# Patient Record
Sex: MALE | Race: WHITE | NOT HISPANIC OR LATINO | Employment: UNEMPLOYED | URBAN - METROPOLITAN AREA
[De-identification: names, ages, dates, MRNs, and addresses within clinical notes are randomized per-mention and may not be internally consistent; named-entity substitution may affect disease eponyms.]

---

## 2017-06-06 ENCOUNTER — HOSPITAL ENCOUNTER (EMERGENCY)
Facility: HOSPITAL | Age: 47
Discharge: HOME | End: 2017-06-06
Attending: EMERGENCY MEDICINE | Admitting: EMERGENCY MEDICINE
Payer: COMMERCIAL

## 2017-06-06 ENCOUNTER — APPOINTMENT (EMERGENCY)
Dept: RADIOLOGY | Facility: HOSPITAL | Age: 47
End: 2017-06-06
Payer: COMMERCIAL

## 2017-06-06 VITALS
DIASTOLIC BLOOD PRESSURE: 86 MMHG | WEIGHT: 140 LBS | HEART RATE: 85 BPM | SYSTOLIC BLOOD PRESSURE: 139 MMHG | OXYGEN SATURATION: 99 % | RESPIRATION RATE: 18 BRPM | TEMPERATURE: 98.9 F | HEIGHT: 67 IN | BODY MASS INDEX: 21.97 KG/M2

## 2017-06-06 DIAGNOSIS — K57.92 DIVERTICULITIS: Primary | ICD-10-CM

## 2017-06-06 LAB
ALBUMIN SERPL BCP-MCNC: 3.2 G/DL (ref 3.5–5)
ALP SERPL-CCNC: 50 U/L (ref 46–116)
ALT SERPL W P-5'-P-CCNC: 12 U/L (ref 12–78)
ANION GAP SERPL CALCULATED.3IONS-SCNC: 8 MMOL/L (ref 4–13)
AST SERPL W P-5'-P-CCNC: 9 U/L (ref 5–45)
BACTERIA UR QL AUTO: ABNORMAL /HPF
BASOPHILS # BLD AUTO: 0 THOUSANDS/ΜL (ref 0–0.1)
BASOPHILS NFR BLD AUTO: 0 % (ref 0–1)
BILIRUB SERPL-MCNC: 0.4 MG/DL (ref 0.2–1)
BILIRUB UR QL STRIP: NEGATIVE
BUN SERPL-MCNC: 8 MG/DL (ref 5–25)
CALCIUM SERPL-MCNC: 8.6 MG/DL (ref 8.3–10.1)
CHLORIDE SERPL-SCNC: 103 MMOL/L (ref 100–108)
CLARITY UR: CLEAR
CO2 SERPL-SCNC: 29 MMOL/L (ref 21–32)
COLOR UR: ABNORMAL
CREAT SERPL-MCNC: 0.84 MG/DL (ref 0.6–1.3)
EOSINOPHIL # BLD AUTO: 0 THOUSAND/ΜL (ref 0–0.61)
EOSINOPHIL NFR BLD AUTO: 1 % (ref 0–6)
ERYTHROCYTE [DISTWIDTH] IN BLOOD BY AUTOMATED COUNT: 13.3 % (ref 11.6–15.1)
GFR SERPL CREATININE-BSD FRML MDRD: >60 ML/MIN/1.73SQ M
GLUCOSE SERPL-MCNC: 103 MG/DL (ref 65–140)
GLUCOSE UR STRIP-MCNC: NEGATIVE MG/DL
HCT VFR BLD AUTO: 44 % (ref 42–52)
HGB BLD-MCNC: 14.9 G/DL (ref 14–18)
HGB UR QL STRIP.AUTO: ABNORMAL
KETONES UR STRIP-MCNC: NEGATIVE MG/DL
LEUKOCYTE ESTERASE UR QL STRIP: NEGATIVE
LIPASE SERPL-CCNC: 175 U/L (ref 73–393)
LYMPHOCYTES # BLD AUTO: 1.3 THOUSANDS/ΜL (ref 0.6–4.47)
LYMPHOCYTES NFR BLD AUTO: 19 % (ref 14–44)
MCH RBC QN AUTO: 31 PG (ref 27–31)
MCHC RBC AUTO-ENTMCNC: 34 G/DL (ref 31.4–37.4)
MCV RBC AUTO: 91 FL (ref 82–98)
MONOCYTES # BLD AUTO: 0.7 THOUSAND/ΜL (ref 0.17–1.22)
MONOCYTES NFR BLD AUTO: 11 % (ref 4–12)
NEUTROPHILS # BLD AUTO: 4.5 THOUSANDS/ΜL (ref 1.85–7.62)
NEUTS SEG NFR BLD AUTO: 69 % (ref 43–75)
NITRITE UR QL STRIP: NEGATIVE
NON-SQ EPI CELLS URNS QL MICRO: ABNORMAL /HPF
NRBC BLD AUTO-RTO: 0 /100 WBCS
PH UR STRIP.AUTO: 6 [PH] (ref 5–9)
PLATELET # BLD AUTO: 198 THOUSANDS/UL (ref 130–400)
PMV BLD AUTO: 6.8 FL (ref 8.9–12.7)
POTASSIUM SERPL-SCNC: 4.3 MMOL/L (ref 3.5–5.3)
PROT SERPL-MCNC: 6.7 G/DL (ref 6.4–8.2)
PROT UR STRIP-MCNC: NEGATIVE MG/DL
RBC # BLD AUTO: 4.82 MILLION/UL (ref 4.7–6.1)
RBC #/AREA URNS AUTO: ABNORMAL /HPF
SODIUM SERPL-SCNC: 140 MMOL/L (ref 136–145)
SP GR UR STRIP.AUTO: <=1.005 (ref 1–1.03)
UROBILINOGEN UR QL STRIP.AUTO: 0.2 E.U./DL
WBC # BLD AUTO: 6.6 THOUSAND/UL (ref 4.8–10.8)
WBC #/AREA URNS AUTO: ABNORMAL /HPF

## 2017-06-06 PROCEDURE — 96360 HYDRATION IV INFUSION INIT: CPT

## 2017-06-06 PROCEDURE — 81001 URINALYSIS AUTO W/SCOPE: CPT | Performed by: EMERGENCY MEDICINE

## 2017-06-06 PROCEDURE — 74177 CT ABD & PELVIS W/CONTRAST: CPT

## 2017-06-06 PROCEDURE — 36415 COLL VENOUS BLD VENIPUNCTURE: CPT | Performed by: EMERGENCY MEDICINE

## 2017-06-06 PROCEDURE — 80053 COMPREHEN METABOLIC PANEL: CPT | Performed by: EMERGENCY MEDICINE

## 2017-06-06 PROCEDURE — 99284 EMERGENCY DEPT VISIT MOD MDM: CPT

## 2017-06-06 PROCEDURE — 85025 COMPLETE CBC W/AUTO DIFF WBC: CPT | Performed by: EMERGENCY MEDICINE

## 2017-06-06 PROCEDURE — 96361 HYDRATE IV INFUSION ADD-ON: CPT

## 2017-06-06 PROCEDURE — 83690 ASSAY OF LIPASE: CPT | Performed by: EMERGENCY MEDICINE

## 2017-06-06 RX ORDER — METRONIDAZOLE 500 MG/1
500 TABLET ORAL ONCE
Status: COMPLETED | OUTPATIENT
Start: 2017-06-06 | End: 2017-06-06

## 2017-06-06 RX ORDER — METRONIDAZOLE 500 MG/1
500 TABLET ORAL 3 TIMES DAILY
Qty: 30 TABLET | Refills: 0 | Status: SHIPPED | OUTPATIENT
Start: 2017-06-06 | End: 2017-06-16

## 2017-06-06 RX ORDER — MORPHINE SULFATE 2 MG/ML
2 INJECTION, SOLUTION INTRAMUSCULAR; INTRAVENOUS ONCE
Status: DISCONTINUED | OUTPATIENT
Start: 2017-06-06 | End: 2017-06-06 | Stop reason: HOSPADM

## 2017-06-06 RX ORDER — LEVOFLOXACIN 500 MG/1
500 TABLET, FILM COATED ORAL ONCE
Status: COMPLETED | OUTPATIENT
Start: 2017-06-06 | End: 2017-06-06

## 2017-06-06 RX ORDER — HYDROCODONE BITARTRATE AND ACETAMINOPHEN 5; 325 MG/1; MG/1
1 TABLET ORAL EVERY 8 HOURS PRN
Qty: 10 TABLET | Refills: 0 | Status: SHIPPED | OUTPATIENT
Start: 2017-06-06 | End: 2017-06-09

## 2017-06-06 RX ORDER — LEVOFLOXACIN 500 MG/1
500 TABLET, FILM COATED ORAL DAILY
Qty: 10 TABLET | Refills: 0 | Status: SHIPPED | OUTPATIENT
Start: 2017-06-06 | End: 2017-06-16

## 2017-06-06 RX ADMIN — LEVOFLOXACIN 500 MG: 500 TABLET, FILM COATED ORAL at 19:34

## 2017-06-06 RX ADMIN — METRONIDAZOLE 500 MG: 500 TABLET ORAL at 19:34

## 2017-06-06 RX ADMIN — IOHEXOL 100 ML: 350 INJECTION, SOLUTION INTRAVENOUS at 18:51

## 2017-06-06 RX ADMIN — SODIUM CHLORIDE 1000 ML: 0.9 INJECTION, SOLUTION INTRAVENOUS at 17:56

## 2017-06-07 ENCOUNTER — ALLSCRIPTS OFFICE VISIT (OUTPATIENT)
Dept: OTHER | Facility: OTHER | Age: 47
End: 2017-06-07

## 2017-07-06 ENCOUNTER — ALLSCRIPTS OFFICE VISIT (OUTPATIENT)
Dept: OTHER | Facility: OTHER | Age: 47
End: 2017-07-06

## 2017-08-14 ENCOUNTER — GENERIC CONVERSION - ENCOUNTER (OUTPATIENT)
Dept: OTHER | Facility: OTHER | Age: 47
End: 2017-08-14

## 2017-08-14 ENCOUNTER — ANESTHESIA EVENT (OUTPATIENT)
Dept: GASTROENTEROLOGY | Facility: AMBULARY SURGERY CENTER | Age: 47
End: 2017-08-14
Payer: COMMERCIAL

## 2017-08-14 ENCOUNTER — HOSPITAL ENCOUNTER (OUTPATIENT)
Facility: AMBULARY SURGERY CENTER | Age: 47
Setting detail: OUTPATIENT SURGERY
Discharge: HOME | End: 2017-08-14
Attending: INTERNAL MEDICINE | Admitting: INTERNAL MEDICINE
Payer: COMMERCIAL

## 2017-08-14 VITALS
OXYGEN SATURATION: 97 % | WEIGHT: 135 LBS | SYSTOLIC BLOOD PRESSURE: 121 MMHG | TEMPERATURE: 97.1 F | RESPIRATION RATE: 16 BRPM | HEART RATE: 59 BPM | DIASTOLIC BLOOD PRESSURE: 74 MMHG | BODY MASS INDEX: 21.19 KG/M2 | HEIGHT: 67 IN

## 2017-08-14 DIAGNOSIS — K57.32 DIVERTICULITIS OF LARGE INTESTINE WITHOUT PERFORATION OR ABSCESS WITHOUT BLEEDING: ICD-10-CM

## 2017-08-14 PROCEDURE — 88305 TISSUE EXAM BY PATHOLOGIST: CPT | Performed by: INTERNAL MEDICINE

## 2017-08-14 RX ORDER — SODIUM CHLORIDE, SODIUM LACTATE, POTASSIUM CHLORIDE, CALCIUM CHLORIDE 600; 310; 30; 20 MG/100ML; MG/100ML; MG/100ML; MG/100ML
125 INJECTION, SOLUTION INTRAVENOUS CONTINUOUS
Status: DISCONTINUED | OUTPATIENT
Start: 2017-08-14 | End: 2017-08-14 | Stop reason: HOSPADM

## 2017-08-14 RX ORDER — PROPOFOL 10 MG/ML
INJECTION, EMULSION INTRAVENOUS CONTINUOUS PRN
Status: DISCONTINUED | OUTPATIENT
Start: 2017-08-14 | End: 2017-08-14 | Stop reason: SURG

## 2017-08-14 RX ORDER — PROPOFOL 10 MG/ML
INJECTION, EMULSION INTRAVENOUS AS NEEDED
Status: DISCONTINUED | OUTPATIENT
Start: 2017-08-14 | End: 2017-08-14 | Stop reason: SURG

## 2017-08-14 RX ADMIN — PROPOFOL 100 MG: 10 INJECTION, EMULSION INTRAVENOUS at 12:40

## 2017-08-14 RX ADMIN — PROPOFOL 90 MCG/KG/MIN: 10 INJECTION, EMULSION INTRAVENOUS at 12:40

## 2017-08-14 RX ADMIN — SODIUM CHLORIDE, SODIUM LACTATE, POTASSIUM CHLORIDE, AND CALCIUM CHLORIDE: .6; .31; .03; .02 INJECTION, SOLUTION INTRAVENOUS at 12:31

## 2017-08-21 ENCOUNTER — GENERIC CONVERSION - ENCOUNTER (OUTPATIENT)
Dept: OTHER | Facility: OTHER | Age: 47
End: 2017-08-21

## 2017-09-20 ENCOUNTER — ALLSCRIPTS OFFICE VISIT (OUTPATIENT)
Dept: OTHER | Facility: OTHER | Age: 47
End: 2017-09-20

## 2017-09-22 ENCOUNTER — APPOINTMENT (EMERGENCY)
Dept: RADIOLOGY | Facility: HOSPITAL | Age: 47
DRG: 282 | End: 2017-09-22
Payer: COMMERCIAL

## 2017-09-22 ENCOUNTER — HOSPITAL ENCOUNTER (INPATIENT)
Facility: HOSPITAL | Age: 47
LOS: 2 days | DRG: 282 | End: 2017-09-25
Attending: EMERGENCY MEDICINE | Admitting: INTERNAL MEDICINE
Payer: COMMERCIAL

## 2017-09-22 DIAGNOSIS — R07.9 CHEST PAIN: Primary | ICD-10-CM

## 2017-09-22 DIAGNOSIS — K63.9 DISORDER OF JEJUNUM: ICD-10-CM

## 2017-09-22 PROBLEM — J20.9 ACUTE BRONCHITIS: Status: ACTIVE | Noted: 2017-09-22

## 2017-09-22 LAB
ANION GAP SERPL CALCULATED.3IONS-SCNC: 7 MMOL/L (ref 4–13)
BASOPHILS # BLD AUTO: 0 THOUSANDS/ΜL (ref 0–0.1)
BASOPHILS NFR BLD AUTO: 0 % (ref 0–1)
BUN SERPL-MCNC: 17 MG/DL (ref 5–25)
CALCIUM SERPL-MCNC: 9.2 MG/DL (ref 8.3–10.1)
CHLORIDE SERPL-SCNC: 104 MMOL/L (ref 100–108)
CHOLEST SERPL-MCNC: 174 MG/DL (ref 50–200)
CO2 SERPL-SCNC: 28 MMOL/L (ref 21–32)
CREAT SERPL-MCNC: 0.9 MG/DL (ref 0.6–1.3)
EOSINOPHIL # BLD AUTO: 0.1 THOUSAND/ΜL (ref 0–0.61)
EOSINOPHIL NFR BLD AUTO: 1 % (ref 0–6)
ERYTHROCYTE [DISTWIDTH] IN BLOOD BY AUTOMATED COUNT: 14.2 % (ref 11.6–15.1)
GFR SERPL CREATININE-BSD FRML MDRD: 101 ML/MIN/1.73SQ M
GLUCOSE SERPL-MCNC: 117 MG/DL (ref 65–140)
HCT VFR BLD AUTO: 47.4 % (ref 42–52)
HDLC SERPL-MCNC: 50 MG/DL (ref 40–60)
HGB BLD-MCNC: 15.9 G/DL (ref 14–18)
LDLC SERPL CALC-MCNC: 110 MG/DL (ref 0–100)
LIPASE SERPL-CCNC: 313 U/L (ref 73–393)
LYMPHOCYTES # BLD AUTO: 1.8 THOUSANDS/ΜL (ref 0.6–4.47)
LYMPHOCYTES NFR BLD AUTO: 17 % (ref 14–44)
MCH RBC QN AUTO: 30.7 PG (ref 27–31)
MCHC RBC AUTO-ENTMCNC: 33.6 G/DL (ref 31.4–37.4)
MCV RBC AUTO: 91 FL (ref 82–98)
MONOCYTES # BLD AUTO: 0.6 THOUSAND/ΜL (ref 0.17–1.22)
MONOCYTES NFR BLD AUTO: 6 % (ref 4–12)
NEUTROPHILS # BLD AUTO: 8.2 THOUSANDS/ΜL (ref 1.85–7.62)
NEUTS SEG NFR BLD AUTO: 77 % (ref 43–75)
NRBC BLD AUTO-RTO: 0 /100 WBCS
NT-PROBNP SERPL-MCNC: 86 PG/ML
PLATELET # BLD AUTO: 226 THOUSANDS/UL (ref 130–400)
PMV BLD AUTO: 7 FL (ref 8.9–12.7)
POTASSIUM SERPL-SCNC: 4.2 MMOL/L (ref 3.5–5.3)
RBC # BLD AUTO: 5.19 MILLION/UL (ref 4.7–6.1)
SODIUM SERPL-SCNC: 139 MMOL/L (ref 136–145)
TRIGL SERPL-MCNC: 68 MG/DL
TROPONIN I SERPL-MCNC: 0.02 NG/ML
TROPONIN I SERPL-MCNC: 0.74 NG/ML
TROPONIN I SERPL-MCNC: 3.42 NG/ML
TROPONIN I SERPL-MCNC: 4.03 NG/ML
WBC # BLD AUTO: 10.7 THOUSAND/UL (ref 4.8–10.8)

## 2017-09-22 PROCEDURE — 71010 HB CHEST X-RAY 1 VIEW FRONTAL (PORTABLE): CPT

## 2017-09-22 PROCEDURE — 84484 ASSAY OF TROPONIN QUANT: CPT | Performed by: INTERNAL MEDICINE

## 2017-09-22 PROCEDURE — 99285 EMERGENCY DEPT VISIT HI MDM: CPT

## 2017-09-22 PROCEDURE — 96374 THER/PROPH/DIAG INJ IV PUSH: CPT

## 2017-09-22 PROCEDURE — 93005 ELECTROCARDIOGRAM TRACING: CPT | Performed by: EMERGENCY MEDICINE

## 2017-09-22 PROCEDURE — 83690 ASSAY OF LIPASE: CPT | Performed by: EMERGENCY MEDICINE

## 2017-09-22 PROCEDURE — 96375 TX/PRO/DX INJ NEW DRUG ADDON: CPT

## 2017-09-22 PROCEDURE — 94760 N-INVAS EAR/PLS OXIMETRY 1: CPT

## 2017-09-22 PROCEDURE — 80048 BASIC METABOLIC PNL TOTAL CA: CPT | Performed by: EMERGENCY MEDICINE

## 2017-09-22 PROCEDURE — 80061 LIPID PANEL: CPT | Performed by: INTERNAL MEDICINE

## 2017-09-22 PROCEDURE — 83880 ASSAY OF NATRIURETIC PEPTIDE: CPT | Performed by: EMERGENCY MEDICINE

## 2017-09-22 PROCEDURE — 74175 CTA ABDOMEN W/CONTRAST: CPT

## 2017-09-22 PROCEDURE — 96361 HYDRATE IV INFUSION ADD-ON: CPT

## 2017-09-22 PROCEDURE — 94640 AIRWAY INHALATION TREATMENT: CPT

## 2017-09-22 PROCEDURE — 71275 CT ANGIOGRAPHY CHEST: CPT

## 2017-09-22 PROCEDURE — 85025 COMPLETE CBC W/AUTO DIFF WBC: CPT | Performed by: EMERGENCY MEDICINE

## 2017-09-22 PROCEDURE — 84484 ASSAY OF TROPONIN QUANT: CPT | Performed by: EMERGENCY MEDICINE

## 2017-09-22 PROCEDURE — 87081 CULTURE SCREEN ONLY: CPT | Performed by: INTERNAL MEDICINE

## 2017-09-22 PROCEDURE — 36415 COLL VENOUS BLD VENIPUNCTURE: CPT | Performed by: EMERGENCY MEDICINE

## 2017-09-22 RX ORDER — ONDANSETRON 2 MG/ML
4 INJECTION INTRAMUSCULAR; INTRAVENOUS ONCE
Status: COMPLETED | OUTPATIENT
Start: 2017-09-22 | End: 2017-09-22

## 2017-09-22 RX ORDER — AZITHROMYCIN 250 MG/1
250 TABLET, FILM COATED ORAL EVERY 24 HOURS
Status: DISCONTINUED | OUTPATIENT
Start: 2017-09-22 | End: 2017-09-25 | Stop reason: HOSPADM

## 2017-09-22 RX ORDER — POLYETHYLENE GLYCOL 3350 17 G/17G
17 POWDER, FOR SOLUTION ORAL DAILY
Status: DISCONTINUED | OUTPATIENT
Start: 2017-09-22 | End: 2017-09-25 | Stop reason: HOSPADM

## 2017-09-22 RX ORDER — NITROGLYCERIN 0.4 MG/1
0.4 TABLET SUBLINGUAL ONCE
Status: COMPLETED | OUTPATIENT
Start: 2017-09-22 | End: 2017-09-22

## 2017-09-22 RX ORDER — ALBUTEROL SULFATE 90 UG/1
2 AEROSOL, METERED RESPIRATORY (INHALATION) EVERY 6 HOURS PRN
COMMUNITY
End: 2018-07-05 | Stop reason: SDUPTHER

## 2017-09-22 RX ORDER — ACETAMINOPHEN 325 MG/1
650 TABLET ORAL EVERY 6 HOURS PRN
Status: DISCONTINUED | OUTPATIENT
Start: 2017-09-22 | End: 2017-09-25 | Stop reason: HOSPADM

## 2017-09-22 RX ORDER — AMOXICILLIN AND CLAVULANATE POTASSIUM 500; 125 MG/1; MG/1
1 TABLET, FILM COATED ORAL EVERY 8 HOURS SCHEDULED
COMMUNITY
End: 2017-09-28 | Stop reason: HOSPADM

## 2017-09-22 RX ORDER — MORPHINE SULFATE 4 MG/ML
4 INJECTION, SOLUTION INTRAMUSCULAR; INTRAVENOUS ONCE
Status: COMPLETED | OUTPATIENT
Start: 2017-09-22 | End: 2017-09-22

## 2017-09-22 RX ORDER — ONDANSETRON 2 MG/ML
4 INJECTION INTRAMUSCULAR; INTRAVENOUS EVERY 4 HOURS PRN
Status: DISCONTINUED | OUTPATIENT
Start: 2017-09-22 | End: 2017-09-25 | Stop reason: HOSPADM

## 2017-09-22 RX ORDER — ASPIRIN 81 MG/1
324 TABLET, CHEWABLE ORAL ONCE
Status: COMPLETED | OUTPATIENT
Start: 2017-09-22 | End: 2017-09-22

## 2017-09-22 RX ORDER — NICOTINE 21 MG/24HR
1 PATCH, TRANSDERMAL 24 HOURS TRANSDERMAL DAILY
Status: DISCONTINUED | OUTPATIENT
Start: 2017-09-22 | End: 2017-09-25 | Stop reason: HOSPADM

## 2017-09-22 RX ORDER — IPRATROPIUM BROMIDE AND ALBUTEROL SULFATE 2.5; .5 MG/3ML; MG/3ML
3 SOLUTION RESPIRATORY (INHALATION)
Status: DISCONTINUED | OUTPATIENT
Start: 2017-09-22 | End: 2017-09-25 | Stop reason: HOSPADM

## 2017-09-22 RX ORDER — PANTOPRAZOLE SODIUM 40 MG/1
40 TABLET, DELAYED RELEASE ORAL
Status: DISCONTINUED | OUTPATIENT
Start: 2017-09-23 | End: 2017-09-25 | Stop reason: HOSPADM

## 2017-09-22 RX ORDER — ATORVASTATIN CALCIUM 80 MG/1
80 TABLET, FILM COATED ORAL
Status: DISCONTINUED | OUTPATIENT
Start: 2017-09-22 | End: 2017-09-25 | Stop reason: HOSPADM

## 2017-09-22 RX ORDER — PREDNISONE 1 MG/1
TABLET ORAL DAILY
COMMUNITY
End: 2017-09-28 | Stop reason: HOSPADM

## 2017-09-22 RX ORDER — MAGNESIUM HYDROXIDE/ALUMINUM HYDROXICE/SIMETHICONE 120; 1200; 1200 MG/30ML; MG/30ML; MG/30ML
30 SUSPENSION ORAL EVERY 6 HOURS PRN
Status: DISCONTINUED | OUTPATIENT
Start: 2017-09-22 | End: 2017-09-25 | Stop reason: HOSPADM

## 2017-09-22 RX ORDER — ASPIRIN 81 MG/1
81 TABLET ORAL DAILY
Status: DISCONTINUED | OUTPATIENT
Start: 2017-09-23 | End: 2017-09-25 | Stop reason: HOSPADM

## 2017-09-22 RX ADMIN — ATORVASTATIN CALCIUM 80 MG: 80 TABLET, FILM COATED ORAL at 18:49

## 2017-09-22 RX ADMIN — NITROGLYCERIN 0.4 MG: 0.4 TABLET SUBLINGUAL at 12:57

## 2017-09-22 RX ADMIN — IPRATROPIUM BROMIDE AND ALBUTEROL SULFATE 3 ML: .5; 3 SOLUTION RESPIRATORY (INHALATION) at 21:01

## 2017-09-22 RX ADMIN — ENOXAPARIN SODIUM 20 MG: 30 INJECTION SUBCUTANEOUS at 18:49

## 2017-09-22 RX ADMIN — SODIUM CHLORIDE 1000 ML: 0.9 INJECTION, SOLUTION INTRAVENOUS at 13:01

## 2017-09-22 RX ADMIN — MORPHINE SULFATE 4 MG: 4 INJECTION, SOLUTION INTRAMUSCULAR; INTRAVENOUS at 13:01

## 2017-09-22 RX ADMIN — ENOXAPARIN SODIUM 40 MG: 40 INJECTION SUBCUTANEOUS at 17:21

## 2017-09-22 RX ADMIN — POLYETHYLENE GLYCOL 3350 17 G: 17 POWDER, FOR SOLUTION ORAL at 17:17

## 2017-09-22 RX ADMIN — ONDANSETRON 4 MG: 2 INJECTION INTRAMUSCULAR; INTRAVENOUS at 12:57

## 2017-09-22 RX ADMIN — IOHEXOL 100 ML: 350 INJECTION, SOLUTION INTRAVENOUS at 13:07

## 2017-09-22 RX ADMIN — AZITHROMYCIN 250 MG: 250 TABLET, FILM COATED ORAL at 17:20

## 2017-09-22 RX ADMIN — ASPIRIN 81 MG 324 MG: 81 TABLET ORAL at 12:59

## 2017-09-22 RX ADMIN — NICOTINE 1 PATCH: 21 PATCH, EXTENDED RELEASE TRANSDERMAL at 17:19

## 2017-09-22 NOTE — ED PROVIDER NOTES
History  Chief Complaint   Patient presents with    Chest Pain     chest pain for the past half hour, pt diagnosed yesterday with bronchitis, pt has hx of reflux but sts this is different  pt coughing up yellow mucous      Chest Pain   Pain location:  Substernal area and epigastric  Pain quality: aching    Pain radiates to:  Does not radiate  Pain severity:  Moderate  Onset quality:  Gradual  Timing:  Constant  Progression:  Worsening  Chronicity:  New  Relieved by:  Nothing  Worsened by:  Nothing tried  Ineffective treatments:  None tried  Associated symptoms: no abdominal pain, no cough, no dizziness, no dysphagia, no fever, no headache, no nausea, no numbness, no shortness of breath and no weakness    Associated symptoms comment:  Chest pain with cough patient recently diagnosed with bronchitis started on outpatient antibiotics with no improvement started with worsening pain earlier today with radiation to the mid back  No neurological deficits noted no nausea no vomiting  Patient had no diarrhea as well  Prior to Admission Medications   Prescriptions Last Dose Informant Patient Reported? Taking? albuterol (PROVENTIL HFA,VENTOLIN HFA) 90 mcg/act inhaler   Yes Yes   Sig: Inhale 2 puffs every 6 (six) hours as needed for wheezing      Facility-Administered Medications: None       Past Medical History:   Diagnosis Date    Diverticulitis     GERD (gastroesophageal reflux disease)     off medicine for past few years    Wears glasses        Past Surgical History:   Procedure Laterality Date    APPENDECTOMY  1974    COLONOSCOPY N/A 8/14/2017    Procedure: COLONOSCOPY;  Surgeon: Larissa Wallace MD;  Location: [facility]; Service: Gastroenterology    THUMB AMPUTATION Left 1980       Family History   Problem Relation Age of Onset    Heart disease Father      CABG    Heart disease Maternal Grandfather      I have reviewed and agree with the history as documented      Social History   Substance Use Topics  Smoking status: Current Every Day Smoker     Packs/day: 2 00     Years: 28 00     Types: Cigarettes    Smokeless tobacco: Never Used    Alcohol use Yes      Comment: rarely        Review of Systems   Constitutional: Negative for chills and fever  HENT: Negative for rhinorrhea, sore throat and trouble swallowing  Eyes: Negative for pain  Respiratory: Negative for cough, shortness of breath, wheezing and stridor  Cardiovascular: Positive for chest pain  Negative for leg swelling  Gastrointestinal: Negative for abdominal pain, diarrhea and nausea  Endocrine: Negative for polyuria  Genitourinary: Negative for dysuria, flank pain and urgency  Musculoskeletal: Negative for joint swelling, myalgias and neck stiffness  Skin: Negative for rash  Allergic/Immunologic: Negative for immunocompromised state  Neurological: Negative for dizziness, syncope, weakness, numbness and headaches  Psychiatric/Behavioral: Negative for confusion and suicidal ideas  All other systems reviewed and are negative  Physical Exam  ED Triage Vitals [09/22/17 1242]   Temperature Pulse Respirations Blood Pressure SpO2   97 8 °F (36 6 °C) 83 16 139/76 93 %      Temp Source Heart Rate Source Patient Position - Orthostatic VS BP Location FiO2 (%)   Oral Monitor Sitting Left arm --      Pain Score       7           Physical Exam   Constitutional: He is oriented to person, place, and time  He appears well-developed and well-nourished  HENT:   Head: Normocephalic and atraumatic  Eyes: EOM are normal  Pupils are equal, round, and reactive to light  Neck: Normal range of motion  Neck supple  Cardiovascular: Normal rate and regular rhythm  Exam reveals no friction rub  No murmur heard  Pulmonary/Chest: No respiratory distress  He has wheezes  He has no rales  Abdominal: Soft  Bowel sounds are normal  He exhibits no distension  There is no tenderness  Musculoskeletal: Normal range of motion   He exhibits no edema or tenderness  Neurological: He is alert and oriented to person, place, and time  Skin: Skin is warm  No rash noted  Psychiatric: He has a normal mood and affect  Nursing note and vitals reviewed        ED Medications  Medications   aspirin chewable tablet 324 mg (324 mg Oral Given 9/22/17 1259)   nitroglycerin (NITROSTAT) SL tablet 0 4 mg (0 4 mg Sublingual Given 9/22/17 1257)   morphine (PF) 4 mg/mL injection 4 mg (4 mg Intravenous Given 9/22/17 1301)   sodium chloride 0 9 % bolus 1,000 mL (1,000 mL Intravenous New Bag 9/22/17 1301)   ondansetron (ZOFRAN) injection 4 mg (4 mg Intravenous Given 9/22/17 1257)   iohexol (OMNIPAQUE) 350 MG/ML injection (MULTI-DOSE) 100 mL (100 mL Intravenous Given 9/22/17 1307)       Diagnostic Studies  Labs Reviewed   CBC AND DIFFERENTIAL - Abnormal        Result Value Ref Range Status    MPV 7 0 (*) 8 9 - 12 7 fL Final    Neutrophils Relative 77 (*) 43 - 75 % Final    Neutrophils Absolute 8 20 (*) 1 85 - 7 62 Thousands/µL Final    WBC 10 70  4 80 - 10 80 Thousand/uL Final    RBC 5 19  4 70 - 6 10 Million/uL Final    Hemoglobin 15 9  14 0 - 18 0 g/dL Final    Hematocrit 47 4  42 0 - 52 0 % Final    MCV 91  82 - 98 fL Final    MCH 30 7  27 0 - 31 0 pg Final    MCHC 33 6  31 4 - 37 4 g/dL Final    RDW 14 2  11 6 - 15 1 % Final    Platelets 497  890 - 400 Thousands/uL Final    nRBC 0  /100 WBCs Final    Lymphocytes Relative 17  14 - 44 % Final    Monocytes Relative 6  4 - 12 % Final    Eosinophils Relative 1  0 - 6 % Final    Basophils Relative 0  0 - 1 % Final    Lymphocytes Absolute 1 80  0 60 - 4 47 Thousands/µL Final    Monocytes Absolute 0 60  0 17 - 1 22 Thousand/µL Final    Eosinophils Absolute 0 10  0 00 - 0 61 Thousand/µL Final    Basophils Absolute 0 00  0 00 - 0 10 Thousands/µL Final   NT-BNP PRO (BRAIN NATRIURETIC PEPTIDE) - Normal    NT-proBNP 86  <125 pg/mL Final   LIPASE - Normal    Lipase 313  73 - 393 u/L Final   TROPONIN I - Normal    Troponin I 0 02 <=0 04 ng/mL Final    Comment: 3Autovalidation override    Narrative:     [employer] Chemistry analyzer 99% cutoff is > 0 04 ng/mL in network labs    o cTnI 99% cutoff is useful only when applied to patients in the clinical setting of myocardial ischemia  o cTnI 99% cutoff should be interpreted in the context of clinical history, ECG findings and possibly cardiac imaging to establish correct diagnosis  o cTnI 99% cutoff may be suggestive but clearly not indicative of a coronary event without the clinical setting of myocardial ischemia  BASIC METABOLIC PANEL    Sodium 744  136 - 145 mmol/L Final    Potassium 4 2  3 5 - 5 3 mmol/L Final    Chloride 104  100 - 108 mmol/L Final    CO2 28  21 - 32 mmol/L Final    Anion Gap 7  4 - 13 mmol/L Final    BUN 17  5 - 25 mg/dL Final    Creatinine 0 90  0 60 - 1 30 mg/dL Final    Comment: Standardized to IDMS reference method    Glucose 117  65 - 140 mg/dL Final    Comment:   If the patient is fasting, the ADA then defines impaired fasting glucose as > 100 mg/dL and diabetes as > or equal to 123 mg/dL  Specimen collection should occur prior to Sulfasalazine administration due to the potential for falsely depressed results  Specimen collection should occur prior to Sulfapyridine administration due to the potential for falsely elevated results  Calcium 9 2  8 3 - 10 1 mg/dL Final    eGFR 101  ml/min/1 73sq m Final    Narrative:     National Kidney Disease Education Program recommendations are as follows:  GFR calculation is accurate only with a steady state creatinine  Chronic Kidney disease less than 60 ml/min/1 73 sq  meters  Kidney failure less than 15 ml/min/1 73 sq  meters  LIGHT BLUE TOP   RED TOP       X-ray chest 1 view portable   Final Result      COPD  No active pulmonary disease  Workstation performed: [id number]         CTA dissection protocol chest and abdomen   Final Result      No evidence of aortic aneurysm or dissection  Mild to moderate COPD  Mildly thick-walled jejunal loop with slight hyperemia of the bowel, perhaps related to underdistention, cannot entirely exclude enteritis  No inflammatory changes in the adjacent mesentery  Workstation performed: [id number]             Procedures  ECG 12 Lead Documentation  Date/Time: 9/22/2017 12:38 PM  Performed by: Kalpana Clarke by: Zay Macedo     ECG reviewed by me, the ED Provider: yes    Patient location:  ED  Previous ECG:     Previous ECG:  Compared to current    Similarity:  No change  Interpretation:     Interpretation: normal    Rate:     ECG rate assessment: normal    Rhythm:     Rhythm: sinus rhythm    Ectopy:     Ectopy: none    QRS:     QRS axis:  Normal    QRS intervals:  Normal  Conduction:     Conduction: normal    ST segments:     ST segments:  Normal  T waves:     T waves: normal            Phone Contacts  ED Phone Contact    ED Course  ED Course          HEART Risk Score    Flowsheet Row Most Recent Value   History  1 Filed at: 09/22/2017 1403   ECG  0 Filed at: 09/22/2017 1403   Age  1 Filed at: 09/22/2017 1403   Risk Factors  2 Filed at: 09/22/2017 1403   Troponin  0 Filed at: 09/22/2017 1403   Heart Score Risk Calculator   History  1 Filed at: 09/22/2017 1403   ECG  0 Filed at: 09/22/2017 1403   Age  1 Filed at: 09/22/2017 1403   Risk Factors  2 Filed at: 09/22/2017 1403   Troponin  0 Filed at: 09/22/2017 1403   HEART Score  4 Filed at: 09/22/2017 1403   HEART Score  4 Filed at: 09/22/2017 1403                            MDM  Number of Diagnoses or Management Options  Chest pain: new and requires workup  Diagnosis management comments: 57-year-old male presents with chest pain noted risk factors for cardiac disease significant risk factors with a heart score of 3  Pain is controlled CT scan for dissection protocol as negative  Plan on inpatient management and evaluation spoke with the hospitalist accepted the patient    Aspirin morphine and nitro given with improvement in the patient's symptoms  Amount and/or Complexity of Data Reviewed  Clinical lab tests: reviewed and ordered  Tests in the radiology section of CPT®: ordered and reviewed  Review and summarize past medical records: yes      CritCare Time    Disposition  Final diagnoses:   Chest pain     ED Disposition     ED Disposition Condition Comment    Admit        Follow-up Information    None       Patient's Medications   Discharge Prescriptions    No medications on file     No discharge procedures on file      ED Provider  Electronically Signed by       Abdoulaye Lam DO  09/22/17 3824

## 2017-09-22 NOTE — CONSULTS
CARDIOLOGY CONSULTATION  Jordon Mark 48 y o  male MRN: [id number]  Unit/Bed#: [address] [place] [address] Encounter: [id number]      History of Present Illness   Physician Requesting Consult: Jj Haddad MD  Reason for Consult / Principal Problem: chest pain    Assessment/Plan   Non STEMI- possible type 1 versus 2  She has significant risk factors including prolonged smoking, family history, and age  He currently has no active chest pain  His EKG 12 lead shows no acute ST T wave changes-  Continue troponin x3  Plan for nuclear stress test in a m  echo 2D   aspirin plus atorvastatin 80 mg   Given his significant risk factors and recent chest pain this morning will start Lovenox therapy  Recent bronchitis- wheezing on examination  Continue Zithromax plus pulmonary toilet    Nicotine dependence- smoking cessation      HPI: Jordon Mark is a 48y o  year old male who presents with 10/10 chest pain this morning which lasted 15 minutes and subsided  However he also reports having recent cough and productive yellow phlegm  He was treated for bronchitis and started on prednisone and amoxicillin  He denies having any prior history of a myocardial infarction or stent placement  He states not being very active and having shortness of breath walking up 2 flights of stairs  He denies having lower extremity edema  He denies having nausea vomiting diarrhea  He denies having any bleeding history  He denies having any history of strokes  Historical Information   Past Medical History:   Diagnosis Date    Diverticulitis     GERD (gastroesophageal reflux disease)     off medicine for past few years    Wears glasses      Past Surgical History:   Procedure Laterality Date    APPENDECTOMY  1974    COLONOSCOPY N/A 8/14/2017    Procedure: COLONOSCOPY;  Surgeon: Ronald Francis MD;  Location: [place] [address] GI LAB;   Service: Gastroenterology    THUMB AMPUTATION Left 1980     History   Alcohol Use    Yes     Comment: rarely History   Drug Use No     History   Smoking Status    Current Every Day Smoker    Packs/day: 2 00    Years: 28 00    Types: Cigarettes   Smokeless Tobacco    Never Used     Family History   Problem Relation Age of Onset    Heart disease Father      CABG    Heart disease Maternal Grandfather        Meds/Allergies   Prior to Admission medications    Medication Sig Start Date End Date Taking?  Authorizing Provider   albuterol (PROVENTIL HFA,VENTOLIN HFA) 90 mcg/act inhaler Inhale 2 puffs every 6 (six) hours as needed for wheezing   Yes Historical Provider, MD   amoxicillin-clavulanate (AUGMENTIN) 500-125 mg per tablet Take 1 tablet by mouth every 8 (eight) hours   Yes Historical Provider, MD   predniSONE 1 mg tablet Take by mouth daily   Yes Historical Provider, MD     Current Facility-Administered Medications   Medication Dose Route Frequency Provider Last Rate Last Dose    acetaminophen (TYLENOL) tablet 650 mg  650 mg Oral Q6H PRN Luis M Varghese MD        aluminum-magnesium hydroxide-simethicone (MYLANTA) 200-200-20 mg/5 mL oral suspension 30 mL  30 mL Oral Q6H PRN Luis M Varghese MD        [START ON 9/23/2017] aspirin (ECOTRIN LOW STRENGTH) EC tablet 81 mg  81 mg Oral Daily Luis M Varghese MD        atorvastatin (LIPITOR) tablet 80 mg  80 mg Oral Daily With Yola Lombardi MD        azithromycin (ZITHROMAX) tablet 250 mg  250 mg Oral Q24H Luis M Varghese MD   250 mg at 09/22/17 1720    enoxaparin (LOVENOX) subcutaneous injection 20 mg  20 mg Subcutaneous Once Luis M Varghese MD        enoxaparin (LOVENOX) subcutaneous injection 60 mg  1 mg/kg Subcutaneous Q12H [facility] Luis M Varghese MD        ipratropium-albuterol (DUO-NEB) 0 5-2 5 mg/mL inhalation solution 3 mL  3 mL Nebulization Q6H Luis M Varghese MD        nicotine (NICODERM CQ) 21 mg/24 hr TD 24 hr patch 1 patch  1 patch Transdermal Daily Luis M Varghese MD   1 patch at 09/22/17 1719    ondansetron (ZOFRAN) injection 4 mg  4 mg Intravenous Q4H PRN Luis M Varghese MD        [START ON 9/23/2017] pantoprazole (PROTONIX) EC tablet 40 mg  40 mg Oral Early Morning Luis M Varghese MD        polyethylene glycol (MIRALAX) packet 17 g  17 g Oral Daily Luis M Varghese MD   17 g at 09/22/17 1717     No Known Allergies    Review of systems  CONSTITUTIONAL:  No weight loss, fever, chills, weakness or fatigue  HEENT:  Eyes:  No visual loss, blurred vision, double vision or yellow sclerae  Ears, Nose, Throat:  No hearing loss, sneezing, congestion, runny nose or sore throat  SKIN:  No rash or itching  CARDIOVASCULAR:  As per history and physical  RESPIRATORY:  No shortness of breath, cough or sputum  GASTROINTESTINAL:  No anorexia, nausea, vomiting or diarrhea  No abdominal pain or blood  GENITOURINARY:  Burning on urination  No flank pain  NEUROLOGICAL:  No headache, dizziness, syncope, paralysis, ataxia, numbness or tingling in the extremities  No change in bowel or bladder control  MUSCULOSKELETAL:  No muscle, back pain, joint pain or stiffness  HEMATOLOGIC:  No anemia, bleeding or bruising  LYMPHATICS:  No enlarged nodes  No history of splenectomy  PSYCHIATRIC:  No active suicidal or homicidal ideation  ENDOCRINOLOGIC:  No reports of sweating, cold or heat intolerance  No polyuria or polydipsia  ALLERGIES:  No history of asthma, hives, eczema or rhinitis  More than 10 systems reviewed and otherwise noncontributory  Objective   Vitals: Blood pressure 144/63, pulse 75, temperature 97 9 °F (36 6 °C), temperature source Oral, resp  rate 17, height 5' 6" (1 676 m), weight 64 kg (141 lb 1 5 oz), SpO2 95 %  Physical Exam   Constitutional: He is oriented to person, place, and time  He appears well-developed and well-nourished  No distress  HENT:   Head: Normocephalic and atraumatic  Right Ear: External ear normal    Left Ear: External ear normal    Nose: Nose normal    Mouth/Throat: No oropharyngeal exudate     Eyes: Conjunctivae are normal  Pupils are equal, round, and reactive to light  Right eye exhibits no discharge  Left eye exhibits no discharge  No scleral icterus  Neck: Normal range of motion  No JVD present  No tracheal deviation present  No thyromegaly present  Cardiovascular: Normal rate, regular rhythm and intact distal pulses  Exam reveals no gallop and no friction rub  No murmur heard  Pulmonary/Chest: Effort normal  No stridor  No respiratory distress  He has wheezes  He has no rales  He exhibits no tenderness  Abdominal: Soft  Bowel sounds are normal  He exhibits no distension and no mass  There is no tenderness  There is no rebound and no guarding  Genitourinary:   Genitourinary Comments: No CVA tenderness   Musculoskeletal: He exhibits no edema or tenderness  Neurological: He is alert and oriented to person, place, and time  He displays normal reflexes  He exhibits normal muscle tone  Skin: Skin is warm and dry  No rash noted  He is not diaphoretic  No erythema  Psychiatric: He has a normal mood and affect  His behavior is normal  Judgment and thought content normal    Nursing note and vitals reviewed      Recent Results (from the past 24 hour(s))   CBC and differential    Collection Time: 09/22/17 12:48 PM   Result Value Ref Range    WBC 10 70 4 80 - 10 80 Thousand/uL    RBC 5 19 4 70 - 6 10 Million/uL    Hemoglobin 15 9 14 0 - 18 0 g/dL    Hematocrit 47 4 42 0 - 52 0 %    MCV 91 82 - 98 fL    MCH 30 7 27 0 - 31 0 pg    MCHC 33 6 31 4 - 37 4 g/dL    RDW 14 2 11 6 - 15 1 %    MPV 7 0 (L) 8 9 - 12 7 fL    Platelets 836 179 - 830 Thousands/uL    nRBC 0 /100 WBCs    Neutrophils Relative 77 (H) 43 - 75 %    Lymphocytes Relative 17 14 - 44 %    Monocytes Relative 6 4 - 12 %    Eosinophils Relative 1 0 - 6 %    Basophils Relative 0 0 - 1 %    Neutrophils Absolute 8 20 (H) 1 85 - 7 62 Thousands/µL    Lymphocytes Absolute 1 80 0 60 - 4 47 Thousands/µL    Monocytes Absolute 0 60 0 17 - 1 22 Thousand/µL    Eosinophils Absolute 0 10 0 00 - 0 61 Thousand/µL    Basophils Absolute 0 00 0 00 - 0 10 Thousands/µL   Basic metabolic panel    Collection Time: 09/22/17 12:48 PM   Result Value Ref Range    Sodium 139 136 - 145 mmol/L    Potassium 4 2 3 5 - 5 3 mmol/L    Chloride 104 100 - 108 mmol/L    CO2 28 21 - 32 mmol/L    Anion Gap 7 4 - 13 mmol/L    BUN 17 5 - 25 mg/dL    Creatinine 0 90 0 60 - 1 30 mg/dL    Glucose 117 65 - 140 mg/dL    Calcium 9 2 8 3 - 10 1 mg/dL    eGFR 101 ml/min/1 73sq m   B-type natriuretic peptide    Collection Time: 09/22/17 12:48 PM   Result Value Ref Range    NT-proBNP 86 <125 pg/mL   Lipase    Collection Time: 09/22/17 12:48 PM   Result Value Ref Range    Lipase 313 73 - 393 u/L   Troponin I    Collection Time: 09/22/17 12:48 PM   Result Value Ref Range    Troponin I 0 02 <=0 04 ng/mL   Troponin I    Collection Time: 09/22/17  4:17 PM   Result Value Ref Range    Troponin I 0 74 (H) <=0 04 ng/mL   Lipid panel    Collection Time: 09/22/17  4:17 PM   Result Value Ref Range    Cholesterol 174 50 - 200 mg/dL    Triglycerides 68 <=150 mg/dL    HDL, Direct 50 40 - 60 mg/dL    LDL Calculated 110 (H) 0 - 100 mg/dL     Imaging: I have personally reviewed pertinent films in PACS  EKG:  Normal sinus rhythm no acute ST T wave changes    Counseling / Coordination of Care  Total floor / unit time spent today 70 minutes   Greater than fifty percent of time spent at bedside for coordination of care, patient counseling, history taking:  NSTEMI management with hospitalist

## 2017-09-22 NOTE — PLAN OF CARE
Problem: RESPIRATORY - ADULT  Goal: Achieves optimal ventilation and oxygenation  INTERVENTIONS:  - Assess for changes in respiratory status  - Assess for changes in mentation and behavior  - Position to facilitate oxygenation and minimize respiratory effort  - Oxygen administration by appropriate delivery method based on oxygen saturation (per order) or ABGs  - Initiate smoking cessation education as indicated  - Encourage broncho-pulmonary hygiene including cough, deep breathe, Incentive Spirometry  - Assess the need for suctioning and aspirate as needed  - Assess and instruct to report SOB or any respiratory difficulty  - Respiratory Therapy support as indicated  Outcome: Progressing  resp care plan initiated will update on 9/25

## 2017-09-22 NOTE — H&P
History and Physical - [facility] Internal Medicine    Patient Information: Shira Santana 48 y o  male MRN: [id number]  Unit/Bed#: [address] [address] Encounter: [id number]  Admitting Physician: Vipul Swanson MD  PCP: Alicia Vale MD  Date of Admission:  09/22/17        Chief Complaint:   Chest pain    History of Present Illness:    Shira Santana is a 48 y o  male who presents with chest pain  Patient was diagnosed with bronchitis and was started on prednisone and amoxicillin  He is still coughing with yellow phlegm  Patient describes the pain as heaviness, retrosternal   He had 2 episodes of 1st 1 lasted for 15 minutes and resolved spontaneously  The other 1 happened while he was visiting his sister and this 1 was more severe associated with shortness of breath, but no nausea or sweating  No known exacerbating or relieving factors  Afebrile  No abd pain, nausea, vomiting, or diarrhea  No dysuria or polyuria  No headaches or dizziness  Review of Systems:  All 10 point review of systems was discussed and otherwise negative    Past Medical and Surgical History:     Past Medical History:   Diagnosis Date    Diverticulitis     GERD (gastroesophageal reflux disease)     off medicine for past few years    Wears glasses        Past Surgical History:   Procedure Laterality Date    APPENDECTOMY  1974    COLONOSCOPY N/A 8/14/2017    Procedure: COLONOSCOPY;  Surgeon: Pedro Vazquez MD;  Location: [facility] GI LAB; Service: Gastroenterology    THUMB AMPUTATION Left 1980       Meds/Allergies:    Prior to Admission medications    Medication Sig Start Date End Date Taking? Authorizing Provider   albuterol (PROVENTIL HFA,VENTOLIN HFA) 90 mcg/act inhaler Inhale 2 puffs every 6 (six) hours as needed for wheezing   Yes Historical Provider, MD     I have reviewed home medications with patient personally      Allergies: No Known Allergies    Social History:     Marital Status:      History   Alcohol Use    Yes Comment: rarely     History   Smoking Status    Current Every Day Smoker    Packs/day: 2 00    Years: 28 00    Types: Cigarettes   Smokeless Tobacco    Never Used     History   Drug Use No       Family History:  Asked and noncontributory    Physical Exam:     Vitals:   Blood Pressure: 144/63 (09/22/17 1527)  Pulse: 74 (09/22/17 1527)  Temperature: 97 9 °F (36 6 °C) (09/22/17 1527)  Temp Source: Oral (09/22/17 1527)  Respirations: 20 (09/22/17 1527)  Height: 5' 6" (167 6 cm) (09/22/17 1528)  Weight - Scale: 64 kg (141 lb 1 5 oz) (09/22/17 1528)  SpO2: 95 % (09/22/17 1527)    General: Alert , Ox3  Head: Normocephalic atraumatic  Eyes: FERNANDO  Anicteric sclera  H&N: Supple neck  No palpable lymphadenopathy  Chest CTA BL  No wheezing or crackles  Heart:  Distant heart sounds  Abd: soft, epigastric tenderness, non distended  Extremities: No oedema  No clubbing or cyanosis  Skin: Intact, no rash  Neuro: Moving all 4 extremities  Additional Data:     Lab Results: I have personally reviewed pertinent reports  Results from last 7 days  Lab Units 09/22/17  1248   WBC Thousand/uL 10 70   HEMOGLOBIN g/dL 15 9   HEMATOCRIT % 47 4   PLATELETS Thousands/uL 226   NEUTROS PCT % 77*   LYMPHS PCT % 17   MONOS PCT % 6   EOS PCT % 1       Results from last 7 days  Lab Units 09/22/17  1248   SODIUM mmol/L 139   POTASSIUM mmol/L 4 2   CHLORIDE mmol/L 104   CO2 mmol/L 28   BUN mg/dL 17   CREATININE mg/dL 0 90   CALCIUM mg/dL 9 2   GLUCOSE RANDOM mg/dL 117           Imaging: I have personally reviewed pertinent reports  and I have personally reviewed pertinent films in PACS    X-ray Chest 1 View Portable    Result Date: 9/22/2017  Narrative: CHEST  INDICATION: Chest pain  COMPARISON: 3/5/2014 VIEWS:  AP frontal IMAGES:  1 FINDINGS: Cardiomediastinal silhouette appears unremarkable  Monitoring wires and leads project over the chest  The lungs are clear  Hyperinflation consistent with COPD   No pneumothorax or pleural effusion  Visualized osseous structures appear within normal limits for the patient's age  Impression: COPD  No active pulmonary disease  Workstation performed: [id number]     Cta Dissection Protocol Chest And Abdomen    Result Date: 9/22/2017  Narrative: CTA - CHEST AND ABDOMEN  - WITHOUT AND WITH IV CONTRAST INDICATION:  Chest pain  Coughing up yellow mucus  COMPARISON:  Chest film earlier this day, 3/5/2014, CT abdomen and pelvis 6/6/2017 TECHNIQUE: CT examination of the chest and abdomen was performed both prior to and after the administration of intravenous contrast   Thin section angiographic arterial phase post contrast technique was used in order to evaluate for aortic dissection  3D reformatted images and volume rendering were performed on an independent workstation  Additionally, reformatted images were created in axial, sagittal, and coronal planes  Radiation dose length product (DLP) for this visit:  594 59 mGy-cm   This examination, like all CT scans performed in the [facility], was performed utilizing techniques to minimize radiation dose exposure, including the use of iterative  reconstruction and automated exposure control  IV Contrast:  100 mL of iohexol (OMNIPAQUE)     Enteric Contrast: Enteric contrast was not administered  FINDINGS: AORTA: There is no aortic dissection  There is no aortic aneurysm  Precontrast images demonstrate no evidence of aortic mural hematoma  Mild atherosclerotic changes are noted  CHEST LUNGS:  Mild to moderate centrilobular and paraseptal emphysema  PLEURA:  Unremarkable  HEART/PULMONARY ARTERIAL TREE:  Heart is unremarkable for the patient's age  Within the limitations of this examination there is no evidence of pulmonary embolus  MEDIASTINUM AND HARSHA:  Unremarkable  CHEST WALL AND LOWER NECK:       Normal  ABDOMEN LIVER/BILIARY TREE:  Small left hepatic lobe cyst, similar  GALLBLADDER:  No calcified gallstones   No pericholecystic inflammatory change  SPLEEN:  Unremarkable  PANCREAS:  Unremarkable  ADRENAL GLANDS:  Unremarkable  KIDNEYS/URETERS:  Tiny right renal cyst  No hydronephrosis  VISUALIZED STOMACH AND BOWEL:  The small bowel is normal caliber  Mildly thick-walled jejunal loop is seen in the mid abdomen with slight hyperemia of the bowel, perhaps related to underdistention  Cannot entirely exclude enteritis  No inflammatory changes in the adjacent mesentery  VISUALIZED ABDOMINOPELVIC CAVITY:  No ascites or free intraperitoneal air  No lymphadenopathy  ABDOMINAL WALL:  Unremarkable  OSSEOUS STRUCTURES:  No acute fracture or destructive osseous lesion  Impression: No evidence of aortic aneurysm or dissection  Mild to moderate COPD  Mildly thick-walled jejunal loop with slight hyperemia of the bowel, perhaps related to underdistention, cannot entirely exclude enteritis  No inflammatory changes in the adjacent mesentery  Workstation performed: [id number]       EKG, Pathology, and Other Studies Reviewed on Admission:   EKG:  Normal sinus rhythm with no ST/T-wave changes    Assessment/Plan:    Hospital Problem List:     Active Problems:    * No active hospital problems  *      Plan for the Primary Problem(s):  Chest pain  Likely GI source  CT scan of the chest showed no evidence of aneurysms or pneumonias  No pleural reactions  Will trend troponins check 2D echo  Keep patient only monitors  Consult Cardiology for possible stress test   Will start patient empirically on Protonix  Will start aspirin  Will check lipid profile    Acute bronchitis  Patient is not wheezing  Will keep patient on Zithromax  Left treatments p r n     Incidental Jejunal Thickness in CT abd  Will consult GI    VTE Prophylaxis: Enoxaparin (Lovenox)  / sequential compression device   Code Status: Fc  POLST: POLST form is not discussed and not completed at this time      Anticipated Length of Stay:  Patient will be admitted on an Observation basis with an anticipated length of stay of  < 2 midnights  Justification for Hospital Stay: work up for chest pain    Total Time for Visit, including Counseling / Coordination of Care: 30 minutes  Greater than 50% of this total time spent on direct patient counseling and coordination of care  ** Please Note: Dragon 360 Dictation voice to text software may have been used in the creation of this document   **

## 2017-09-23 ENCOUNTER — APPOINTMENT (OUTPATIENT)
Dept: NON INVASIVE DIAGNOSTICS | Facility: HOSPITAL | Age: 47
DRG: 282 | End: 2017-09-23
Payer: COMMERCIAL

## 2017-09-23 LAB
ANION GAP SERPL CALCULATED.3IONS-SCNC: 8 MMOL/L (ref 4–13)
BUN SERPL-MCNC: 21 MG/DL (ref 5–25)
CALCIUM SERPL-MCNC: 8.4 MG/DL (ref 8.3–10.1)
CHLORIDE SERPL-SCNC: 108 MMOL/L (ref 100–108)
CO2 SERPL-SCNC: 29 MMOL/L (ref 21–32)
CREAT SERPL-MCNC: 0.83 MG/DL (ref 0.6–1.3)
ERYTHROCYTE [DISTWIDTH] IN BLOOD BY AUTOMATED COUNT: 14.6 % (ref 11.6–15.1)
GFR SERPL CREATININE-BSD FRML MDRD: 104 ML/MIN/1.73SQ M
GLUCOSE P FAST SERPL-MCNC: 95 MG/DL (ref 65–99)
GLUCOSE SERPL-MCNC: 95 MG/DL (ref 65–140)
HCT VFR BLD AUTO: 41.5 % (ref 42–52)
HGB BLD-MCNC: 13.8 G/DL (ref 14–18)
MCH RBC QN AUTO: 30.4 PG (ref 27–31)
MCHC RBC AUTO-ENTMCNC: 33.1 G/DL (ref 31.4–37.4)
MCV RBC AUTO: 92 FL (ref 82–98)
PLATELET # BLD AUTO: 186 THOUSANDS/UL (ref 130–400)
PMV BLD AUTO: 6.9 FL (ref 8.9–12.7)
POTASSIUM SERPL-SCNC: 4.3 MMOL/L (ref 3.5–5.3)
RBC # BLD AUTO: 4.52 MILLION/UL (ref 4.7–6.1)
SODIUM SERPL-SCNC: 145 MMOL/L (ref 136–145)
TROPONIN I SERPL-MCNC: 1.72 NG/ML
TROPONIN I SERPL-MCNC: 2.94 NG/ML
WBC # BLD AUTO: 8 THOUSAND/UL (ref 4.8–10.8)

## 2017-09-23 PROCEDURE — 84484 ASSAY OF TROPONIN QUANT: CPT | Performed by: STUDENT IN AN ORGANIZED HEALTH CARE EDUCATION/TRAINING PROGRAM

## 2017-09-23 PROCEDURE — 85027 COMPLETE CBC AUTOMATED: CPT | Performed by: INTERNAL MEDICINE

## 2017-09-23 PROCEDURE — 94760 N-INVAS EAR/PLS OXIMETRY 1: CPT

## 2017-09-23 PROCEDURE — 93306 TTE W/DOPPLER COMPLETE: CPT

## 2017-09-23 PROCEDURE — 93005 ELECTROCARDIOGRAM TRACING: CPT | Performed by: STUDENT IN AN ORGANIZED HEALTH CARE EDUCATION/TRAINING PROGRAM

## 2017-09-23 PROCEDURE — 94640 AIRWAY INHALATION TREATMENT: CPT

## 2017-09-23 PROCEDURE — 80048 BASIC METABOLIC PNL TOTAL CA: CPT | Performed by: INTERNAL MEDICINE

## 2017-09-23 RX ADMIN — IPRATROPIUM BROMIDE AND ALBUTEROL SULFATE 3 ML: .5; 3 SOLUTION RESPIRATORY (INHALATION) at 14:13

## 2017-09-23 RX ADMIN — IPRATROPIUM BROMIDE AND ALBUTEROL SULFATE 3 ML: .5; 3 SOLUTION RESPIRATORY (INHALATION) at 07:36

## 2017-09-23 RX ADMIN — ENOXAPARIN SODIUM 60 MG: 60 INJECTION SUBCUTANEOUS at 21:26

## 2017-09-23 RX ADMIN — ENOXAPARIN SODIUM 60 MG: 60 INJECTION SUBCUTANEOUS at 08:37

## 2017-09-23 RX ADMIN — ASPIRIN 81 MG: 81 TABLET ORAL at 08:37

## 2017-09-23 RX ADMIN — PANTOPRAZOLE SODIUM 40 MG: 40 TABLET, DELAYED RELEASE ORAL at 05:14

## 2017-09-23 RX ADMIN — AZITHROMYCIN 250 MG: 250 TABLET, FILM COATED ORAL at 16:23

## 2017-09-23 RX ADMIN — IPRATROPIUM BROMIDE AND ALBUTEROL SULFATE 3 ML: .5; 3 SOLUTION RESPIRATORY (INHALATION) at 02:19

## 2017-09-23 RX ADMIN — NICOTINE 1 PATCH: 21 PATCH, EXTENDED RELEASE TRANSDERMAL at 08:38

## 2017-09-23 RX ADMIN — POLYETHYLENE GLYCOL 3350 17 G: 17 POWDER, FOR SOLUTION ORAL at 11:43

## 2017-09-23 RX ADMIN — IPRATROPIUM BROMIDE AND ALBUTEROL SULFATE 3 ML: .5; 3 SOLUTION RESPIRATORY (INHALATION) at 20:50

## 2017-09-23 RX ADMIN — ATORVASTATIN CALCIUM 80 MG: 80 TABLET, FILM COATED ORAL at 16:23

## 2017-09-23 NOTE — CONSULTS
Consultation - [facility]) [facility]  Jordon Mark 48 y o  male MRN: [id number]  Unit/Bed#: [address] [address] Encounter: [id number]        Inpatient consult to gastroenterology  Consult performed by: Daniel Patterson ordered by: Nhan Garcia          Reason for Consult / Principal Problem:  Jejunal wall thickening on CT    HPI: Jordon Mark is a 48y o  year old male with history of GERD who presented to the emergency room yesterday afternoon complaining of chest pain; he had recently been started on prednisone and amoxicillin after being diagnosed with bronchitis, continuing to cough up yellow phlegm  He was seen by Cardiology and is plan for nuclear stress test this morning as well as echocardiogram   His troponin is noted to be elevated since yesterday evening, it went from 0 74 to 3 42   GI consultation was requested when CT scan showed what appeared to be an area of mild jejunal wall thickening with some mild hyperemia of the bowel wall  The patient says that for several months he has been experiencing some postprandial fecal urgency, and sometimes having to go to the bathroom quickly after he has already defecated  He denies any persistent diarrhea, or any rectal bleeding/melena  He says that for a long time he has also dealt with acid reflux issues and heartburn, he denies any problems with difficulty swallowing  He does not take a PPI and he denies any NSAID use  He has never had an EGD, he says he did have colonoscopy 1 month ago, several weeks after an episode of diverticulitis which he says was his 1st   He says that he had some polyps removed during this colonoscopy  Otherwise he denies any recent unexpected weight loss, any nausea or vomiting  He says he was a little tender in the epigastric region in the emergency room yesterday, but he does not have this problem at this time  REVIEW OF SYSTEMS:    CONSTITUTIONAL: Denies any fever, chills, or rigors   Good appetite, and no recent weight loss  HEENT: No earache or tinnitus  Denies hearing loss or visual disturbances  CARDIOVASCULAR: No chest pain or palpitations  RESPIRATORY: Denies any cough, hemoptysis, shortness of breath or dyspnea on exertion  GASTROINTESTINAL: As noted in the History of Present Illness  GENITOURINARY: No problems with urination  Denies any hematuria or dysuria  NEUROLOGIC: No dizziness or vertigo, denies headaches  MUSCULOSKELETAL: Denies any muscle or joint pain  SKIN: Denies skin rashes or itching  ENDOCRINE: Denies excessive thirst  Denies intolerance to heat or cold  PSYCHOSOCIAL: Denies depression or anxiety  Denies any recent memory loss  Historical Information   Past Medical History:   Diagnosis Date    Diverticulitis     GERD (gastroesophageal reflux disease)     off medicine for past few years    Wears glasses      Past Surgical History:   Procedure Laterality Date    APPENDECTOMY  1974    COLONOSCOPY N/A 8/14/2017    Procedure: COLONOSCOPY;  Surgeon: Manoj Cronin MD;  Location: [place] [address] GI LAB;   Service: Gastroenterology    THUMB AMPUTATION Left 1980     Social History   History   Alcohol Use    Yes     Comment: rarely     History   Drug Use No     History   Smoking Status    Current Every Day Smoker    Packs/day: 2 00    Years: 28 00    Types: Cigarettes   Smokeless Tobacco    Never Used     Family History   Problem Relation Age of Onset    Heart disease Father      CABG    Heart disease Maternal Grandfather        Meds/Allergies     Prescriptions Prior to Admission   Medication    albuterol (PROVENTIL HFA,VENTOLIN HFA) 90 mcg/act inhaler    amoxicillin-clavulanate (AUGMENTIN) 500-125 mg per tablet    predniSONE 1 mg tablet     Current Facility-Administered Medications   Medication Dose Route Frequency    acetaminophen (TYLENOL) tablet 650 mg  650 mg Oral Q6H PRN    aluminum-magnesium hydroxide-simethicone (MYLANTA) 200-200-20 mg/5 mL oral suspension 30 mL 30 mL Oral Q6H PRN    aspirin (ECOTRIN LOW STRENGTH) EC tablet 81 mg  81 mg Oral Daily    atorvastatin (LIPITOR) tablet 80 mg  80 mg Oral Daily With Dinner    azithromycin (ZITHROMAX) tablet 250 mg  250 mg Oral Q24H    enoxaparin (LOVENOX) subcutaneous injection 60 mg  1 mg/kg Subcutaneous Q12H [address]    ipratropium-albuterol (DUO-NEB) 0 5-2 5 mg/mL inhalation solution 3 mL  3 mL Nebulization Q6H    nicotine (NICODERM CQ) 21 mg/24 hr TD 24 hr patch 1 patch  1 patch Transdermal Daily    ondansetron (ZOFRAN) injection 4 mg  4 mg Intravenous Q4H PRN    pantoprazole (PROTONIX) EC tablet 40 mg  40 mg Oral Early Morning    polyethylene glycol (MIRALAX) packet 17 g  17 g Oral Daily       No Known Allergies        Objective     Blood pressure 135/73, pulse 84, temperature 98 °F (36 7 °C), temperature source Oral, resp  rate 18, height 5' 6" (1 676 m), weight 64 kg (141 lb 1 5 oz), SpO2 96 %  Intake/Output Summary (Last 24 hours) at 09/23/17 0932  Last data filed at 09/22/17 1926   Gross per 24 hour   Intake             1360 ml   Output                0 ml   Net             1360 ml         PHYSICAL EXAM     General Appearance:   Alert, cooperative, no distress, appears stated age    HEENT:   Normocephalic, atraumatic, anicteric      Neck:  Supple, symmetrical, trachea midline, no adenopathy;    thyroid: no enlargement/tenderness/nodules; no carotid  bruit or JVD    Lungs:   Clear to auscultation bilaterally; no rales, rhonchi or wheezing; respirations unlabored    Heart[de-identified]   S1 and S2 normal; regular rate and rhythm; no murmur, rub, or gallop     Abdomen:   Soft, non-tender, non-distended; normal bowel sounds; no masses, no organomegaly    Genitalia:   Deferred    Rectal:   Deferred    Extremities:  No cyanosis, clubbing or edema    Pulses:  2+ and symmetric all extremities    Skin:  Skin color, texture, turgor normal, no rashes or lesions    Lymph nodes:  No palpable cervical, axillary or inguinal lymphadenopathy        Lab Results:   Admission on 09/22/2017   Component Date Value    WBC 09/22/2017 10 70     RBC 09/22/2017 5 19     Hemoglobin 09/22/2017 15 9     Hematocrit 09/22/2017 47 4     MCV 09/22/2017 91     MCH 09/22/2017 30 7     MCHC 09/22/2017 33 6     RDW 09/22/2017 14 2     MPV 09/22/2017 7 0*    Platelets 60/09/2027 226     nRBC 09/22/2017 0     Neutrophils Relative 09/22/2017 77*    Lymphocytes Relative 09/22/2017 17     Monocytes Relative 09/22/2017 6     Eosinophils Relative 09/22/2017 1     Basophils Relative 09/22/2017 0     Neutrophils Absolute 09/22/2017 8 20*    Lymphocytes Absolute 09/22/2017 1 80     Monocytes Absolute 09/22/2017 0 60     Eosinophils Absolute 09/22/2017 0 10     Basophils Absolute 09/22/2017 0 00     Sodium 09/22/2017 139     Potassium 09/22/2017 4 2     Chloride 09/22/2017 104     CO2 09/22/2017 28     Anion Gap 09/22/2017 7     BUN 09/22/2017 17     Creatinine 09/22/2017 0 90     Glucose 09/22/2017 117     Calcium 09/22/2017 9 2     eGFR 09/22/2017 101     NT-proBNP 09/22/2017 86     Lipase 09/22/2017 313     Troponin I 09/22/2017 0 02     Troponin I 09/22/2017 0 74*    Cholesterol 09/22/2017 174     Triglycerides 09/22/2017 68     HDL, Direct 09/22/2017 50     LDL Calculated 09/22/2017 110*    Troponin I 09/22/2017 3 42*    Troponin I 09/22/2017 4 03*    Troponin I 09/23/2017 2 94*    Sodium 09/23/2017 145     Potassium 09/23/2017 4 3     Chloride 09/23/2017 108     CO2 09/23/2017 29     Anion Gap 09/23/2017 8     BUN 09/23/2017 21     Creatinine 09/23/2017 0 83     Glucose 09/23/2017 95     Glucose, Fasting 09/23/2017 95     Calcium 09/23/2017 8 4     eGFR 09/23/2017 104     WBC 09/23/2017 8 00     RBC 09/23/2017 4 52*    Hemoglobin 09/23/2017 13 8*    Hematocrit 09/23/2017 41 5*    MCV 09/23/2017 92     MCH 09/23/2017 30 4     MCHC 09/23/2017 33 1     RDW 09/23/2017 14 6     Platelets 52/25/8408 186     MPV 09/23/2017 6 9*     Results for Rod Barros (MRN [id number]) as of 9/23/2017 09:58   Ref  Range 9/22/2017 16:17 9/22/2017 17:17 9/22/2017 19:39 9/22/2017 23:04 9/23/2017 03:58   eGFR Latest Units: ml/min/1 73sq m     104   Sodium Latest Ref Range: 136 - 145 mmol/L     145   Potassium Latest Ref Range: 3 5 - 5 3 mmol/L     4 3   Chloride Latest Ref Range: 100 - 108 mmol/L     108   CO2 Latest Ref Range: 21 - 32 mmol/L     29   Anion Gap Latest Ref Range: 4 - 13 mmol/L     8   BUN Latest Ref Range: 5 - 25 mg/dL     21   Creatinine Latest Ref Range: 0 60 - 1 30 mg/dL     0 83   Glucose Latest Ref Range: 65 - 140 mg/dL     95   GLUCOSE FASTING Latest Ref Range: 65 - 99 mg/dL     95   Calcium Latest Ref Range: 8 3 - 10 1 mg/dL     8 4   Troponin I Latest Ref Range: <=0 04 ng/mL 0 74 (H)  3 42 (H) 4 03 (H) 2 94 (H)   Cholesterol Latest Ref Range: 50 - 200 mg/dL 174       Triglycerides Latest Ref Range: <=150 mg/dL 68       HDL Latest Ref Range: 40 - 60 mg/dL 50       LDL Calculated Latest Ref Range: 0 - 100 mg/dL 110 (H)       WBC Latest Ref Range: 4 80 - 10 80 Thousand/uL     8 00   RBC Latest Ref Range: 4 70 - 6 10 Million/uL     4 52 (L)   Hemoglobin Latest Ref Range: 14 0 - 18 0 g/dL     13 8 (L)   Hematocrit Latest Ref Range: 42 0 - 52 0 %     41 5 (L)   MCV Latest Ref Range: 82 - 98 fL     92   MCH Latest Ref Range: 27 0 - 31 0 pg     30 4   MCHC Latest Ref Range: 31 4 - 37 4 g/dL     33 1   RDW Latest Ref Range: 11 6 - 15 1 %     14 6   Platelets Latest Ref Range: 130 - 400 Thousands/uL     186   MPV Latest Ref Range: 8 9 - 12 7 fL     6 9 (L)   MRSA CULTURE Unknown  Rpt ((NONE))          Imaging Studies: I have personally reviewed pertinent reports          Colonoscopy, Dr Florina Clement, 8/14/17  FINDINGS:  6 mm sessile ascending colon polyp removed by cold snare completely retrieved  Significant left-sided diverticulosis with associated inflammatory changes, erythematous mucosa, multiple biopsies taken, most consistent with colitis associated with diverticulosis  Mild internal hemorrhoids on retroflexion  Otherwise normal colonoscopy  IMPRESSIONS:    Segmental inflammatory changes of the descending sigmoid colon, biopsies taken  Significant left-sided diverticulosis  Ascending colon polyp removed by cold snare  Internal hemorrhoids  RECOMMENDATIONS:  Discharge home  Resume regular diet and resume home medications  Follow up biopsy results  Consider mesalamine agents  Call with any abdominal pain, bleeding, fevers  Repeat colonoscopy in 3-5 years, or based on pathology         CTA - CHEST AND ABDOMEN  - WITHOUT AND WITH IV CONTRAST  INDICATION:  Chest pain  Coughing up yellow mucus  COMPARISON:  Chest film earlier this day, 3/5/2014, CT abdomen and pelvis 6/6/2017  TECHNIQUE: CT examination of the chest and abdomen was performed both prior to and after the administration of intravenous contrast   Thin section angiographic arterial phase post contrast technique was used in order to evaluate for aortic dissection  3D reformatted images and volume rendering were performed on an independent workstation  Additionally, reformatted images were created in axial, sagittal, and coronal planes  Radiation dose length product (DLP) for this visit:  594 59 mGy-cm   This examination, like all CT scans performed in the [facility], was performed utilizing techniques to minimize radiation dose exposure, including the use of iterative   reconstruction and automated exposure control  IV Contrast:  100 mL of iohexol (OMNIPAQUE)       Enteric Contrast: Enteric contrast was not administered  FINDINGS:   AORTA: There is no aortic dissection  There is no aortic aneurysm  Precontrast images demonstrate no evidence of aortic mural hematoma  Mild atherosclerotic changes are noted  CHEST  LUNGS:  Mild to moderate centrilobular and paraseptal emphysema  PLEURA:  Unremarkable    HEART/PULMONARY ARTERIAL TREE:  Heart is unremarkable for the patient's age  Within the limitations of this examination there is no evidence of pulmonary embolus  MEDIATINUM AND HARSHA:  Unremarkable  CHEST WALL AND LOWER NECK:       Normal   ABDOMEN  LIVER/BILIARY TREE:  Small left hepatic lobe cyst, similar  GALLBLADDER:  No calcified gallstones  No pericholecystic inflammatory change  SPLEEN:  Unremarkable  PANCREAS:  Unremarkable  ADRENAL GLANDS:  Unremarkable  KIDNEYS/URETERS:  Tiny right renal cyst  No hydronephrosis  VISUALIZED STOMACH AND BOWEL:    The small bowel is normal caliber  Mildly thick-walled jejunal loop is seen in the mid abdomen with slight hyperemia of the bowel, perhaps related to underdistention  Cannot entirely exclude enteritis  No inflammatory changes in the adjacent mesentery  VISUALIZED ABDOMINOPELVIC CAVITY:  No ascites or free intraperitoneal air  No lymphadenopathy  ABDOMINAL WALL:  Unremarkable  OSSEOUS STRUCTURES:  No acute fracture or destructive osseous lesion  IMPRESSION:  No evidence of aortic aneurysm or dissection  Mild to moderate COPD  Mildly thick-walled jejunal loop with slight hyperemia of the bowel, perhaps related to underdistention, cannot entirely exclude enteritis  No inflammatory changes in the adjacent mesentery          ASSESSMENT/PLAN:     1  Incidentally noted jejunal wall thickening, a mildly thick-walled jejunal loop with slight hyperemia of the bowel was noted on patient's CT scan during workup for chest pain  He does not have any obvious signs or symptoms of enteritis  This finding is most likely related to underdistention in the absence of symptomatology    - diet as tolerated  - consider outpatient small bowel imaging such as small bowel series or CT enterography  - if patient develops any diarrhea, check stool cultures/studies      2    Postprandial fecal urgency for several months, patient says that he has improved his symptoms somewhat with modification of his diet including cutting out fried/fatty foods; his symptomatology is most likely related to some form of dietary intolerance, doubt it would be related to #1  He also had a colonoscopy recently    - I advised patient to continue avoiding fatty/greasy foods, I also recommended he try a trial of avoiding dairy products for a couple of weeks and see if this improves his symptoms  - can also perform duodenal evaluation and possible biopsies for celiac disease if he has EGD as outpatient      3  Longstanding GERD, patient says he is not on a PPI at home  Rule out underlying Smith's or malignancy    - outpatient EGD   - start Protonix daily here      4  History of adenomatous colon polyp, he had tubular adenoma removed during colonoscopy last month    - repeat colonoscopy in 3-5 years        The patient was seen and examined by Dr Chloé Addison, all howell medical decisions were made with Dr Chloé Addison  Thank you for allowing us to participate in the care of this pleasant patient  We will follow up with you closely

## 2017-09-23 NOTE — CASE MANAGEMENT
[address] [facility] in the [facility] by Neftaly Larose for 2017  Network Utilization Review Department  Phone: [phone]; Fax [phone]  ATTENTION: The Network Utilization Review Department is now centralized for our 7 Facilities  Make a note that we have a new phone and fax numbers for our Department  Please call with any questions or concerns to [phone] and carefully follow the prompts so that you are directed to the right person  All voicemails are confidential  Fax any determinations, approvals, denials, and requests for initial or continue stay review clinical to [phone]  Due to HIGH CALL volume, it would be easier if you could please send faxed requests to expedite your requests and in part, help us provide discharge notifications faster  Initial Clinical Review    Admission: Date/Time/Statement: OBS 9/22/17 @1408    Orders Placed This Encounter   Procedures    Place in Observation (expected length of stay for this patient is less than two midnights)     Standing Status:   Standing     Number of Occurrences:   1     Order Specific Question:   Admitting Physician     Answer:   Jeramie Rueda     Order Specific Question:   Level of Care     Answer:   Med Surg [16]     ED: Date/Time/Mode of Arrival:   ED Arrival Information     Expected Arrival Acuity Means of Arrival Escorted By Service Admission Type    - 9/22/2017 12:34 Urgent Walk-In Friend General Medicine Urgent    Arrival Complaint    CHEST PAIN          Chief Complaint:   Chief Complaint   Patient presents with    Chest Pain     chest pain for the past half hour, pt diagnosed yesterday with bronchitis, pt has hx of reflux but sts this is different  pt coughing up yellow mucous        History of Illness: Jordon Mark is a 48 y o  male who presents with chest pain  Patient was diagnosed with bronchitis and was started on prednisone and amoxicillin    He is still coughing with yellow phlegm  Patient describes the pain as heaviness, retrosternal   He had 2 episodes of 1st 1 lasted for 15 minutes and resolved spontaneously  The other 1 happened while he was visiting his sister and this 1 was more severe associated with shortness of breath, but no nausea or sweating  No known exacerbating or relieving factors      Afebrile  No abd pain, nausea, vomiting, or diarrhea  No dysuria or polyuria  No headaches or dizziness  ED Vital Signs:   ED Triage Vitals [09/22/17 1242]   Temperature Pulse Respirations Blood Pressure SpO2   97 8 °F (36 6 °C) 83 16 139/76 93 %      Temp Source Heart Rate Source Patient Position - Orthostatic VS BP Location FiO2 (%)   Oral Monitor Sitting Left arm --      Pain Score       7        Wt Readings from Last 1 Encounters:   09/22/17 64 kg (141 lb 1 5 oz)       Vital Signs (abnormal): HR 54 x 1    Abnormal Labs/Diagnostic Test Results:   9/22: trop  02,  74, 3 42, 4 03  9/22: EKG: nsr  9/22: CTA dissection protocol chest & abd: No evidence of aortic aneurysm or dissection  Mild to moderate COPD  Mildly thick-walled jejunal loop with slight hyperemia of the bowel, perhaps related to underdistention, cannot entirely exclude enteritis  No inflammatory changes in the adjacent mesentery    9/22: PCXR: copd, no active dz    ED Treatment:   Medication Administration from 09/22/2017 1234 to 09/22/2017 1512       Date/Time Order Dose Route Action Action by Comments     09/22/2017 1259 aspirin chewable tablet 324 mg 324 mg Oral Given Gila Garces RN      09/22/2017 1257 nitroglycerin (NITROSTAT) SL tablet 0 4 mg 0 4 mg Sublingual Given Gila Garces RN      09/22/2017 1301 morphine (PF) 4 mg/mL injection 4 mg 4 mg Intravenous Given Gila Garces RN      09/22/2017 1301 sodium chloride 0 9 % bolus 1,000 mL 1,000 mL Intravenous [address] Gila Garces RN      09/22/2017 1257 ondansetron (ZOFRAN) injection 4 mg 4 mg Intravenous Given Gila Garces RN           Past chest pain  His EKG 12 lead shows no acute ST T wave changes-  Continue troponin x3  Plan for nuclear stress test in a m  echo 2D   aspirin plus atorvastatin 80 mg   Given his significant risk factors and recent chest pain this morning will start Lovenox therapy      Recent bronchitis- wheezing on examination    Continue Zithromax plus pulmonary toilet     Nicotine dependence- smoking cessation

## 2017-09-23 NOTE — PLAN OF CARE
Problem: PAIN - ADULT  Goal: Verbalizes/displays adequate comfort level or baseline comfort level  Interventions:  - Encourage patient to monitor pain and request assistance  - Assess pain using appropriate pain scale  - Administer analgesics based on type and severity of pain and evaluate response  - Implement non-pharmacological measures as appropriate and evaluate response  - Consider cultural and social influences on pain and pain management  - Notify physician/advanced practitioner if interventions unsuccessful or patient reports new pain   Outcome: Progressing      Problem: INFECTION - ADULT  Goal: Absence or prevention of progression during hospitalization  INTERVENTIONS:  - Assess and monitor for signs and symptoms of infection  - Monitor lab/diagnostic results  - Monitor all insertion sites, i e  IV site  - [place] appropriate cooling/warming therapies per order  - Administer medications as ordered  - Instruct and encourage patient and family to use good hand hygiene technique  - Identify and instruct in appropriate isolation precautions for identified infection/condition   Outcome: Progressing      Problem: SAFETY ADULT  Goal: Patient will remain free of falls  INTERVENTIONS:  - Assess patient frequently for physical needs  -  Identify cognitive and physical deficits and behaviors that affect risk of falls    -  [place] fall precautions as indicated by assessment   - Educate patient/family on patient safety including physical limitations  - Instruct patient to call for assistance with activity based on assessment  - Modify environment to reduce risk of injury  - Consider OT/PT consult to assist with strengthening/mobility   Outcome: Progressing    Goal: Maintain or return to baseline ADL function  INTERVENTIONS:  -  Assess patient's ability to carry out ADLs; assess patient's baseline for ADL function and identify physical deficits which impact ability to perform ADLs (bathing, care of mouth/teeth, toileting, grooming, dressing, etc )  - Assess/evaluate cause of self-care deficits   - Assess range of motion  - Assess patient's mobility; develop plan if impaired  - Assess patient's need for assistive devices and provide as appropriate  - Encourage maximum independence but intervene and supervise when necessary  ¯ Involve family in performance of ADLs  ¯ Assess for home care needs following discharge   ¯ Request OT consult to assist with ADL evaluation and planning for discharge  ¯ Provide patient education as appropriate   Outcome: Progressing    Goal: Maintain or return mobility status to optimal level  INTERVENTIONS:  - Assess patient's baseline mobility status (ambulation, transfers, stairs, etc )    - Identify cognitive and physical deficits and behaviors that affect mobility  - Identify mobility aids required to assist with transfers and/or ambulation (gait belt, sit-to-stand, lift, walker, cane, etc )  - [place] fall precautions as indicated by assessment  - Record patient progress and toleration of activity level on Mobility SBAR; progress patient to next Phase/Stage  - Instruct patient to call for assistance with activity based on assessment  - Request Rehabilitation consult to assist with strengthening/weightbearing, etc    Outcome: Progressing      Problem: DISCHARGE PLANNING  Goal: Discharge to home or other facility with appropriate resources  INTERVENTIONS:  - Identify barriers to discharge w/patient and caregiver  - Arrange for needed discharge resources and transportation as appropriate  - Identify discharge learning needs (meds, wound care, etc )  - Arrange for interpretive services to assist at discharge as needed  - Refer to Case Management Department for coordinating discharge planning if the patient needs post-hospital services based on physician/advanced practitioner order or complex needs related to functional status, cognitive ability, or social support system   Outcome: Progressing      Problem: Knowledge Deficit  Goal: Patient/family/caregiver demonstrates understanding of disease process, treatment plan, medications, and discharge instructions  Complete learning assessment and assess knowledge base    Interventions:  - Provide teaching at level of understanding  - Provide teaching via preferred learning methods   Outcome: Progressing      Problem: RESPIRATORY - ADULT  Goal: Achieves optimal ventilation and oxygenation  INTERVENTIONS:  - Assess for changes in respiratory status  - Assess for changes in mentation and behavior  - Position to facilitate oxygenation and minimize respiratory effort  - Oxygen administration by appropriate delivery method based on oxygen saturation (per order) or ABGs  - Initiate smoking cessation education as indicated  - Encourage broncho-pulmonary hygiene including cough, deep breathe, Incentive Spirometry  - Assess the need for suctioning and aspirate as needed  - Assess and instruct to report SOB or any respiratory difficulty  - Respiratory Therapy support as indicated   Outcome: Progressing

## 2017-09-23 NOTE — PLAN OF CARE
DISCHARGE PLANNING     Discharge to home or other facility with appropriate resources Progressing        INFECTION - ADULT     Absence or prevention of progression during hospitalization Progressing        Knowledge Deficit     Patient/family/caregiver demonstrates understanding of disease process, treatment plan, medications, and discharge instructions Progressing        PAIN - ADULT     Verbalizes/displays adequate comfort level or baseline comfort level Progressing        RESPIRATORY - ADULT     Achieves optimal ventilation and oxygenation Progressing        SAFETY ADULT     Patient will remain free of falls Progressing     Maintain or return to baseline ADL function Progressing     Maintain or return mobility status to optimal level Progressing

## 2017-09-23 NOTE — PROGRESS NOTES
Notified by nursing staff that the pt's troponin is elevated  Current value is 4 03 (up from 3 42)  Pt is currently sleeping with no signs of distress  Vitals are stable  Notes reviewed and Cardiology is following  He is receiving therapeutic Lovenox, Asprin and Lipitor 80 mg with a plan to undergo nuclear stress test in the morning  At this time, will continue management as above, obtain EKG and continue to trend troponin  Advised nurse not to have pt undergo stress testing until Cardiologist is notified of recent troponin elevation

## 2017-09-23 NOTE — CASE MANAGEMENT
[address] [facility] in the [facility] by Neftaly Larose for 2017  Network Utilization Review Department  Phone: [phone]; Fax [phone]  ATTENTION: The Network Utilization Review Department is now centralized for our 7 Facilities  Make a note that we have a new phone and fax numbers for our Department  Please call with any questions or concerns to [phone] and carefully follow the prompts so that you are directed to the right person  All voicemails are confidential  Fax any determinations, approvals, denials, and requests for initial or continue stay review clinical to [phone]  Due to HIGH CALL volume, it would be easier if you could please send faxed requests to expedite your requests and in part, help us provide discharge notifications faster  Continued Stay Review    Date: 9/23/17        Vital Signs: /73   Pulse 84   Temp 98 °F (36 7 °C) (Oral)   Resp 18   Ht 5' 6" (1 676 m)   Wt 64 kg (141 lb 1 5 oz)   SpO2 96%   BMI 22 77 kg/m²     Medications:   Scheduled Meds:   aspirin 81 mg Oral Daily   atorvastatin 80 mg Oral Daily With Dinner   azithromycin 250 mg Oral Q24H   enoxaparin 1 mg/kg Subcutaneous Q12H [address]   ipratropium-albuterol 3 mL Nebulization Q6H   nicotine 1 patch Transdermal Daily   pantoprazole 40 mg Oral Early Morning   polyethylene glycol 17 g Oral Daily   PRN Meds:   acetaminophen    aluminum-magnesium hydroxide-simethicone    ondansetron    Abnormal Labs/Diagnostic Results:   9/23 TROP 2 94   hgb 13 8   hct 41 5    Age/Sex: 48 y o  male     Assessment/Plan:   PER MED 9/23 0324:  Notified by nursing staff that the pt's troponin is elevated  Current value is 4 03 (up from 3 42)  Pt is currently sleeping with no signs of distress  Vitals are stable  Notes reviewed and Cardiology is following  He is receiving therapeutic Lovenox, Asprin and Lipitor 80 mg with a plan to undergo nuclear stress test in the morning    At this time, will continue management as above, obtain EKG and continue to trend troponin  Advised nurse not to have pt undergo stress testing until Cardiologist is notified of recent troponin elevation    PER GI 9/23:  1  Incidentally noted jejunal wall thickening, a mildly thick-walled jejunal loop with slight hyperemia of the bowel was noted on patient's CT scan during workup for chest pain  He does not have any obvious signs or symptoms of enteritis  This finding is most likely related to underdistention in the absence of symptomatology   - diet as tolerated  - consider outpatient small bowel imaging such as small bowel series or CT enterography  - if patient develops any diarrhea, check stool cultures/studies    2   Postprandial fecal urgency for several months, patient says that he has improved his symptoms somewhat with modification of his diet including cutting out fried/fatty foods; his symptomatology is most likely related to some form of dietary intolerance, doubt it would be related to #1  He also had a colonoscopy recently   - I advised patient to continue avoiding fatty/greasy foods, I also recommended he try a trial of avoiding dairy products for a couple of weeks and see if this improves his symptoms  - can also perform duodenal evaluation and possible biopsies for celiac disease if he has EGD as outpatient    3  Longstanding GERD, patient says he is not on a PPI at home    Rule out underlying Smith's or malignancy   - outpatient EGD   - start Protonix daily here    4   History of adenomatous colon polyp, he had tubular adenoma removed during colonoscopy last month - repeat colonoscopy in 3-5 years          Discharge Plan: to be determined

## 2017-09-23 NOTE — PROGRESS NOTES
Progress Note - Cardiology   Elba Chandler 48 y o  male MRN: [id number]  Unit/Bed#: 88313 [facility] 419-01 Encounter: [id number]    Assessment and Plan:   1  Non ST-elevation MI most likely type 1  Patient has multiple risk factors for coronary artery disease including heavy smoking, strong family history male sex and age  2   Tracheobronchitis no recovering  3  History of nicotine dependence advised to quit smoking  Plan  Continue aspirin  Continue Lovenox  Continue statins  Since patient's heart rate is in 62s and he is not having any chest pain now will hold off on beta-blockers  Echo Doppler  Discussed with patient and significant other since patient has multiple cardiac risk factors and troponin went up to 4 he will be scheduled for cardiac catheterization on Monday  All the risks benefit of native complication discussed with the patient and wishes to proceed  Subjective / Objective:   Patient seen and evaluated  Patient troponin went up up to 4  Troponin now trending down  Patient was admitted with heaviness in the retrosternal which lasted about 15 minutes  He has strong family history of coronary artery disease  His echo is pending  Vitals:    09/23/17 0738   BP:    Pulse:    Resp:    Temp:    SpO2: 96%     Vitals:    09/22/17 1242 09/22/17 1528   Weight: 64 4 kg (142 lb) 64 kg (141 lb 1 5 oz)     Orthostatic Blood Pressures    Flowsheet Row Most Recent Value   Blood Pressure  135/73 filed at 09/23/2017 0700   Patient Position - Orthostatic VS  Sitting filed at 09/23/2017 0700        I/O       09/21 0701 - 09/22 0700 09/22 0701 - 09/23 0700 09/23 0701 - 09/24 0700    P  O   360     IV Piggyback  1000     Total Intake(mL/kg)  1360 (21 3)     Net   +1360                 Invasive Devices     Peripheral Intravenous Line            Peripheral IV 09/22/17 Right Antecubital less than 1 day              Body mass index is 22 77 kg/m²        Physical Exam:   Physical Exam    Neurologic:  Alert & oriented x 3, no focal deficits noted   Constitutional:  Well developed, well nourished,  With no acute distress  Eyes:  PERRL, conjunctiva normal   HENT:  Atraumatic, external ears normal, nose normal,   NECK: Normal range of motion, no tenderness, neck is supple   Respiratory:  Bilateral air entry with coarse breath sounds  Cardiovascular: S1-S2 regular with a 2/6 ejection systolic murmur  S4 is heard  GI:  Soft, nondistended, normal bowel sounds, nontender, no hepatosplenomegaly appreciated  Musculoskeletal:  , no tenderness, no deformities      Extremities:  Mild edema and distal pulses are present  Psychiatric:  Speech and behavior appropriate           Medications, Allergies:     aspirin 81 mg Oral Daily   atorvastatin 80 mg Oral Daily With Dinner   azithromycin 250 mg Oral Q24H   enoxaparin 1 mg/kg Subcutaneous Q12H [address]   ipratropium-albuterol 3 mL Nebulization Q6H   nicotine 1 patch Transdermal Daily   pantoprazole 40 mg Oral Early Morning   polyethylene glycol 17 g Oral Daily       acetaminophen 650 mg Q6H PRN   aluminum-magnesium hydroxide-simethicone 30 mL Q6H PRN   ondansetron 4 mg Q4H PRN     No Known Allergies    VTE Pharmacologic Prophylaxis:   Enoxaparin (Lovenox)    Labs:   Troponins:  Results from last 7 days  Lab Units 09/23/17  0957 09/23/17  0358 09/22/17  2304   TROPONIN I ng/mL 1 72* 2 94* 4 03*       CBC with diff:  Results from last 7 days  Lab Units 09/23/17  0358 09/22/17  1248   WBC Thousand/uL 8 00 10 70   HEMOGLOBIN g/dL 13 8* 15 9   HEMATOCRIT % 41 5* 47 4   MCV fL 92 91   PLATELETS Thousands/uL 186 226   MCH pg 30 4 30 7   MCHC g/dL 33 1 33 6   RDW % 14 6 14 2   MPV fL 6 9* 7 0*   NRBC AUTO /100 WBCs  --  0       CMP:  Results from last 7 days  Lab Units 09/23/17  0358 09/22/17  1248   SODIUM mmol/L 145 139   POTASSIUM mmol/L 4 3 4 2   CHLORIDE mmol/L 108 104   CO2 mmol/L 29 28   ANION GAP mmol/L 8 7   BUN mg/dL 21 17   CREATININE mg/dL 0 83 0 90   GLUCOSE RANDOM mg/dL 95 117   CALCIUM mg/dL 8 4 9  2   EGFR ml/min/1 73sq m 104 101       Lipid Profile:  Results from last 7 days  Lab Units 09/22/17  1617   CHOLESTEROL mg/dL 174   TRIGLYCERIDES mg/dL 68   HDL mg/dL 50   LDL CALC mg/dL 110*     Hgb A1c:    NT-proBNP:   Recent Labs      09/22/17   1248   NTBNP  86        Imaging & Testing   I have personally reviewed pertinent reports  X-ray Chest 1 View Portable    Result Date: 9/22/2017  Narrative: CHEST  INDICATION: Chest pain  COMPARISON: 3/5/2014 VIEWS:  AP frontal IMAGES:  1 FINDINGS: Cardiomediastinal silhouette appears unremarkable  Monitoring wires and leads project over the chest  The lungs are clear  Hyperinflation consistent with COPD  No pneumothorax or pleural effusion  Visualized osseous structures appear within normal limits for the patient's age  Impression: COPD  No active pulmonary disease  Workstation performed: [id number]     Cta Dissection Protocol Chest And Abdomen    Result Date: 9/22/2017  Narrative: CTA - CHEST AND ABDOMEN  - WITHOUT AND WITH IV CONTRAST INDICATION:  Chest pain  Coughing up yellow mucus  COMPARISON:  Chest film earlier this day, 3/5/2014, CT abdomen and pelvis 6/6/2017 TECHNIQUE: CT examination of the chest and abdomen was performed both prior to and after the administration of intravenous contrast   Thin section angiographic arterial phase post contrast technique was used in order to evaluate for aortic dissection  3D reformatted images and volume rendering were performed on an independent workstation  Additionally, reformatted images were created in axial, sagittal, and coronal planes  Radiation dose length product (DLP) for this visit:  594 59 mGy-cm   This examination, like all CT scans performed in the [facility], was performed utilizing techniques to minimize radiation dose exposure, including the use of iterative  reconstruction and automated exposure control   IV Contrast:  100 mL of iohexol (OMNIPAQUE)     Enteric Contrast: Enteric contrast was not administered  FINDINGS: AORTA: There is no aortic dissection  There is no aortic aneurysm  Precontrast images demonstrate no evidence of aortic mural hematoma  Mild atherosclerotic changes are noted  CHEST LUNGS:  Mild to moderate centrilobular and paraseptal emphysema  PLEURA:  Unremarkable  HEART/PULMONARY ARTERIAL TREE:  Heart is unremarkable for the patient's age  Within the limitations of this examination there is no evidence of pulmonary embolus  MEDIASTINUM AND HARSHA:  Unremarkable  CHEST WALL AND LOWER NECK:       Normal  ABDOMEN LIVER/BILIARY TREE:  Small left hepatic lobe cyst, similar  GALLBLADDER:  No calcified gallstones  No pericholecystic inflammatory change  SPLEEN:  Unremarkable  PANCREAS:  Unremarkable  ADRENAL GLANDS:  Unremarkable  KIDNEYS/URETERS:  Tiny right renal cyst  No hydronephrosis  VISUALIZED STOMACH AND BOWEL:  The small bowel is normal caliber  Mildly thick-walled jejunal loop is seen in the mid abdomen with slight hyperemia of the bowel, perhaps related to underdistention  Cannot entirely exclude enteritis  No inflammatory changes in the adjacent mesentery  VISUALIZED ABDOMINOPELVIC CAVITY:  No ascites or free intraperitoneal air  No lymphadenopathy  ABDOMINAL WALL:  Unremarkable  OSSEOUS STRUCTURES:  No acute fracture or destructive osseous lesion  Impression: No evidence of aortic aneurysm or dissection  Mild to moderate COPD  Mildly thick-walled jejunal loop with slight hyperemia of the bowel, perhaps related to underdistention, cannot entirely exclude enteritis  No inflammatory changes in the adjacent mesentery  Workstation performed: [id number]     EKG / Monitor: Personally reviewed  Normal sinus rhythm heart rate 65 beats per minute  No significant ST changes  Previous EKG also shows no significant ST changes  Monitor shows no significant arrhythmias    Cardiac testing:   No results found for this or any previous visit        Dr Chau Velazquez MD [facility]      "This note has been constructed using a voice recognition system  Therefore there may be syntax, spelling, and/or grammatical errors   Please call if you have any questions  "

## 2017-09-23 NOTE — SOCIAL WORK
DASH discussion completed  Discussed goals of making sure pt's needs are met upon discharge, pt's preferences are taken into account, pt understands her health condition, medications and symptoms to watch for after returning home and pt is aware of any follow up appointments recommended by hospital physician  SPOKE WITH PT AT THE BEDSIDE  PT LIVES WITH HIS FAMILY AND HAS NO DME OR HHC  PT IS INDEPENDENT WITH HIS OWN CARE AND DOES DRIVE HIMSELF    PT USES "[facility]" [facility]Y IN [place], [place]   NO DCP NEEDS NOTED

## 2017-09-23 NOTE — PLAN OF CARE
Problem: DISCHARGE PLANNING - CARE MANAGEMENT  Goal: Discharge to post-acute care or home with appropriate resources  INTERVENTIONS:  - Conduct assessment to determine patient/family and health care team treatment goals, and need for post-acute services based on payer coverage, community resources, and patient preferences, and barriers to discharge  - Address psychosocial, clinical, and financial barriers to discharge as identified in assessment in conjunction with the patient/family and health care team  - Arrange appropriate level of post-acute services according to patient's   needs and preference and payer coverage in collaboration with the physician and health care team  - Communicate with and update the patient/family, physician, and health care team regarding progress on the discharge plan  - Arrange appropriate transportation to post-acute venues  RETURN TO HOME INDEPENDENTLY  Outcome: Progressing

## 2017-09-24 LAB
ANION GAP SERPL CALCULATED.3IONS-SCNC: 7 MMOL/L (ref 4–13)
BUN SERPL-MCNC: 15 MG/DL (ref 5–25)
CALCIUM SERPL-MCNC: 8.4 MG/DL (ref 8.3–10.1)
CHLORIDE SERPL-SCNC: 107 MMOL/L (ref 100–108)
CO2 SERPL-SCNC: 30 MMOL/L (ref 21–32)
CREAT SERPL-MCNC: 0.84 MG/DL (ref 0.6–1.3)
ERYTHROCYTE [DISTWIDTH] IN BLOOD BY AUTOMATED COUNT: 14.6 % (ref 11.6–15.1)
GFR SERPL CREATININE-BSD FRML MDRD: 104 ML/MIN/1.73SQ M
GLUCOSE SERPL-MCNC: 92 MG/DL (ref 65–140)
HCT VFR BLD AUTO: 40.6 % (ref 42–52)
HGB BLD-MCNC: 13.5 G/DL (ref 14–18)
MCH RBC QN AUTO: 30.2 PG (ref 27–31)
MCHC RBC AUTO-ENTMCNC: 33.1 G/DL (ref 31.4–37.4)
MCV RBC AUTO: 91 FL (ref 82–98)
MRSA NOSE QL CULT: NORMAL
PLATELET # BLD AUTO: 184 THOUSANDS/UL (ref 130–400)
PMV BLD AUTO: 6.7 FL (ref 8.9–12.7)
POTASSIUM SERPL-SCNC: 3.9 MMOL/L (ref 3.5–5.3)
RBC # BLD AUTO: 4.46 MILLION/UL (ref 4.7–6.1)
SODIUM SERPL-SCNC: 144 MMOL/L (ref 136–145)
TROPONIN I SERPL-MCNC: 0.69 NG/ML
TROPONIN I SERPL-MCNC: 0.7 NG/ML
TROPONIN I SERPL-MCNC: 0.82 NG/ML
WBC # BLD AUTO: 6.3 THOUSAND/UL (ref 4.8–10.8)

## 2017-09-24 PROCEDURE — 94760 N-INVAS EAR/PLS OXIMETRY 1: CPT

## 2017-09-24 PROCEDURE — 93005 ELECTROCARDIOGRAM TRACING: CPT

## 2017-09-24 PROCEDURE — 80048 BASIC METABOLIC PNL TOTAL CA: CPT | Performed by: INTERNAL MEDICINE

## 2017-09-24 PROCEDURE — 94640 AIRWAY INHALATION TREATMENT: CPT

## 2017-09-24 PROCEDURE — 84484 ASSAY OF TROPONIN QUANT: CPT | Performed by: STUDENT IN AN ORGANIZED HEALTH CARE EDUCATION/TRAINING PROGRAM

## 2017-09-24 PROCEDURE — 85027 COMPLETE CBC AUTOMATED: CPT | Performed by: INTERNAL MEDICINE

## 2017-09-24 RX ORDER — SODIUM CHLORIDE 9 MG/ML
100 INJECTION, SOLUTION INTRAVENOUS CONTINUOUS
Status: DISCONTINUED | OUTPATIENT
Start: 2017-09-25 | End: 2017-09-25

## 2017-09-24 RX ADMIN — ENOXAPARIN SODIUM 60 MG: 60 INJECTION SUBCUTANEOUS at 21:38

## 2017-09-24 RX ADMIN — AZITHROMYCIN 250 MG: 250 TABLET, FILM COATED ORAL at 18:05

## 2017-09-24 RX ADMIN — IPRATROPIUM BROMIDE AND ALBUTEROL SULFATE 3 ML: .5; 3 SOLUTION RESPIRATORY (INHALATION) at 02:42

## 2017-09-24 RX ADMIN — POLYETHYLENE GLYCOL 3350 17 G: 17 POWDER, FOR SOLUTION ORAL at 08:16

## 2017-09-24 RX ADMIN — METOPROLOL TARTRATE 12.5 MG: 25 TABLET ORAL at 13:13

## 2017-09-24 RX ADMIN — IPRATROPIUM BROMIDE AND ALBUTEROL SULFATE 3 ML: .5; 3 SOLUTION RESPIRATORY (INHALATION) at 07:56

## 2017-09-24 RX ADMIN — PANTOPRAZOLE SODIUM 40 MG: 40 TABLET, DELAYED RELEASE ORAL at 06:02

## 2017-09-24 RX ADMIN — IPRATROPIUM BROMIDE AND ALBUTEROL SULFATE 3 ML: .5; 3 SOLUTION RESPIRATORY (INHALATION) at 20:49

## 2017-09-24 RX ADMIN — NICOTINE 1 PATCH: 21 PATCH, EXTENDED RELEASE TRANSDERMAL at 08:16

## 2017-09-24 RX ADMIN — METOPROLOL TARTRATE 12.5 MG: 25 TABLET ORAL at 21:39

## 2017-09-24 RX ADMIN — IPRATROPIUM BROMIDE AND ALBUTEROL SULFATE 3 ML: .5; 3 SOLUTION RESPIRATORY (INHALATION) at 13:37

## 2017-09-24 RX ADMIN — ASPIRIN 81 MG: 81 TABLET ORAL at 08:16

## 2017-09-24 RX ADMIN — ENOXAPARIN SODIUM 60 MG: 60 INJECTION SUBCUTANEOUS at 08:16

## 2017-09-24 RX ADMIN — ATORVASTATIN CALCIUM 80 MG: 80 TABLET, FILM COATED ORAL at 18:05

## 2017-09-24 NOTE — PROGRESS NOTES
[facility] Progress Note  Patient: Griffin Parker 48 y o  male   MRN: [id number]  PCP: Paul Johnson MD  Unit/Bed#: [address] [place] [address] Encounter: [id number]  Date Of Visit: 17    Assessment:    Principal Problem:    Chest pain  Active Problems:    Acute bronchitis      Plan:  1  Non ST-elevation MI type 1   -  Patient has multiple risk factors for coronary artery disease including heavy smoking, strong family history male sex and age  - Cardiac cath on Monday per cardiology  - if cardiac cath came back positive, will need further work up, discussed with patient    2  Tracheobronchitis  -  Better  - Cont Azithromycin    3  History of nicotine dependence   - advised to quit smoking Plan  4  Abormal CT scan  - s/p colonoscopy, GI commended that Patient does not have any bowel symptoms at this time and the jejunal thickening appears to most likely from under distention rather than enteritis  If patient has any bowel symptoms may consider CT enterography as an outpatient  VTE Pharmacologic Prophylaxis:   Pharmacologic: Enoxaparin (Lovenox)  Mechanical VTE Prophylaxis in Place: Yes    Patient Centered Rounds: I have performed bedside rounds with nursing staff today  Discussions with Specialists or Other Care Team Provider: Yes    Education and Discussions with Family / Patient: Yes    Time Spent for Care: 20 minutes  More than 50% of total time spent on counseling and coordination of care as described above  Current Length of Stay: 1 day(s)    Current Patient Status: Inpatient     Discharge Plan / Estimated Discharge Date: home when stable    Code Status: Level 1 - Full Code      Subjective:   Feel OK no chest pain, cough better as well    Objective:     Vitals:   Temp (24hrs), Av 9 °F (36 6 °C), Min:97 6 °F (36 4 °C), Max:98 2 °F (36 8 °C)    HR:  [61-82] 82  Resp:  [17-18] 18  BP: (119-152)/(61-86) 152/86  SpO2:  [95 %-96 %] 96 %  Body mass index is 22 77 kg/m²       Input and Output Summary (last 24 hours): Intake/Output Summary (Last 24 hours) at 09/24/17 1814  Last data filed at 09/23/17 1920   Gross per 24 hour   Intake              480 ml   Output                0 ml   Net              480 ml       Physical Exam:     Physical Exam  Constitutional: He is oriented to person, place, and time  He appears well-developed and well-nourished  No distress  HENT:   Head: Normocephalic and atraumatic  Right Ear: External ear normal    Left Ear: External ear normal    Nose: Nose normal    Mouth/Throat: No oropharyngeal exudate  Eyes: Conjunctivae are normal  Pupils are equal, round, and reactive to light  Right eye exhibits no discharge  Left eye exhibits no discharge  No scleral icterus  Neck: Normal range of motion  No JVD present  No tracheal deviation present  No thyromegaly present  Cardiovascular: Normal rate, regular rhythm and intact distal pulses  Exam reveals no gallop and no friction rub  No murmur heard  Pulmonary/Chest: Effort normal  No stridor  No respiratory distress  He has no rales  He exhibits no tenderness  Abdominal: Soft  Bowel sounds are normal  He exhibits no distension and no mass  There is no tenderness  There is no rebound and no guarding  Musculoskeletal: He exhibits no edema or tenderness  Neurological: He is alert and oriented to person, place, and time  He displays normal reflexes  He exhibits normal muscle tone  Skin: Skin is warm and dry  No rash noted  He is not diaphoretic  No erythema  Psychiatric: He has a normal mood and affect   His behavior is normal  Judgment       Additional Data:     Labs:      Results from last 7 days  Lab Units 09/24/17  0541  09/22/17  1248   WBC Thousand/uL 6 30  < > 10 70   HEMOGLOBIN g/dL 13 5*  < > 15 9   HEMATOCRIT % 40 6*  < > 47 4   PLATELETS Thousands/uL 184  < > 226   NEUTROS PCT %  --   --  77*   LYMPHS PCT %  --   --  17   MONOS PCT %  --   --  6   EOS PCT %  --   --  1   < > = values in this interval not displayed  Results from last 7 days  Lab Units 09/24/17  0541   SODIUM mmol/L 144   POTASSIUM mmol/L 3 9   CHLORIDE mmol/L 107   CO2 mmol/L 30   BUN mg/dL 15   CREATININE mg/dL 0 84   CALCIUM mg/dL 8 4   GLUCOSE RANDOM mg/dL 92           * I Have Reviewed All Lab Data Listed Above  * Additional Pertinent Lab Tests Reviewed: All [place] Admission Reviewed        Recent Cultures (last 7 days):           Last 24 Hours Medication List:     aspirin 81 mg Oral Daily   atorvastatin 80 mg Oral Daily With Dinner   azithromycin 250 mg Oral Q24H   enoxaparin 1 mg/kg Subcutaneous Q12H [address]   ipratropium-albuterol 3 mL Nebulization Q6H   metoprolol tartrate 12 5 mg Oral Q12H [address]   nicotine 1 patch Transdermal Daily   pantoprazole 40 mg Oral Early Morning   polyethylene glycol 17 g Oral Daily        Today, Patient Was Seen By: Mike Merritt MD    ** Please Note: This note has been constructed using a voice recognition system   **

## 2017-09-24 NOTE — PROGRESS NOTES
[facility] Internal Medicine Progress Note  Patient: Marilyn Faustin 48 y o  male   MRN: [id number]  PCP: Yamini Booth MD  Unit/Bed#: [address] [place] [address] Encounter: [id number]  Date Of Visit: 17    Assessment:    Principal Problem:    Chest pain  Active Problems:    Acute bronchitis      Plan:  1  Non ST-elevation MI type 1   -  Patient has multiple risk factors for coronary artery disease including heavy smoking, strong family history male sex and age  - Cardiac cath on Monday per cardiology    2  Tracheobronchitis  -  Better  - Cont Azithromycin    3  History of nicotine dependence   - advised to quit smoking Plan  4  Abormal CT scan  - s/p colonoscopy, GI commended that Patient does not have any bowel symptoms at this time and the jejunal thickening appears to most likely from under distention rather than enteritis  If patient has any bowel symptoms may consider CT enterography as an outpatient  VTE Pharmacologic Prophylaxis:   Pharmacologic: Enoxaparin (Lovenox)  Mechanical VTE Prophylaxis in Place: Yes    Patient Centered Rounds: I have performed bedside rounds with nursing staff today  Discussions with Specialists or Other Care Team Provider: Yes    Education and Discussions with Family / Patient: Yes    Time Spent for Care: 20 minutes  More than 50% of total time spent on counseling and coordination of care as described above  Current Length of Stay: 0 day(s)    Current Patient Status: Inpatient     Discharge Plan / Estimated Discharge Date: home when stable    Code Status: Level 1 - Full Code      Subjective:   Feel OK no chest pain    Objective:     Vitals:   Temp (24hrs), Av 4 °F (36 9 °C), Min:98 °F (36 7 °C), Max:98 8 °F (37 1 °C)    HR:  [68-91] 68  Resp:  [18] 18  BP: (116-142)/(61-76) 132/61  SpO2:  [94 %-98 %] 96 %  Body mass index is 22 77 kg/m²  Input and Output Summary (last 24 hours):        Intake/Output Summary (Last 24 hours) at 17  Last data filed at 09/23/17 1920   Gross per 24 hour   Intake              480 ml   Output                0 ml   Net              480 ml       Physical Exam:     Physical Exam  Constitutional: He is oriented to person, place, and time  He appears well-developed and well-nourished  No distress  HENT:   Head: Normocephalic and atraumatic  Right Ear: External ear normal    Left Ear: External ear normal    Nose: Nose normal    Mouth/Throat: No oropharyngeal exudate  Eyes: Conjunctivae are normal  Pupils are equal, round, and reactive to light  Right eye exhibits no discharge  Left eye exhibits no discharge  No scleral icterus  Neck: Normal range of motion  No JVD present  No tracheal deviation present  No thyromegaly present  Cardiovascular: Normal rate, regular rhythm and intact distal pulses  Exam reveals no gallop and no friction rub  No murmur heard  Pulmonary/Chest: Effort normal  No stridor  No respiratory distress  He has no rales  He exhibits no tenderness  Abdominal: Soft  Bowel sounds are normal  He exhibits no distension and no mass  There is no tenderness  There is no rebound and no guarding  Musculoskeletal: He exhibits no edema or tenderness  Neurological: He is alert and oriented to person, place, and time  He displays normal reflexes  He exhibits normal muscle tone  Skin: Skin is warm and dry  No rash noted  He is not diaphoretic  No erythema  Psychiatric: He has a normal mood and affect   His behavior is normal  Judgment       Additional Data:     Labs:      Results from last 7 days  Lab Units 09/23/17  0358 09/22/17  1248   WBC Thousand/uL 8 00 10 70   HEMOGLOBIN g/dL 13 8* 15 9   HEMATOCRIT % 41 5* 47 4   PLATELETS Thousands/uL 186 226   NEUTROS PCT %  --  77*   LYMPHS PCT %  --  17   MONOS PCT %  --  6   EOS PCT %  --  1       Results from last 7 days  Lab Units 09/23/17  0358   SODIUM mmol/L 145   POTASSIUM mmol/L 4 3   CHLORIDE mmol/L 108   CO2 mmol/L 29   BUN mg/dL 21   CREATININE mg/dL 0  83   CALCIUM mg/dL 8 4   GLUCOSE RANDOM mg/dL 95           * I Have Reviewed All Lab Data Listed Above  * Additional Pertinent Lab Tests Reviewed: [address] Admission Reviewed        Recent Cultures (last 7 days):           Last 24 Hours Medication List:     aspirin 81 mg Oral Daily   atorvastatin 80 mg Oral Daily With Dinner   azithromycin 250 mg Oral Q24H   enoxaparin 1 mg/kg Subcutaneous Q12H [address]   ipratropium-albuterol 3 mL Nebulization Q6H   nicotine 1 patch Transdermal Daily   pantoprazole 40 mg Oral Early Morning   polyethylene glycol 17 g Oral Daily        Today, Patient Was Seen By: Jimmy Jin MD    ** Please Note: This note has been constructed using a voice recognition system   **

## 2017-09-24 NOTE — PLAN OF CARE
DISCHARGE PLANNING     Discharge to home or other facility with appropriate resources Progressing        DISCHARGE PLANNING - CARE MANAGEMENT     Discharge to post-acute care or home with appropriate resources Progressing        INFECTION - ADULT     Absence or prevention of progression during hospitalization Progressing        Knowledge Deficit     Patient/family/caregiver demonstrates understanding of disease process, treatment plan, medications, and discharge instructions Progressing        PAIN - ADULT     Verbalizes/displays adequate comfort level or baseline comfort level Progressing        RESPIRATORY - ADULT     Achieves optimal ventilation and oxygenation Progressing        SAFETY ADULT     Patient will remain free of falls Progressing     Maintain or return to baseline ADL function Progressing     Maintain or return mobility status to optimal level Progressing

## 2017-09-24 NOTE — PROGRESS NOTES
Progress Note - Cardiology   Greer Kim 48 y o  male MRN: [id number]  Unit/Bed#: [address] [facility] 419-01 Encounter: [id number]    Assessment and Plan:   1  Non ST-elevation MI most likely type 1  With repeat EKG showing new T-wave inversions but troponin trending down  2   Tracheobronchitis, now much better  3  History of nicotine dependence advised to quit smoking  4  Dyslipidemia  Plan  Continue aspirin  Continue Lovenox, hold after evening dose  Continue statins  Since patient's heart rate is in 60s and he is not having any chest pain now will hold off on beta-blockers  Echo Doppler reviewed and showed evidence of wall motion abnormality along with new 12 lead EKG changes  Discussed with patient and significant other since patient has multiple cardiac risk factors and troponin went up to 4 he will be scheduled for cardiac catheterization on Monday  All the risks benefit alternatives complication, heart attack,  stroke or even death but not limited to it, discussed with the patient and wishes to proceed  Subjective / Objective:   Patient seen and evaluated today  He has no more episodes of chest pain  His troponins are trending down  Echo shows wall motion abnormalities and EKG has new changes  Patient scheduled for cardiac catheterization tomorrow  Vitals:    09/24/17 1056   BP: 120/69   Pulse: 65   Resp: 18   Temp: 97 6 °F (36 4 °C)   SpO2: 96%     Vitals:    09/22/17 1242 09/22/17 1528   Weight: 64 4 kg (142 lb) 64 kg (141 lb 1 5 oz)     Orthostatic Blood Pressures    Flowsheet Row Most Recent Value   Blood Pressure  120/69 filed at 09/24/2017 1056   Patient Position - Orthostatic VS  Lying filed at 09/24/2017 1056        I/O       09/21 0701 - 09/22 0700 09/22 0701 - 09/23 0700 09/23 0701 - 09/24 0700    P  O   360     IV Piggyback  1000     Total Intake(mL/kg)  1360 (21 3)     Net   +1360                 Invasive Devices     Peripheral Intravenous Line            Peripheral IV 09/22/17 Right Antecubital 1 day              Body mass index is 22 77 kg/m²  Physical Exam:   Physical Exam   Constitutional: He is oriented to person, place, and time  He appears well-developed and well-nourished  No distress  HENT:   Head: Normocephalic and atraumatic  Eyes: Pupils are equal, round, and reactive to light  Neck: Neck supple  No JVD present  No tracheal deviation present  No thyromegaly present  Cardiovascular: Normal rate, regular rhythm, S1 normal, S2 normal and intact distal pulses  Exam reveals no gallop, no S3, no S4, no distant heart sounds and no friction rub  Murmur heard  Systolic (ejection) murmur is present with a grade of 2/6   Pulmonary/Chest: Effort normal and breath sounds normal  No respiratory distress  He has no wheezes  He has no rales  He exhibits no tenderness  Abdominal: Soft  Bowel sounds are normal  He exhibits no distension  There is no tenderness  Musculoskeletal: He exhibits no edema or deformity  Neurological: He is alert and oriented to person, place, and time  Skin: Skin is warm and dry  No rash noted  He is not diaphoretic  No pallor  Psychiatric: He has a normal mood and affect   His behavior is normal  Judgment normal                Medications, Allergies:       aspirin 81 mg Oral Daily   atorvastatin 80 mg Oral Daily With Dinner   azithromycin 250 mg Oral Q24H   enoxaparin 1 mg/kg Subcutaneous Q12H [address]   ipratropium-albuterol 3 mL Nebulization Q6H   nicotine 1 patch Transdermal Daily   pantoprazole 40 mg Oral Early Morning   polyethylene glycol 17 g Oral Daily       acetaminophen 650 mg Q6H PRN   aluminum-magnesium hydroxide-simethicone 30 mL Q6H PRN   ondansetron 4 mg Q4H PRN     No Known Allergies    VTE Pharmacologic Prophylaxis:   Enoxaparin (Lovenox)    Labs:   Troponins:    Results from last 7 days  Lab Units 09/24/17  1001 09/24/17  0647 09/23/17  0957   TROPONIN I ng/mL 0 69* 0 82* 1 72*       CBC with diff:    Results from last 7 days  Lab Units 09/24/17  0541 09/23/17  0358 09/22/17  1248   WBC Thousand/uL 6 30 8 00 10 70   HEMOGLOBIN g/dL 13 5* 13 8* 15 9   HEMATOCRIT % 40 6* 41 5* 47 4   MCV fL 91 92 91   PLATELETS Thousands/uL 184 186 226   MCH pg 30 2 30 4 30 7   MCHC g/dL 33 1 33 1 33 6   RDW % 14 6 14 6 14 2   MPV fL 6 7* 6 9* 7 0*   NRBC AUTO /100 WBCs  --   --  0       CMP:    Results from last 7 days  Lab Units 09/24/17  0541 09/23/17  0358 09/22/17  1248   SODIUM mmol/L 144 145 139   POTASSIUM mmol/L 3 9 4 3 4 2   CHLORIDE mmol/L 107 108 104   CO2 mmol/L 30 29 28   ANION GAP mmol/L 7 8 7   BUN mg/dL 15 21 17   CREATININE mg/dL 0 84 0 83 0 90   GLUCOSE RANDOM mg/dL 92 95 117   CALCIUM mg/dL 8 4 8 4 9 2   EGFR ml/min/1 73sq m 104 104 101       Lipid Profile:    Results from last 7 days  Lab Units 09/22/17  1617   CHOLESTEROL mg/dL 174   TRIGLYCERIDES mg/dL 68   HDL mg/dL 50   LDL CALC mg/dL 110*     Hgb A1c:    NT-proBNP:   Recent Labs      09/22/17   1248   NTBNP  86        Imaging & Testing   I have personally reviewed pertinent reports  X-ray Chest 1 View Portable    Result Date: 9/22/2017  Narrative: CHEST  INDICATION: Chest pain  COMPARISON: 3/5/2014 VIEWS:  AP frontal IMAGES:  1 FINDINGS: Cardiomediastinal silhouette appears unremarkable  Monitoring wires and leads project over the chest  The lungs are clear  Hyperinflation consistent with COPD  No pneumothorax or pleural effusion  Visualized osseous structures appear within normal limits for the patient's age  Impression: COPD  No active pulmonary disease  Workstation performed: [id number]     Cta Dissection Protocol Chest And Abdomen    Result Date: 9/22/2017  Narrative: CTA - CHEST AND ABDOMEN  - WITHOUT AND WITH IV CONTRAST INDICATION:  Chest pain  Coughing up yellow mucus   COMPARISON:  Chest film earlier this day, 3/5/2014, CT abdomen and pelvis 6/6/2017 TECHNIQUE: CT examination of the chest and abdomen was performed both prior to and after the administration of intravenous contrast   Thin section angiographic arterial phase post contrast technique was used in order to evaluate for aortic dissection  3D reformatted images and volume rendering were performed on an independent workstation  Additionally, reformatted images were created in axial, sagittal, and coronal planes  Radiation dose length product (DLP) for this visit:  594 59 mGy-cm   This examination, like all CT scans performed in the [facility], was performed utilizing techniques to minimize radiation dose exposure, including the use of iterative  reconstruction and automated exposure control  IV Contrast:  100 mL of iohexol (OMNIPAQUE)     Enteric Contrast: Enteric contrast was not administered  FINDINGS: AORTA: There is no aortic dissection  There is no aortic aneurysm  Precontrast images demonstrate no evidence of aortic mural hematoma  Mild atherosclerotic changes are noted  CHEST LUNGS:  Mild to moderate centrilobular and paraseptal emphysema  PLEURA:  Unremarkable  HEART/PULMONARY ARTERIAL TREE:  Heart is unremarkable for the patient's age  Within the limitations of this examination there is no evidence of pulmonary embolus  MEDIASTINUM AND HARSHA:  Unremarkable  CHEST WALL AND LOWER NECK:       Normal  ABDOMEN LIVER/BILIARY TREE:  Small left hepatic lobe cyst, similar  GALLBLADDER:  No calcified gallstones  No pericholecystic inflammatory change  SPLEEN:  Unremarkable  PANCREAS:  Unremarkable  ADRENAL GLANDS:  Unremarkable  KIDNEYS/URETERS:  Tiny right renal cyst  No hydronephrosis  VISUALIZED STOMACH AND BOWEL:  The small bowel is normal caliber  Mildly thick-walled jejunal loop is seen in the mid abdomen with slight hyperemia of the bowel, perhaps related to underdistention  Cannot entirely exclude enteritis  No inflammatory changes in the adjacent mesentery  VISUALIZED ABDOMINOPELVIC CAVITY:  No ascites or free intraperitoneal air  No lymphadenopathy  ABDOMINAL WALL:  Unremarkable  OSSEOUS STRUCTURES:  No acute fracture or destructive osseous lesion  Impression: No evidence of aortic aneurysm or dissection  Mild to moderate COPD  Mildly thick-walled jejunal loop with slight hyperemia of the bowel, perhaps related to underdistention, cannot entirely exclude enteritis  No inflammatory changes in the adjacent mesentery  Workstation performed: [id number]     EKG / Monitor: Personally reviewed  Normal sinus rhythm heart rate 65 beats per minute  No evidence of significant tachy-chandu arrhythmia  EKG shows new changes with T-wave inversions in precordial leads consistent with ischemia    Cardiac testing:   Echo Doppler yesterday shows EF around 50-55% with anterior apical and apical septal wall motion abnormality  Dr Gerber Sosa, MD [facility]      "This note has been constructed using a voice recognition system  Therefore there may be syntax, spelling, and/or grammatical errors   Please call if you have any questions  "

## 2017-09-24 NOTE — PROGRESS NOTES
Notified by nursing staff that the pt was displaying some T wave inversions on telemetry  Stat EKG does reveal some T wave inversions in lateral leads not evident on prior study  Went to bedside and the pt was resting comfortably with no complaints  Reported having an uneventful night  He is currently on therapeutic Lovenox, Asprin and high dose Statin therapy (no beta blocker due to HR in 60s)  Cardiology has been following the pt and is planning for a cath tomorrow  At this time, Ill trend his troponin  Contacted Cardiologist on call and discussed case with him as well  Agrees with above plan for now  All this information was also relayed to the nurse

## 2017-09-25 ENCOUNTER — ANESTHESIA (INPATIENT)
Dept: PERIOP | Facility: HOSPITAL | Age: 47
DRG: 235 | End: 2017-09-25
Payer: COMMERCIAL

## 2017-09-25 ENCOUNTER — APPOINTMENT (INPATIENT)
Dept: RADIOLOGY | Facility: HOSPITAL | Age: 47
DRG: 235 | End: 2017-09-25
Payer: COMMERCIAL

## 2017-09-25 ENCOUNTER — HOSPITAL ENCOUNTER (INPATIENT)
Facility: HOSPITAL | Age: 47
LOS: 3 days | Discharge: HOME HEALTH | DRG: 235 | End: 2017-09-28
Attending: THORACIC SURGERY (CARDIOTHORACIC VASCULAR SURGERY) | Admitting: THORACIC SURGERY (CARDIOTHORACIC VASCULAR SURGERY)
Payer: COMMERCIAL

## 2017-09-25 ENCOUNTER — ANESTHESIA EVENT (INPATIENT)
Dept: PERIOP | Facility: HOSPITAL | Age: 47
DRG: 235 | End: 2017-09-25
Payer: COMMERCIAL

## 2017-09-25 ENCOUNTER — GENERIC CONVERSION - ENCOUNTER (OUTPATIENT)
Dept: OTHER | Facility: OTHER | Age: 47
End: 2017-09-25

## 2017-09-25 ENCOUNTER — APPOINTMENT (INPATIENT)
Dept: NON INVASIVE DIAGNOSTICS | Facility: HOSPITAL | Age: 47
DRG: 282 | End: 2017-09-25
Attending: INTERNAL MEDICINE
Payer: COMMERCIAL

## 2017-09-25 ENCOUNTER — APPOINTMENT (INPATIENT)
Dept: NON INVASIVE DIAGNOSTICS | Facility: HOSPITAL | Age: 47
DRG: 235 | End: 2017-09-25
Attending: THORACIC SURGERY (CARDIOTHORACIC VASCULAR SURGERY)
Payer: COMMERCIAL

## 2017-09-25 VITALS
HEIGHT: 66 IN | TEMPERATURE: 97.5 F | OXYGEN SATURATION: 99 % | SYSTOLIC BLOOD PRESSURE: 128 MMHG | DIASTOLIC BLOOD PRESSURE: 82 MMHG | RESPIRATION RATE: 18 BRPM | HEART RATE: 75 BPM | BODY MASS INDEX: 22.66 KG/M2 | WEIGHT: 141 LBS

## 2017-09-25 DIAGNOSIS — Z95.1 S/P CABG X 2: ICD-10-CM

## 2017-09-25 DIAGNOSIS — I25.42 LEFT MAIN CORONARY ARTERY DISSECTION: ICD-10-CM

## 2017-09-25 DIAGNOSIS — I21.4 NSTEMI (NON-ST ELEVATED MYOCARDIAL INFARCTION) (HCC): ICD-10-CM

## 2017-09-25 DIAGNOSIS — I25.10 CORONARY ARTERY DISEASE INVOLVING NATIVE CORONARY ARTERY OF NATIVE HEART WITHOUT ANGINA PECTORIS: Primary | ICD-10-CM

## 2017-09-25 PROBLEM — J95.89 ACUTE RESPIRATORY INSUFFICIENCY, POSTOPERATIVE: Status: ACTIVE | Noted: 2017-09-25

## 2017-09-25 PROBLEM — R07.9 CHEST PAIN: Status: RESOLVED | Noted: 2017-09-22 | Resolved: 2017-09-25

## 2017-09-25 PROBLEM — Z72.0 TOBACCO ABUSE: Status: ACTIVE | Noted: 2017-09-25

## 2017-09-25 LAB
ABO GROUP BLD: NORMAL
ANCILLARY VALUES: ABNORMAL
ANION GAP SERPL CALCULATED.3IONS-SCNC: 6 MMOL/L (ref 4–13)
ANION GAP SERPL CALCULATED.3IONS-SCNC: 8 MMOL/L (ref 4–13)
APTT PPP: 28 SECONDS (ref 24–33)
ATRIAL RATE: 58 BPM
ATRIAL RATE: 63 BPM
ATRIAL RATE: 65 BPM
ATRIAL RATE: 77 BPM
BASE EXCESS BLDA CALC-SCNC: 1 MMOL/L (ref -2–3)
BASE EXCESS BLDA CALC-SCNC: 1 MMOL/L (ref -2–3)
BASE EXCESS BLDA CALC-SCNC: 3 MMOL/L (ref -2–3)
BASE EXCESS BLDA CALC-SCNC: 6 MMOL/L (ref -2–3)
BASOPHILS # BLD AUTO: 0 THOUSANDS/ΜL (ref 0–0.1)
BASOPHILS NFR BLD AUTO: 1 % (ref 0–1)
BILIRUB UR QL STRIP: NEGATIVE
BLD GP AB SCN SERPL QL: NEGATIVE
BUN SERPL-MCNC: 13 MG/DL (ref 5–25)
BUN SERPL-MCNC: 8 MG/DL (ref 5–25)
CA-I BLD-SCNC: 0.9 MMOL/L (ref 1.12–1.32)
CA-I BLD-SCNC: 0.94 MMOL/L (ref 1.12–1.32)
CA-I BLD-SCNC: 1.02 MMOL/L (ref 1.12–1.32)
CA-I BLD-SCNC: 1.03 MMOL/L (ref 1.12–1.32)
CA-I BLD-SCNC: 1.12 MMOL/L (ref 1.12–1.32)
CA-I BLD-SCNC: 1.15 MMOL/L (ref 1.12–1.32)
CALCIUM SERPL-MCNC: 7.2 MG/DL (ref 8.3–10.1)
CALCIUM SERPL-MCNC: 8.5 MG/DL (ref 8.3–10.1)
CHLORIDE SERPL-SCNC: 106 MMOL/L (ref 100–108)
CHLORIDE SERPL-SCNC: 108 MMOL/L (ref 100–108)
CLARITY UR: CLEAR
CO2 SERPL-SCNC: 27 MMOL/L (ref 21–32)
CO2 SERPL-SCNC: 30 MMOL/L (ref 21–32)
COLOR UR: YELLOW
CREAT SERPL-MCNC: 0.64 MG/DL (ref 0.6–1.3)
CREAT SERPL-MCNC: 0.87 MG/DL (ref 0.6–1.3)
DS:DELIVERY SYSTEM: AC
EOSINOPHIL # BLD AUTO: 0.3 THOUSAND/ΜL (ref 0–0.61)
EOSINOPHIL NFR BLD AUTO: 5 % (ref 0–6)
ERYTHROCYTE [DISTWIDTH] IN BLOOD BY AUTOMATED COUNT: 14.5 % (ref 11.6–15.1)
EST. AVERAGE GLUCOSE BLD GHB EST-MCNC: 117 MG/DL
FIO2 GAS DIL.REBREATH: 60 L
GFR SERPL CREATININE-BSD FRML MDRD: 102 ML/MIN/1.73SQ M
GFR SERPL CREATININE-BSD FRML MDRD: 116 ML/MIN/1.73SQ M
GLUCOSE SERPL-MCNC: 107 MG/DL (ref 65–140)
GLUCOSE SERPL-MCNC: 113 MG/DL (ref 65–140)
GLUCOSE SERPL-MCNC: 126 MG/DL (ref 65–140)
GLUCOSE SERPL-MCNC: 137 MG/DL (ref 65–140)
GLUCOSE SERPL-MCNC: 142 MG/DL (ref 65–140)
GLUCOSE SERPL-MCNC: 147 MG/DL (ref 65–140)
GLUCOSE SERPL-MCNC: 159 MG/DL (ref 65–140)
GLUCOSE SERPL-MCNC: 159 MG/DL (ref 65–140)
GLUCOSE SERPL-MCNC: 176 MG/DL (ref 65–140)
GLUCOSE SERPL-MCNC: 79 MG/DL (ref 65–140)
GLUCOSE SERPL-MCNC: 80 MG/DL (ref 65–140)
GLUCOSE SERPL-MCNC: 85 MG/DL (ref 65–140)
GLUCOSE UR STRIP-MCNC: NEGATIVE MG/DL
HBA1C MFR BLD: 5.7 % (ref 4.2–6.3)
HCO3 BLDA-SCNC: 28.3 MMOL/L (ref 22–28)
HCO3 BLDA-SCNC: 29.6 MMOL/L (ref 22–28)
HCO3 BLDA-SCNC: 30.1 MMOL/L (ref 22–28)
HCO3 BLDA-SCNC: 30.8 MMOL/L (ref 24–30)
HCO3 BLDA-SCNC: 31.6 MMOL/L (ref 22–28)
HCO3 BLDA-SCNC: 31.8 MMOL/L (ref 22–28)
HCT VFR BLD AUTO: 42.3 % (ref 42–52)
HCT VFR BLD AUTO: 45.8 % (ref 36.5–49.3)
HCT VFR BLD CALC: 26 % (ref 36.5–49.3)
HCT VFR BLD CALC: 27 % (ref 36.5–49.3)
HCT VFR BLD CALC: 29 % (ref 36.5–49.3)
HCT VFR BLD CALC: 36 % (ref 36.5–49.3)
HCT VFR BLD CALC: 39 % (ref 36.5–49.3)
HCT VFR BLD CALC: 43 % (ref 36.5–49.3)
HGB BLD-MCNC: 14 G/DL (ref 14–18)
HGB BLD-MCNC: 15.3 G/DL (ref 12–17)
HGB BLDA-MCNC: 12.2 G/DL (ref 12–17)
HGB BLDA-MCNC: 13.3 G/DL (ref 12–17)
HGB BLDA-MCNC: 14.6 G/DL (ref 12–17)
HGB BLDA-MCNC: 8.8 G/DL (ref 12–17)
HGB BLDA-MCNC: 9.2 G/DL (ref 12–17)
HGB BLDA-MCNC: 9.9 G/DL (ref 12–17)
HGB UR QL STRIP.AUTO: NEGATIVE
INR PPP: 0.98 (ref 0.86–1.16)
KCT BLD-ACNC: 121 SEC (ref 89–137)
KCT BLD-ACNC: 126 SEC (ref 89–137)
KCT BLD-ACNC: 577 SEC (ref 89–137)
KCT BLD-ACNC: 605 SEC (ref 89–137)
KCT BLD-ACNC: 726 SEC (ref 89–137)
KETONES UR STRIP-MCNC: NEGATIVE MG/DL
LEUKOCYTE ESTERASE UR QL STRIP: NEGATIVE
LYMPHOCYTES # BLD AUTO: 2.3 THOUSANDS/ΜL (ref 0.6–4.47)
LYMPHOCYTES NFR BLD AUTO: 37 % (ref 14–44)
MAGNESIUM SERPL-MCNC: 2 MG/DL (ref 1.6–2.6)
MCH RBC QN AUTO: 30.1 PG (ref 27–31)
MCHC RBC AUTO-ENTMCNC: 33 G/DL (ref 31.4–37.4)
MCV RBC AUTO: 91 FL (ref 82–98)
MONOCYTES # BLD AUTO: 0.6 THOUSAND/ΜL (ref 0.17–1.22)
MONOCYTES NFR BLD AUTO: 9 % (ref 4–12)
NEUTROPHILS # BLD AUTO: 3 THOUSANDS/ΜL (ref 1.85–7.62)
NEUTS SEG NFR BLD AUTO: 48 % (ref 43–75)
NITRITE UR QL STRIP: NEGATIVE
NRBC BLD AUTO-RTO: 0 /100 WBCS
P AXIS: 81 DEGREES
P AXIS: 81 DEGREES
P AXIS: 84 DEGREES
P AXIS: 85 DEGREES
PCO2 BLD: 30 MMOL/L (ref 21–32)
PCO2 BLD: 31 MMOL/L (ref 21–32)
PCO2 BLD: 32 MMOL/L (ref 21–32)
PCO2 BLD: 33 MMOL/L (ref 21–32)
PCO2 BLD: 33 MMOL/L (ref 21–32)
PCO2 BLD: 34 MMOL/L (ref 21–32)
PCO2 BLD: 53.4 MM HG (ref 36–44)
PCO2 BLD: 53.9 MM HG (ref 36–44)
PCO2 BLD: 58.1 MM HG (ref 36–44)
PCO2 BLD: 61.7 MM HG (ref 36–44)
PCO2 BLD: 66.8 MM HG (ref 42–50)
PCO2 BLD: 84.2 MM HG (ref 36–44)
PH BLD: 7.18 [PH] (ref 7.35–7.45)
PH BLD: 7.27 [PH] (ref 7.35–7.45)
PH BLD: 7.27 [PH] (ref 7.3–7.4)
PH BLD: 7.32 [PH] (ref 7.35–7.45)
PH BLD: 7.35 [PH] (ref 7.35–7.45)
PH BLD: 7.38 [PH] (ref 7.35–7.45)
PH UR STRIP.AUTO: 7 [PH] (ref 4.5–8)
PLATELET # BLD AUTO: 136 THOUSANDS/UL (ref 149–390)
PLATELET # BLD AUTO: 149 THOUSANDS/UL (ref 149–390)
PLATELET # BLD AUTO: 192 THOUSANDS/UL (ref 130–400)
PMV BLD AUTO: 6.7 FL (ref 8.9–12.7)
PMV BLD AUTO: 9.2 FL (ref 8.9–12.7)
PMV BLD AUTO: 9.3 FL (ref 8.9–12.7)
PO2 BLD: 140 MM HG (ref 75–129)
PO2 BLD: 43 MM HG (ref 35–45)
PO2 BLD: 449 MM HG (ref 75–129)
PO2 BLD: 467 MM HG (ref 75–129)
PO2 BLD: 500 MM HG (ref 75–129)
PO2 BLD: 590 MM HG (ref 75–129)
POTASSIUM BLD-SCNC: 3.9 MMOL/L (ref 3.5–5.3)
POTASSIUM BLD-SCNC: 4 MMOL/L (ref 3.5–5.3)
POTASSIUM BLD-SCNC: 4.9 MMOL/L (ref 3.5–5.3)
POTASSIUM BLD-SCNC: 5 MMOL/L (ref 3.5–5.3)
POTASSIUM BLD-SCNC: 5.1 MMOL/L (ref 3.5–5.3)
POTASSIUM BLD-SCNC: 5.8 MMOL/L (ref 3.5–5.3)
POTASSIUM SERPL-SCNC: 4.1 MMOL/L (ref 3.5–5.3)
POTASSIUM SERPL-SCNC: 4.6 MMOL/L (ref 3.5–5.3)
POTASSIUM SERPL-SCNC: 4.8 MMOL/L (ref 3.5–5.3)
PR INTERVAL: 136 MS
PR INTERVAL: 142 MS
PRESSURE SETTING: 5
PROT UR STRIP-MCNC: NEGATIVE MG/DL
PROTHROMBIN TIME: 10.3 SECONDS (ref 9.4–11.7)
QRS AXIS: 67 DEGREES
QRS AXIS: 74 DEGREES
QRS AXIS: 75 DEGREES
QRS AXIS: 85 DEGREES
QRSD INTERVAL: 74 MS
QRSD INTERVAL: 79 MS
QRSD INTERVAL: 82 MS
QRSD INTERVAL: 88 MS
QT INTERVAL: 336 MS
QT INTERVAL: 350 MS
QT INTERVAL: 375 MS
QT INTERVAL: 446 MS
QTC INTERVAL: 364 MS
QTC INTERVAL: 380 MS
QTC INTERVAL: 384 MS
QTC INTERVAL: 437 MS
RBC # BLD AUTO: 4.63 MILLION/UL (ref 4.7–6.1)
RESPIRATORY RATE: 16
RH BLD: POSITIVE
SAO2 % BLD FROM PO2: 100 % (ref 95–98)
SAO2 % BLD FROM PO2: 70 % (ref 95–98)
SAO2 % BLD FROM PO2: 99 % (ref 95–98)
SODIUM BLD-SCNC: 133 MMOL/L (ref 136–145)
SODIUM BLD-SCNC: 135 MMOL/L (ref 136–145)
SODIUM BLD-SCNC: 137 MMOL/L (ref 136–145)
SODIUM BLD-SCNC: 140 MMOL/L (ref 136–145)
SODIUM BLD-SCNC: 142 MMOL/L (ref 136–145)
SODIUM BLD-SCNC: 143 MMOL/L (ref 136–145)
SODIUM SERPL-SCNC: 141 MMOL/L (ref 136–145)
SODIUM SERPL-SCNC: 144 MMOL/L (ref 136–145)
SP GR UR STRIP.AUTO: >1.045 (ref 1–1.03)
SPECIMEN EXPIRATION DATE: NORMAL
SPECIMEN SOURCE: ABNORMAL
SPECIMEN SOURCE: NORMAL
SPECIMEN SOURCE: NORMAL
T WAVE AXIS: 66 DEGREES
T WAVE AXIS: 86 DEGREES
T WAVE AXIS: 95 DEGREES
T WAVE AXIS: 97 DEGREES
UROBILINOGEN UR QL STRIP.AUTO: 0.2 E.U./DL
VENT TYPE: ABNORMAL
VENTILATION VALUE: 450
VENTRICULAR RATE: 58 BPM
VENTRICULAR RATE: 63 BPM
VENTRICULAR RATE: 65 BPM
VENTRICULAR RATE: 77 BPM
WBC # BLD AUTO: 6.3 THOUSAND/UL (ref 4.8–10.8)

## 2017-09-25 PROCEDURE — 86900 BLOOD TYPING SEROLOGIC ABO: CPT | Performed by: PHYSICIAN ASSISTANT

## 2017-09-25 PROCEDURE — 85049 AUTOMATED PLATELET COUNT: CPT | Performed by: THORACIC SURGERY (CARDIOTHORACIC VASCULAR SURGERY)

## 2017-09-25 PROCEDURE — 81003 URINALYSIS AUTO W/O SCOPE: CPT | Performed by: PHYSICIAN ASSISTANT

## 2017-09-25 PROCEDURE — 94640 AIRWAY INHALATION TREATMENT: CPT

## 2017-09-25 PROCEDURE — 85730 THROMBOPLASTIN TIME PARTIAL: CPT | Performed by: INTERNAL MEDICINE

## 2017-09-25 PROCEDURE — C1769 GUIDE WIRE: HCPCS

## 2017-09-25 PROCEDURE — 80048 BASIC METABOLIC PNL TOTAL CA: CPT | Performed by: PHYSICIAN ASSISTANT

## 2017-09-25 PROCEDURE — 80048 BASIC METABOLIC PNL TOTAL CA: CPT | Performed by: INTERNAL MEDICINE

## 2017-09-25 PROCEDURE — 94760 N-INVAS EAR/PLS OXIMETRY 1: CPT

## 2017-09-25 PROCEDURE — 84295 ASSAY OF SERUM SODIUM: CPT

## 2017-09-25 PROCEDURE — 93312 ECHO TRANSESOPHAGEAL: CPT

## 2017-09-25 PROCEDURE — 93458 L HRT ARTERY/VENTRICLE ANGIO: CPT

## 2017-09-25 PROCEDURE — 85014 HEMATOCRIT: CPT | Performed by: PHYSICIAN ASSISTANT

## 2017-09-25 PROCEDURE — 85025 COMPLETE CBC W/AUTO DIFF WBC: CPT | Performed by: INTERNAL MEDICINE

## 2017-09-25 PROCEDURE — 85610 PROTHROMBIN TIME: CPT | Performed by: INTERNAL MEDICINE

## 2017-09-25 PROCEDURE — 86901 BLOOD TYPING SEROLOGIC RH(D): CPT | Performed by: PHYSICIAN ASSISTANT

## 2017-09-25 PROCEDURE — 86850 RBC ANTIBODY SCREEN: CPT | Performed by: PHYSICIAN ASSISTANT

## 2017-09-25 PROCEDURE — B211YZZ FLUOROSCOPY OF MULTIPLE CORONARY ARTERIES USING OTHER CONTRAST: ICD-10-PCS | Performed by: INTERNAL MEDICINE

## 2017-09-25 PROCEDURE — 02100Z9 BYPASS CORONARY ARTERY, ONE ARTERY FROM LEFT INTERNAL MAMMARY, OPEN APPROACH: ICD-10-PCS | Performed by: THORACIC SURGERY (CARDIOTHORACIC VASCULAR SURGERY)

## 2017-09-25 PROCEDURE — 30233N0 TRANSFUSION OF AUTOLOGOUS RED BLOOD CELLS INTO PERIPHERAL VEIN, PERCUTANEOUS APPROACH: ICD-10-PCS | Performed by: ANESTHESIOLOGY

## 2017-09-25 PROCEDURE — 85347 COAGULATION TIME ACTIVATED: CPT

## 2017-09-25 PROCEDURE — 93005 ELECTROCARDIOGRAM TRACING: CPT

## 2017-09-25 PROCEDURE — 85014 HEMATOCRIT: CPT

## 2017-09-25 PROCEDURE — 71010 HB CHEST X-RAY 1 VIEW FRONTAL (PORTABLE): CPT

## 2017-09-25 PROCEDURE — 021009W BYPASS CORONARY ARTERY, ONE ARTERY FROM AORTA WITH AUTOLOGOUS VENOUS TISSUE, OPEN APPROACH: ICD-10-PCS | Performed by: THORACIC SURGERY (CARDIOTHORACIC VASCULAR SURGERY)

## 2017-09-25 PROCEDURE — 02HV33Z INSERTION OF INFUSION DEVICE INTO SUPERIOR VENA CAVA, PERCUTANEOUS APPROACH: ICD-10-PCS | Performed by: ANESTHESIOLOGY

## 2017-09-25 PROCEDURE — 84132 ASSAY OF SERUM POTASSIUM: CPT | Performed by: PHYSICIAN ASSISTANT

## 2017-09-25 PROCEDURE — C1894 INTRO/SHEATH, NON-LASER: HCPCS

## 2017-09-25 PROCEDURE — 94002 VENT MGMT INPAT INIT DAY: CPT

## 2017-09-25 PROCEDURE — 93005 ELECTROCARDIOGRAM TRACING: CPT | Performed by: PHYSICIAN ASSISTANT

## 2017-09-25 PROCEDURE — 06BQ4ZZ EXCISION OF LEFT SAPHENOUS VEIN, PERCUTANEOUS ENDOSCOPIC APPROACH: ICD-10-PCS | Performed by: THORACIC SURGERY (CARDIOTHORACIC VASCULAR SURGERY)

## 2017-09-25 PROCEDURE — 85049 AUTOMATED PLATELET COUNT: CPT | Performed by: PHYSICIAN ASSISTANT

## 2017-09-25 PROCEDURE — 5A1221Z PERFORMANCE OF CARDIAC OUTPUT, CONTINUOUS: ICD-10-PCS | Performed by: THORACIC SURGERY (CARDIOTHORACIC VASCULAR SURGERY)

## 2017-09-25 PROCEDURE — 85018 HEMOGLOBIN: CPT | Performed by: PHYSICIAN ASSISTANT

## 2017-09-25 PROCEDURE — 82330 ASSAY OF CALCIUM: CPT

## 2017-09-25 PROCEDURE — 86920 COMPATIBILITY TEST SPIN: CPT

## 2017-09-25 PROCEDURE — 82947 ASSAY GLUCOSE BLOOD QUANT: CPT

## 2017-09-25 PROCEDURE — 82948 REAGENT STRIP/BLOOD GLUCOSE: CPT

## 2017-09-25 PROCEDURE — 84132 ASSAY OF SERUM POTASSIUM: CPT

## 2017-09-25 PROCEDURE — 83036 HEMOGLOBIN GLYCOSYLATED A1C: CPT | Performed by: PHYSICIAN ASSISTANT

## 2017-09-25 PROCEDURE — B215YZZ FLUOROSCOPY OF LEFT HEART USING OTHER CONTRAST: ICD-10-PCS | Performed by: INTERNAL MEDICINE

## 2017-09-25 PROCEDURE — 4A023N7 MEASUREMENT OF CARDIAC SAMPLING AND PRESSURE, LEFT HEART, PERCUTANEOUS APPROACH: ICD-10-PCS | Performed by: INTERNAL MEDICINE

## 2017-09-25 PROCEDURE — 82803 BLOOD GASES ANY COMBINATION: CPT

## 2017-09-25 PROCEDURE — 83735 ASSAY OF MAGNESIUM: CPT | Performed by: INTERNAL MEDICINE

## 2017-09-25 PROCEDURE — 5A1223Z PERFORMANCE OF CARDIAC PACING, CONTINUOUS: ICD-10-PCS | Performed by: THORACIC SURGERY (CARDIOTHORACIC VASCULAR SURGERY)

## 2017-09-25 DEVICE — MARKER CORONARY BYPASS VOSS GRAFT: Type: IMPLANTABLE DEVICE | Site: HEART | Status: FUNCTIONAL

## 2017-09-25 RX ORDER — FENTANYL CITRATE 50 UG/ML
50 INJECTION, SOLUTION INTRAMUSCULAR; INTRAVENOUS ONCE
Status: COMPLETED | OUTPATIENT
Start: 2017-09-25 | End: 2017-09-25

## 2017-09-25 RX ORDER — MAGNESIUM SULFATE HEPTAHYDRATE 40 MG/ML
2 INJECTION, SOLUTION INTRAVENOUS ONCE
Status: COMPLETED | OUTPATIENT
Start: 2017-09-25 | End: 2017-09-25

## 2017-09-25 RX ORDER — CALCIUM CHLORIDE 100 MG/ML
1 INJECTION INTRAVENOUS; INTRAVENTRICULAR ONCE
Status: DISCONTINUED | OUTPATIENT
Start: 2017-09-25 | End: 2017-09-25 | Stop reason: ALTCHOICE

## 2017-09-25 RX ORDER — METOPROLOL TARTRATE 5 MG/5ML
INJECTION INTRAVENOUS AS NEEDED
Status: DISCONTINUED | OUTPATIENT
Start: 2017-09-25 | End: 2017-09-25 | Stop reason: SURG

## 2017-09-25 RX ORDER — FENTANYL CITRATE 50 UG/ML
50 INJECTION, SOLUTION INTRAMUSCULAR; INTRAVENOUS
Status: DISCONTINUED | OUTPATIENT
Start: 2017-09-25 | End: 2017-09-26

## 2017-09-25 RX ORDER — SODIUM CHLORIDE 9 MG/ML
125 INJECTION, SOLUTION INTRAVENOUS CONTINUOUS
Status: DISCONTINUED | OUTPATIENT
Start: 2017-09-25 | End: 2017-09-25 | Stop reason: HOSPADM

## 2017-09-25 RX ORDER — ALBUTEROL SULFATE 90 UG/1
AEROSOL, METERED RESPIRATORY (INHALATION) AS NEEDED
Status: DISCONTINUED | OUTPATIENT
Start: 2017-09-25 | End: 2017-09-25 | Stop reason: SURG

## 2017-09-25 RX ORDER — POTASSIUM CHLORIDE 14.9 MG/ML
20 INJECTION INTRAVENOUS ONCE AS NEEDED
Status: DISCONTINUED | OUTPATIENT
Start: 2017-09-25 | End: 2017-09-26

## 2017-09-25 RX ORDER — OXYCODONE HYDROCHLORIDE AND ACETAMINOPHEN 5; 325 MG/1; MG/1
2 TABLET ORAL EVERY 6 HOURS PRN
Status: DISCONTINUED | OUTPATIENT
Start: 2017-09-25 | End: 2017-09-28 | Stop reason: HOSPADM

## 2017-09-25 RX ORDER — POLYETHYLENE GLYCOL 3350 17 G/17G
17 POWDER, FOR SOLUTION ORAL DAILY
Status: DISCONTINUED | OUTPATIENT
Start: 2017-09-26 | End: 2017-09-28 | Stop reason: HOSPADM

## 2017-09-25 RX ORDER — GLYCOPYRROLATE 0.2 MG/ML
INJECTION INTRAMUSCULAR; INTRAVENOUS AS NEEDED
Status: DISCONTINUED | OUTPATIENT
Start: 2017-09-25 | End: 2017-09-25 | Stop reason: SURG

## 2017-09-25 RX ORDER — LIDOCAINE HYDROCHLORIDE 10 MG/ML
INJECTION, SOLUTION INFILTRATION; PERINEURAL CODE/TRAUMA/SEDATION MEDICATION
Status: COMPLETED | OUTPATIENT
Start: 2017-09-25 | End: 2017-09-25

## 2017-09-25 RX ORDER — ACETAMINOPHEN 325 MG/1
650 TABLET ORAL EVERY 4 HOURS PRN
Status: DISCONTINUED | OUTPATIENT
Start: 2017-09-25 | End: 2017-09-28 | Stop reason: HOSPADM

## 2017-09-25 RX ORDER — SODIUM CHLORIDE 450 MG/100ML
20 INJECTION, SOLUTION INTRAVENOUS CONTINUOUS
Status: DISCONTINUED | OUTPATIENT
Start: 2017-09-25 | End: 2017-09-26

## 2017-09-25 RX ORDER — ACETAMINOPHEN 325 MG/1
650 TABLET ORAL EVERY 4 HOURS PRN
Status: DISCONTINUED | OUTPATIENT
Start: 2017-09-25 | End: 2017-09-25

## 2017-09-25 RX ORDER — MIDAZOLAM HYDROCHLORIDE 1 MG/ML
INJECTION INTRAMUSCULAR; INTRAVENOUS AS NEEDED
Status: DISCONTINUED | OUTPATIENT
Start: 2017-09-25 | End: 2017-09-25 | Stop reason: SURG

## 2017-09-25 RX ORDER — SODIUM CHLORIDE, SODIUM LACTATE, POTASSIUM CHLORIDE, CALCIUM CHLORIDE 600; 310; 30; 20 MG/100ML; MG/100ML; MG/100ML; MG/100ML
INJECTION, SOLUTION INTRAVENOUS CONTINUOUS PRN
Status: DISCONTINUED | OUTPATIENT
Start: 2017-09-25 | End: 2017-09-25 | Stop reason: SURG

## 2017-09-25 RX ORDER — HYDRALAZINE HYDROCHLORIDE 20 MG/ML
5 INJECTION INTRAMUSCULAR; INTRAVENOUS EVERY 6 HOURS PRN
Status: DISCONTINUED | OUTPATIENT
Start: 2017-09-25 | End: 2017-09-25

## 2017-09-25 RX ORDER — OXYCODONE HYDROCHLORIDE AND ACETAMINOPHEN 5; 325 MG/1; MG/1
1 TABLET ORAL EVERY 4 HOURS PRN
Status: DISCONTINUED | OUTPATIENT
Start: 2017-09-25 | End: 2017-09-28 | Stop reason: HOSPADM

## 2017-09-25 RX ORDER — CHLORHEXIDINE GLUCONATE 0.12 MG/ML
15 RINSE ORAL EVERY 12 HOURS SCHEDULED
Status: DISCONTINUED | OUTPATIENT
Start: 2017-09-25 | End: 2017-09-25

## 2017-09-25 RX ORDER — HEPARIN SODIUM 1000 [USP'U]/ML
INJECTION, SOLUTION INTRAVENOUS; SUBCUTANEOUS CODE/TRAUMA/SEDATION MEDICATION
Status: COMPLETED | OUTPATIENT
Start: 2017-09-25 | End: 2017-09-25

## 2017-09-25 RX ORDER — AMOXICILLIN AND CLAVULANATE POTASSIUM 500; 125 MG/1; MG/1
1 TABLET, FILM COATED ORAL EVERY 8 HOURS SCHEDULED
Status: DISCONTINUED | OUTPATIENT
Start: 2017-09-25 | End: 2017-09-25

## 2017-09-25 RX ORDER — BISACODYL 10 MG
10 SUPPOSITORY, RECTAL RECTAL DAILY PRN
Status: DISCONTINUED | OUTPATIENT
Start: 2017-09-25 | End: 2017-09-28 | Stop reason: HOSPADM

## 2017-09-25 RX ORDER — ATORVASTATIN CALCIUM 80 MG/1
80 TABLET, FILM COATED ORAL
Status: DISCONTINUED | OUTPATIENT
Start: 2017-09-25 | End: 2017-09-25

## 2017-09-25 RX ORDER — FONDAPARINUX SODIUM 2.5 MG/.5ML
2.5 INJECTION SUBCUTANEOUS DAILY
Status: DISCONTINUED | OUTPATIENT
Start: 2017-09-26 | End: 2017-09-28 | Stop reason: HOSPADM

## 2017-09-25 RX ORDER — ASPIRIN 325 MG
325 TABLET ORAL DAILY
Status: DISCONTINUED | OUTPATIENT
Start: 2017-09-26 | End: 2017-09-28 | Stop reason: HOSPADM

## 2017-09-25 RX ORDER — ROCURONIUM BROMIDE 10 MG/ML
INJECTION, SOLUTION INTRAVENOUS AS NEEDED
Status: DISCONTINUED | OUTPATIENT
Start: 2017-09-25 | End: 2017-09-25 | Stop reason: SURG

## 2017-09-25 RX ORDER — ASPIRIN 325 MG
325 TABLET, DELAYED RELEASE (ENTERIC COATED) ORAL DAILY
Status: DISCONTINUED | OUTPATIENT
Start: 2017-09-25 | End: 2017-09-25

## 2017-09-25 RX ORDER — VERAPAMIL HYDROCHLORIDE 2.5 MG/ML
INJECTION, SOLUTION INTRAVENOUS CODE/TRAUMA/SEDATION MEDICATION
Status: COMPLETED | OUTPATIENT
Start: 2017-09-25 | End: 2017-09-25

## 2017-09-25 RX ORDER — MIDAZOLAM HYDROCHLORIDE 1 MG/ML
INJECTION INTRAMUSCULAR; INTRAVENOUS CODE/TRAUMA/SEDATION MEDICATION
Status: COMPLETED | OUTPATIENT
Start: 2017-09-25 | End: 2017-09-25

## 2017-09-25 RX ORDER — PREDNISONE 1 MG/1
1 TABLET ORAL DAILY
Status: DISCONTINUED | OUTPATIENT
Start: 2017-09-25 | End: 2017-09-25

## 2017-09-25 RX ORDER — NITROGLYCERIN 20 MG/100ML
INJECTION INTRAVENOUS CODE/TRAUMA/SEDATION MEDICATION
Status: COMPLETED | OUTPATIENT
Start: 2017-09-25 | End: 2017-09-25

## 2017-09-25 RX ORDER — POTASSIUM CHLORIDE 14.9 MG/ML
20 INJECTION INTRAVENOUS
Status: DISCONTINUED | OUTPATIENT
Start: 2017-09-25 | End: 2017-09-26

## 2017-09-25 RX ORDER — FENTANYL CITRATE 50 UG/ML
INJECTION, SOLUTION INTRAMUSCULAR; INTRAVENOUS CODE/TRAUMA/SEDATION MEDICATION
Status: COMPLETED | OUTPATIENT
Start: 2017-09-25 | End: 2017-09-25

## 2017-09-25 RX ORDER — FUROSEMIDE 10 MG/ML
40 INJECTION INTRAMUSCULAR; INTRAVENOUS EVERY 6 HOURS PRN
Status: DISCONTINUED | OUTPATIENT
Start: 2017-09-25 | End: 2017-09-26

## 2017-09-25 RX ORDER — PROTAMINE SULFATE 10 MG/ML
INJECTION, SOLUTION INTRAVENOUS AS NEEDED
Status: DISCONTINUED | OUTPATIENT
Start: 2017-09-25 | End: 2017-09-25 | Stop reason: SURG

## 2017-09-25 RX ORDER — CHLORHEXIDINE GLUCONATE 0.12 MG/ML
15 RINSE ORAL 2 TIMES DAILY
Status: DISCONTINUED | OUTPATIENT
Start: 2017-09-25 | End: 2017-09-26

## 2017-09-25 RX ORDER — ONDANSETRON 2 MG/ML
4 INJECTION INTRAMUSCULAR; INTRAVENOUS EVERY 6 HOURS PRN
Status: DISCONTINUED | OUTPATIENT
Start: 2017-09-25 | End: 2017-09-28 | Stop reason: HOSPADM

## 2017-09-25 RX ORDER — HEPARIN SODIUM 1000 [USP'U]/ML
INJECTION, SOLUTION INTRAVENOUS; SUBCUTANEOUS AS NEEDED
Status: DISCONTINUED | OUTPATIENT
Start: 2017-09-25 | End: 2017-09-25 | Stop reason: SURG

## 2017-09-25 RX ORDER — PANTOPRAZOLE SODIUM 40 MG/1
40 TABLET, DELAYED RELEASE ORAL DAILY
Status: DISCONTINUED | OUTPATIENT
Start: 2017-09-26 | End: 2017-09-28 | Stop reason: HOSPADM

## 2017-09-25 RX ORDER — MAGNESIUM HYDROXIDE 1200 MG/15ML
LIQUID ORAL AS NEEDED
Status: DISCONTINUED | OUTPATIENT
Start: 2017-09-25 | End: 2017-09-25 | Stop reason: HOSPADM

## 2017-09-25 RX ORDER — SODIUM CHLORIDE 9 MG/ML
INJECTION, SOLUTION INTRAVENOUS CONTINUOUS PRN
Status: DISCONTINUED | OUTPATIENT
Start: 2017-09-25 | End: 2017-09-25 | Stop reason: SURG

## 2017-09-25 RX ORDER — ATORVASTATIN CALCIUM 80 MG/1
80 TABLET, FILM COATED ORAL
Status: DISCONTINUED | OUTPATIENT
Start: 2017-09-25 | End: 2017-09-28 | Stop reason: HOSPADM

## 2017-09-25 RX ORDER — AMIODARONE HYDROCHLORIDE 200 MG/1
200 TABLET ORAL EVERY 8 HOURS SCHEDULED
Status: DISCONTINUED | OUTPATIENT
Start: 2017-09-25 | End: 2017-09-28 | Stop reason: HOSPADM

## 2017-09-25 RX ORDER — FONDAPARINUX SODIUM 2.5 MG/.5ML
2.5 INJECTION SUBCUTANEOUS DAILY
Status: DISCONTINUED | OUTPATIENT
Start: 2017-09-25 | End: 2017-09-25

## 2017-09-25 RX ORDER — PROPOFOL 10 MG/ML
INJECTION, EMULSION INTRAVENOUS AS NEEDED
Status: DISCONTINUED | OUTPATIENT
Start: 2017-09-25 | End: 2017-09-25 | Stop reason: SURG

## 2017-09-25 RX ORDER — FENTANYL CITRATE 50 UG/ML
INJECTION, SOLUTION INTRAMUSCULAR; INTRAVENOUS AS NEEDED
Status: DISCONTINUED | OUTPATIENT
Start: 2017-09-25 | End: 2017-09-25 | Stop reason: SURG

## 2017-09-25 RX ADMIN — AMINOCAPROIC ACID 5 G: 250 INJECTION, SOLUTION INTRAVENOUS at 14:03

## 2017-09-25 RX ADMIN — NITROGLYCERIN 200 MCG: 20 INJECTION INTRAVENOUS at 07:50

## 2017-09-25 RX ADMIN — AMIODARONE HYDROCHLORIDE 200 MG: 200 TABLET ORAL at 21:15

## 2017-09-25 RX ADMIN — HYDROMORPHONE HYDROCHLORIDE 0.5 MG: 1 INJECTION, SOLUTION INTRAMUSCULAR; INTRAVENOUS; SUBCUTANEOUS at 20:51

## 2017-09-25 RX ADMIN — ROCURONIUM BROMIDE 100 MG: 10 INJECTION, SOLUTION INTRAVENOUS at 13:46

## 2017-09-25 RX ADMIN — VERAPAMIL HYDROCHLORIDE 2.5 MG: 2.5 INJECTION, SOLUTION INTRAVENOUS at 07:50

## 2017-09-25 RX ADMIN — SODIUM CHLORIDE 10 MG/HR: 0.9 INJECTION, SOLUTION INTRAVENOUS at 14:40

## 2017-09-25 RX ADMIN — ROCURONIUM BROMIDE 50 MG: 10 INJECTION, SOLUTION INTRAVENOUS at 15:35

## 2017-09-25 RX ADMIN — METOPROLOL TARTRATE 1 MG: 1 INJECTION, SOLUTION INTRAVENOUS at 14:41

## 2017-09-25 RX ADMIN — CEFAZOLIN SODIUM 2000 MG: 2 SOLUTION INTRAVENOUS at 21:15

## 2017-09-25 RX ADMIN — MIDAZOLAM HYDROCHLORIDE 2 MG: 1 INJECTION, SOLUTION INTRAMUSCULAR; INTRAVENOUS at 13:28

## 2017-09-25 RX ADMIN — HEPARIN SODIUM 17000 UNITS: 1000 INJECTION INTRAVENOUS; SUBCUTANEOUS at 15:12

## 2017-09-25 RX ADMIN — ATORVASTATIN CALCIUM 80 MG: 80 TABLET, FILM COATED ORAL at 19:50

## 2017-09-25 RX ADMIN — NEOSTIGMINE METHYLSULFATE 5 MG: 1 INJECTION, SOLUTION INTRAMUSCULAR; INTRAVENOUS; SUBCUTANEOUS at 16:58

## 2017-09-25 RX ADMIN — FENTANYL CITRATE 250 MCG: 50 INJECTION, SOLUTION INTRAMUSCULAR; INTRAVENOUS at 15:35

## 2017-09-25 RX ADMIN — OXYCODONE HYDROCHLORIDE AND ACETAMINOPHEN 1 TABLET: 5; 325 TABLET ORAL at 19:50

## 2017-09-25 RX ADMIN — PROPOFOL 100 MG: 10 INJECTION, EMULSION INTRAVENOUS at 13:47

## 2017-09-25 RX ADMIN — CALCIUM CHLORIDE 1 G: 100 INJECTION, SOLUTION INTRAVENOUS at 18:30

## 2017-09-25 RX ADMIN — CHLORHEXIDINE GLUCONATE 15 ML: 1.2 RINSE ORAL at 12:43

## 2017-09-25 RX ADMIN — HYDROMORPHONE HYDROCHLORIDE 0.5 MG: 1 INJECTION, SOLUTION INTRAMUSCULAR; INTRAVENOUS; SUBCUTANEOUS at 23:46

## 2017-09-25 RX ADMIN — FENTANYL CITRATE 25 MCG: 50 INJECTION, SOLUTION INTRAMUSCULAR; INTRAVENOUS at 07:47

## 2017-09-25 RX ADMIN — HEPARIN SODIUM 5000 UNITS: 1000 INJECTION INTRAVENOUS; SUBCUTANEOUS at 15:08

## 2017-09-25 RX ADMIN — HEPARIN SODIUM 3500 UNITS: 1000 INJECTION INTRAVENOUS; SUBCUTANEOUS at 07:50

## 2017-09-25 RX ADMIN — LIDOCAINE HYDROCHLORIDE 2 ML: 10 INJECTION, SOLUTION INFILTRATION; PERINEURAL at 07:48

## 2017-09-25 RX ADMIN — FENTANYL CITRATE 50 MCG: 50 INJECTION INTRAMUSCULAR; INTRAVENOUS at 17:40

## 2017-09-25 RX ADMIN — AMINOCAPROIC ACID 1000 MG/HR: 250 INJECTION, SOLUTION INTRAVENOUS at 14:03

## 2017-09-25 RX ADMIN — IPRATROPIUM BROMIDE AND ALBUTEROL SULFATE 3 ML: .5; 3 SOLUTION RESPIRATORY (INHALATION) at 01:19

## 2017-09-25 RX ADMIN — SODIUM CHLORIDE 100 ML/HR: 0.9 INJECTION, SOLUTION INTRAVENOUS at 05:58

## 2017-09-25 RX ADMIN — SODIUM CHLORIDE: 0.9 INJECTION, SOLUTION INTRAVENOUS at 13:27

## 2017-09-25 RX ADMIN — FENTANYL CITRATE 25 MCG: 50 INJECTION, SOLUTION INTRAMUSCULAR; INTRAVENOUS at 07:44

## 2017-09-25 RX ADMIN — PROTAMINE SULFATE 220 MG: 10 INJECTION, SOLUTION INTRAVENOUS at 16:15

## 2017-09-25 RX ADMIN — MIDAZOLAM HYDROCHLORIDE 1 MG: 1 INJECTION, SOLUTION INTRAMUSCULAR; INTRAVENOUS at 07:47

## 2017-09-25 RX ADMIN — ASPIRIN 325 MG: 325 TABLET, DELAYED RELEASE ORAL at 12:43

## 2017-09-25 RX ADMIN — SODIUM CHLORIDE, SODIUM LACTATE, POTASSIUM CHLORIDE, AND CALCIUM CHLORIDE: .6; .31; .03; .02 INJECTION, SOLUTION INTRAVENOUS at 14:02

## 2017-09-25 RX ADMIN — METOPROLOL TARTRATE 1 MG: 1 INJECTION, SOLUTION INTRAVENOUS at 14:27

## 2017-09-25 RX ADMIN — FENTANYL CITRATE 250 MCG: 50 INJECTION, SOLUTION INTRAMUSCULAR; INTRAVENOUS at 13:46

## 2017-09-25 RX ADMIN — CHLORHEXIDINE GLUCONATE 15 ML: 1.2 RINSE ORAL at 21:16

## 2017-09-25 RX ADMIN — IOHEXOL 85 ML: 350 INJECTION, SOLUTION INTRAVENOUS at 14:16

## 2017-09-25 RX ADMIN — MUPIROCIN 1 APPLICATION: 20 OINTMENT TOPICAL at 12:43

## 2017-09-25 RX ADMIN — MIDAZOLAM HYDROCHLORIDE 2 MG: 1 INJECTION, SOLUTION INTRAMUSCULAR; INTRAVENOUS at 13:27

## 2017-09-25 RX ADMIN — METOPROLOL TARTRATE 1 MG: 1 INJECTION, SOLUTION INTRAVENOUS at 14:30

## 2017-09-25 RX ADMIN — HYDROMORPHONE HYDROCHLORIDE 0.5 MG: 1 INJECTION, SOLUTION INTRAMUSCULAR; INTRAVENOUS; SUBCUTANEOUS at 21:16

## 2017-09-25 RX ADMIN — SODIUM CHLORIDE 20 ML/HR: 0.45 INJECTION, SOLUTION INTRAVENOUS at 18:15

## 2017-09-25 RX ADMIN — GLYCOPYRROLATE 0.8 MG: 0.2 INJECTION, SOLUTION INTRAMUSCULAR; INTRAVENOUS at 16:58

## 2017-09-25 RX ADMIN — FENTANYL CITRATE 500 MCG: 50 INJECTION, SOLUTION INTRAMUSCULAR; INTRAVENOUS at 13:45

## 2017-09-25 RX ADMIN — SODIUM CHLORIDE, SODIUM LACTATE, POTASSIUM CHLORIDE, AND CALCIUM CHLORIDE 500 ML: .6; .31; .03; .02 INJECTION, SOLUTION INTRAVENOUS at 20:20

## 2017-09-25 RX ADMIN — SODIUM CHLORIDE, SODIUM LACTATE, POTASSIUM CHLORIDE, AND CALCIUM CHLORIDE 500 ML: .6; .31; .03; .02 INJECTION, SOLUTION INTRAVENOUS at 23:30

## 2017-09-25 RX ADMIN — MUPIROCIN 1 APPLICATION: 20 OINTMENT TOPICAL at 21:15

## 2017-09-25 RX ADMIN — MAGNESIUM SULFATE HEPTAHYDRATE 2 G: 40 INJECTION, SOLUTION INTRAVENOUS at 18:30

## 2017-09-25 RX ADMIN — METOPROLOL TARTRATE 2 MG: 1 INJECTION, SOLUTION INTRAVENOUS at 14:40

## 2017-09-25 RX ADMIN — CEFAZOLIN SODIUM 2000 MG: 2 SOLUTION INTRAVENOUS at 14:25

## 2017-09-25 RX ADMIN — SODIUM CHLORIDE 3 UNITS/HR: 9 INJECTION, SOLUTION INTRAVENOUS at 18:17

## 2017-09-25 RX ADMIN — MIDAZOLAM HYDROCHLORIDE 6 MG: 1 INJECTION, SOLUTION INTRAMUSCULAR; INTRAVENOUS at 13:45

## 2017-09-25 RX ADMIN — MIDAZOLAM HYDROCHLORIDE 1 MG: 1 INJECTION, SOLUTION INTRAMUSCULAR; INTRAVENOUS at 07:44

## 2017-09-25 RX ADMIN — ALBUTEROL SULFATE 2 PUFF: 90 AEROSOL, METERED RESPIRATORY (INHALATION) at 13:24

## 2017-09-25 RX ADMIN — FENTANYL CITRATE 50 MCG: 50 INJECTION INTRAMUSCULAR; INTRAVENOUS at 17:35

## 2017-09-25 RX ADMIN — ONDANSETRON 4 MG: 2 INJECTION INTRAMUSCULAR; INTRAVENOUS at 21:44

## 2017-09-25 NOTE — PROCEDURES
Cardiac Catheterization Operative Report    Shira Santana  [id number]  9/25/2017  Alicia Vale MD    Procedure performed:    Right coronary angiography  Left coronary angiography  LV gram   Fluoroscopy    Indication:     Interventionist Cardiologist : Dr Violet Hidalgo MD [facility] [place]    Brief history:  Patient is 53-year-old male with a past medical history significant for tobacco abuse, strong family history of coronary artery disease was admitted for the chest pain on Friday ruled in for non ST elevation MI  Admission EKG shows no significant ST changes a repeat EKG on Saturday was also normal and an EKG done on Sunday shows deep T-wave inversions in precordial leads  In view of that patient was scheduled for cardiac catheterization  Procedure Details: The risks, benefits, complications, including risk of stroke, heart attack, bleeding and even death but not limited to it discussed with the patient  Other treatment options, alternatives and expected outcomes were discussed with the patient and family  The patient and/or family concurred with the proposed plan, giving informed consent  Patient was brought to the cath lab after IV hydration was begun and oral premedication was given  Patient was further sedated with midazolam and fentanyl  Patient was prepped and draped in the usual manner  Using the modified Seldinger access technique, a 5 Amharic sheath was placed in the right radial artery without any complications  Patient was given intra-arterial nitroglycerin and IV verapamil  IV Heparin was administered  A left heart catheterization was performed  Left and right coronary angiograms were  done  End of the procedure patient was transferred to holding area without any complications  Patient tolerated the procedure well  After the procedure was completed, sedation was stopped and the sheaths and catheters were all removed  Hemostasis was achieved with vasc band as per protocol      Equipment used:     1  JR4 for right coronary angiography  2  JL 3 5 for left coronary angiography  3  LV gram  with JR4    Findings:  1  Dominance: Right dominant coronary system    2  Left main Coronary artery: Left main is a normal-size vessel  It bifurcates into large LAD and a nondominant circumflex system  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 which is not flow limiting  We never fully engaged the left main as it was noted on the 1st shot  3  Left anterior descending artery: LAD is a large-size vessel and it has a long smooth 75% to 80% proximal to mid stenosis  Distal LAD has mild luminal arteries  4  Circumflex Coronary artery: Circumflex is a nondominant artery with some mild luminal irregularities causing 25 30% stenosis    5  Right coronary artery: RCA is a medium-sized nondominant artery with mild luminal regularities no focal stenosis seen    6  Left ventriculogram: LV gram done in QUESADA view shows low normal LV systolic function with EF 50-55%  Normal LVEDP  No gradient across aortic valve  Echo shows patient has anterior apical as well as apical septal hypokinesis    Estimated Blood Loss:  Minimal         Complications:  None; patient tolerated the procedure well  Impression: Cardiac catheterization shows 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  We did not engage the left main  There was also 80 percent prox mid LAD stenosis  Recommendation: Continue medical therapy  Continue risk factor modification and since patient had spontaneous dissection plaque noted  It is non flow limiting however it is at a critical point and patient has significant single-vessel stenosis  Patient will probably best benefit from  bypass surgery  Disposition: Patient transferred to holding area/monitoring in hemodynamically stable condition  Patient to be transferred to [place]  Spoke to CT surgery and Cardiology and patient's family             Condition: Stable        Chau Velazquez MD [facility] [place]        "This note has been constructed using a voice recognition system  Therefore there may be syntax, spelling, and/or grammatical errors   Please call if you have any questions  "

## 2017-09-25 NOTE — SEDATION DOCUMENTATION
Vacband applied to right wrist  Hemostasis obtained at 6cc of air in band  Additional 2cc added to band for a total of 8cc of air  No bleeding, no hematoma noted  Capillary refill brisk, patient denies numbness or tingling

## 2017-09-25 NOTE — PROGRESS NOTES
Patient transported to [facility] with [facility] transport  Report given to Rubi Helms RN transporter  Sent patient with vascband on right wrist still intact with 6cc air total in band  Gave syringe to transporter with Dr Brenden Dorman orders for removal protocol  Right wrist site stable, no bleeding no hematoma noted  Capillary refill brisk  Patient denies any numbness or tingling  Patient denies chest pain  Telephone report given to Cathy Moe, receiving RN at [facility] AND [facility]  Transfer forms completed, signed, and sent with transporters

## 2017-09-25 NOTE — EMTALA/ACUTE CARE TRANSFER
[address]  Dept: [phone]      ACUTE CARE TRANSFER CONSENT    NAME Mariah Thompson                                         1970                              MRN [id number]    I have been informed of my rights regarding examination, treatment, and transfer   by Dr Soha Delacruz MD    Benefits:      Risks:        Transfer Request:  I acknowledge that my medical condition has been evaluated and explained to me by the treating physician or other qualified medical person and/or my attending physician who has recommended and offered to me further medical examination and treatment  I understand the Hospital's obligation with respect to the treatment and stabilization of my medical condition  I nevertheless request to be transferred  I release the Hospital, the doctor, and any other persons caring for me from all responsibility or liability for any injury or ill effects that may result from my transfer and agree to accept all responsibility for the consequences of my choice to transfer, rather than receive stabilizing treatment at the Hospital  I understand that because the transfer is my request, my insurance may not provide reimbursement for the services  The Hospital will assist and direct me and my family in how to make arrangements for transfer, but the hospital is not liable for any fees charged by the transport service  In spite of this understanding, I refuse to consent to further medical examination and treatment which has been offered to me, and request transfer to    I authorize the performance of emergency medical procedures and treatments upon me in both transit and upon arrival at the receiving facility  Additionally, I authorize the release of any and all medical records to the receiving facility and request they be transported with me, if possible      I authorize the performance of emergency medical procedures and treatments upon me in both transit and upon arrival at the receiving facility  Additionally, I authorize the release of any and all medical records to the receiving facility and request they be transported with me, if possible  I understand that the safest mode of transportation during a medical emergency is an ambulance and that the Hospital advocates the use of this mode of transport  Risks of traveling to the receiving facility by car, including absence of medical control, life sustaining equipment, such as oxygen, and medical personnel has been explained to me and I fully understand them  (DIYA CORRECT BOX BELOW)  [  ]  I consent to the stated transfer and to be transported by ambulance/helicopter  [  ]  I consent to the stated transfer, but refuse transportation by ambulance and accept full responsibility for my transportation by car  I understand the risks of non-ambulance transfers and I exonerate the Hospital and its staff from any deterioration in my condition that results from this refusal     X___________________________________________    DATE  17  TIME________  Signature of patient or legally responsible individual signing on patient behalf           RELATIONSHIP TO PATIENT_________________________          Provider Certification    NAME Mira Vargas                                        [facility] 1970                              MRN [id number]    A medical screening exam was performed on the above named patient    Based on the examination:    Condition Necessitating Transfer Coronary  Artery aneurysm    Patient Condition:      Reason for Transfer:      Transfer Requirements: Facility     · Space available and qualified personnel available for treatment as acknowledged by    · Agreed to accept transfer and to provide appropriate medical treatment as acknowledged by          · Appropriate medical records of the examination and treatment of the patient are provided at the time of transfer   STAFF INITIAL WHEN COMPLETED _______  · Transfer will be performed by qualified personnel from    and appropriate transfer equipment as required, including the use of necessary and appropriate life support measures  Provider Certification: I have examined the patient and explained the following risks and benefits of being transferred/refusing transfer to the patient/family:         Based on these reasonable risks and benefits to the patient and/or the unborn child(anu), and based upon the information available at the time of the patients examination, I certify that the medical benefits reasonably to be expected from the provision of appropriate medical treatments at another medical facility outweigh the increasing risks, if any, to the individuals medical condition, and in the case of labor to the unborn child, from effecting the transfer      X_______________Luis M Varghese_____________________________ DATE 09/25/17        TIME___8:45 AM____      ORIGINAL - SEND TO MEDICAL RECORDS   COPY - SEND WITH PATIENT DURING TRANSFER

## 2017-09-26 ENCOUNTER — APPOINTMENT (INPATIENT)
Dept: RADIOLOGY | Facility: HOSPITAL | Age: 47
DRG: 235 | End: 2017-09-26
Payer: COMMERCIAL

## 2017-09-26 LAB
ANION GAP SERPL CALCULATED.3IONS-SCNC: 5 MMOL/L (ref 4–13)
ATRIAL RATE: 84 BPM
ATRIAL RATE: 85 BPM
BUN SERPL-MCNC: 13 MG/DL (ref 5–25)
CALCIUM SERPL-MCNC: 7.6 MG/DL (ref 8.3–10.1)
CHLORIDE SERPL-SCNC: 107 MMOL/L (ref 100–108)
CO2 SERPL-SCNC: 29 MMOL/L (ref 21–32)
CREAT SERPL-MCNC: 0.6 MG/DL (ref 0.6–1.3)
ERYTHROCYTE [DISTWIDTH] IN BLOOD BY AUTOMATED COUNT: 14.2 % (ref 11.6–15.1)
GFR SERPL CREATININE-BSD FRML MDRD: 119 ML/MIN/1.73SQ M
GLUCOSE SERPL-MCNC: 109 MG/DL (ref 65–140)
GLUCOSE SERPL-MCNC: 123 MG/DL (ref 65–140)
GLUCOSE SERPL-MCNC: 123 MG/DL (ref 65–140)
GLUCOSE SERPL-MCNC: 127 MG/DL (ref 65–140)
GLUCOSE SERPL-MCNC: 132 MG/DL (ref 65–140)
GLUCOSE SERPL-MCNC: 136 MG/DL (ref 65–140)
GLUCOSE SERPL-MCNC: 138 MG/DL (ref 65–140)
GLUCOSE SERPL-MCNC: 139 MG/DL (ref 65–140)
GLUCOSE SERPL-MCNC: 153 MG/DL (ref 65–140)
HCT VFR BLD AUTO: 39.7 % (ref 36.5–49.3)
HCT VFR BLD AUTO: 43.5 % (ref 36.5–49.3)
HGB BLD-MCNC: 13 G/DL (ref 12–17)
HGB BLD-MCNC: 14.4 G/DL (ref 12–17)
MAGNESIUM SERPL-MCNC: 2.6 MG/DL (ref 1.6–2.6)
MCH RBC QN AUTO: 30.4 PG (ref 26.8–34.3)
MCHC RBC AUTO-ENTMCNC: 32.7 G/DL (ref 31.4–37.4)
MCV RBC AUTO: 93 FL (ref 82–98)
P AXIS: 82 DEGREES
P AXIS: 83 DEGREES
PLATELET # BLD AUTO: 104 THOUSANDS/UL (ref 149–390)
PMV BLD AUTO: 9.2 FL (ref 8.9–12.7)
POTASSIUM SERPL-SCNC: 4.3 MMOL/L (ref 3.5–5.3)
POTASSIUM SERPL-SCNC: 5 MMOL/L (ref 3.5–5.3)
POTASSIUM SERPL-SCNC: 5.2 MMOL/L (ref 3.5–5.3)
PR INTERVAL: 142 MS
PR INTERVAL: 142 MS
QRS AXIS: 71 DEGREES
QRS AXIS: 75 DEGREES
QRSD INTERVAL: 71 MS
QRSD INTERVAL: 71 MS
QT INTERVAL: 346 MS
QT INTERVAL: 367 MS
QTC INTERVAL: 411 MS
QTC INTERVAL: 434 MS
RBC # BLD AUTO: 4.27 MILLION/UL (ref 3.88–5.62)
SODIUM SERPL-SCNC: 141 MMOL/L (ref 136–145)
T WAVE AXIS: 86 DEGREES
T WAVE AXIS: 90 DEGREES
VENTRICULAR RATE: 84 BPM
VENTRICULAR RATE: 85 BPM
WBC # BLD AUTO: 12.27 THOUSAND/UL (ref 4.31–10.16)

## 2017-09-26 PROCEDURE — 80048 BASIC METABOLIC PNL TOTAL CA: CPT | Performed by: PHYSICIAN ASSISTANT

## 2017-09-26 PROCEDURE — 93005 ELECTROCARDIOGRAM TRACING: CPT | Performed by: PHYSICIAN ASSISTANT

## 2017-09-26 PROCEDURE — 94760 N-INVAS EAR/PLS OXIMETRY 1: CPT

## 2017-09-26 PROCEDURE — 97166 OT EVAL MOD COMPLEX 45 MIN: CPT

## 2017-09-26 PROCEDURE — G8989 SELF CARE D/C STATUS: HCPCS

## 2017-09-26 PROCEDURE — 71010 HB CHEST X-RAY 1 VIEW FRONTAL (PORTABLE): CPT

## 2017-09-26 PROCEDURE — 97535 SELF CARE MNGMENT TRAINING: CPT

## 2017-09-26 PROCEDURE — 84132 ASSAY OF SERUM POTASSIUM: CPT | Performed by: PHYSICIAN ASSISTANT

## 2017-09-26 PROCEDURE — G8987 SELF CARE CURRENT STATUS: HCPCS

## 2017-09-26 PROCEDURE — 82948 REAGENT STRIP/BLOOD GLUCOSE: CPT

## 2017-09-26 PROCEDURE — 85027 COMPLETE CBC AUTOMATED: CPT | Performed by: PHYSICIAN ASSISTANT

## 2017-09-26 PROCEDURE — 85018 HEMOGLOBIN: CPT | Performed by: PHYSICIAN ASSISTANT

## 2017-09-26 PROCEDURE — 83735 ASSAY OF MAGNESIUM: CPT | Performed by: PHYSICIAN ASSISTANT

## 2017-09-26 PROCEDURE — G8988 SELF CARE GOAL STATUS: HCPCS

## 2017-09-26 PROCEDURE — 85014 HEMATOCRIT: CPT | Performed by: PHYSICIAN ASSISTANT

## 2017-09-26 RX ORDER — DOCUSATE SODIUM 100 MG/1
100 CAPSULE, LIQUID FILLED ORAL 2 TIMES DAILY
Status: DISCONTINUED | OUTPATIENT
Start: 2017-09-26 | End: 2017-09-28 | Stop reason: HOSPADM

## 2017-09-26 RX ORDER — TEMAZEPAM 15 MG/1
15 CAPSULE ORAL
Status: DISCONTINUED | OUTPATIENT
Start: 2017-09-26 | End: 2017-09-28 | Stop reason: HOSPADM

## 2017-09-26 RX ORDER — ALBUMIN, HUMAN INJ 5% 5 %
25 SOLUTION INTRAVENOUS ONCE
Status: COMPLETED | OUTPATIENT
Start: 2017-09-26 | End: 2017-09-26

## 2017-09-26 RX ORDER — FUROSEMIDE 10 MG/ML
40 INJECTION INTRAMUSCULAR; INTRAVENOUS DAILY
Status: DISCONTINUED | OUTPATIENT
Start: 2017-09-26 | End: 2017-09-28 | Stop reason: HOSPADM

## 2017-09-26 RX ORDER — POTASSIUM CHLORIDE 20 MEQ/1
20 TABLET, EXTENDED RELEASE ORAL DAILY
Status: DISCONTINUED | OUTPATIENT
Start: 2017-09-27 | End: 2017-09-28 | Stop reason: HOSPADM

## 2017-09-26 RX ADMIN — ALBUMIN HUMAN 25 G: 0.05 INJECTION, SOLUTION INTRAVENOUS at 01:30

## 2017-09-26 RX ADMIN — ONDANSETRON 4 MG: 2 INJECTION INTRAMUSCULAR; INTRAVENOUS at 08:57

## 2017-09-26 RX ADMIN — POLYETHYLENE GLYCOL 3350 17 G: 17 POWDER, FOR SOLUTION ORAL at 08:39

## 2017-09-26 RX ADMIN — PANTOPRAZOLE SODIUM 40 MG: 40 TABLET, DELAYED RELEASE ORAL at 08:38

## 2017-09-26 RX ADMIN — MUPIROCIN 1 APPLICATION: 20 OINTMENT TOPICAL at 21:01

## 2017-09-26 RX ADMIN — ATORVASTATIN CALCIUM 80 MG: 80 TABLET, FILM COATED ORAL at 16:38

## 2017-09-26 RX ADMIN — DOCUSATE SODIUM 100 MG: 100 CAPSULE, LIQUID FILLED ORAL at 08:38

## 2017-09-26 RX ADMIN — ACETAMINOPHEN 650 MG: 325 TABLET, FILM COATED ORAL at 08:39

## 2017-09-26 RX ADMIN — DOCUSATE SODIUM 100 MG: 100 CAPSULE, LIQUID FILLED ORAL at 17:54

## 2017-09-26 RX ADMIN — FUROSEMIDE 40 MG: 10 INJECTION, SOLUTION INTRAMUSCULAR; INTRAVENOUS at 08:39

## 2017-09-26 RX ADMIN — AMIODARONE HYDROCHLORIDE 200 MG: 200 TABLET ORAL at 21:20

## 2017-09-26 RX ADMIN — MUPIROCIN 1 APPLICATION: 20 OINTMENT TOPICAL at 08:39

## 2017-09-26 RX ADMIN — AMIODARONE HYDROCHLORIDE 200 MG: 200 TABLET ORAL at 13:16

## 2017-09-26 RX ADMIN — CEFAZOLIN SODIUM 2000 MG: 2 SOLUTION INTRAVENOUS at 13:17

## 2017-09-26 RX ADMIN — METOPROLOL TARTRATE 25 MG: 25 TABLET ORAL at 08:38

## 2017-09-26 RX ADMIN — AMIODARONE HYDROCHLORIDE 200 MG: 200 TABLET ORAL at 06:26

## 2017-09-26 RX ADMIN — ASPIRIN 325 MG: 325 TABLET ORAL at 08:38

## 2017-09-26 RX ADMIN — OXYCODONE HYDROCHLORIDE AND ACETAMINOPHEN 2 TABLET: 5; 325 TABLET ORAL at 00:12

## 2017-09-26 RX ADMIN — METOPROLOL TARTRATE 25 MG: 25 TABLET ORAL at 21:01

## 2017-09-26 RX ADMIN — OXYCODONE HYDROCHLORIDE AND ACETAMINOPHEN 2 TABLET: 5; 325 TABLET ORAL at 13:16

## 2017-09-26 RX ADMIN — FONDAPARINUX SODIUM 2.5 MG: 2.5 INJECTION, SOLUTION SUBCUTANEOUS at 08:39

## 2017-09-26 RX ADMIN — CEFAZOLIN SODIUM 2000 MG: 2 SOLUTION INTRAVENOUS at 06:26

## 2017-09-26 RX ADMIN — OXYCODONE HYDROCHLORIDE AND ACETAMINOPHEN 1 TABLET: 5; 325 TABLET ORAL at 19:25

## 2017-09-26 NOTE — CASE MANAGEMENT
Notification of Discharge  This is a Notification of Discharge from our facility [address]  Please be advised that this patient has been discharge from our facility  Below you will find the admission and discharge date and time including the patients disposition  PRESENTATION DATE: 9/22/2017 12:40 PM  IP ADMISSION DATE: 9/23/17 1700  DISCHARGE DATE: 9/25/2017  9:43 AM  DISPOSITION: [facility] in the [facility] by Neftaly Larose for 2017  Network Utilization Review Department  Phone: [phone]; Fax [phone]  ATTENTION: The Network Utilization Review Department is now centralized for our 7 Facilities  Make a note that we have a new phone and fax numbers for our Department  Please call with any questions or concerns to [phone] and carefully follow the prompts so that you are directed to the right person  All voicemails are confidential  Fax any determinations, approvals, denials, and requests for initial or continue stay review clinical to [phone]  Due to HIGH CALL volume, it would be easier if you could please send faxed requests to expedite your requests and in part, help us provide discharge notifications faster

## 2017-09-26 NOTE — CASE MANAGEMENT
Continued Stay Review  Date: 17  Vitals:   Temp (24hrs), Av 9 °F (36 6 °C), Min:97 6 °F (36 4 °C), Max:98 2 °F (36 8 °C)  HR:  [61-82] 82  Resp:  [17-18] 18  BP: (119-152)/(61-86) 152/86  SpO2:  [95 %-96 %] 96 %  Body mass index is 22 77 kg/m²  Medications:   Scheduled Meds:   aspirin 81 mg Oral Daily   atorvastatin 80 mg Oral Daily With Dinner   azithromycin 250 mg Oral Q24H   enoxaparin 1 mg/kg Subcutaneous Q12H [address]   ipratropium-albuterol 3 mL Nebulization Q6H   nicotine 1 patch Transdermal Daily   pantoprazole 40 mg Oral Early Morning   polyethylene glycol 17 g Oral Daily   PRN Meds:   acetaminophen    aluminum-magnesium hydroxide-simethicone    ondansetron  Abnormal Labs/Diagnostic Results:   TROP 0 82, 0 69, 0 70  Cardiac testing:   Echo Doppler yesterday shows EF around 50-55% with anterior apical and apical septal wall motion abnormality  Age/Sex: 48 y o  male   Assessment/Plan:   Principal Problem:    Chest pain  Active Problems:    Acute bronchitis   Plan:  1   Non ST-elevation MI type 1   -  Patient has multiple risk factors for coronary artery disease including heavy smoking, strong family history male sex and age  2   Tracheobronchitis  -  Better  - Cont Azithromycin  3   History of nicotine dependence   - advised to quit smoking Plan  4  Abormal CT scan  - s/p colonoscopy, GI commended that Patient does not have any bowel symptoms at this time and the jejunal thickening appears to most likely from under distention rather than enteritis   If patient has any bowel symptoms may consider CT enterography as an outpatient    PER CARDIO  Assessment and Plan:   1  Non ST-elevation MI most likely type 1  With repeat EKG showing new T-wave inversions but troponin trending down  2   Tracheobronchitis, now much better  3  History of nicotine dependence advised to quit smoking  4  Dyslipidemia  Plan    Continue aspirin  Continue Lovenox, hold after evening dose  Continue statins  Since patient's heart rate is in 60s and he is not having any chest pain now will hold off on beta-blockers  Echo Doppler reviewed and showed evidence of wall motion abnormality along with new 12 lead EKG changes  Discussed with patient and significant other since patient has multiple cardiac risk factors and troponin went up to 4 he will be scheduled for cardiac catheterization on Monday  All the risks benefit alternatives complication, heart attack,  stroke or even death but not limited to it, discussed with the patient and wishes to proceed       Subjective / Objective:   Patient seen and evaluated today  He has no more episodes of chest pain  His troponins are trending down  Echo shows wall motion abnormalities and EKG has new changes  Discharge Plan: TBD  [address] [facility] in the [facility] by Neftaly Larose for 2017  Network Utilization Review Department  Phone: [phone]; Fax [phone]  ATTENTION: The Network Utilization Review Department is now centralized for our 7 Facilities  Make a note that we have a new phone and fax numbers for our Department  Please call with any questions or concerns to [phone] and carefully follow the prompts so that you are directed to the right person  All voicemails are confidential  Fax any determinations, approvals, denials, and requests for initial or continue stay review clinical to [phone]  Due to HIGH CALL volume, it would be easier if you could please send faxed requests to expedite your requests and in part, help us provide discharge notifications faster

## 2017-09-26 NOTE — CASE MANAGEMENT
Initial Clinical Review     Admission: Date/Time/Statement:    OBSERVATION  9/22/17 @1408   INPATIENT ADMISSION 9/23/17 @ 1700        Orders Placed This Encounter   Procedures    Place in Observation (expected length of stay for this patient is less than two midnights)       Standing Status:   Standing       Number of Occurrences:   1       Order Specific Question:   Admitting Physician       Answer:   Dasia Palomino [[id number]]       Order Specific Question:   Level of Care       Answer:   Med Surg [16]      09/23/17 1700 Release Kobe Louise MD (auto-released) From Order: [id number]   09/23/17 1700 Complete Kobe Louise MD    Order Details     Frequency Duration Priority Order Class   Once 1  occurrence Routine Hospital Performed   Order Questions     Question Answer Comment   Admitting Physician REMINGTON ECKERT    Level of Care Med Surg    Bed Type Clinitro    Estimated length of stay More than 2 Midnights    Certification I certify that inpatient services are medically necessary for this patient for a duration of greater than two midnights  See H&P and MD Progress Notes for additional information about the patient's course of treatment  ED: Date/Time/Mode of Arrival:             ED Arrival Information      Expected Arrival Acuity Means of Arrival Escorted By Service Admission Type     - 9/22/2017 12:34 Urgent [facility] Urgent     Arrival Complaint     CHEST PAIN          Chief Complaint:        Chief Complaint   Patient presents with    Chest Pain       chest pain for the past half hour, pt diagnosed yesterday with bronchitis, pt has hx of reflux but sts this is different  pt coughing up yellow mucous       History of Illness: Ofpete Epifanio a 48 y  o  male who presents with chest pain   Patient was diagnosed with bronchitis and was started on prednisone and amoxicillin   He is still coughing with yellow phlegm   Patient describes the pain as heaviness, retrosternal   He had 2 episodes of 1st 1 lasted for 15 minutes and resolved spontaneously   The other 1 happened while he was visiting his sister and this 1 was more severe associated with shortness of breath, but no nausea or sweating   No known exacerbating or relieving factors  Afebrile  No abd pain, nausea, vomiting, or diarrhea  No dysuria or polyuria  No headaches or dizziness  ED Vital Signs:            ED Triage Vitals [09/22/17 1242]   Temperature Pulse Respirations Blood Pressure SpO2   97 8 °F (36 6 °C) 83 16 139/76 93 %       Temp Source Heart Rate Source Patient Position - Orthostatic VS BP Location FiO2 (%)   Oral Monitor Sitting Left arm --       Pain Score           7                Wt Readings from Last 1 Encounters:   09/22/17 64 kg (141 lb 1 5 oz)    Vital Signs (abnormal): HR 54 x 1  Abnormal Labs/Diagnostic Test Results:   9/22: trop  02,  74, 3 42, 4 03  9/22: EKG: nsr  9/22: CTA dissection protocol chest & abd: No evidence of aortic aneurysm or dissection  Mild to moderate COPD  Mildly thick-walled jejunal loop with slight hyperemia of the bowel, perhaps related to underdistention, cannot entirely exclude enteritis  No inflammatory changes in the adjacent mesentery  9/22: PCXR: copd, no active dz  ED Treatment:              Medication Administration from 09/22/2017 1234 to 09/22/2017 1512        Date/Time Order Dose Route Action Action by Comments       09/22/2017 1259 aspirin chewable tablet 324 mg 324 mg Oral Given Hollis Saldana RN         09/22/2017 1257 nitroglycerin (NITROSTAT) SL tablet 0 4 mg 0 4 mg Sublingual Given Hollis Saldana RN         09/22/2017 1301 morphine (PF) 4 mg/mL injection 4 mg 4 mg Intravenous Given Hollis Saldana RN         09/22/2017 1301 sodium chloride 0 9 % bolus 1,000 mL 1,000 mL Intravenous New Bag Hollis Saldana RN         09/22/2017 1257 ondansetron (ZOFRAN) injection 4 mg 4 mg Intravenous Given Hollis Saldana RN          Past Medical/Surgical History:         Active Ambulatory Problems     Diagnosis Date Noted    No Active Ambulatory Problems           Resolved Ambulatory Problems     Diagnosis Date Noted    No Resolved Ambulatory Problems           Past Medical History:   Diagnosis Date    Diverticulitis      GERD (gastroesophageal reflux disease)      Wears glasses      Admitting Diagnosis: Chest pain [R07 9]  Age/Sex: 48 y o  male  Assessment/Plan: Chest pain  Likely GI source   CT scan of the chest showed no evidence of aneurysms or pneumonias   No pleural reactions   Will trend troponins check 2D echo  General [place] patient only monitors   Consult Cardiology for possible stress test   Will start patient empirically on Protonix   Will start aspirin  Beverley Nay check lipid profile  Acute bronchitis  Patient is not wheezing   Will keep patient on Zithromax   Left treatments p r n   Incidental Jejunal Thickness in CT abd  Will consult GI  Admission Orders:  Scheduled Meds:   aspirin 81 mg Oral Daily   atorvastatin 80 mg Oral Daily With Dinner   azithromycin 250 mg Oral Q24H   enoxaparin 1 mg/kg Subcutaneous Q12H [facility]   ipratropium-albuterol 3 mL Nebulization Q6H   nicotine 1 patch Transdermal Daily   pantoprazole 40 mg Oral Early Morning   polyethylene glycol 17 g Oral Daily   PRN Meds:   acetaminophen    aluminum-magnesium hydroxide-simethicone    ondansetron      NPO  Bmp, cbc in am  Trop q3h  Cons GI  bilat venodynes  tele     PER CARDIO 9/22:  Non STEMI- possible type 1 versus 2   She has significant risk factors including prolonged smoking, family history, and age  Paty Funes currently has no active chest pain   His EKG 12 lead shows no acute ST T wave changes-  Continue troponin x3   Plan for nuclear stress test in a m  echo 2D   aspirin plus atorvastatin 80 mg  Mike Randall his significant risk factors and recent chest pain this morning will start Lovenox therapy      Recent bronchitis- wheezing on examination   Continue Zithromax plus pulmonary toilet     Nicotine dependence- smoking cessation    Date: 9/23/17 INPATIENT ADMISSION         Vital Signs: /73   Pulse 84   Temp 98 °F (36 7 °C) (Oral)   Resp 18   Ht 5' 6" (1 676 m)   Wt 64 kg (141 lb 1 5 oz)   SpO2 96%   BMI 22 77 kg/m²      Medications:   Scheduled Meds:   aspirin 81 mg Oral Daily   atorvastatin 80 mg Oral Daily With Dinner   azithromycin 250 mg Oral Q24H   enoxaparin 1 mg/kg Subcutaneous Q12H [facility]   ipratropium-albuterol 3 mL Nebulization Q6H   nicotine 1 patch Transdermal Daily   pantoprazole 40 mg Oral Early Morning   polyethylene glycol 17 g Oral Daily   PRN Meds:   acetaminophen    aluminum-magnesium hydroxide-simethicone    ondansetron  Abnormal Labs/Diagnostic Results:   9/23 TROP 2 94   hgb 13 8   hct 41 5  Age/Sex: 48 y o  male   Assessment/Plan:   PER MED 9/23 0324:  Notified by nursing staff that the pt's troponin is elevated    Current value is 4 03 (up from 3 42)    Pt is currently sleeping with no signs of distress   Vitals are stable   Notes reviewed and Cardiology is following  [facility] is receiving therapeutic Lovenox, Asprin and Lipitor 80 mg with a plan to undergo nuclear stress test in the morning   At this time, will continue management as above, obtain EKG and continue to trend troponin   Advised nurse not to have pt undergo stress testing until Cardiologist is notified of recent troponin elevation  PER GI 9/23:  1   Incidentally noted jejunal wall thickening, a mildly thick-walled jejunal loop with slight hyperemia of the bowel was noted on patient's CT scan during workup for chest pain   He does not have any obvious signs or symptoms of enteritis   This finding is most likely related to underdistention in the absence of symptomatology   - diet as tolerated  - consider outpatient small bowel imaging such as small bowel series or CT enterography  - if patient develops any diarrhea, check stool cultures/studies    2   Postprandial fecal urgency for several months, patient says that he has improved his symptoms somewhat with modification of his diet including cutting out fried/fatty foods; his symptomatology is most likely related to some form of dietary intolerance, doubt it would be related to #1  Anna Patton also had a colonoscopy recently   - I advised patient to continue avoiding fatty/greasy foods, I also recommended he try a trial of avoiding dairy products for a couple of weeks and see if this improves his symptoms  - can also perform duodenal evaluation and possible biopsies for celiac disease if he has EGD as outpatient    3   Longstanding GERD, patient says he is not on a PPI at home  [place]  out underlying Smith's or malignancy   - outpatient EGD   - start Protonix daily here    4   History of adenomatous colon polyp, he had tubular adenoma removed during colonoscopy last month - repeat colonoscopy in 3-5 years   Discharge Plan: to be determined

## 2017-09-27 LAB
ANION GAP SERPL CALCULATED.3IONS-SCNC: 4 MMOL/L (ref 4–13)
BUN SERPL-MCNC: 15 MG/DL (ref 5–25)
CALCIUM SERPL-MCNC: 8.3 MG/DL (ref 8.3–10.1)
CHLORIDE SERPL-SCNC: 102 MMOL/L (ref 100–108)
CO2 SERPL-SCNC: 34 MMOL/L (ref 21–32)
CREAT SERPL-MCNC: 0.7 MG/DL (ref 0.6–1.3)
ERYTHROCYTE [DISTWIDTH] IN BLOOD BY AUTOMATED COUNT: 14.3 % (ref 11.6–15.1)
GFR SERPL CREATININE-BSD FRML MDRD: 112 ML/MIN/1.73SQ M
GLUCOSE SERPL-MCNC: 105 MG/DL (ref 65–140)
GLUCOSE SERPL-MCNC: 109 MG/DL (ref 65–140)
GLUCOSE SERPL-MCNC: 109 MG/DL (ref 65–140)
GLUCOSE SERPL-MCNC: 111 MG/DL (ref 65–140)
GLUCOSE SERPL-MCNC: 118 MG/DL (ref 65–140)
HCT VFR BLD AUTO: 37.7 % (ref 36.5–49.3)
HGB BLD-MCNC: 12.3 G/DL (ref 12–17)
MAGNESIUM SERPL-MCNC: 2.4 MG/DL (ref 1.6–2.6)
MCH RBC QN AUTO: 30.4 PG (ref 26.8–34.3)
MCHC RBC AUTO-ENTMCNC: 32.6 G/DL (ref 31.4–37.4)
MCV RBC AUTO: 93 FL (ref 82–98)
PLATELET # BLD AUTO: 141 THOUSANDS/UL (ref 149–390)
PMV BLD AUTO: 10.1 FL (ref 8.9–12.7)
POTASSIUM SERPL-SCNC: 4.4 MMOL/L (ref 3.5–5.3)
RBC # BLD AUTO: 4.04 MILLION/UL (ref 3.88–5.62)
SODIUM SERPL-SCNC: 140 MMOL/L (ref 136–145)
WBC # BLD AUTO: 10.46 THOUSAND/UL (ref 4.31–10.16)

## 2017-09-27 PROCEDURE — 83735 ASSAY OF MAGNESIUM: CPT | Performed by: PHYSICIAN ASSISTANT

## 2017-09-27 PROCEDURE — G8978 MOBILITY CURRENT STATUS: HCPCS

## 2017-09-27 PROCEDURE — 82948 REAGENT STRIP/BLOOD GLUCOSE: CPT

## 2017-09-27 PROCEDURE — 97163 PT EVAL HIGH COMPLEX 45 MIN: CPT

## 2017-09-27 PROCEDURE — 97110 THERAPEUTIC EXERCISES: CPT

## 2017-09-27 PROCEDURE — 80048 BASIC METABOLIC PNL TOTAL CA: CPT | Performed by: PHYSICIAN ASSISTANT

## 2017-09-27 PROCEDURE — 94760 N-INVAS EAR/PLS OXIMETRY 1: CPT

## 2017-09-27 PROCEDURE — G8979 MOBILITY GOAL STATUS: HCPCS

## 2017-09-27 PROCEDURE — 85027 COMPLETE CBC AUTOMATED: CPT | Performed by: PHYSICIAN ASSISTANT

## 2017-09-27 RX ORDER — ALBUTEROL SULFATE 90 UG/1
2 AEROSOL, METERED RESPIRATORY (INHALATION) EVERY 4 HOURS PRN
Status: DISCONTINUED | OUTPATIENT
Start: 2017-09-27 | End: 2017-09-28 | Stop reason: HOSPADM

## 2017-09-27 RX ADMIN — ASPIRIN 325 MG: 325 TABLET ORAL at 08:24

## 2017-09-27 RX ADMIN — OXYCODONE HYDROCHLORIDE AND ACETAMINOPHEN 1 TABLET: 5; 325 TABLET ORAL at 16:52

## 2017-09-27 RX ADMIN — FONDAPARINUX SODIUM 2.5 MG: 2.5 INJECTION, SOLUTION SUBCUTANEOUS at 08:26

## 2017-09-27 RX ADMIN — METOPROLOL TARTRATE 25 MG: 25 TABLET ORAL at 08:24

## 2017-09-27 RX ADMIN — OXYCODONE HYDROCHLORIDE AND ACETAMINOPHEN 2 TABLET: 5; 325 TABLET ORAL at 08:23

## 2017-09-27 RX ADMIN — DOCUSATE SODIUM 100 MG: 100 CAPSULE, LIQUID FILLED ORAL at 19:35

## 2017-09-27 RX ADMIN — AMIODARONE HYDROCHLORIDE 200 MG: 200 TABLET ORAL at 13:45

## 2017-09-27 RX ADMIN — DOCUSATE SODIUM 100 MG: 100 CAPSULE, LIQUID FILLED ORAL at 08:24

## 2017-09-27 RX ADMIN — POLYETHYLENE GLYCOL 3350 17 G: 17 POWDER, FOR SOLUTION ORAL at 08:26

## 2017-09-27 RX ADMIN — OXYCODONE HYDROCHLORIDE AND ACETAMINOPHEN 1 TABLET: 5; 325 TABLET ORAL at 21:17

## 2017-09-27 RX ADMIN — ATORVASTATIN CALCIUM 80 MG: 80 TABLET, FILM COATED ORAL at 16:52

## 2017-09-27 RX ADMIN — METOPROLOL TARTRATE 25 MG: 25 TABLET ORAL at 21:17

## 2017-09-27 RX ADMIN — AMIODARONE HYDROCHLORIDE 200 MG: 200 TABLET ORAL at 05:43

## 2017-09-27 RX ADMIN — PANTOPRAZOLE SODIUM 40 MG: 40 TABLET, DELAYED RELEASE ORAL at 08:24

## 2017-09-27 RX ADMIN — MUPIROCIN 1 APPLICATION: 20 OINTMENT TOPICAL at 08:25

## 2017-09-27 RX ADMIN — AMIODARONE HYDROCHLORIDE 200 MG: 200 TABLET ORAL at 21:17

## 2017-09-27 RX ADMIN — POTASSIUM CHLORIDE 20 MEQ: 1500 TABLET, EXTENDED RELEASE ORAL at 08:24

## 2017-09-27 RX ADMIN — OXYCODONE HYDROCHLORIDE AND ACETAMINOPHEN 1 TABLET: 5; 325 TABLET ORAL at 00:04

## 2017-09-27 RX ADMIN — FUROSEMIDE 40 MG: 10 INJECTION, SOLUTION INTRAMUSCULAR; INTRAVENOUS at 08:25

## 2017-09-28 VITALS
TEMPERATURE: 98.2 F | SYSTOLIC BLOOD PRESSURE: 107 MMHG | OXYGEN SATURATION: 97 % | WEIGHT: 133.6 LBS | BODY MASS INDEX: 21.47 KG/M2 | RESPIRATION RATE: 19 BRPM | HEIGHT: 66 IN | HEART RATE: 66 BPM | DIASTOLIC BLOOD PRESSURE: 62 MMHG

## 2017-09-28 LAB
ABO GROUP BLD BPU: NORMAL
ANION GAP SERPL CALCULATED.3IONS-SCNC: 5 MMOL/L (ref 4–13)
BPU ID: NORMAL
BUN SERPL-MCNC: 13 MG/DL (ref 5–25)
CALCIUM SERPL-MCNC: 8.7 MG/DL (ref 8.3–10.1)
CHLORIDE SERPL-SCNC: 103 MMOL/L (ref 100–108)
CO2 SERPL-SCNC: 31 MMOL/L (ref 21–32)
CREAT SERPL-MCNC: 0.62 MG/DL (ref 0.6–1.3)
CROSSMATCH: NORMAL
ERYTHROCYTE [DISTWIDTH] IN BLOOD BY AUTOMATED COUNT: 13.8 % (ref 11.6–15.1)
GFR SERPL CREATININE-BSD FRML MDRD: 117 ML/MIN/1.73SQ M
GLUCOSE SERPL-MCNC: 86 MG/DL (ref 65–140)
GLUCOSE SERPL-MCNC: 93 MG/DL (ref 65–140)
GLUCOSE SERPL-MCNC: 94 MG/DL (ref 65–140)
HCT VFR BLD AUTO: 36.7 % (ref 36.5–49.3)
HGB BLD-MCNC: 12.2 G/DL (ref 12–17)
MCH RBC QN AUTO: 30.4 PG (ref 26.8–34.3)
MCHC RBC AUTO-ENTMCNC: 33.2 G/DL (ref 31.4–37.4)
MCV RBC AUTO: 92 FL (ref 82–98)
PLATELET # BLD AUTO: 134 THOUSANDS/UL (ref 149–390)
PMV BLD AUTO: 9.3 FL (ref 8.9–12.7)
POTASSIUM SERPL-SCNC: 4.3 MMOL/L (ref 3.5–5.3)
RBC # BLD AUTO: 4.01 MILLION/UL (ref 3.88–5.62)
SODIUM SERPL-SCNC: 139 MMOL/L (ref 136–145)
UNIT DISPENSE STATUS: NORMAL
UNIT PRODUCT CODE: NORMAL
UNIT RH: NORMAL
WBC # BLD AUTO: 8.95 THOUSAND/UL (ref 4.31–10.16)

## 2017-09-28 PROCEDURE — 80048 BASIC METABOLIC PNL TOTAL CA: CPT | Performed by: PHYSICIAN ASSISTANT

## 2017-09-28 PROCEDURE — 85027 COMPLETE CBC AUTOMATED: CPT | Performed by: PHYSICIAN ASSISTANT

## 2017-09-28 PROCEDURE — 82948 REAGENT STRIP/BLOOD GLUCOSE: CPT

## 2017-09-28 RX ORDER — DOCUSATE SODIUM 100 MG/1
100 CAPSULE, LIQUID FILLED ORAL 2 TIMES DAILY
Qty: 10 CAPSULE | Refills: 0 | Status: SHIPPED | OUTPATIENT
Start: 2017-09-28 | End: 2018-01-26

## 2017-09-28 RX ORDER — OXYCODONE HYDROCHLORIDE AND ACETAMINOPHEN 5; 325 MG/1; MG/1
1 TABLET ORAL EVERY 4 HOURS PRN
Qty: 30 TABLET | Refills: 0
Start: 2017-09-28 | End: 2017-10-08

## 2017-09-28 RX ORDER — OXYCODONE HYDROCHLORIDE AND ACETAMINOPHEN 5; 325 MG/1; MG/1
2 TABLET ORAL EVERY 6 HOURS PRN
Qty: 30 TABLET | Refills: 0
Start: 2017-09-28 | End: 2017-10-08

## 2017-09-28 RX ORDER — OMEPRAZOLE 20 MG/1
20 CAPSULE, DELAYED RELEASE ORAL DAILY
Qty: 30 CAPSULE | Refills: 0 | Status: SHIPPED | OUTPATIENT
Start: 2017-09-28 | End: 2018-01-30 | Stop reason: SDUPTHER

## 2017-09-28 RX ORDER — ASPIRIN 325 MG
325 TABLET ORAL DAILY
Qty: 100 TABLET | Refills: 0 | Status: SHIPPED | OUTPATIENT
Start: 2017-09-29

## 2017-09-28 RX ORDER — POLYETHYLENE GLYCOL 3350 17 G/17G
17 POWDER, FOR SOLUTION ORAL DAILY PRN
Qty: 14 EACH | Refills: 0 | Status: SHIPPED | OUTPATIENT
Start: 2017-09-28 | End: 2018-01-26

## 2017-09-28 RX ORDER — POTASSIUM CHLORIDE 20 MEQ/1
20 TABLET, EXTENDED RELEASE ORAL DAILY
Qty: 5 TABLET | Refills: 1 | Status: SHIPPED | OUTPATIENT
Start: 2017-09-29 | End: 2018-01-26

## 2017-09-28 RX ORDER — TORSEMIDE 10 MG/1
10 TABLET ORAL DAILY
Qty: 5 TABLET | Refills: 1 | Status: SHIPPED | OUTPATIENT
Start: 2017-09-28 | End: 2018-01-26

## 2017-09-28 RX ORDER — ATORVASTATIN CALCIUM 80 MG/1
80 TABLET, FILM COATED ORAL
Qty: 30 TABLET | Refills: 2 | Status: SHIPPED | OUTPATIENT
Start: 2017-09-28 | End: 2018-01-30 | Stop reason: SDUPTHER

## 2017-09-28 RX ORDER — ACETAMINOPHEN 325 MG/1
650 TABLET ORAL EVERY 6 HOURS PRN
Qty: 30 TABLET | Refills: 0 | COMMUNITY
Start: 2017-09-28 | End: 2018-01-26

## 2017-09-28 RX ADMIN — AMIODARONE HYDROCHLORIDE 200 MG: 200 TABLET ORAL at 06:13

## 2017-09-28 RX ADMIN — FUROSEMIDE 40 MG: 10 INJECTION, SOLUTION INTRAMUSCULAR; INTRAVENOUS at 08:23

## 2017-09-28 RX ADMIN — FONDAPARINUX SODIUM 2.5 MG: 2.5 INJECTION, SOLUTION SUBCUTANEOUS at 08:23

## 2017-09-28 RX ADMIN — METOPROLOL TARTRATE 25 MG: 25 TABLET ORAL at 08:23

## 2017-09-28 RX ADMIN — POLYETHYLENE GLYCOL 3350 17 G: 17 POWDER, FOR SOLUTION ORAL at 08:23

## 2017-09-28 RX ADMIN — MUPIROCIN 1 APPLICATION: 20 OINTMENT TOPICAL at 08:23

## 2017-09-28 RX ADMIN — POTASSIUM CHLORIDE 20 MEQ: 1500 TABLET, EXTENDED RELEASE ORAL at 08:23

## 2017-09-28 RX ADMIN — DOCUSATE SODIUM 100 MG: 100 CAPSULE, LIQUID FILLED ORAL at 08:23

## 2017-09-28 RX ADMIN — PANTOPRAZOLE SODIUM 40 MG: 40 TABLET, DELAYED RELEASE ORAL at 08:24

## 2017-09-28 RX ADMIN — OXYCODONE HYDROCHLORIDE AND ACETAMINOPHEN 1 TABLET: 5; 325 TABLET ORAL at 03:33

## 2017-09-28 RX ADMIN — ASPIRIN 325 MG: 325 TABLET ORAL at 08:24

## 2017-10-10 ENCOUNTER — GENERIC CONVERSION - ENCOUNTER (OUTPATIENT)
Dept: OTHER | Facility: OTHER | Age: 47
End: 2017-10-10

## 2017-10-10 ENCOUNTER — ALLSCRIPTS OFFICE VISIT (OUTPATIENT)
Dept: OTHER | Facility: OTHER | Age: 47
End: 2017-10-10

## 2017-10-10 NOTE — DISCHARGE SUMMARY
Karan Nolasco MD Physician Signed Internal Medicine  H&P Date of Service: 9/25/2017  3:32 PM   Related encounter: Admission (Discharged) from 9/25/2017 in [address] [place] [address] 4          []Hide copied text  Discharge Summary - [address] Internal Medicine     Patient Information: Jordon Mark 48 y o  male MRN: [id number]  Unit/Bed#: OR Reader Encounter: [id number]     Discharging Physician / Practitioner: Karan Nolasco MD  PCP: Melba Giles MD  Admission Date: 9/25/2017  Discharge Date: 09/25/17     Reason for Admission:  Chest pain     Discharge Diagnoses:      Principal Problem:    Left main coronary artery dissection  Active Problems:    Acute bronchitis    NSTEMI (non-ST elevated myocardial infarction)    Tobacco abuse    CAD (coronary artery disease)  Resolved Problems:    Chest pain        Consultations During Hospital Stay:  · Cardiology     Procedures Performed:      · Cardiac catheterization     Significant Findings / Test Results:      · LAD 90% stenosis and LM dissection     Incidental Findings:   · As above      Test Results Pending at Discharge (will require follow up): · None     Outpatient Tests Requested:  · None     Complications:  None     Hospital Course:      Jordon Mark is a 48 y o  male patient who originally presented to the hospital on 9/25/2017 due to chest pain  Patient was admitted, troponin slightly got elevated  Patient was started on aspirin, beta-blockers, statins, and full-dose therapeutic Lovenox  Had TTE w/ slightly decreased LV function & evidence of apical/septal wall motion abnormalities  Sent for diagnostic cath this AM w/ incidental finding of LAD CAD 90% & spontaneous LM dissection   Transferred to B for cardiac sx consultation      Condition at Discharge: serious      Discharge Day Visit / Exam:      * Please refer to separate progress note for these details *  Discharge instructions/Information to patient and family:   See after visit summary for information provided to patient and family        Provisions for Follow-Up Care:  See after visit summary for information related to follow-up care and any pertinent home health orders        Disposition:      Other: Will transfer to [facility] to   [address] [facility]:   · Not Applicable to this Patient - Not Applicable to this Patient     Planned Readmission:  No     Discharge Statement:  I spent 30 minutes discharging the patient  This time was spent on the day of discharge  I had direct contact with the patient on the day of discharge  Greater than 50% of the total time was spent examining patient, answering all patient questions, arranging and discussing plan of care with patient as well as directly providing post-discharge instructions  Additional time then spent on discharge activities      Discharge Medications:  See after visit summary for reconciled discharge medications provided to patient and family        ** Please Note: This note has been constructed using a voice recognition system   **

## 2017-10-26 ENCOUNTER — GENERIC CONVERSION - ENCOUNTER (OUTPATIENT)
Dept: OTHER | Facility: OTHER | Age: 47
End: 2017-10-26

## 2017-10-26 ENCOUNTER — ALLSCRIPTS OFFICE VISIT (OUTPATIENT)
Dept: OTHER | Facility: OTHER | Age: 47
End: 2017-10-26

## 2017-10-26 DIAGNOSIS — J44.9 CHRONIC OBSTRUCTIVE PULMONARY DISEASE (HCC): ICD-10-CM

## 2017-10-26 DIAGNOSIS — E78.5 HYPERLIPIDEMIA: ICD-10-CM

## 2017-10-26 DIAGNOSIS — I25.10 ATHEROSCLEROTIC HEART DISEASE OF NATIVE CORONARY ARTERY WITHOUT ANGINA PECTORIS: ICD-10-CM

## 2017-10-26 DIAGNOSIS — Z95.1 PRESENCE OF AORTOCORONARY BYPASS GRAFT: ICD-10-CM

## 2017-10-27 ENCOUNTER — TRANSCRIBE ORDERS (OUTPATIENT)
Dept: ADMINISTRATIVE | Facility: HOSPITAL | Age: 47
End: 2017-10-27

## 2017-10-27 ENCOUNTER — HOSPITAL ENCOUNTER (OUTPATIENT)
Dept: RADIOLOGY | Facility: HOSPITAL | Age: 47
Discharge: HOME | End: 2017-10-27
Attending: INTERNAL MEDICINE
Payer: COMMERCIAL

## 2017-10-27 DIAGNOSIS — Z95.1 POSTSURGICAL AORTOCORONARY BYPASS STATUS: ICD-10-CM

## 2017-10-27 DIAGNOSIS — J44.9 OBSTRUCTIVE CHRONIC BRONCHITIS WITHOUT EXACERBATION (HCC): Primary | ICD-10-CM

## 2017-10-27 PROCEDURE — 71020 HB CHEST X-RAY 2VW FRONTAL&LATL: CPT

## 2017-10-29 ENCOUNTER — GENERIC CONVERSION - ENCOUNTER (OUTPATIENT)
Dept: OTHER | Facility: OTHER | Age: 47
End: 2017-10-29

## 2017-10-30 ENCOUNTER — GENERIC CONVERSION - ENCOUNTER (OUTPATIENT)
Dept: OTHER | Facility: OTHER | Age: 47
End: 2017-10-30

## 2017-11-02 ENCOUNTER — APPOINTMENT (OUTPATIENT)
Dept: CARDIAC REHAB | Facility: CLINIC | Age: 47
End: 2017-11-02
Payer: COMMERCIAL

## 2017-11-02 DIAGNOSIS — Z95.1 S/P CABG X 2: ICD-10-CM

## 2017-11-02 PROCEDURE — 93797 PHYS/QHP OP CAR RHAB WO ECG: CPT

## 2017-11-03 ENCOUNTER — APPOINTMENT (OUTPATIENT)
Dept: CARDIAC REHAB | Facility: CLINIC | Age: 47
End: 2017-11-03
Payer: COMMERCIAL

## 2017-11-03 DIAGNOSIS — Z95.1 S/P CABG X 2: ICD-10-CM

## 2017-11-03 PROCEDURE — 93798 PHYS/QHP OP CAR RHAB W/ECG: CPT

## 2017-11-06 ENCOUNTER — APPOINTMENT (OUTPATIENT)
Dept: CARDIAC REHAB | Facility: CLINIC | Age: 47
End: 2017-11-06
Payer: COMMERCIAL

## 2017-11-06 DIAGNOSIS — Z95.1 S/P CABG X 2: ICD-10-CM

## 2017-11-06 PROCEDURE — 93798 PHYS/QHP OP CAR RHAB W/ECG: CPT

## 2017-11-08 ENCOUNTER — APPOINTMENT (OUTPATIENT)
Dept: CARDIAC REHAB | Facility: CLINIC | Age: 47
End: 2017-11-08
Payer: COMMERCIAL

## 2017-11-08 DIAGNOSIS — Z95.1 S/P CABG X 2: ICD-10-CM

## 2017-11-08 PROCEDURE — 93798 PHYS/QHP OP CAR RHAB W/ECG: CPT

## 2017-11-09 ENCOUNTER — GENERIC CONVERSION - ENCOUNTER (OUTPATIENT)
Dept: OTHER | Facility: OTHER | Age: 47
End: 2017-11-09

## 2017-11-10 ENCOUNTER — APPOINTMENT (OUTPATIENT)
Dept: CARDIAC REHAB | Facility: CLINIC | Age: 47
End: 2017-11-10
Payer: COMMERCIAL

## 2017-11-10 DIAGNOSIS — Z95.1 S/P CABG X 2: ICD-10-CM

## 2017-11-10 PROCEDURE — 93798 PHYS/QHP OP CAR RHAB W/ECG: CPT

## 2017-11-13 ENCOUNTER — APPOINTMENT (OUTPATIENT)
Dept: CARDIAC REHAB | Facility: CLINIC | Age: 47
End: 2017-11-13
Payer: COMMERCIAL

## 2017-11-13 DIAGNOSIS — Z95.1 S/P CABG X 2: ICD-10-CM

## 2017-11-13 PROCEDURE — 93798 PHYS/QHP OP CAR RHAB W/ECG: CPT

## 2017-11-15 ENCOUNTER — APPOINTMENT (OUTPATIENT)
Dept: CARDIAC REHAB | Facility: CLINIC | Age: 47
End: 2017-11-15
Payer: COMMERCIAL

## 2017-11-15 DIAGNOSIS — Z95.1 S/P CABG X 2: ICD-10-CM

## 2017-11-15 PROCEDURE — 93798 PHYS/QHP OP CAR RHAB W/ECG: CPT

## 2017-11-17 ENCOUNTER — APPOINTMENT (OUTPATIENT)
Dept: CARDIAC REHAB | Facility: CLINIC | Age: 47
End: 2017-11-17
Payer: COMMERCIAL

## 2017-11-17 DIAGNOSIS — Z95.1 S/P CABG X 2: ICD-10-CM

## 2017-11-17 PROCEDURE — 93798 PHYS/QHP OP CAR RHAB W/ECG: CPT

## 2017-11-20 ENCOUNTER — ALLSCRIPTS OFFICE VISIT (OUTPATIENT)
Dept: OTHER | Facility: OTHER | Age: 47
End: 2017-11-20

## 2017-11-20 ENCOUNTER — APPOINTMENT (OUTPATIENT)
Dept: CARDIAC REHAB | Facility: CLINIC | Age: 47
End: 2017-11-20
Payer: COMMERCIAL

## 2017-11-21 ENCOUNTER — APPOINTMENT (OUTPATIENT)
Dept: CARDIAC REHAB | Facility: CLINIC | Age: 47
End: 2017-11-21
Payer: COMMERCIAL

## 2017-11-21 DIAGNOSIS — Z95.1 S/P CABG X 2: ICD-10-CM

## 2017-11-21 PROCEDURE — 93798 PHYS/QHP OP CAR RHAB W/ECG: CPT

## 2017-11-21 NOTE — PROGRESS NOTES
Assessment    1  Chronic obstructive pulmonary disease, unspecified COPD type (496) (J44 9)   2  Shortness of breath on exertion (786 05) (R06 02)    Plan  Chronic obstructive pulmonary disease, unspecified COPD type    · Azithromycin 250 MG Oral Tablet; TAKE 2 TABLETS ON DAY 1 THEN TAKE 1TABLET A DAY FOR 4 DAYS   · Tudorza Pressair 400 MCG/ACT Inhalation Aerosol Powder Breath Activated; 1 INH2 times a day, samples given    Discussion/Summary    Rto in 6 jesus with pulmonologist in 4 wks  Possible side effects of new medications were reviewed with the patient/guardian today  The treatment plan was reviewed with the patient/guardian  The patient/guardian understands and agrees with the treatment plan      Chief Complaint  Patient presents for c/o congestion x 6 days  nil/lpn      History of Present Illness  HPI: 49 yo m seen for productive cough x 1 wk, undergoing cardiac rehab, was advised by cardio to see lung specialist - CXR showed hyperventilation, using rescue inhaler daily, stopped smoking 2 m ago, admits SOB with going 1 flight of stairs - no CP, never been Tx for COPD      Review of Systems   Constitutional: no fever or chills, feels well, no tiredness, no recent weight loss or weight gain  Cardiovascular: no complaints of slow or fast heart rate, no chest pain, no palpitations, no leg claudication or lower extremity edema  Respiratory: cough-- and-- shortness of breath during exertion, but-- as noted in HPI  Gastrointestinal: no complaints of abdominal pain, no constipation, no nausea or vomiting, no diarrhea or bloody stools  Integumentary: no complaints of skin rash or lesion, no itching or dry skin, no skin wounds  Active Problems  1  Asthma (493 90) (J45 909)   2  CAD, multiple vessel (414 00) (I25 10)   3  Chronic obstructive pulmonary disease, unspecified COPD type (496) (J44 9)   4  Colon polyps (211 3) (K63 5)   5  Diverticulitis of colon (562 11) (K57 32)   6   Fatigue, unspecified type (780 79) (R53 83)   7  GERD (gastroesophageal reflux disease) (530 81) (K21 9)   8  Hyperlipidemia (272 4) (E78 5)   9  Nicotine dependence (305 1) (F17 200)   10  NSTEMI (non-ST elevated myocardial infarction) (410 70) (I21 4)   11  Postop check (V67 00) (Z09)   12  S/P CABG x 2 (V45 81) (Z95 1)    Past Medical History  Active Problems And Past Medical History Reviewed: The active problems and past medical history were reviewed and updated today  Family History  Mother    1  Family history of Benign Essential Hypertension   2  Denied: Family history of colon cancer   3  Denied: Family history of colonic polyps   4  Denied: Family history of liver disease  Father    11  Family history of Adenocarcinoma In Situ Of The Testis   6  Family history of Cholecystectomy   7  Denied: Family history of colon cancer   8  Denied: Family history of colonic polyps   9  Denied: Family history of liver disease   10  Family history of Heart Surgery   11  Family history of Type 2 Diabetes Mellitus  Family History Reviewed: The family history was reviewed and updated today  Social History   · Denied: History of Being A Social Drinker   · History of Current Every Day Smoker (305 1)   · Feels safe at home   · Former smoker (R09 52) (L08 322)  The social history was reviewed and updated today  Surgical History    1  History of Appendectomy   2  History of CABG  Surgical History Reviewed: The surgical history was reviewed and updated today  Current Meds   1  Aspirin 325 MG Oral Tablet; Take 1 tablet daily; Therapy: (Recorded:26Oct2017) to Recorded   2  Atorvastatin Calcium 80 MG Oral Tablet; TAKE 1 TABLET DAILY; Therapy: (Recorded:26Oct2017) to Recorded   3  Metoprolol Tartrate 25 MG Oral Tablet; TAKE 0 5 TABLET Twice daily; Therapy: 29BGL6946 to (Last Rx:26Oct2017)  Requested for: 26Oct2017 Ordered   4   Omeprazole 20 MG Oral Capsule Delayed Release; take 1 capsule daily  Requested for: 64YCA2859; Last Rx: 35GAF3339 Ordered   5  ProAir  (90 Base) MCG/ACT Inhalation Aerosol Solution; 2  PUFFS Q 4-6 HOURS PRN  ELBERT; Therapy: 94TMV5223 to (Last Willie Bone)  Requested for: 16VSE7954 Ordered    The medication list was reviewed and updated today  Allergies  1  No Known Drug Allergies  2  No Known Environmental Allergies   3  No Known Food Allergies    Vitals   Recorded: 12FNJ0285 11:20AM   Temperature 97 5 F   Heart Rate 60   Respiration Quality Normal   Respiration 14   Systolic 617   Diastolic 70   Height 5 ft 5 in   Weight 151 lb 6 oz   BMI Calculated 25 19   BSA Calculated 1 76       Physical Exam   Constitutional  General appearance: No acute distress, well appearing and well nourished  Pulmonary  Respiratory effort: No increased work of breathing or signs of respiratory distress  Auscultation of lungs: Abnormal   Auscultation of the lungs revealed decreased breath sounds diffusely  no rales or crackles were heard bilaterally  no rhonchi  no wheezing  Cardiovascular  Auscultation of heart: Normal rate and rhythm, normal S1 and S2, without murmurs  Examination of extremities for edema and/or varicosities: Normal          Future Appointments    Date/Time Provider Specialty Site   12/12/2017 01:00 PM BRIDGER Etienne   Pulmonary Medicine  [address] [facility]       Signatures   Electronically signed by : BRIDGER Wells ; Nov 20 2017  7:08PM EST                       (Author)

## 2017-11-22 ENCOUNTER — APPOINTMENT (OUTPATIENT)
Dept: CARDIAC REHAB | Facility: CLINIC | Age: 47
End: 2017-11-22
Payer: COMMERCIAL

## 2017-11-22 DIAGNOSIS — Z95.1 S/P CABG X 2: ICD-10-CM

## 2017-11-22 PROCEDURE — 93798 PHYS/QHP OP CAR RHAB W/ECG: CPT

## 2017-11-24 ENCOUNTER — APPOINTMENT (OUTPATIENT)
Dept: CARDIAC REHAB | Facility: CLINIC | Age: 47
End: 2017-11-24
Payer: COMMERCIAL

## 2017-11-27 ENCOUNTER — APPOINTMENT (OUTPATIENT)
Dept: CARDIAC REHAB | Facility: CLINIC | Age: 47
End: 2017-11-27
Payer: COMMERCIAL

## 2017-11-27 DIAGNOSIS — Z95.1 S/P CABG X 2: ICD-10-CM

## 2017-11-27 DIAGNOSIS — E78.5 HYPERLIPIDEMIA: ICD-10-CM

## 2017-11-27 DIAGNOSIS — I25.10 ATHEROSCLEROTIC HEART DISEASE OF NATIVE CORONARY ARTERY WITHOUT ANGINA PECTORIS: ICD-10-CM

## 2017-11-27 DIAGNOSIS — I21.4 NON-ST ELEVATION (NSTEMI) MYOCARDIAL INFARCTION (HCC): ICD-10-CM

## 2017-11-27 DIAGNOSIS — Z95.1 PRESENCE OF AORTOCORONARY BYPASS GRAFT: ICD-10-CM

## 2017-11-27 PROCEDURE — 93798 PHYS/QHP OP CAR RHAB W/ECG: CPT

## 2017-11-29 ENCOUNTER — APPOINTMENT (OUTPATIENT)
Dept: CARDIAC REHAB | Facility: CLINIC | Age: 47
End: 2017-11-29
Payer: COMMERCIAL

## 2017-11-29 DIAGNOSIS — Z95.1 S/P CABG X 2: ICD-10-CM

## 2017-11-29 PROCEDURE — 93798 PHYS/QHP OP CAR RHAB W/ECG: CPT

## 2017-12-01 ENCOUNTER — APPOINTMENT (OUTPATIENT)
Dept: CARDIAC REHAB | Facility: CLINIC | Age: 47
End: 2017-12-01
Payer: COMMERCIAL

## 2017-12-01 DIAGNOSIS — Z95.1 S/P CABG X 2: ICD-10-CM

## 2017-12-01 PROCEDURE — 93798 PHYS/QHP OP CAR RHAB W/ECG: CPT

## 2017-12-04 ENCOUNTER — APPOINTMENT (OUTPATIENT)
Dept: CARDIAC REHAB | Facility: CLINIC | Age: 47
End: 2017-12-04
Payer: COMMERCIAL

## 2017-12-04 DIAGNOSIS — Z95.1 S/P CABG X 2: ICD-10-CM

## 2017-12-04 PROCEDURE — 93798 PHYS/QHP OP CAR RHAB W/ECG: CPT

## 2017-12-06 ENCOUNTER — APPOINTMENT (OUTPATIENT)
Dept: CARDIAC REHAB | Facility: CLINIC | Age: 47
End: 2017-12-06
Payer: COMMERCIAL

## 2017-12-06 DIAGNOSIS — Z95.1 S/P CABG X 2: ICD-10-CM

## 2017-12-06 PROCEDURE — 93798 PHYS/QHP OP CAR RHAB W/ECG: CPT

## 2017-12-08 ENCOUNTER — APPOINTMENT (OUTPATIENT)
Dept: CARDIAC REHAB | Facility: CLINIC | Age: 47
End: 2017-12-08
Payer: COMMERCIAL

## 2017-12-08 DIAGNOSIS — Z95.1 S/P CABG X 2: ICD-10-CM

## 2017-12-08 PROCEDURE — 93798 PHYS/QHP OP CAR RHAB W/ECG: CPT

## 2017-12-11 ENCOUNTER — APPOINTMENT (OUTPATIENT)
Dept: CARDIAC REHAB | Facility: CLINIC | Age: 47
End: 2017-12-11
Payer: COMMERCIAL

## 2017-12-11 DIAGNOSIS — Z95.1 S/P CABG X 2: ICD-10-CM

## 2017-12-11 PROCEDURE — 93798 PHYS/QHP OP CAR RHAB W/ECG: CPT

## 2017-12-12 ENCOUNTER — ALLSCRIPTS OFFICE VISIT (OUTPATIENT)
Dept: OTHER | Facility: OTHER | Age: 47
End: 2017-12-12

## 2017-12-13 ENCOUNTER — APPOINTMENT (OUTPATIENT)
Dept: CARDIAC REHAB | Facility: CLINIC | Age: 47
End: 2017-12-13
Payer: COMMERCIAL

## 2017-12-13 DIAGNOSIS — Z95.1 S/P CABG X 2: ICD-10-CM

## 2017-12-13 PROCEDURE — 93798 PHYS/QHP OP CAR RHAB W/ECG: CPT

## 2017-12-13 NOTE — CONSULTS
Assessment  1  COPD, moderate (496) (J44 9)    Results/Data  [place] Sleepiness Score 10Sqg4267 01:42PM User, Ahs     Test Name Result Flag Reference   [place] Score 4 - Normal     Answer Summary: Q1-0, Q2-1, Q3-0, Q4-1, Q5-2, Q6-0, Q7-0, Q8-0     STOP BANG Questionnaire 71ZFH2043 01:39PM User, Ahs     Test Name Result Flag Reference   STOP BANG Questionnaire Risk of LUCY Low Risk       Snoring: No Tired: No Observed: No Blood Pressure: No BMI: No Age: No Neck Circumference: No Gender: Yes   STOP BANG Questionnaire LUCY Total Score 1       Snoring: No Tired: No Observed: No Blood Pressure: No BMI: No Age: No Neck Circumference: No Gender: Yes       Results Free Text Form [facility]:   Results  Chest X-ray all chest imaging is reviewed by me  PFT Results v2:     Spirometry: Forced vital capacity: 4 27L-- and-- 112% Predicted Values  Forced expiratory volume in one second: 1 67L-- and-- 56% Predicted Value  FEV1/FVC ratio is 39% Predicted Values  Post Bronchodilator Spirometry:   Lung Volumes:   DLCO:   PFT Interpretation:  moderate obstructive defect  Discussion/Summary  Discussion Summary:   1  Moderate COPD secondary to smoking also has had significant occupational exposures  He is currently using Tudorza twice daily with significant improvement in his breathing  When he does have periodic shortness of breath he is to use the rescue inhaler  If he is requiring the use of his rescue inhaler on a daily basis he will let us know  At this time would not add any further inhaler therapy as I believe he will continue to improve with rehab  He is also encouraged to use a mask when he is at work to help limit exposure to noxious stimuli  results of spirometry are reviewed in detail with him today  Will obtain complete PFT with DLCO after his next visit to establish baseline diffusion capacity  2  Former smoker continued abstinence from smoking is encouraged      3  CAD status post CABG currently undergoing cardiac rehab without further symptoms  4  Follow up in 2 months or sooner if needed  All questions are answered to his satisfaction understanding  He verbalized understanding  He is encouraged to call with any further questions or concerns  Goals and Barriers: The patient has the current Goals: To improved breathing  The patent has the current Barriers: None  Patient's Capacity to Self-Care: Patient is able to Self-Care  Patient Education: Educational resources provided: None given  Medication SE Review and Pt Understands Tx: The treatment plan was reviewed with the patient/guardian  The patient/guardian understands and agrees with the treatment plan      Active Problems     · Asthma (493 90) (J45 909)   · CAD, multiple vessel (414 00) (I25 10)   · Chronic obstructive pulmonary disease, unspecified COPD type (496) (J44 9)   · Colon polyps (211 3) (K63 5)   · Diverticulitis of colon (562 11) (K57 32)   · Fatigue, unspecified type (780 79) (R53 83)   · GERD (gastroesophageal reflux disease) (530 81) (K21 9)   · Hyperlipidemia (272 4) (E78 5)   · Nicotine dependence (305 1) (F17 200)   · NSTEMI (non-ST elevated myocardial infarction) (410 70) (I21 4)   · Postop check (V67 00) (Z09)   · S/P CABG x 2 (V45 81) (Z95 1)   · Shortness of breath on exertion (786 05) (R06 02)    Chief Complaint  Chief Complaint Free Text Note Form: Pt presents today for breathing issues per cardiologist       History of Present Illness  HPI: Patient is a 51 yo male with a past medical history of CAD s/p MI on Sept 22nd- was transferred to [place]- then had CABG 2 vessel, hyperlipidemia, GERD who presents for evaluation of COPD   not required rescue inhaler  Uses Tudorza  Feels improved using [place]  Started using Tudorza 1 week ago had breathing issues for years  Has had shortness of breath on exertion  Does have wheezing when he is sleeping  No snoring  No witnessed apneas  No choking and gasping in his sleep   Once asleep he wakes up multiple times at night due to pain- shoulder etc always coughing- has resolved since quitting smoking-lower ext swelling  undergoing cardiac rehab   smoked for 30 years- 2 ppd runs a [employer]  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 desk job  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  does not use a maskfor many yearshistory- heart disease  known lung disease      Review of Systems  Complete-Male Pulm:  Constitutional: no fever,-- no chills,-- not feeling tired-- and-- no recent weight loss  Eyes: no purulent discharge from the eyes  ENT: no nasal discharge  Cardiovascular: no chest pain,-- no palpitations-- and-- no extremity edema  Respiratory: as noted in HPI  Gastrointestinal: no abdominal pain,-- no nausea,-- no vomiting-- and-- no diarrhea  Genitourinary: no dysuria  Musculoskeletal: no arthralgias  Integumentary: no rashes-- and-- no skin lesions  Neurological: no headache,-- no confusion-- and-- no dizziness  Psychiatric: no anxiety-- and-- no depression  Endocrine: no muscle weakness  Hematologic/Lymphatic: no swollen glands  Past Medical History  1  History of Chronic sinus complaints (786 9) (R09 89)   2  History of Cough (786 2) (R05)   3  History of Groin discomfort (789 09) (R10 30)   4  History of acute bronchitis (V12 69) (Z87 09)   5  History of acute sinusitis (V12 69) (Z87 09)   6  History of angina (V12 59) (Z86 79)   7  History of bronchitis (V12 69) (Z87 09)   8  History of heart attack (412) (I25 2)   9  History of heartburn (V12 79) (Z87 898)   10  History of hypertension (V12 59) (Z86 79)   11  History of otitis media (V12 49) (Z86 69)   12  History of Skin lesion (709 9) (L98 9)   13  History of Vasovagal syncope (780 2) (R55)  Active Problems And Past Medical History Reviewed: The active problems and past medical history were reviewed and updated today  Surgical History  1  History of Appendectomy   2  History of CABG  Surgical History Reviewed:    The surgical history was reviewed and updated today  Family History  Mother    1  Family history of Benign Essential Hypertension   2  Denied: Family history of colon cancer   3  Denied: Family history of colonic polyps   4  Denied: Family history of liver disease  Father    11  Family history of Adenocarcinoma In Situ Of The Testis   6  Family history of Cholecystectomy   7  Denied: Family history of colon cancer   8  Denied: Family history of colonic polyps   9  Denied: Family history of liver disease   10  Family history of Heart Surgery   11  Family history of Type 2 Diabetes Mellitus  Family History Reviewed: The family history was reviewed and updated today  Social History     · Denied: History of Being A Social Drinker   · History of Current Every Day Smoker (305 1)   ·    · Feels safe at home   · History of Former smoker (V15 82) (B85 198)   · Occupation   · Pets/Animals: Cat   · Pets/Animals: Dog  Social History Reviewed: The social history was reviewed and updated today  The social history was reviewed and is unchanged  Current Meds   1  Aspirin 325 MG Oral Tablet; Take 1 tablet daily; Therapy: (Recorded:26Oct2017) to Recorded   2  Atorvastatin Calcium 80 MG Oral Tablet; TAKE 1 TABLET DAILY; Therapy: (Recorded:26Oct2017) to Recorded   3  Metoprolol Tartrate 25 MG Oral Tablet; TAKE 0 5 TABLET Twice daily; Therapy: 05SQC9873 to (Last Rx:26Oct2017)  Requested for: 26Oct2017 Ordered   4  Omeprazole 20 MG Oral Capsule Delayed Release; take 1 capsule daily  Requested for: 86DHV4357; Last Rx:31Oct2017 Ordered   5  ProAir  (90 Base) MCG/ACT Inhalation Aerosol Solution; 2  PUFFS Q 4-6 HOURS PRN  ELBERT; Therapy: 69ZZA2565 to (Last Emelyn Reasoner)  Requested for: 61CUE0915 Ordered   6  Tudorza Pressair 400 MCG/ACT Inhalation Aerosol Powder Breath Activated; 1 INH 2 times a day, samples given; Therapy: 41CRQ6439 to (Last Rx:20Nov2017)  Requested for: 20Nov2017 Ordered  Medication List Reviewed:    The medication list was reviewed and updated today  Allergies  1  No Known Drug Allergies  2  No Known Environmental Allergies   3  No Known Food Allergies    Vitals  Vital Signs    Recorded: 17Wfj7187 12:57PM   Temperature 98 1 F, Oral   Heart Rate 69   Pulse Quality Normal   Respiration Quality Normal   Respiration 12   Systolic 249, RUE, Sitting   Diastolic 78, RUE, Sitting   Height 5 ft 5 25 in   Weight 156 lb    BMI Calculated 25 76   BSA Calculated 1 78   O2 Saturation 96, RA       Physical Exam   Constitutional  General appearance: No acute distress, well appearing and well nourished  Eyes  Examination of pupil and irises: Anicteric, pupils reactive  Ears, Nose, Mouth, and Throat  External inspection of ears and nose: Normal    Oropharynx: Normal with no erythema, edema, exudate or lesions  Mallampati Classification: 3  Neck  Neck: Supple, symmetric, trachea midline, no masses  Pulmonary  Chest: Normal    Respiratory effort: No increased work of breathing or signs of respiratory distress  Auscultation of lungs: Abnormal  -- Diminished breath sounds bilaterally  Cardiovascular  Auscultation of heart: Normal rate and rhythm, normal S1 and S2, no murmurs  Examination of extremities for edema and/or varicosities: Normal    Abdomen  Abdomen: Soft, non-tender  Lymphatic  Palpation of lymph nodes in neck: No lymphadenopathy  Musculoskeletal  Gait and station: Normal    Digits and nails: Normal without clubbing or cyanosis  Neurologic  Mental Status: Normal  Not confused, no evidence of dementia, good comprehension, good concentration  Skin  Skin and subcutaneous tissue: Limited exam shows no rash  Psychiatric  Orientation to person, place and time: Normal    Mood and affect: Normal        Future Appointments    Date/Time Provider Specialty Site   02/12/2018 12:00 PM BRIDGER Raza  Pulmonary Medicine [facility]   01/03/2018 11:00 AM BRIDGER Vargas   Cardiology [facility] [address]       Signatures   Electronically signed by : BRIDGER Mackey ; Dec 12 2017  1:51PM EST                       (Author)

## 2017-12-15 ENCOUNTER — APPOINTMENT (OUTPATIENT)
Dept: CARDIAC REHAB | Facility: CLINIC | Age: 47
End: 2017-12-15
Payer: COMMERCIAL

## 2017-12-15 DIAGNOSIS — Z95.1 S/P CABG X 2: ICD-10-CM

## 2017-12-15 PROCEDURE — 93798 PHYS/QHP OP CAR RHAB W/ECG: CPT

## 2017-12-18 ENCOUNTER — APPOINTMENT (OUTPATIENT)
Dept: CARDIAC REHAB | Facility: CLINIC | Age: 47
End: 2017-12-18
Payer: COMMERCIAL

## 2017-12-18 DIAGNOSIS — Z95.1 S/P CABG X 2: ICD-10-CM

## 2017-12-18 PROCEDURE — 93798 PHYS/QHP OP CAR RHAB W/ECG: CPT

## 2017-12-20 ENCOUNTER — APPOINTMENT (OUTPATIENT)
Dept: CARDIAC REHAB | Facility: CLINIC | Age: 47
End: 2017-12-20
Payer: COMMERCIAL

## 2017-12-22 ENCOUNTER — APPOINTMENT (OUTPATIENT)
Dept: CARDIAC REHAB | Facility: CLINIC | Age: 47
End: 2017-12-22
Payer: COMMERCIAL

## 2017-12-22 DIAGNOSIS — Z95.1 S/P CABG X 2: ICD-10-CM

## 2017-12-22 PROCEDURE — 93798 PHYS/QHP OP CAR RHAB W/ECG: CPT

## 2017-12-27 ENCOUNTER — ALLSCRIPTS OFFICE VISIT (OUTPATIENT)
Dept: OTHER | Facility: OTHER | Age: 47
End: 2017-12-27

## 2017-12-27 ENCOUNTER — APPOINTMENT (OUTPATIENT)
Dept: CARDIAC REHAB | Facility: CLINIC | Age: 47
End: 2017-12-27
Payer: COMMERCIAL

## 2017-12-27 DIAGNOSIS — Z95.1 S/P CABG X 2: ICD-10-CM

## 2017-12-27 PROCEDURE — 93798 PHYS/QHP OP CAR RHAB W/ECG: CPT

## 2017-12-29 ENCOUNTER — APPOINTMENT (OUTPATIENT)
Dept: CARDIAC REHAB | Facility: CLINIC | Age: 47
End: 2017-12-29
Payer: COMMERCIAL

## 2017-12-29 DIAGNOSIS — Z95.1 S/P CABG X 2: ICD-10-CM

## 2017-12-29 PROCEDURE — 93798 PHYS/QHP OP CAR RHAB W/ECG: CPT

## 2018-01-02 ENCOUNTER — GENERIC CONVERSION - ENCOUNTER (OUTPATIENT)
Dept: OTHER | Facility: OTHER | Age: 48
End: 2018-01-02

## 2018-01-03 ENCOUNTER — ALLSCRIPTS OFFICE VISIT (OUTPATIENT)
Dept: OTHER | Facility: OTHER | Age: 48
End: 2018-01-03

## 2018-01-03 ENCOUNTER — APPOINTMENT (OUTPATIENT)
Dept: CARDIAC REHAB | Facility: CLINIC | Age: 48
End: 2018-01-03
Payer: COMMERCIAL

## 2018-01-03 DIAGNOSIS — Z95.1 S/P CABG X 2: ICD-10-CM

## 2018-01-03 DIAGNOSIS — R79.89 OTHER SPECIFIED ABNORMAL FINDINGS OF BLOOD CHEMISTRY: ICD-10-CM

## 2018-01-03 DIAGNOSIS — R42 DIZZINESS AND GIDDINESS: ICD-10-CM

## 2018-01-03 PROCEDURE — 93798 PHYS/QHP OP CAR RHAB W/ECG: CPT

## 2018-01-04 NOTE — PROGRESS NOTES
Assessment   Assessed   1  CAD, multiple vessel (414 00) (I25 10)  2  COPD, moderate (496) (J44 9)  3  Hyperlipidemia (272 4) (E78 5)  4  S/P CABG x 2 (V45 81) (Z95 1)  5  Shortness of breath on exertion (786 05) (R06 02)  6  Elevated LFTs (790 6) (R79 89)  7  Dizziness and giddiness (780 4) (R42)1      1 Amended By: David Dean; Jan 03 2018 4:09 PM EST      Plan   CAD, multiple vessel, Hyperlipidemia, S/P CABG x 2, Shortness of breath on exertion    · Follow-up visit in 3 months Evaluation and Treatment  Follow-up  Status: Hold For -    Scheduling  Requested for: 82MBJ6223  Ordered; For: CAD, multiple vessel, Hyperlipidemia, S/P CABG x 2, Shortness of breath     on exertion;  Ordered By: David Dean  Performed:   Due: 14QFZ3900  Elevated LFTs    · (1) HEPATIC FUNCTION PANEL; Status:Active; Requested RQU:00YKX6955; Perform:[facility]; ODH:75UBI0289;MARYG; For:Elevated LFTs; Ordered     By:Zaina Sharif;    Discussion/Summary   Cardiology Discussion Summary Free Text Note Form [facility]:    1  Coronary artery disease status post CABG x2 in September of 2017 with LIMA to LAD and SVG vein graft to circumflex  Clinically doing much better though still fatigue and tired Dizziness and fatigue  Most likely caused by metoprolol will decrease the dose to 12 5 mg twice a day  Fatigued ness is better  Patient had an episode of dizziness if remains dizzy will DC metoprolol  Shortness of breath  Patient has chronic shortness of breath  Diagnosed to have moderate COPD  Inhalers were changed  Followed up with Pulmonary Dyslipidemia  On high-intensity statins  Patient's labs reviewed  Cholesterol is acceptable  CMP is acceptable other than ALT which is mildly elevated  Repeat LFTs in 6 weeks History of tobacco abuse  Heavy smoker throughout her life since he was 13years old  Now quit smoking  Patient encouraged not to go back to smoking   Patient's wife also also been encouraged to quit smoking otherwise patient has high likelihood of going back to smoking LFTs rehabilitation advised to call us back if he feels dizzy or lightheaded again  Patient's Capacity to Self-Care: Patient is able to Self-Care  Medication SE Review and Pt Understands Tx: Possible side effects of new medications were reviewed with the patient/guardian today  The treatment plan was reviewed with the patient/guardian  The patient/guardian understands and agrees with the treatment plan    Counseling Documentation With Imm: The patient, patient's family was counseled regarding diagnostic results,-- instructions for management,-- risk factor reductions,-- patient and family education,-- importance of compliance with treatment  Chief Complaint   Chief Complaint Free Text Note Form: PT is here for a 3 month F/U, complains of some dizziness on and off for the past 2 weeks  Chief Complaint Chronic Condition [facility]: Patient is here today for follow up of chronic conditions described in HPI  History of Present Illness   Cardiology HPI Free Text Note [facility]: 38-year-old male who was admitted to [facility] in September 22, 2017 with 10/10 severe chest pain and had a non ST elevation MI  He was short of breath even climbing 1-2 flights of stair  His cardiac catheterization shows he had a spontaneous dissection of left main as well as significant stenosis of his mid LAD and he underwent successful coronary artery bypass surgery x2 with LIMA to LAD and SVG vein graft to circumflex came for follow-up  Patient used to smoke heavy and has now quit smoking  He has history of I believe COPD but never seen a pulmonologist  He feels fatigue and tired otherwise he is getting better  Some dizziness after taking metoprolol  No fever no chills no other significant complaint came for follow-up  Gained some weight  He is doing cardiac rehabilitation therapy  Able to walk about 25 minutes on the treadmill   Had an episode of dizziness on [place] evening  No more episodes since then  He is on very low-dose metoprolol and blood pressure is acceptable  No fever no chills no nausea no vomiting  Diagnosed to have moderate COPD by PFT and started on new inhalers  Came with his wife who still smokes      Review of Systems   Cardiology Male ROS:         Cardiac: as noted in HPI,-- no chest pain,-- no rhythm problems,-- no fainting/blackouts,-- no signs of swelling-- and-- no palpitations present  Skin: No complaints of nonhealing sores or skin rash  Genitourinary: No complaints of recurrent urinary tract infections, frequent urination at night, difficult urination, blood in urine, kidney stones, loss of bladder control, no kidney or prostate problems, no erectile dysfunction  Psychological: anxiety  General: no appetite changes-- and-- no lack of energy/fatigue  Respiratory: shortness of breath-- and-- cough/sputum  HEENT: No complaints of serious problems, hearing problems, nose problems, throat problems, or snoring  Gastrointestinal: No complaints of liver problems, nausea, vomiting, heartburn, constipation, bloody stools, diarrhea, problems swallowing, adbominal pain, or rectal bleeding  Hematologic: No complaints of bleeding disorders, anemia, blood clots, or excessive brusing  Neurological: dizziness, but-- No complaints of numbness, tingling, dizziness, weakness, seizures, headaches, syncope or fainting, AM fatigue, daytime sleepiness, no witnessed apnea episodes  Musculoskeletal: No complaints of arthritis, back pain, or painfull swelling  ROS Reviewed:    ROS reviewed  Active Problems   Problems   1  Asthma (493 90) (J45 909)  2  CAD, multiple vessel (414 00) (I25 10)  3  Cellulitis of toe of left foot (681 10) (L03 032)  4  Chronic obstructive pulmonary disease, unspecified COPD type (496) (J44 9)  5  Colon polyps (211 3) (K63 5)  6  COPD, moderate (496) (J44 9)  7  Diverticulitis of colon (562 11) (K57 32)  8  Fatigue, unspecified type (780 79) (R53 83)  9  GERD (gastroesophageal reflux disease) (530 81) (K21 9)  10  Hyperlipidemia (272 4) (E78 5)  11  Ingrowing nail (703 0) (L60 0)  12  Nicotine dependence (305 1) (F17 200)  13  NSTEMI (non-ST elevated myocardial infarction) (410 70) (I21 4)  14  Onychomycosis (110 1) (B35 1)  15  Postop check (V67 00) (Z09)  16  S/P CABG x 2 (V45 81) (Z95 1)  17  Shortness of breath on exertion (786 05) (R06 02)    Past Medical History   Problems   1  History of Chronic sinus complaints (786 9) (R09 89)  2  History of Cough (786 2) (R05)  3  History of Groin discomfort (789 09) (R10 30)  4  History of acute bronchitis (V12 69) (Z87 09)  5  History of acute sinusitis (V12 69) (Z87 09)  6  History of angina (V12 59) (Z86 79)  7  History of bronchitis (V12 69) (Z87 09)  8  History of heart attack (412) (I25 2)  9  History of heartburn (V12 79) (Z87 898)  10  History of hypertension (V12 59) (Z86 79)  11  History of otitis media (V12 49) (Z86 69)  12  History of Skin lesion (709 9) (L98 9)  13  History of Vasovagal syncope (780 2) (R55)  Active Problems And Past Medical History Reviewed: The active problems and past medical history were reviewed and updated today  Surgical History   Problems   1  History of Appendectomy  2  History of CABG  Surgical History Reviewed: The surgical history was reviewed and updated today  Family History   Mother   1  Family history of Benign Essential Hypertension  2  Denied: Family history of colon cancer  3  Denied: Family history of colonic polyps  4  Denied: Family history of liver disease  Father   11  Family history of Adenocarcinoma In Situ Of The Testis  6  Family history of Cholecystectomy  7  Family history of cardiac disorder (V17 49) (Z82 49)  8  Denied: Family history of colon cancer  9  Denied: Family history of colonic polyps  10   Family history of diabetes mellitus (V18 0) (Z83 3)  11  Denied: Family history of liver disease  12  Family history of malignant neoplasm (V16 9) (Z80 9)  13  Family history of Heart Surgery  14  Family history of Type 2 Diabetes Mellitus  Sister   13  Family history of diabetes mellitus (V18 0) (Z83 3)  Grandmother   12  Family history of arthritis (V17 7) (Z82 61)  17  Family history of cardiac disorder (V17 49) (Z82 49)  18  Family history of diabetes mellitus (V18 0) (Z83 3)  19  Family history of malignant neoplasm (V16 9) (Z80 9)  Grandfather   20  Family history of cardiac disorder (V17 49) (Z82 49)  Family History   21  Family history of arthritis (V17 7) (Z82 61)  Family History Reviewed: The family history was reviewed and updated today  Social History   Problems    · Denied: History of Being A Social Drinker   · History of Current Every Day Smoker (305 1)   ·    · Denied: History of Exposure to second hand smoke   · Feels safe at home   · Former smoker (T45 76) (G57 938)   · History of Former smoker (V15 82) (Z87 891)   · Occupation   · Pets/Animals: Cat   · Pets/Animals: Dog   · Denied: History of Recreational drug use   · Denied: History of Travel history  Social History Reviewed: The social history was reviewed and updated today  The social history was reviewed and is unchanged  Current Meds   1  Aspirin 325 MG Oral Tablet; Take 1 tablet daily; Therapy: (Recorded:26Oct2017) to Recorded  2  Atorvastatin Calcium 80 MG Oral Tablet; TAKE 1 TABLET DAILY; Therapy: (Recorded:26Oct2017) to Recorded  3  Cefadroxil 500 MG Oral Capsule; TAKE 1 CAPSULE TWICE DAILY; Therapy: 67QUR4500 to (Evaluate:10Jan2018)  Requested for: 75Uey7452; Last     Rx:57Amw6942 Ordered  4  Metoprolol Tartrate 25 MG Oral Tablet; TAKE 0 5 TABLET Twice daily; Therapy: 38TXQ1538 to (Last Rx:26Oct2017)  Requested for: 26Oct2017 Ordered  5   Omeprazole 20 MG Oral Capsule Delayed Release; take 1 capsule daily  Requested for:     38WRK6338; Last Rx: 28GKV6586 Ordered  6  ProAir  (90 Base) MCG/ACT Inhalation Aerosol Solution; 2  PUFFS Q 4-6 HOURS     PRN  ELBERT;     Therapy: 88QCY7517 to (Last Lennice Gilmer)  Requested for: 15ENG3578 Ordered  7  Tudorza Pressair 400 MCG/ACT Inhalation Aerosol Powder Breath Activated; 1 INH 2     times a day, samples given; Therapy: 59URL5035 to (Last Rx:20Nov2017)  Requested for: 20Nov2017 Ordered  Medication List Reviewed: The medication list was reviewed and updated today  Allergies   Medication   1  No Known Drug Allergies  Non-Medication   2  No Known Environmental Allergies  3  No Known Food Allergies    Vitals   Vital Signs    Recorded: 44XXP7734 11:17AM   Heart Rate 82, Apical   Systolic 979, LUE, Sitting   Diastolic 82, LUE, Sitting   Height 5 ft 5 25 in   Weight 158 lb 2 oz   BMI Calculated 26 11   BSA Calculated 1 8   O2 Saturation 94, RA     Physical Exam        Constitutional      General appearance: No acute distress, well appearing and well nourished  -- Alert awake oriented  Eyes      Conjunctiva and Sclera examination: Conjunctiva pink, sclera anicteric  Ears, Nose, Mouth, and Throat - Oropharynx: Clear, nares are clear, mucous membranes are moist       Neck      Neck and thyroid: Normal, supple, trachea midline, no thyromegaly  Pulmonary      Respiratory effort: No increased work of breathing or signs of respiratory distress  -- Normal effort  Auscultation of lungs: Abnormal  -- Bilateral decreased air entry with prolonged expiratory phase and rare rhonchi  Cardiovascular      Auscultation of heart: Normal rate and rhythm, normal S1 and S2, no murmurs  -- S1-S2 regular  Carotid pulses: Normal, 2+ bilaterally  Peripheral vascular exam: Normal pulses throughout, no tenderness, erythema or swelling  Pedal pulses: Normal, 2+ bilaterally  Examination of extremities for edema and/or varicosities: Normal  -- No edema        Abdomen      Abdomen: Non-tender and no distention  Liver and spleen: No hepatomegaly or splenomegaly  Musculoskeletal Gait and station: Normal gait  -- Digits and nails: Normal without clubbing or cyanosis  -- Inspection/palpation of joints, bones, and muscles: Normal, ROM normal        Skin - Skin and subcutaneous tissue: Normal without rashes or lesions  Skin is warm and well perfused, normal turgor  Neurologic - Cranial nerves: II - XII intact  -- Speech: Normal        Psychiatric - Orientation to person, place, and time: Normal -- Mood and affect: Normal       Additional Findings - Surgical wound is healing well  Results/Data   Diagnostic Studies Reviewed Cardio: I personally reviewed the recording/images in the office today  My interpretation follows  Lab Review: Labs reviewed with the patient  ALT is 54 which is less than 2 times the normal  Will repeat LFTs  Other labs reviewed with the patient      Stress Test: Echo show normal LV systolic function  Catheterization: Cardiac catheterization in September 2017 shows proximal LAD 80% stenosis, spontaneous dissection of the left main, preserved LV systolic function, nonobstructive disease of the circumflex which was medium to small size  Circumflex has 25 -30% stenosis  ECG Report: 10/26/2017 12 lead EKG shows normal sinus rhythm heart rate 60 beats per minute  Nonspecific ST changes   No old EKG to compare (1) CBC/ PLT (NO DIFF) 65XWH8528 12:00AM Maria Elenaica Carrel      Test Name Result Flag Reference   HEMATOCRIT 42 3  37 5-51 0   HEMOGLOBIN 14 4  13 0-17 7   MCHC 34 0  31 5-35 7   MCH 31  26 6-33 0   MCV 91  79-97   PLATELET COUNT 630  918-843   RBC COUNT 4 65  4 14-5 80   RDW 12 9  12 3-15 4   WBC COUNT 4 7  3 4-10 8      (1) COMPREHENSIVE METABOLIC PANEL 69HQJ3338 49:86PL Maria Elena Carrel      Test Name Result Flag Reference   SODIUM 141  134-144   POTASSIUM 4 6  3 5-5 2   CHLORIDE 99     CARBON DIOXIDE 26  18-29   ANION GAP (CALC) 1 6 1 2-2 2   BLOOD UREA NITROGEN 16  6-24   CREATININE 0 94  0 76-1 27   CALCIUM 9 3  8 7-10 2   BILI, TOTAL 0 4  0 0-1 2   ALK PHOSPHATAS 77     ALT (SGPT) 54 H 0-44   AST(SGOT) 40  0-40   ALBUMIN 4 0  3 5-5 5   TOTAL PROTEIN 6 5  6 0-8 5   eGFR  110  >59   eGFR Non- 95  >59   GLUCOSE FASTING 83  65-99      (1) LIPID PANEL, FASTING 29Dec2017 12:00AM Kieran Apo      Test Name Result Flag Reference   CHOLESTEROL 146  100-199   HDL,DIRECT 48  >39   LDL CHOLESTEROL CALCULATED 77  0-99   TRIGLYCERIDES 107  0-149   VLDL,CALCULATED 21  5-40      (1) TSH 30Oct2017 11:39AM Kieran Apo      Test Name Result Flag Reference   TSH 2 400  0 450-4 500      * XR CHEST PA & LATERAL 27Oct2017 12:00AM Kieran Apo      Test Name Result Flag Reference   XR CHEST PA & LATERAL (Report)     CHEST - DUAL ENERGY           INDICATION: COPD  COMPARISON: 9/26/2017           VIEWS: PA (including soft tissue/bone algorithms) and lateral projections           IMAGES: 4           FINDINGS:              Cardiomediastinal silhouette appears unremarkable  A median sternotomy has been performed  The lungs are hyperinflated  No evidence of heart failure  Minimal blunting of the right costophrenic angle, the left costophrenic angle is sharp  Visualized osseous structures appear within normal limits for the patient's age  IMPRESSION:           No active pulmonary disease  Hyperinflation  Workstation performed: [id number]           Signed by:      Daina Oleary MD      10/28/17      Health Management   Colon polyps   COLONOSCOPY (GI, SURG); every 5 years; Last 71ZQN5080; Next Due: 58Gmi7340; Active    Future Appointments      Date/Time Provider Specialty Site   02/12/2018 12:00 PM BRIDGER Mcnamara   Pulmonary Medicine [facility] [address]     Signatures    Electronically signed by : BRIDGER Henriquez ; Rene  3 2018  1:05PM EST (Author)     Electronically signed by : BRIDGER Rivera ; Rene  3 2018  4:30PM EST                       (Author)     Electronically signed by : BRIDGER Rivera ; Rene  3 2018  4:30PM EST                       (Author)

## 2018-01-05 ENCOUNTER — APPOINTMENT (OUTPATIENT)
Dept: CARDIAC REHAB | Facility: CLINIC | Age: 48
End: 2018-01-05
Payer: COMMERCIAL

## 2018-01-05 DIAGNOSIS — Z95.1 S/P CABG X 2: ICD-10-CM

## 2018-01-05 PROCEDURE — 93798 PHYS/QHP OP CAR RHAB W/ECG: CPT

## 2018-01-08 ENCOUNTER — ALLSCRIPTS OFFICE VISIT (OUTPATIENT)
Dept: OTHER | Facility: OTHER | Age: 48
End: 2018-01-08

## 2018-01-08 ENCOUNTER — HOSPITAL ENCOUNTER (OUTPATIENT)
Dept: NON INVASIVE DIAGNOSTICS | Facility: HOSPITAL | Age: 48
Discharge: HOME | End: 2018-01-08
Attending: INTERNAL MEDICINE
Payer: COMMERCIAL

## 2018-01-08 ENCOUNTER — GENERIC CONVERSION - ENCOUNTER (OUTPATIENT)
Dept: OTHER | Facility: OTHER | Age: 48
End: 2018-01-08

## 2018-01-08 ENCOUNTER — APPOINTMENT (OUTPATIENT)
Dept: CARDIAC REHAB | Facility: CLINIC | Age: 48
End: 2018-01-08
Payer: COMMERCIAL

## 2018-01-08 DIAGNOSIS — R42 DIZZINESS AND GIDDINESS: ICD-10-CM

## 2018-01-08 DIAGNOSIS — Z95.1 S/P CABG X 2: ICD-10-CM

## 2018-01-08 PROCEDURE — 93225 XTRNL ECG REC<48 HRS REC: CPT

## 2018-01-08 PROCEDURE — 93798 PHYS/QHP OP CAR RHAB W/ECG: CPT

## 2018-01-08 PROCEDURE — 93226 XTRNL ECG REC<48 HR SCAN A/R: CPT

## 2018-01-09 NOTE — PROGRESS NOTES
Assessment   1  Dizziness and giddiness (780 4) (R42)   2  Shortness of breath on exertion (786 05) (R06 02)   3  COPD, moderate (496) (J44 9)    Discussion/Summary      Will extend current temp disability until cardiac issues are ruled out - was cleared to go back to work by cardiologist  in 2 wks if not better  The treatment plan was reviewed with the patient/guardian  The patient/guardian understands and agrees with the treatment plan      Chief Complaint   F/U low blood pressure and dizziness  ck/lpn      History of Present Illness   50 y o m with recent open heart surgery seen for daily episodes of dizziness (light headedness ) since 12/24/17 - was seen by cardio - stopped Metoprolol few days ago - did not have episodes of 2 days - planning holter monitor and stress test by cardio - undergoing cardia rehab with no symptoms      Review of Systems        Constitutional: recent weight gain, but-- not feeling poorly-- and-- not feeling tired  Eyes: No complaints of eye pain, no red eyes, no discharge from eyes, no itchy eyes  ENT: no complaints of earache, no hearing loss, no nosebleeds, no nasal discharge, no sore throat, no hoarseness  Cardiovascular: No complaints of slow heart rate, no fast heart rate, no chest pain, no palpitations, no leg claudication, no lower extremity  Respiratory: has COPD, f/u with pulmonologist, but-- No complaints of shortness of breath, no wheezing, no cough, no SOB on exertion, no orthopnea or PND  Gastrointestinal: No complaints of abdominal pain, no constipation, no nausea or vomiting, no diarrhea or bloody stools  Neurological: dizziness, but-- as noted in HPI,-- no headache,-- no numbness,-- no tingling,-- no limb weakness,-- no convulsions,-- no fainting-- and-- no difficulty walking  Active Problems   1  Asthma (493 90) (J45 909)   2  CAD, multiple vessel (414 00) (I25 10)   3   Chronic obstructive pulmonary disease, unspecified COPD type (496) (J44 9)   4  Colon polyps (211 3) (K63 5)   5  COPD, moderate (496) (J44 9)   6  Diverticulitis of colon (562 11) (K57 32)   7  Dizziness and giddiness (780 4) (R42)   8  Elevated LFTs (790 6) (R79 89)   9  Fatigue, unspecified type (780 79) (R53 83)   10  GERD (gastroesophageal reflux disease) (530 81) (K21 9)   11  Hyperlipidemia (272 4) (E78 5)   12  Ingrowing nail (703 0) (L60 0)   13  Nicotine dependence (305 1) (F17 200)   14  NSTEMI (non-ST elevated myocardial infarction) (410 70) (I21 4)   15  Onychomycosis (110 1) (B35 1)   16  Postop check (V67 00) (Z09)   17  S/P CABG x 2 (V45 81) (Z95 1)   18  Shortness of breath on exertion (786 05) (R06 02)    Surgical History   1  History of Appendectomy   2  History of CABG     The surgical history was reviewed and updated today  Family History   Mother    1  Family history of Benign Essential Hypertension   2  Denied: Family history of colon cancer   3  Denied: Family history of colonic polyps   4  Denied: Family history of liver disease  Father    11  Family history of Adenocarcinoma In Situ Of The Testis   6  Family history of Cholecystectomy   7  Family history of cardiac disorder (V17 49) (Z82 49)   8  Denied: Family history of colon cancer   9  Denied: Family history of colonic polyps   10  Family history of diabetes mellitus (V18 0) (Z83 3)   11  Denied: Family history of liver disease   12  Family history of malignant neoplasm (V16 9) (Z80 9)   13  Family history of Heart Surgery   14  Family history of Type 2 Diabetes Mellitus  Sister    13  Family history of diabetes mellitus (V18 0) (Z83 3)  Grandmother    12  Family history of arthritis (V17 7) (Z82 61)   17  Family history of cardiac disorder (V17 49) (Z82 49)   18  Family history of diabetes mellitus (V18 0) (Z83 3)   19  Family history of malignant neoplasm (V16 9) (Z80 9)  Grandfather    20  Family history of cardiac disorder (V17 49) (Z82 49)  Family History    21   Family history of arthritis (V17 7) (Z82 61)     The family history was reviewed and updated today  Social History    · Denied: History of Being A Social Drinker   · History of Current Every Day Smoker (305 1)   ·    · Denied: History of Exposure to second hand smoke   · Feels safe at home   · Former smoker (B67 48) (Y44 650)   · History of Former smoker (V15 82) (Z87 891)   · Occupation   · Pets/Animals: Cat   · Pets/Animals: Dog   · Denied: History of Recreational drug use   · Denied: History of Travel history  The social history was reviewed and updated today  Current Meds    1  Aspirin 325 MG Oral Tablet; Take 1 tablet daily; Therapy: (Recorded:26Oct2017) to Recorded   2  Atorvastatin Calcium 80 MG Oral Tablet; TAKE 1 TABLET DAILY; Therapy: (Recorded:26Oct2017) to Recorded   3  Omeprazole 20 MG Oral Capsule Delayed Release; take 1 capsule daily  Requested for:     69HVC2937; Last Rx:31Oct2017 Ordered   4  ProAir  (90 Base) MCG/ACT Inhalation Aerosol Solution; 2  PUFFS Q 4-6 HOURS     PRN  ELBERT;     Therapy: 55NEE8938 to (Last Pete Null)  Requested for: 31CZU0553 Ordered   5  Tudorza Pressair 400 MCG/ACT Inhalation Aerosol Powder Breath Activated; 1 INH 2     times a day, samples given; Therapy: 63BQB1829 to (Last Rx:20Nov2017)  Requested for: 20Nov2017 Ordered     The medication list was reviewed and updated today  Allergies   1  No Known Drug Allergies  2  No Known Environmental Allergies   3  No Known Food Allergies    Vitals   Vital Signs    Recorded: 37XNV5640 11:49AM   Temperature 97 7 F, Tympanic   Heart Rate 84, L Radial   Pulse Quality Normal, L Radial   Respiration Quality Normal   Respiration 12   Systolic 455, LUE, Sitting   Diastolic 78, LUE, Sitting   Height 5 ft 5 25 in   Weight 161 lb    BMI Calculated 26 59   BSA Calculated 1 81     Physical Exam        Constitutional      General appearance: No acute distress, well appearing and well nourished         Pulmonary Respiratory effort: No increased work of breathing or signs of respiratory distress  Auscultation of lungs: Abnormal   Auscultation of the lungs revealed decreased breath sounds diffusely  no rales or crackles were heard bilaterally  no rhonchi  no wheezing  Cardiovascular      Auscultation of heart: Normal rate and rhythm, normal S1 and S2, without murmurs  Examination of extremities for edema and/or varicosities: Normal        Musculoskeletal      Gait and station: Normal        Neurologic      Cranial nerves: Cranial nerves 2-12 intact  Psychiatric      Mood and affect: Normal           Results/Data   (1) CBC/ PLT (NO DIFF) 29LZH3167 12:00AM [facility]      Test Name Result Flag Reference   HEMATOCRIT 42 3  37 5-51 0   HEMOGLOBIN 14 4  13 0-17 7   MCHC 34 0  31 5-35 7   MCH 31  26 6-33 0   MCV 91  79-97   PLATELET COUNT 782  396-492   RBC COUNT 4 65  4 14-5 80   RDW 12 9  12 3-15 4   WBC COUNT 4 7  3 4-10 8      (1) COMPREHENSIVE METABOLIC PANEL 03QKR4626 69:99WE [employer]      Test Name Result Flag Reference   SODIUM 141  134-144   POTASSIUM 4 6  3 5-5 2   CHLORIDE 99     CARBON DIOXIDE 26  18-29   ANION GAP (CALC) 1 6  1 2-2 2   BLOOD UREA NITROGEN 16  6-24   CREATININE 0 94  0 76-1 27   CALCIUM 9 3  8 7-10 2   BILI, TOTAL 0 4  0 0-1 2   ALK PHOSPHATAS 77     ALT (SGPT) 54 H 0-44   AST(SGOT) 40  0-40   ALBUMIN 4 0  3 5-5 5   TOTAL PROTEIN 6 5  6 0-8 5   eGFR  110  >59   eGFR Non- 95  >59   GLUCOSE FASTING 83  65-99      (1) LIPID PANEL, FASTING 26VMP7097 12:00AM Ben Ordonez      Test Name Result Flag Reference   CHOLESTEROL 146  100-199   HDL,DIRECT 48  >39   LDL CHOLESTEROL CALCULATED 77  0-99   TRIGLYCERIDES 107  0-149   VLDL,CALCULATED 21  5-40      Health Management   Colon polyps   COLONOSCOPY (GI, SURG); every 5 years; Last 49CMC0029; Next Due: 85Gbg1268;  Active    Future Appointments      Date/Time Provider Specialty Site   02/12/2018 12:00 PM BRIDGER Hurd  [facility] Medicine [facility] Beverly Carrier   01/19/2018 08:15 AM BRIDGER Back   Family Medicine [address]     Signatures    Electronically signed by : BRIDGER Vila ; Jan 8 2018  1:47PM EST                       (Author)

## 2018-01-10 ENCOUNTER — APPOINTMENT (OUTPATIENT)
Dept: CARDIAC REHAB | Facility: CLINIC | Age: 48
End: 2018-01-10
Payer: COMMERCIAL

## 2018-01-10 DIAGNOSIS — Z95.1 S/P CABG X 2: ICD-10-CM

## 2018-01-10 PROCEDURE — 93798 PHYS/QHP OP CAR RHAB W/ECG: CPT

## 2018-01-12 ENCOUNTER — APPOINTMENT (OUTPATIENT)
Dept: CARDIAC REHAB | Facility: CLINIC | Age: 48
End: 2018-01-12
Payer: COMMERCIAL

## 2018-01-12 DIAGNOSIS — Z95.1 S/P CABG X 2: ICD-10-CM

## 2018-01-12 PROCEDURE — 93798 PHYS/QHP OP CAR RHAB W/ECG: CPT

## 2018-01-12 NOTE — RESULT NOTES
Discussion/Summary   Please inform the patient that the 1 polyp removed was a tubular adenoma, there was no high-grade dysplasia and no cancer  I recommend a repeat colonoscopy in 5 years please put in for recall  The biopsies from the sigmoid colon did not show any significant inflammation which is good news  Please have him call the office if he has any continued symptoms and follow up as needed otherwise we can see him back at his next colonoscopy  Please make sure that he maintain a high-fiber diet  Verified Results  (1) TISSUE EXAM [id number] 12:49PM Gil Little     Test Name Result Flag Reference   LAB AP CASE REPORT (Report)     Surgical Pathology Report             Case: [id number]                   Authorizing Provider: Fernandez Reynoso MD      Collected:      08/14/2017 1249        Ordering Location:   [facility]  Received:      08/14/2017 Jessee Rehman 104                                     Pathologist:      Taz Dominguez MD                             Specimens:  A) - Large Intestine, Right/Ascending Colon, Cold snare ascending colon polyp             B) - Large Intestine, Sigmoid Colon, Cold bx, sigmoid colon, inflammation   LAB AP FINAL DIAGNOSIS (Report)     A  Colon, ascending polyp, biopsy:  - Tubular adenoma, negative for high-grade dysplasia  B  Colon, sigmoid, biopsies:  - Benign colonic mucosa with mild nonspecific reactive changes   - No thickened basement membranes or intraepithelial lymphocytosis to   suggest microscopic colitis (i e  collagenous colitis or lymphocytic   colitis, respectively)  - No active or chronic colitis, dysplasia or carcinoma  Electronically signed by Taz Dominguez MD on 8/17/2017 at 1:52 PM   LAB AP SURGICAL ADDITIONAL INFORMATION (Report)     All controls performed with the immunohistochemical stains reported above   reacted appropriately   These tests were developed and their performance characteristics determined by [facility] or   [facility]  They may not be cleared or approved by the [place]  Food and Drug Administration  The FDA has determined that such clearance   or approval is not necessary  These tests are used for clinical purposes  They should not be regarded as investigational or for research  This   laboratory has been approved by [place] [address], designated as a high-complexity   laboratory and is qualified to perform these tests  Interpretation performed at [facility]   LAB AP GROSS DESCRIPTION (Report)     A  The specimen is received in formalin, labeled with the patient's name   and hospital number, and is designated Ascending colon polyp  The   specimen consists of 4 tan to brown soft tissue fragments ranging in   greatest dimension from 0 1 to 0 5 centimeters  Due to the size and   consistency of the smallest fragment, the smallest fragment may not   survive histological processing  Entirely submitted  One cassette  B  The specimen is received in formalin, labeled with the patient's name   and hospital number, and is designated Sigmoid colon, inflammation  The   specimen consists of 4 white to brown soft tissue fragments ranging in   greatest dimension from 0 2 to 0 4 centimeters  Entirely submitted  One   cassette  Note: The estimated total formalin fixation time based upon information   provided by the submitting clinician and the standard processing schedule   is 25 0 hours      ES

## 2018-01-13 VITALS
BODY MASS INDEX: 22.16 KG/M2 | TEMPERATURE: 98.7 F | SYSTOLIC BLOOD PRESSURE: 130 MMHG | WEIGHT: 133 LBS | RESPIRATION RATE: 16 BRPM | HEART RATE: 78 BPM | HEIGHT: 65 IN | DIASTOLIC BLOOD PRESSURE: 80 MMHG

## 2018-01-13 VITALS
RESPIRATION RATE: 14 BRPM | HEART RATE: 60 BPM | HEIGHT: 65 IN | DIASTOLIC BLOOD PRESSURE: 70 MMHG | BODY MASS INDEX: 25.22 KG/M2 | SYSTOLIC BLOOD PRESSURE: 120 MMHG | WEIGHT: 151.38 LBS | TEMPERATURE: 97.5 F

## 2018-01-13 VITALS
OXYGEN SATURATION: 98 % | DIASTOLIC BLOOD PRESSURE: 62 MMHG | WEIGHT: 131.25 LBS | HEIGHT: 65 IN | HEART RATE: 74 BPM | BODY MASS INDEX: 21.87 KG/M2 | SYSTOLIC BLOOD PRESSURE: 122 MMHG | RESPIRATION RATE: 16 BRPM | TEMPERATURE: 96.8 F

## 2018-01-13 NOTE — RESULT NOTES
Verified Results  * XR CHEST PA & LATERAL 27Oct2017 12:00AM Tabatha Poplin     Test Name Result Flag Reference   XR CHEST PA & LATERAL (Report)     CHEST - DUAL ENERGY     INDICATION: COPD  COMPARISON: 9/26/2017     VIEWS: PA (including soft tissue/bone algorithms) and lateral projections     IMAGES: 4     FINDINGS:        Cardiomediastinal silhouette appears unremarkable  A median sternotomy has been performed  The lungs are hyperinflated  No evidence of heart failure  Minimal blunting of the right costophrenic angle, the left costophrenic angle is sharp  Visualized osseous structures appear within normal limits for the patient's age  IMPRESSION:     No active pulmonary disease  Hyperinflation         Workstation performed: [id number]     Signed by:   Houston Pastrana MD   10/28/17

## 2018-01-14 VITALS
RESPIRATION RATE: 12 BRPM | HEART RATE: 78 BPM | OXYGEN SATURATION: 95 % | SYSTOLIC BLOOD PRESSURE: 138 MMHG | BODY MASS INDEX: 23.66 KG/M2 | TEMPERATURE: 98.1 F | HEIGHT: 65 IN | DIASTOLIC BLOOD PRESSURE: 78 MMHG | WEIGHT: 142 LBS

## 2018-01-15 ENCOUNTER — APPOINTMENT (OUTPATIENT)
Dept: CARDIAC REHAB | Facility: CLINIC | Age: 48
End: 2018-01-15
Payer: COMMERCIAL

## 2018-01-15 DIAGNOSIS — Z95.1 S/P CABG X 2: ICD-10-CM

## 2018-01-15 PROCEDURE — 93798 PHYS/QHP OP CAR RHAB W/ECG: CPT

## 2018-01-16 ENCOUNTER — GENERIC CONVERSION - ENCOUNTER (OUTPATIENT)
Dept: OTHER | Facility: OTHER | Age: 48
End: 2018-01-16

## 2018-01-17 ENCOUNTER — APPOINTMENT (OUTPATIENT)
Dept: CARDIAC REHAB | Facility: CLINIC | Age: 48
End: 2018-01-17
Payer: COMMERCIAL

## 2018-01-17 NOTE — MISCELLANEOUS
Assessment    1  CAD (coronary artery disease) (414 00) (I25 10)   2  NSTEMI (non-ST elevated myocardial infarction) (410 70) (I21 4)    Plan  CAD (coronary artery disease)    · Metoprolol Tartrate 25 MG Oral Tablet; take one tablet by mouth twice daily   Rx By: Yenny Tavarez; Dispense: 30 Days ; #:60 Tablet; Refill: 0; For: CAD (coronary artery disease); ELBERT = N; Record    Discussion/Summary  Discussion Summary:   Rto prn  has bridgette with cardiac surgeon and cardiologist    Medication SE Review and Pt Understands Tx: Possible side effects of new medications were reviewed with the patient/guardian today  The treatment plan was reviewed with the patient/guardian  The patient/guardian understands and agrees with the treatment plan      Chief Complaint  Chief Complaint Free Text Note Form: f/u recent open heart surgery      History of Present Illness  TCM Communication [facility]: The patient is being contacted for follow-up after hospitalization and FERNANDO Chris LPN 74/2/25 patient returned call  He was hospitalized at and [facility]  The date of admission: 09/25/17, date of discharge: 9/28/17  Diagnosis: Coronary artery Disease  He was discharged to home  Medications reviewed and updated today  He scheduled a follow up appointment  Symptoms: weakness, fatigue and shortness of breath, but no fever, no dizziness, no headache, no cough, no chest pain, no back pain on left side, no back pain on right side, no arm pain left side, no arm pain on right side, no leg pain on left side, no leg pain on right side, no upper abdominal pain, no middle abdominal pain, no lower abdominal pain, no rash:, no anorexia, no nausea, no vomiting, no loose stools, no constipation, no pain with urinating, no incisional pain, no wound drainage and no swelling  Counseling was provided to the patient  Communication performed and completed by FERNANDO Chris LPN 85/62/9947   HPI: 48 y o m seen for f/u CAD - newly Dx - underwent CABG x2 on 9/25/17 - feeling better, off pain meds, still soreness in the chest at postop site, stopped smoking 3 wks ago      Review of Systems  Complete-Male:   Constitutional: No fever or chills, feels well, no tiredness, no recent weight gain or weight loss  Cardiovascular: No complaints of slow heart rate, no fast heart rate, no chest pain, no palpitations, no leg claudication, no lower extremity  Respiratory: No complaints of shortness of breath, no wheezing, no cough, no SOB on exertion, no orthopnea or PND  Gastrointestinal: No complaints of abdominal pain, no constipation, no nausea or vomiting, no diarrhea or bloody stools  Musculoskeletal: as noted in HPI  Integumentary: as noted in HPI  Neurological: No compliants of headache, no confusion, no convulsions, no numbness or tingling, no dizziness or fainting, no limb weakness, no difficulty walking  Psychiatric: Is not suicidal, no sleep disturbances, no anxiety or depression, no change in personality, no emotional problems  Active Problems    1  Asthma (493 90) (J45 909)   2  CAD (coronary artery disease) (414 00) (I25 10)   3  Colon polyps (211 3) (K63 5)   4  Diverticulitis of colon (562 11) (K57 32)   5  GERD (gastroesophageal reflux disease) (530 81) (K21 9)   6  Hyperlipidemia (272 4) (E78 5)   7  Nicotine dependence (305 1) (F17 200)   8  NSTEMI (non-ST elevated myocardial infarction) (410 70) (I21 4)    Surgical History    1  History of Appendectomy   2  History of CABG  Surgical History Reviewed: The surgical history was reviewed and updated today  Family History  Mother    1  Family history of Benign Essential Hypertension   2  Denied: Family history of colon cancer   3  Denied: Family history of colonic polyps   4  Denied: Family history of liver disease  Father    11  Family history of Adenocarcinoma In Situ Of The Testis   6  Family history of Cholecystectomy   7  Denied: Family history of colon cancer   8   Denied: Family history of colonic polyps   9  Denied: Family history of liver disease   10  Family history of Heart Surgery   11  Family history of Type 2 Diabetes Mellitus  Family History Reviewed: The family history was reviewed and updated today  Social History    · Denied: History of Being A Social Drinker   · History of Current Every Day Smoker (305 1)   · Feels safe at home   · Former smoker (R15 59) (N56 284)  Social History Reviewed: The social history was reviewed and updated today  Current Meds   1  Amoxicillin-Pot Clavulanate 875-125 MG Oral Tablet; 1 BID TAKE WITH FOOD; Therapy: 57URB4789 to (Last [employer])  Requested for: 71Fgo7268 Ordered   2  Aspirin 325 MG Oral Tablet; Therapy: (Recorded:03Oct2017) to Recorded   3  Atorvastatin Calcium 80 MG Oral Tablet; Therapy: [phone]) to Recorded   4  MethylPREDNISolone 4 MG Oral Tablet Therapy Pack; take as directed; Therapy: 27BWY7682 to (Last [employer])  Requested for: 74EOR0559 Ordered   5  Omeprazole 20 MG Oral Capsule Delayed Release; Therapy: [phone]) to Recorded   6  ProAir  (90 Base) MCG/ACT Inhalation Aerosol Solution; 2  PUFFS Q 4-6 HOURS   PRN  ELBERT;   Therapy: 36IXA0425 to (Last [employer])  Requested for: 45YDY6643 Ordered  Medication List Reviewed: The medication list was reviewed and updated today  Allergies    1  No Known Drug Allergies    2  No Known Environmental Allergies   3  No Known Food Allergies    Vitals  Signs   Recorded: 39YNY5985 08:25AM   Temperature: 98 3 F, Temporal  Heart Rate: 78, R Radial  Pulse Quality: Normal, R Radial  Respiration Quality: Normal  Respiration: 12  Systolic: 90, RUE, Sitting  Diastolic: 58, RUE, Sitting  Height: 5 ft 5 in  Weight: 136 lb   BMI Calculated: 22 63  BSA Calculated: 1 68    Physical Exam    Constitutional   General appearance: No acute distress, well appearing and well nourished      Pulmonary   Respiratory effort: No increased work of breathing or signs of respiratory distress  Auscultation of lungs: Clear to auscultation, equal breath sounds bilaterally, no wheezes, no rales, no rhonci  Cardiovascular   Auscultation of heart: Normal rate and rhythm, normal S1 and S2, without murmurs  Examination of extremities for edema and/or varicosities: Normal          Health Management  Colon polyps   COLONOSCOPY (GI, SURG); every 5 years; Last 21VGR8744; Next Due: 71Smq2877; Active    Future Appointments    Date/Time Provider Specialty Site   10/26/2017 01:15 PM BRIDGER Brown  Cardiac Surgery CT SURGERY [facility]   11/03/2017 03:40 PM BRIDGER Zhou  Cardiology [facility]   10/13/2017 09:00 AM BRIDGER Hummel   Family Medicine [address]     Signatures   Electronically signed by : BRIDGER Riggins ; Oct 10 2017  8:53AM EST                       (Author)

## 2018-01-18 NOTE — RESULT NOTES
Verified Results  (1) COMPREHENSIVE METABOLIC PANEL [id number] 09:13XE Melo Brick     Test Name Result Flag Reference   SODIUM 141  134-144   POTASSIUM 4 6  3 5-5 2   CHLORIDE 99     CARBON DIOXIDE 29  18-29   ANION GAP (CALC) 1 8  1 2-2 2   BLOOD UREA NITROGEN 17  6-24   CREATININE 0 92  0 76-1 27   CALCIUM 9 4  8 7-10 2   BILI, TOTAL 0 4  0 0-1 2   ALK PHOSPHATAS 86     ALT (SGPT) 61 H 0-44   AST(SGOT) 40  0-40   ALBUMIN 4 3  3 5-5 5   TOTAL PROTEIN 6 7  6 0-8 5   eGFR  113  >59   eGFR Non- 98  >59   GLUCOSE FASTING 84  65-99

## 2018-01-19 ENCOUNTER — APPOINTMENT (OUTPATIENT)
Dept: CARDIAC REHAB | Facility: CLINIC | Age: 48
End: 2018-01-19
Payer: COMMERCIAL

## 2018-01-19 DIAGNOSIS — Z95.1 S/P CABG X 2: ICD-10-CM

## 2018-01-19 PROCEDURE — 93798 PHYS/QHP OP CAR RHAB W/ECG: CPT

## 2018-01-22 ENCOUNTER — APPOINTMENT (OUTPATIENT)
Dept: CARDIAC REHAB | Facility: CLINIC | Age: 48
End: 2018-01-22
Payer: COMMERCIAL

## 2018-01-22 VITALS
OXYGEN SATURATION: 96 % | SYSTOLIC BLOOD PRESSURE: 122 MMHG | RESPIRATION RATE: 12 BRPM | DIASTOLIC BLOOD PRESSURE: 78 MMHG | HEIGHT: 65 IN | BODY MASS INDEX: 25.99 KG/M2 | WEIGHT: 156 LBS | TEMPERATURE: 98.1 F | HEART RATE: 69 BPM

## 2018-01-22 VITALS
BODY MASS INDEX: 24.16 KG/M2 | WEIGHT: 145 LBS | TEMPERATURE: 97.9 F | RESPIRATION RATE: 12 BRPM | SYSTOLIC BLOOD PRESSURE: 108 MMHG | HEART RATE: 54 BPM | OXYGEN SATURATION: 98 % | DIASTOLIC BLOOD PRESSURE: 64 MMHG | HEIGHT: 65 IN

## 2018-01-22 VITALS
SYSTOLIC BLOOD PRESSURE: 90 MMHG | RESPIRATION RATE: 12 BRPM | BODY MASS INDEX: 22.66 KG/M2 | HEART RATE: 78 BPM | TEMPERATURE: 98.3 F | HEIGHT: 65 IN | DIASTOLIC BLOOD PRESSURE: 58 MMHG | WEIGHT: 136 LBS

## 2018-01-22 VITALS
SYSTOLIC BLOOD PRESSURE: 128 MMHG | WEIGHT: 145.2 LBS | OXYGEN SATURATION: 97 % | HEART RATE: 60 BPM | HEIGHT: 65 IN | BODY MASS INDEX: 24.19 KG/M2 | DIASTOLIC BLOOD PRESSURE: 82 MMHG

## 2018-01-22 VITALS — DIASTOLIC BLOOD PRESSURE: 62 MMHG | SYSTOLIC BLOOD PRESSURE: 102 MMHG

## 2018-01-22 DIAGNOSIS — Z95.1 S/P CABG X 2: ICD-10-CM

## 2018-01-22 PROCEDURE — 93798 PHYS/QHP OP CAR RHAB W/ECG: CPT

## 2018-01-23 VITALS — BODY MASS INDEX: 25.99 KG/M2 | WEIGHT: 156 LBS | HEIGHT: 65 IN

## 2018-01-23 VITALS
DIASTOLIC BLOOD PRESSURE: 82 MMHG | HEIGHT: 65 IN | OXYGEN SATURATION: 94 % | BODY MASS INDEX: 26.35 KG/M2 | SYSTOLIC BLOOD PRESSURE: 128 MMHG | WEIGHT: 158.13 LBS | HEART RATE: 82 BPM

## 2018-01-23 VITALS
WEIGHT: 161 LBS | HEIGHT: 65 IN | RESPIRATION RATE: 12 BRPM | DIASTOLIC BLOOD PRESSURE: 78 MMHG | BODY MASS INDEX: 26.82 KG/M2 | HEART RATE: 84 BPM | TEMPERATURE: 97.7 F | SYSTOLIC BLOOD PRESSURE: 102 MMHG

## 2018-01-23 NOTE — MISCELLANEOUS
Message  Return to work or school:   Sourav Ruvalcaba is under my professional care   He was seen in my office on 1/8/18   He is able to return to work on  1/22/18            Signatures   Electronically signed by : BRIDGER Ross ; Jan 8 2018 12:07PM EST                       (Author)

## 2018-01-23 NOTE — RESULT NOTES
Verified Results  (1) CBC/ PLT (NO DIFF) 18YRQ4212 12:00AM [employer]     Test Name Result Flag Reference   HEMATOCRIT 42 3  37 5-51 0   HEMOGLOBIN 14 4  13 0-17 7   MCHC 34 0  31 5-35 7   MCH 31  26 6-33 0   MCV 91  79-97   PLATELET COUNT 510  534-235   RBC COUNT 4 65  4 14-5 80   RDW 12 9  12 3-15 4   WBC COUNT 4 7  3 4-10 8     (1) COMPREHENSIVE METABOLIC PANEL 73GOJ6740 76:44NS [employer]     Test Name Result Flag Reference   SODIUM 141  134-144   POTASSIUM 4 6  3 5-5 2   CHLORIDE 99     CARBON DIOXIDE 26  18-29   ANION GAP (CALC) 1 6  1 2-2 2   BLOOD UREA NITROGEN 16  6-24   CREATININE 0 94  0 76-1 27   CALCIUM 9 3  8 7-10 2   BILI, TOTAL 0 4  0 0-1 2   ALK PHOSPHATAS 77     ALT (SGPT) 54 H 0-44   AST(SGOT) 40  0-40   ALBUMIN 4 0  3 5-5 5   TOTAL PROTEIN 6 5  6 0-8 5   eGFR  110  >59   eGFR Non- 95  >59   GLUCOSE FASTING 83  65-99     (1) LIPID PANEL, FASTING 58Ygi9394 12:00AM [employer]     Test Name Result Flag Reference   CHOLESTEROL 146  100-199   HDL,DIRECT 48  >39   LDL CHOLESTEROL CALCULATED 77  0-99   TRIGLYCERIDES 107  0-149   VLDL,CALCULATED 21  5-40

## 2018-01-23 NOTE — RESULT NOTES
Verified Results  HOLTER MONITOR - Genevieve Galan 115 [id number] 12:47PM Venkata Paz Order Number: [id number]    - Patient Instructions: To schedule this appointment, please contact Central Scheduling at [phone]  Test Name Result Flag Reference   HOLTER MONITOR - 48 HOUR (Report)     PT NAME: Michelle Clayton   : 1970 AGE: 50 y o  GENDER: male   MRN: [id number]  PROCEDURE: Holter monitor - 48 hour           INDICATIONS:    48 hour Holter monitor was performed palpitations  PROCEDURE:     Average heart rate was 84; minimum heart rate was sinus bradycardia at 57 BPM at 4:25 a m ; and maximum heart rate was sinus tachycardia at 156 BPM at 9:56 a m  Shannon Skipper Ventricular ectopic activity consisted of 230 beats, which comprises 0 1% of total beats  Most of them were singles  Supraventricular ectopic activity consisted of 69 beats, which comprises 0 01% of total beats  Most of the PACs were singles     No significant bradyarrhythmias were present  The patient's rhythm included 4 hour and 50 minutes of tachycardia  The fastest single episode of tachycardia occurred at 9:19 a m  , lasting lasting 50 minutes and 37 seconds, with maximum HR of 156 beats per minute  Patient's diary included symptoms of winded with activities  Corresponding events on monitor was sinus tachycardia  1  48 hour Holter monitor shows elevated average heart rate no evidence of any significant Jai arrhythmias   2  Rhythm was sinus throughout monitoring period  3  There were episodes of sinus tachycardia noted during physical activities  Average heart rate was 84 beats per minute  This note has been constructed using a voice recognition system  Therefore there may be syntax, spelling, and/or grammatical errors  Please call if you have any questions

## 2018-01-24 ENCOUNTER — CLINICAL SUPPORT (OUTPATIENT)
Dept: CARDIAC REHAB | Facility: CLINIC | Age: 48
End: 2018-01-24
Payer: COMMERCIAL

## 2018-01-24 DIAGNOSIS — Z95.1 S/P CABG X 2: ICD-10-CM

## 2018-01-24 PROCEDURE — 93798 PHYS/QHP OP CAR RHAB W/ECG: CPT

## 2018-01-24 RX ORDER — ASPIRIN 325 MG
TABLET ORAL
COMMUNITY
End: 2018-01-26

## 2018-01-24 RX ORDER — ALBUTEROL SULFATE 90 UG/1
AEROSOL, METERED RESPIRATORY (INHALATION)
COMMUNITY
Start: 2013-11-06 | End: 2018-02-12 | Stop reason: CLARIF

## 2018-01-24 RX ORDER — ATORVASTATIN CALCIUM 80 MG/1
TABLET, FILM COATED ORAL
COMMUNITY
End: 2018-01-26

## 2018-01-24 RX ORDER — OMEPRAZOLE 20 MG/1
CAPSULE, DELAYED RELEASE ORAL
COMMUNITY
End: 2018-01-26

## 2018-01-26 ENCOUNTER — CLINICAL SUPPORT (OUTPATIENT)
Dept: CARDIAC REHAB | Facility: CLINIC | Age: 48
End: 2018-01-26
Payer: COMMERCIAL

## 2018-01-26 ENCOUNTER — OFFICE VISIT (OUTPATIENT)
Dept: FAMILY MEDICINE CLINIC | Facility: CLINIC | Age: 48
End: 2018-01-26
Payer: COMMERCIAL

## 2018-01-26 VITALS
BODY MASS INDEX: 26.03 KG/M2 | TEMPERATURE: 97.8 F | HEIGHT: 66 IN | RESPIRATION RATE: 16 BRPM | WEIGHT: 162 LBS | DIASTOLIC BLOOD PRESSURE: 80 MMHG | SYSTOLIC BLOOD PRESSURE: 140 MMHG | HEART RATE: 84 BPM

## 2018-01-26 DIAGNOSIS — R42 DIZZINESS: Primary | ICD-10-CM

## 2018-01-26 DIAGNOSIS — I25.10 CAD, MULTIPLE VESSEL: ICD-10-CM

## 2018-01-26 DIAGNOSIS — J44.9 CHRONIC OBSTRUCTIVE PULMONARY DISEASE, UNSPECIFIED COPD TYPE (HCC): ICD-10-CM

## 2018-01-26 DIAGNOSIS — Z95.1 S/P CABG X 2: ICD-10-CM

## 2018-01-26 PROBLEM — I25.42: Status: RESOLVED | Noted: 2017-09-25 | Resolved: 2018-01-26

## 2018-01-26 PROBLEM — K21.9 GERD (GASTROESOPHAGEAL REFLUX DISEASE): Status: ACTIVE | Noted: 2017-07-06

## 2018-01-26 PROBLEM — Z72.0 TOBACCO ABUSE: Status: RESOLVED | Noted: 2017-09-25 | Resolved: 2018-01-26

## 2018-01-26 PROBLEM — J20.9 ACUTE BRONCHITIS: Status: RESOLVED | Noted: 2017-09-22 | Resolved: 2018-01-26

## 2018-01-26 PROBLEM — J45.909 ASTHMA: Status: RESOLVED | Noted: 2017-07-06 | Resolved: 2018-01-26

## 2018-01-26 PROBLEM — J45.909 ASTHMA: Status: ACTIVE | Noted: 2017-07-06

## 2018-01-26 PROBLEM — J95.89 ACUTE RESPIRATORY INSUFFICIENCY, POSTOPERATIVE: Status: RESOLVED | Noted: 2017-09-25 | Resolved: 2018-01-26

## 2018-01-26 PROCEDURE — 99214 OFFICE O/P EST MOD 30 MIN: CPT | Performed by: FAMILY MEDICINE

## 2018-01-26 PROCEDURE — 93798 PHYS/QHP OP CAR RHAB W/ECG: CPT

## 2018-01-26 RX ORDER — NEBIVOLOL 2.5 MG/1
2.5 TABLET ORAL DAILY
Qty: 30 TABLET | Refills: 1 | Status: SHIPPED | OUTPATIENT
Start: 2018-01-26 | End: 2018-02-12 | Stop reason: SINTOL

## 2018-01-26 NOTE — PATIENT INSTRUCTIONS
[facility] Stable Ischemic Heart Disease   AMBULATORY CARE:   Stable ischemic heart disease (SIHD)  is often caused by coronary artery disease (CAD)  CAD is narrowing or blockage of your heart arteries caused by a buildup of plaque  Plaque is made up of cholesterol and other substances  The narrowing or blockages of your heart arteries prevents blood and oxygen from reaching your heart muscle  This is called ischemia  Over time, ischemia can lead to an abnormal heartbeat, heart attack, and heart failure  Early diagnosis and treatment can help prevent these problems  Signs and symptoms of SIHD:  You may or may not have symptoms of SIHD  Signs and symptoms may get worse with activity, such as climbing stairs  Talk to your healthcare provider if you have any of the following symptoms of SIHD:  · Chest pain, abdominal pain, or pain in your shoulder, neck, or upper arm    · Shortness of breath    · A fast heartbeat    · Sweating or clammy skin    · Feeling tired or having low energy levels    · Dizziness or fainting  Screening for SIHD:  Screening for SIHD means you have tests done to check for ischemia  Other heart problems may also be found during screening  The results can help you and your provider decide if you need treatment  How to tell if you are a good candidate for SIHD screening: You may be screened for SIHD if you have any symptoms of SIHD  You may also be screened if you have risk factors for SIHD  Examples include the following:  · A history of a heart attack    · A family history of heart disease    · Diabetes    · High cholesterol or blood pressure    · Chronic kidney disease    · Being overweight    · A lack of exercise    · Smoking or heavy alcohol use  How screening is done:   · Your healthcare provider will examine you  He or she will check your blood pressure and weight  You may need an electrocardiogram (EKG)  An EKG test records your heart rhythm and how fast your heart beats   It may show problems with how your heart beats, or heart damage  Tell the provider if you smoke, drink alcohol, or use illegal drugs  Also tell him or her about any symptoms you have, such as chest pain or shortness of breath  The provider will ask about other health conditions, medicines you take, and your activity level  · Your healthcare provider may recommend 1 or more screening tests based on your history and symptoms  The provider's choice of screening tests may depend on your age and how much you can exercise  Examples of screening tests include a CT scan, stress test, or a heart catheterization  Benefits and risks of screening for SIHD:  Talk with your healthcare provider about the risks and benefits of screening:  · Benefits  include finding ischemia before it causes life-threatening problems  It may be possible to prevent more damage to your heart  Results of screening can help you and your healthcare provider create a treatment plan that is right for you  · Risks  of each screening test are different  Some tests, such as an EKG, blood test, or CT scan, have few risks  You do not need to prepare for those tests  The following tests have more risks, and you may need to prepare for each test:     ¨ A stress test  includes risks such as chest pain, abnormal heartbeat, dizziness, or a heart attack  Medicine given to stress your heart may cause wheezing or shortness of breath  The contrast liquid may cause kidney damage or an allergic reaction  ¨ A heart catheterization  includes risks such as heavy bleeding or an infection  The catheter used during the procedure may damage your heart or blood vessels  You may get a blood clot in your arm or leg  You could have a heart attack or stroke during or after the procedure  Your heart could have irregular beats  The contrast liquid may cause kidney damage or an allergic reaction    Questions to ask your healthcare provider to help you make decisions about screening:  Ask your healthcare provider the following questions to help you make a decision about screening:  · How high is my risk for a heart attack or other heart problems? · Do the benefits of screening outweigh the risks for me? · Will I be able to help create my treatment plan if screening shows I have SIHD or other problems? · Will my insurance cover screening? · Where is the screening done? · Do I need to do anything to get ready to have screening? · What happens during each screening test?     · When and how do I get the results of my screening? What happens after you have screening for SIHD:  You will meet with your healthcare provider to go over the results of your screening  You, your family or caregiver, and your healthcare provider can talk about your treatment options  Together you can decide which treatment is right for you  Treatment choices may depend on your age and other health conditions  It may also depend on how severe your ischemia is and how well your heart is currently working  Treatment may include medicines, procedures, cardiac rehab, or changes to your lifestyle  How SIHD is treated, and what are the benefits of treatment:  You may need more than 1 treatment to manage SIHD  · Lifestyle changes  include diet and exercise  These changes can help you lose weight, and decrease your blood pressure and cholesterol  Lifestyle changes may also help decrease your symptoms and risk for a heart attack  · Medicines  can help decrease stress on your heart and prevent a heart attack  Medicines may also decrease symptoms, such as chest pain and shortness of breath  Medicines may be combined with other treatments   You may need any of the following medicines:     ¨ Blood thinners or antiplatelet medicines to prevent blood clots    ¨ Cholesterol medicine to lower and control your cholesterol    ¨ Blood pressure medicine to lower and control your blood pressure    ¨ Nitrates to relieve chest pain    · Percutaneous coronary intervention (PCI) , also known as angioplasty, may be done to open an artery blocked by plaque  This can help increase blood flow and oxygen to your heart  It can also help decrease your risk for a heart attack  A tube with a balloon on the end is threaded into the blocked artery  Once the tube is in the artery, the balloon is inflated  As the balloon inflates, it presses the plaque against the artery wall to open the artery  A stent may be placed in your artery to keep it open  · Coronary artery bypass surgery (CABG)  is open heart surgery  Healthcare providers take arteries or veins from other areas in your body and use them to bypass or go around the blocked arteries of your heart  A CABG can help treat ischemia and decrease your risk for a heart attack  Risks of SIHD treatment:   · Lifestyle changes  will not reverse the damage to your heart  They will only prevent symptoms from getting worse  · Medicines  will not fix heart damage that you already have  Medicines may decrease symptoms, improve the heart's pumping action, and help prevent more damage  Medicines to treat SIHD have side effects  Examples include an increased risk of bleeding, abnormal heartbeat, or low blood pressure  Even with medicine, you may still have a heart attack or other heart problems  Ask your healthcare provider for a complete list of side effects for each medicine  · PCI  has the following risks:     ¨ You may develop a hematoma (swelling caused by collection of blood) or bleed more than expected from your catheter site  The contrast liquid used during angioplasty may cause an allergic reaction or kidney problems  You may develop an infection  An artery in your heart may become completely closed or have a spasm  If this happens, your heart will not get enough blood  This may cause chest pain or a heart attack   You might need heart surgery right away to bypass (go around) the artery  ¨ You may get a blood clot  The clot may cause life-threatening problems, such as a heart attack or stroke  Your arteries may become blocked again, and you may need another angioplasty or heart surgery  · Coronary artery bypass surgery (CABG)  has the following risks:     ¨ You may develop an infection  You may bleed more than expected and need a blood transfusion  You may have fast, irregular heartbeats  Your heart may not get enough oxygen and have trouble pumping blood through your body after surgery  Your signs and symptoms may come back  You may need another CABG  ¨ You may have fluid buildup around your heart  This fluid puts pressure on the heart and prevents it from working properly  You may also have fluid buildup around your lungs  This may make it hard for you to breathe after surgery  You may get a blood clot in your leg or arm  These problems can be life-threatening  Questions to ask your healthcare provider to help you make decisions about treatment:  The following are questions you can ask your healthcare provider about each treatment:  · Do the benefits of treatment outweigh the risks for me? · Will my insurance cover treatment? · Where is the treatment done? · Do I need to do anything to get ready for the treatment? · How will I know if the treatment is working? · If the treatment does not work, what other treatment choices do I have? © 2017 [facility] Information is for End User's use only and may not be sold, redistributed or otherwise used for commercial purposes  All illustrations and images included in CareNotes® are the copyrighted property of A D [employer] M [employer]  or [employer]ft[employer] Larose  The above information is an  only  It is not intended as medical advice for individual conditions or treatments  Talk to your doctor, nurse or pharmacist before following any medical regimen to see if it is safe and effective for you

## 2018-01-26 NOTE — PROGRESS NOTES
Chief Complaint   Patient presents with    Follow-up     dizziness        Patient ID: Sudha Hall is a 50 y o  male  Pt is seeing for f/u CAD, COPD, Dizziness -  Recent Mi, under cardiologist and pulmonologist care -  Dizziness resolved after pt stopped metoprolol          The following portions of the patient's history were reviewed and updated as appropriate: allergies, current medications, past family history, past medical history, past social history, past surgical history and problem list     Review of Systems   Constitutional: Negative  Respiratory: Positive for shortness of breath  Negative for apnea, cough and wheezing  Cardiovascular: Negative  Gastrointestinal: Negative  Neurological: Negative  Current Outpatient Prescriptions   Medication Sig Dispense Refill    Aclidinium Bromide (TUDORZA PRESSAIR) 400 MCG/ACT AEPB Inhale      albuterol (PROAIR HFA) 90 mcg/act inhaler Inhale      albuterol (PROVENTIL HFA,VENTOLIN HFA) 90 mcg/act inhaler Inhale 2 puffs every 6 (six) hours as needed for wheezing      aspirin 325 mg tablet Take 1 tablet by mouth daily 100 tablet 0    atorvastatin (LIPITOR) 80 mg tablet Take 1 tablet by mouth daily with dinner 30 tablet 2    omeprazole (PriLOSEC) 20 mg delayed release capsule Take 1 capsule by mouth daily 30 capsule 0     No current facility-administered medications for this visit  Objective:    /80   Pulse 84   Temp 97 8 °F (36 6 °C) (Tympanic)   Resp 16   Ht 5' 6" (1 676 m)   Wt 73 5 kg (162 lb)   BMI 26 15 kg/m²        Physical Exam   Constitutional: He is oriented to person, place, and time  He appears well-developed  Cardiovascular: Regular rhythm and normal heart sounds  Exam reveals no gallop and no distant heart sounds  No murmur heard  Pulmonary/Chest: No tachypnea and no bradypnea  No respiratory distress  He has decreased breath sounds  He has no wheezes  He has no rhonchi     Neurological: He is oriented to person, place, and time  No cranial nerve deficit  Skin: Skin is warm  Psychiatric: He has a normal mood and affect  Cognition and memory are normal    Vitals reviewed  Assessment/Plan:    No problem-specific Assessment & Plan notes found for this encounter  Diagnoses and all orders for this visit:    Dizziness    CAD, multiple vessel    Chronic obstructive pulmonary disease, unspecified COPD type ([facility] )    Other orders  -     Discontinue: aspirin 325 mg tablet; Take by mouth  -     Discontinue: omeprazole (PriLOSEC) 20 mg delayed release capsule; Take by mouth  -     Aclidinium Bromide (TUDORZA PRESSAIR) 400 MCG/ACT AEPB; Inhale  -     Discontinue: atorvastatin (LIPITOR) 80 mg tablet; Take by mouth  -     Discontinue: metoprolol tartrate (LOPRESSOR) 25 mg tablet; Take by mouth  -     albuterol (PROAIR HFA) 90 mcg/act inhaler;  Inhale      will try Bystolic 2 5 mg                     Mely Nicholas MD

## 2018-01-29 ENCOUNTER — CLINICAL SUPPORT (OUTPATIENT)
Dept: CARDIAC REHAB | Facility: CLINIC | Age: 48
End: 2018-01-29
Payer: COMMERCIAL

## 2018-01-29 DIAGNOSIS — Z95.1 S/P CABG X 2: ICD-10-CM

## 2018-01-29 PROCEDURE — 93798 PHYS/QHP OP CAR RHAB W/ECG: CPT

## 2018-01-29 RX ORDER — OMEPRAZOLE 20 MG/1
20 CAPSULE, DELAYED RELEASE ORAL DAILY
Qty: 30 CAPSULE | Refills: 0 | OUTPATIENT
Start: 2018-01-29

## 2018-01-29 RX ORDER — ATORVASTATIN CALCIUM 80 MG/1
80 TABLET, FILM COATED ORAL
Qty: 30 TABLET | Refills: 0 | OUTPATIENT
Start: 2018-01-29

## 2018-01-30 DIAGNOSIS — I25.810 CORONARY ARTERY DISEASE INVOLVING OTHER CORONARY ARTERY BYPASS GRAFT WITHOUT ANGINA PECTORIS: Primary | ICD-10-CM

## 2018-01-30 DIAGNOSIS — K21.9 GASTROESOPHAGEAL REFLUX DISEASE WITHOUT ESOPHAGITIS: ICD-10-CM

## 2018-01-30 RX ORDER — OMEPRAZOLE 20 MG/1
20 CAPSULE, DELAYED RELEASE ORAL DAILY
Qty: 30 CAPSULE | Refills: 6 | Status: SHIPPED | OUTPATIENT
Start: 2018-01-30 | End: 2018-01-31 | Stop reason: SDUPTHER

## 2018-01-30 RX ORDER — ATORVASTATIN CALCIUM 80 MG/1
80 TABLET, FILM COATED ORAL
Qty: 30 TABLET | Refills: 6 | Status: SHIPPED | OUTPATIENT
Start: 2018-01-30 | End: 2018-05-29

## 2018-01-31 ENCOUNTER — CLINICAL SUPPORT (OUTPATIENT)
Dept: CARDIAC REHAB | Facility: CLINIC | Age: 48
End: 2018-01-31
Payer: COMMERCIAL

## 2018-01-31 DIAGNOSIS — Z95.1 S/P CABG X 2: ICD-10-CM

## 2018-01-31 DIAGNOSIS — K21.9 GASTROESOPHAGEAL REFLUX DISEASE WITHOUT ESOPHAGITIS: ICD-10-CM

## 2018-01-31 PROCEDURE — 93798 PHYS/QHP OP CAR RHAB W/ECG: CPT

## 2018-01-31 RX ORDER — OMEPRAZOLE 20 MG/1
20 CAPSULE, DELAYED RELEASE ORAL DAILY
Qty: 30 CAPSULE | Refills: 0 | Status: SHIPPED | OUTPATIENT
Start: 2018-01-31 | End: 2018-05-29

## 2018-01-31 NOTE — TELEPHONE ENCOUNTER
From: Sudha Hall  Sent: 1/31/2018 7:08 AM EST  Subject: Medication Renewal Request    Sudha Hall would like a refill of the following medications:     omeprazole (PriLOSEC) 20 mg delayed release capsule Naty Miramontes MD]    Preferred pharmacy: [address] [place] [address] [id number]    Comment:

## 2018-02-01 ENCOUNTER — HOSPITAL ENCOUNTER (EMERGENCY)
Facility: HOSPITAL | Age: 48
Discharge: HOME | End: 2018-02-01
Attending: EMERGENCY MEDICINE | Admitting: EMERGENCY MEDICINE
Payer: COMMERCIAL

## 2018-02-01 ENCOUNTER — APPOINTMENT (EMERGENCY)
Dept: RADIOLOGY | Facility: HOSPITAL | Age: 48
End: 2018-02-01
Payer: COMMERCIAL

## 2018-02-01 VITALS
BODY MASS INDEX: 25.71 KG/M2 | HEIGHT: 66 IN | OXYGEN SATURATION: 95 % | RESPIRATION RATE: 20 BRPM | SYSTOLIC BLOOD PRESSURE: 124 MMHG | DIASTOLIC BLOOD PRESSURE: 73 MMHG | HEART RATE: 84 BPM | TEMPERATURE: 97.9 F | WEIGHT: 160 LBS

## 2018-02-01 DIAGNOSIS — R07.89 ATYPICAL CHEST PAIN: Primary | ICD-10-CM

## 2018-02-01 DIAGNOSIS — T88.7XXA MEDICATION SIDE EFFECT: ICD-10-CM

## 2018-02-01 LAB
ALBUMIN SERPL BCP-MCNC: 3.9 G/DL (ref 3.5–5)
ALP SERPL-CCNC: 74 U/L (ref 46–116)
ALT SERPL W P-5'-P-CCNC: 35 U/L (ref 12–78)
ANION GAP SERPL CALCULATED.3IONS-SCNC: 5 MMOL/L (ref 4–13)
APTT PPP: 27 SECONDS (ref 24–33)
AST SERPL W P-5'-P-CCNC: 20 U/L (ref 5–45)
ATRIAL RATE: 81 BPM
BASOPHILS # BLD AUTO: 0 THOUSANDS/ΜL (ref 0–0.1)
BASOPHILS NFR BLD AUTO: 0 % (ref 0–1)
BILIRUB SERPL-MCNC: 0.6 MG/DL (ref 0.2–1)
BUN SERPL-MCNC: 17 MG/DL (ref 5–25)
CALCIUM SERPL-MCNC: 9.5 MG/DL (ref 8.3–10.1)
CHLORIDE SERPL-SCNC: 100 MMOL/L (ref 100–108)
CK SERPL-CCNC: 104 U/L (ref 39–308)
CO2 SERPL-SCNC: 32 MMOL/L (ref 21–32)
CREAT SERPL-MCNC: 0.8 MG/DL (ref 0.6–1.3)
EOSINOPHIL # BLD AUTO: 0.1 THOUSAND/ΜL (ref 0–0.61)
EOSINOPHIL NFR BLD AUTO: 1 % (ref 0–6)
ERYTHROCYTE [DISTWIDTH] IN BLOOD BY AUTOMATED COUNT: 12.5 % (ref 11.6–15.1)
GFR SERPL CREATININE-BSD FRML MDRD: 105 ML/MIN/1.73SQ M
GLUCOSE SERPL-MCNC: 117 MG/DL (ref 65–140)
HCT VFR BLD AUTO: 43.5 % (ref 42–52)
HGB BLD-MCNC: 14.7 G/DL (ref 14–18)
INR PPP: 0.97 (ref 0.86–1.16)
LYMPHOCYTES # BLD AUTO: 1.3 THOUSANDS/ΜL (ref 0.6–4.47)
LYMPHOCYTES NFR BLD AUTO: 13 % (ref 14–44)
MCH RBC QN AUTO: 30.7 PG (ref 27–31)
MCHC RBC AUTO-ENTMCNC: 33.9 G/DL (ref 31.4–37.4)
MCV RBC AUTO: 91 FL (ref 82–98)
MONOCYTES # BLD AUTO: 1 THOUSAND/ΜL (ref 0.17–1.22)
MONOCYTES NFR BLD AUTO: 10 % (ref 4–12)
NEUTROPHILS # BLD AUTO: 7.3 THOUSANDS/ΜL (ref 1.85–7.62)
NEUTS SEG NFR BLD AUTO: 75 % (ref 43–75)
NRBC BLD AUTO-RTO: 0 /100 WBCS
NT-PROBNP SERPL-MCNC: 170 PG/ML
P AXIS: 73 DEGREES
PLATELET # BLD AUTO: 237 THOUSANDS/UL (ref 130–400)
PMV BLD AUTO: 7.1 FL (ref 8.9–12.7)
POTASSIUM SERPL-SCNC: 4.2 MMOL/L (ref 3.5–5.3)
PR INTERVAL: 146 MS
PROT SERPL-MCNC: 7.8 G/DL (ref 6.4–8.2)
PROTHROMBIN TIME: 10.2 SECONDS (ref 9.4–11.7)
QRS AXIS: 66 DEGREES
QRSD INTERVAL: 84 MS
QT INTERVAL: 334 MS
QTC INTERVAL: 387 MS
RBC # BLD AUTO: 4.8 MILLION/UL (ref 4.7–6.1)
SODIUM SERPL-SCNC: 137 MMOL/L (ref 136–145)
T WAVE AXIS: 71 DEGREES
TROPONIN I SERPL-MCNC: <0.02 NG/ML
TROPONIN I SERPL-MCNC: <0.02 NG/ML
VENTRICULAR RATE: 81 BPM
WBC # BLD AUTO: 9.7 THOUSAND/UL (ref 4.8–10.8)

## 2018-02-01 PROCEDURE — 84484 ASSAY OF TROPONIN QUANT: CPT | Performed by: EMERGENCY MEDICINE

## 2018-02-01 PROCEDURE — 36415 COLL VENOUS BLD VENIPUNCTURE: CPT | Performed by: EMERGENCY MEDICINE

## 2018-02-01 PROCEDURE — 93010 ELECTROCARDIOGRAM REPORT: CPT | Performed by: INTERNAL MEDICINE

## 2018-02-01 PROCEDURE — 85730 THROMBOPLASTIN TIME PARTIAL: CPT | Performed by: EMERGENCY MEDICINE

## 2018-02-01 PROCEDURE — 85610 PROTHROMBIN TIME: CPT | Performed by: EMERGENCY MEDICINE

## 2018-02-01 PROCEDURE — 93005 ELECTROCARDIOGRAM TRACING: CPT

## 2018-02-01 PROCEDURE — 85025 COMPLETE CBC W/AUTO DIFF WBC: CPT | Performed by: EMERGENCY MEDICINE

## 2018-02-01 PROCEDURE — 82550 ASSAY OF CK (CPK): CPT | Performed by: EMERGENCY MEDICINE

## 2018-02-01 PROCEDURE — 80053 COMPREHEN METABOLIC PANEL: CPT | Performed by: EMERGENCY MEDICINE

## 2018-02-01 PROCEDURE — 83880 ASSAY OF NATRIURETIC PEPTIDE: CPT | Performed by: EMERGENCY MEDICINE

## 2018-02-01 PROCEDURE — 71045 X-RAY EXAM CHEST 1 VIEW: CPT

## 2018-02-01 PROCEDURE — 99285 EMERGENCY DEPT VISIT HI MDM: CPT

## 2018-02-01 RX ORDER — CLINDAMYCIN HYDROCHLORIDE 150 MG/1
300 CAPSULE ORAL ONCE
Status: DISCONTINUED | OUTPATIENT
Start: 2018-02-01 | End: 2018-02-01

## 2018-02-01 NOTE — ED PROVIDER NOTES
History  Chief Complaint   Patient presents with    Chest Pain     started with chest pain over the weekend when he started on a new blood pressure medicine  States he had a fever last night, though unsure of how high  This morning pain is also in his back and c/o SOB  History provided by:  Patient   used: No    Chest Pain   Pain location:  R chest  Pain quality: dull and pressure    Pain radiates to:  Upper back (Right upper back)  Pain radiates to the back: yes    Pain severity:  Moderate  Onset quality:  Gradual  Duration:  5 days  Timing:  Intermittent  Progression:  Waxing and waning  Chronicity:  New  Context: breathing    Context: no drug use, not eating, no intercourse, not lifting, no movement, not raising an arm, not at rest, no stress and no trauma    Context comment:  Worse with deep breathing  Relieved by:  None tried  Worsened by:   Movement and deep breathing (Direct palpation; reports is worse when he takes his blood pressure medication)  Ineffective treatments:  None tried  Associated symptoms: anxiety, back pain, cough, dizziness and fever    Associated symptoms: no abdominal pain, no AICD problem, no altered mental status, no anorexia, no claudication, no diaphoresis, no dysphagia, no fatigue, no headache, no heartburn, no lower extremity edema, no nausea, no near-syncope, no numbness, no orthopnea, no palpitations, no PND, no shortness of breath, no syncope, not vomiting and no weakness    Associated symptoms comment:  Mild, nonproductive cough; hoarseness  Subjective fevers x1 day  Low blood pressure  Risk factors: coronary artery disease, high cholesterol, hypertension and male sex    Risk factors: no aortic disease, no diabetes mellitus, no immobilization, no Marfan's syndrome, not obese, no prior DVT/PE, no smoking and no surgery    Risk factors comment:  Former smoker, recent NSTEMI; status post CABG x2, 9/2017      Prior to Admission Medications   Prescriptions Last Dose Informant Patient Reported? Taking? Aclidinium Bromide (TUDORZA PRESSAIR) 400 MCG/ACT AEPB  Self Yes No   Sig: Inhale   albuterol (PROAIR HFA) 90 mcg/act inhaler  Self Yes No   Sig: Inhale   albuterol (PROVENTIL HFA,VENTOLIN HFA) 90 mcg/act inhaler  Self Yes No   Sig: Inhale 2 puffs every 6 (six) hours as needed for wheezing   aspirin 325 mg tablet  Self No No   Sig: Take 1 tablet by mouth daily   atorvastatin (LIPITOR) 80 mg tablet   No No   Sig: Take 1 tablet (80 mg total) by mouth daily with dinner   nebivolol (BYSTOLIC) 2 5 mg tablet   No No   Sig: Take 1 tablet (2 5 mg total) by mouth daily   omeprazole (PriLOSEC) 20 mg delayed release capsule   No No   Sig: Take 1 capsule (20 mg total) by mouth daily      Facility-Administered Medications: None       Past Medical History:   Diagnosis Date    Chronic sinus complaints     last assessed 12/12/17    Diverticulosis     GERD (gastroesophageal reflux disease)     off medicine for past few years    Heart attack     last assessed 12/12/17    HTN (hypertension)     Hyperlipidemia     Tobacco use     Vasovagal syncope     last assessed 5/1/13    Wears glasses        Past Surgical History:   Procedure Laterality Date    APPENDECTOMY  1974    COLONOSCOPY N/A 8/14/2017    Procedure: COLONOSCOPY;  Surgeon: Lynn Cox MD;  Location[facility];   Service: Gastroenterology    CORONARY ARTERY BYPASS GRAFT N/A 9/25/2017    Procedure: INTRA OP MATTHEW; CORONARY ARTERY BYPASS GRAFT X 2 WITH SVH TO OM1  AND LIMA TO LAD; LEFT LEG EVH;  Surgeon: Adolph Sy MD;  Location:  MAIN OR;  Service: Cardiac Surgery    THUMB AMPUTATION Left 1980       Family History   Problem Relation Age of Onset    Heart disease Father      CABG    Testicular cancer Father      adenocarcinoma in situ of the testis    Heart defect Father      cardiac disorder    Cancer Father     Diabetes type II Father     Heart disease Maternal Grandfather     Hypertension Mother benign essential    Diabetes Sister     Arthritis Family     Arthritis Other     Heart defect Other      cardiac disorder    Diabetes Other     Cancer Other      I have reviewed and agree with the history as documented  Social History   Substance Use Topics    Smoking status: Former Smoker     Packs/day: 2 00     Years: 28 00     Types: Cigarettes     Quit date: 9/22/2017    Smokeless tobacco: Never Used      Comment: denied hx of exposure to second hand smoke; former smoker, smoked 2ppd for 30 yrs  pt quit 3 mos ago as per [employer]    Alcohol use No      Comment: rarely; denied hx of social drinker as per [employer]        Review of Systems   Constitutional: Positive for fever  Negative for chills, diaphoresis and fatigue  HENT: Positive for congestion  Negative for rhinorrhea, sinus pain, sore throat and trouble swallowing  Respiratory: Positive for cough and chest tightness  Negative for shortness of breath  Cardiovascular: Positive for chest pain  Negative for palpitations, orthopnea, claudication, syncope, PND and near-syncope  Gastrointestinal: Negative for abdominal distention, abdominal pain, anorexia, diarrhea, heartburn, nausea and vomiting  Genitourinary: Negative for difficulty urinating and flank pain  Musculoskeletal: Positive for back pain  Negative for neck pain and neck stiffness  Skin: Negative for pallor, rash and wound  Allergic/Immunologic: Negative for immunocompromised state  Neurological: Positive for dizziness and light-headedness  Negative for syncope, weakness, numbness and headaches  Psychiatric/Behavioral: The patient is nervous/anxious          Physical Exam  ED Triage Vitals   Temperature Pulse Respirations Blood Pressure SpO2   02/01/18 1057 02/01/18 1056 02/01/18 1056 02/01/18 1056 02/01/18 1056   97 9 °F (36 6 °C) 87 20 163/77 96 %      Temp Source Heart Rate Source Patient Position - Orthostatic VS BP Location FiO2 (%)   02/01/18 1057 02/01/18 1056 02/01/18 1056 02/01/18 1056 --   Oral Monitor Sitting Left arm       Pain Score       02/01/18 1056       3           Orthostatic Vital Signs  Vitals:    02/01/18 1056   BP: 163/77   Pulse: 87   Patient Position - Orthostatic VS: Sitting       Physical Exam   Constitutional: He is oriented to person, place, and time  He appears well-developed and well-nourished  No distress  HENT:   Head: Normocephalic and atraumatic  Mouth/Throat: Oropharynx is clear and moist    Eyes: Conjunctivae and EOM are normal  Pupils are equal, round, and reactive to light  Neck: Normal range of motion  Neck supple  No JVD present  Cardiovascular: Normal rate, regular rhythm and intact distal pulses  Pulmonary/Chest: Effort normal and breath sounds normal    Abdominal: Soft  He exhibits no distension  There is no tenderness  Musculoskeletal: Normal range of motion  He exhibits no edema, tenderness or deformity  Neurological: He is oriented to person, place, and time  Skin: Skin is warm and dry  He is not diaphoretic  Psychiatric:   Mildly anxious   Nursing note and vitals reviewed        ED Medications  Medications - No data to display    Diagnostic Studies  Results Reviewed     Procedure Component Value Units Date/Time    NT-BNP PRO [[id number]]     Lab Status:  No result Specimen:  Blood     CBC and differential [[id number]] Collected:  02/01/18 1124    Lab Status:  No result Specimen:  Blood from Arm, Right     Protime-INR [[id number]] Collected:  02/01/18 1124    Lab Status:  No result Specimen:  Blood from Arm, Right     APTT [[id number]] Collected:  02/01/18 1124    Lab Status:  No result Specimen:  Blood from Arm, Right     Comprehensive metabolic panel [[id number]] Collected:  02/01/18 1124    Lab Status:  No result Specimen:  Blood from Arm, Right     CK Total with Reflex CKMB [[id number]] Collected:  02/01/18 1124    Lab Status:  No result Specimen:  Blood from Arm, Right     Troponin I [[id number]] Collected:  02/01/18 1124    Lab Status:  No result Specimen:  Blood from Arm, Right                  XR chest 1 view portable   Final Result by Celestine White MD (02/01 1130)      No active pulmonary disease  Workstation performed: [id number]                    Procedures  Procedures       Phone Contacts  ED Phone Contact    ED Course  ED Course as of Feb 01 1636   Thu Feb 01, 2018   1417 Troponin I: <0 02                               MDM  Number of Diagnoses or Management Options  Atypical chest pain: new and requires workup  Medication side effect: established and worsening  Diagnosis management comments: Rule out ACS, Congestive heart failure, pneumonia; doubt PE, AD at this time  - EKG  - labs including cardiac enzymes, BNP  - chest x-ray  - cardiology consult  - s/p aspirin at home (full dose)  - p r n   Analgesia  - re-evaluation, dispo       Amount and/or Complexity of Data Reviewed  Clinical lab tests: ordered and reviewed  Tests in the radiology section of CPT®: ordered and reviewed  Tests in the medicine section of CPT®: reviewed and ordered  Decide to obtain previous medical records or to obtain history from someone other than the patient: yes  Obtain history from someone other than the patient: yes (Spouse - patient and wife informed of all findings and recommendations by Cardiology, Dr Brendan Pires; verbalized understanding and agree with follow-up plan)  Review and summarize past medical records: yes  Discuss the patient with other providers: yes (Dr Brendan Pires - case discussed, recommend repeating cardiac enzymes x2, re-evaluation and discharge; follow up as an outpatient, recommends discontinuing beta-blocker at this time until further evaluation)  Independent visualization of images, tracings, or specimens: yes    Risk of Complications, Morbidity, and/or Mortality  Presenting problems: moderate  Diagnostic procedures: low  Management options: low    Patient Progress  Patient progress: stable    CritCare Time    Disposition  Final diagnoses:   None     ED Disposition     None      Follow-up Information    None       Patient's Medications   Discharge Prescriptions    No medications on file     No discharge procedures on file      ED Provider  Electronically Signed by           Dirk Kelly MD  02/01/18 0741

## 2018-02-01 NOTE — DISCHARGE INSTRUCTIONS
Please discontinue your blood pressure medication as recommended by your Cardiologist    Follow-up with Dr Jae Contreras, please call for appointment  Chest Wall Pain, Ambulatory Care   GENERAL INFORMATION:   Chest wall pain  may be caused by problems with the muscles, cartilage, or bones of the chest wall  Chest wall pain may also be caused by pain that spreads to your chest from another part of your body  The pain may be aching, severe, dull, or sharp  It may come and go, or it may be constant  The pain may be worse when you move in certain ways, breathe deeply, or cough  Seek immediate care for the following symptoms:   · Severe pain    · You have any of the following signs of a heart attack:      ¨ Squeezing, pressure, or pain in your chest that lasts longer than 5 minutes or returns    ¨ Discomfort or pain in your back, neck, jaw, stomach, or arm     ¨ Trouble breathing    ¨ Nausea or vomiting    ¨ Lightheadedness or a sudden cold sweat, especially with chest pain or trouble breathing  Treatment  depends on the cause of your chest wall pain  You may need any of the following:  · NSAIDs  help decrease swelling and pain or fever  This medicine is available with or without a doctor's order  NSAIDs can cause stomach bleeding or kidney problems in certain people  If you take blood thinner medicine, always ask your healthcare provider if NSAIDs are safe for you  Always read the medicine label and follow directions  · Acetaminophen  decreases pain  It is available without a doctor's order  Ask how much to take and how often to take it  Follow directions  Acetaminophen can cause liver damage if not taken correctly  · Apply heat  on your chest for 20 to 30 minutes every 2 hours for as many days as directed  Heat helps decrease pain and muscle spasms  · Apply ice  on your chest for 15 to 20 minutes every hour or as directed  Use an ice pack, or put crushed ice in a plastic bag  Cover it with a towel   Ice helps prevent tissue damage and decreases swelling and pain  Follow up with your healthcare provider as directed:  Write down your questions so you remember to ask them during your visits  CARE AGREEMENT:   You have the right to help plan your care  Learn about your health condition and how it may be treated  Discuss treatment options with your caregivers to decide what care you want to receive  You always have the right to refuse treatment  The above information is an  only  It is not intended as medical advice for individual conditions or treatments  Talk to your doctor, nurse or pharmacist before following any medical regimen to see if it is safe and effective for you  © 2014 [address] is for End User's use only and may not be sold, redistributed or otherwise used for commercial purposes  All illustrations and images included in CareNotes® are the copyrighted property of [employer]  or [address]

## 2018-02-01 NOTE — ED TRIAGE NOTES
Taken off of lopressor, because it was "making him sick" as per wife  Was placed on Bystolic by PCP

## 2018-02-02 ENCOUNTER — APPOINTMENT (OUTPATIENT)
Dept: CARDIAC REHAB | Facility: CLINIC | Age: 48
End: 2018-02-02
Payer: COMMERCIAL

## 2018-02-12 ENCOUNTER — OFFICE VISIT (OUTPATIENT)
Dept: PULMONOLOGY | Facility: MEDICAL CENTER | Age: 48
End: 2018-02-12
Payer: COMMERCIAL

## 2018-02-12 ENCOUNTER — OFFICE VISIT (OUTPATIENT)
Dept: FAMILY MEDICINE CLINIC | Facility: CLINIC | Age: 48
End: 2018-02-12
Payer: COMMERCIAL

## 2018-02-12 VITALS
HEART RATE: 80 BPM | TEMPERATURE: 98.5 F | RESPIRATION RATE: 16 BRPM | DIASTOLIC BLOOD PRESSURE: 70 MMHG | WEIGHT: 162 LBS | HEIGHT: 66 IN | SYSTOLIC BLOOD PRESSURE: 135 MMHG | BODY MASS INDEX: 26.03 KG/M2

## 2018-02-12 VITALS
WEIGHT: 162 LBS | HEIGHT: 66 IN | OXYGEN SATURATION: 95 % | HEART RATE: 78 BPM | TEMPERATURE: 97.9 F | DIASTOLIC BLOOD PRESSURE: 82 MMHG | SYSTOLIC BLOOD PRESSURE: 118 MMHG | RESPIRATION RATE: 12 BRPM | BODY MASS INDEX: 26.03 KG/M2

## 2018-02-12 DIAGNOSIS — J44.9 COPD, SEVERE (HCC): Primary | ICD-10-CM

## 2018-02-12 DIAGNOSIS — R09.82 POSTNASAL DRIP: ICD-10-CM

## 2018-02-12 DIAGNOSIS — I25.10 CAD, MULTIPLE VESSEL: ICD-10-CM

## 2018-02-12 DIAGNOSIS — K21.9 GASTROESOPHAGEAL REFLUX DISEASE, ESOPHAGITIS PRESENCE NOT SPECIFIED: ICD-10-CM

## 2018-02-12 DIAGNOSIS — J43.2 CENTRILOBULAR EMPHYSEMA (HCC): ICD-10-CM

## 2018-02-12 DIAGNOSIS — J44.9 CHRONIC OBSTRUCTIVE PULMONARY DISEASE, UNSPECIFIED COPD TYPE (HCC): Primary | ICD-10-CM

## 2018-02-12 PROCEDURE — 99215 OFFICE O/P EST HI 40 MIN: CPT | Performed by: INTERNAL MEDICINE

## 2018-02-12 PROCEDURE — 99214 OFFICE O/P EST MOD 30 MIN: CPT | Performed by: FAMILY MEDICINE

## 2018-02-12 NOTE — PROGRESS NOTES
Chief Complaint   Patient presents with    Follow-up     ER        Patient ID: Vidhya Ni is a 50 y o  male  HPI  Pt is seeing for f/u ER visit 10 days ago  -  Had R sided chest discomfort -  Bystolic was stopped, cardiac w/u was negative - has COPD -  On Tudorza, -  using rescue inhaler 1-2 times a day -  Has bridgette with pulmonologist today for f/u     The following portions of the patient's history were reviewed and updated as appropriate: allergies, current medications, past family history, past medical history, past social history, past surgical history and problem list     Review of Systems   Constitutional: Negative  Respiratory: Positive for chest tightness and shortness of breath  Negative for apnea, cough, choking, wheezing and stridor  Cardiovascular: Negative  Gastrointestinal: Negative  Genitourinary: Negative  Musculoskeletal: Negative  Skin: Negative  Neurological: Negative  Current Outpatient Prescriptions   Medication Sig Dispense Refill    Aclidinium Bromide (TUDORZA PRESSAIR) 400 MCG/ACT AEPB Inhale      albuterol (PROAIR HFA) 90 mcg/act inhaler Inhale      albuterol (PROVENTIL HFA,VENTOLIN HFA) 90 mcg/act inhaler Inhale 2 puffs every 6 (six) hours as needed for wheezing      aspirin 325 mg tablet Take 1 tablet by mouth daily 100 tablet 0    atorvastatin (LIPITOR) 80 mg tablet Take 1 tablet (80 mg total) by mouth daily with dinner 30 tablet 6    omeprazole (PriLOSEC) 20 mg delayed release capsule Take 1 capsule (20 mg total) by mouth daily 30 capsule 0     No current facility-administered medications for this visit  Objective:    /70 (BP Location: Left arm, Patient Position: Sitting, Cuff Size: Large)   Pulse 80   Temp 98 5 °F (36 9 °C) (Tympanic)   Resp 16   Ht 5' 6" (1 676 m)   Wt 73 5 kg (162 lb)   BMI 26 15 kg/m²        Physical Exam   Constitutional: He is oriented to person, place, and time  He appears well-developed     Cardiovascular: Normal rate and regular rhythm  Exam reveals distant heart sounds  No murmur heard  Pulmonary/Chest: Effort normal  He has decreased breath sounds  He has no wheezes  He has no rhonchi  He has no rales  Neurological: He is alert and oriented to person, place, and time  Skin: Skin is warm             Assessment/Plan:      Diagnoses and all orders for this visit:    Chronic obstructive pulmonary disease, unspecified COPD type ([facility] [address] )    CAD, multiple vessel      still not controlled COPD -  Side effects with bblockers are likely related to COPD   F/u with pulmonologist and cardiologist  Cont current meds  Was advised to wean himself off Prilosec  rto in 3 m                     Sammy Lerma MD

## 2018-02-12 NOTE — ASSESSMENT & PLAN NOTE
Given suboptimal control of underlying COPD and shortness of Breath will change his inhaler to Trelogy

## 2018-02-12 NOTE — PATIENT INSTRUCTIONS
Use Inhaler- once per day  Rinse and spit after the use of the inhaler  Get Pulmonary function test done  Schedule pulmonary rehabilitation  Fluticasone/Vilanterol (By breathing)   Fluticasone Furoate (zhbt-SMP-f-sone FURE-oh-ate), Vilanterol Trifenatate (vye-BERNARDO-ter-ol jtpp-NNX-k-enumann)  Treats asthma and chronic obstructive pulmonary disease (COPD)  This medicine contains a steroid  Brand Name(s): Alana Elliproxanne   There may be other brand names for this medicine  When This Medicine Should Not Be Used: This medicine is not right for everyone  Do not use it if you had an allergic reaction to fluticasone, vilanterol, or milk proteins  How to Use This Medicine:   Powder  · Use this medicine at the same time every day exactly as directed  Never use it more often than your doctor told you to  · This medicine should come with a Medication Guide  Ask your pharmacist for a copy if you do not have one  · This medicine is a powder that is used with its own inhaler device  Keep the medicine in the foil tray until you are ready to use the inhaler  · Each time you open the cover of the inhaler and hear a click, the inhaler is ready to use  Do not close the cover again until you have taken your dose  You will lose the dose if you open and close the cover without inhaling the medicine  · When you take a dose, inhale through your mouth  Do not breath in through your nose  · When the counter on the medicine turns red, there are less than 10 doses left  Refill your prescription as soon as possible  · When you have finished all your inhalations, rinse your mouth out with water  · Missed dose: Take a dose as soon as you remember  If it is almost time for your next dose, wait until then and take a regular dose  Do not take extra medicine to make up for a missed dose  Do not take more than 1 puff per day  · Store the medicine in a closed container at room temperature, away from heat, moisture, and direct light   Throw away this medicine 6 weeks after it was opened or when the counter reads "0 "  Drugs and Foods to Avoid:   Ask your doctor or pharmacist before using any other medicine, including over-the-counter medicines, vitamins, and herbal products  · Do not use this medicine together with similar inhaled medicines, including arformoterol, budesonide/formoterol, formoterol, indacaterol, or salmeterol  · Some medicines can affect how this medicine works  Tell your doctor if you are using any of the following:  ¨ Clarithromycin, conivaptan, indinavir, itraconazole, ketoconazole, lopinavir, nefazodone, nelfinavir, ritonavir, saquinavir, telithromycin, troleandomycin, voriconazole  ¨ Beta-blocker  ¨ Diuretic (water pill)  ¨ Tricyclic antidepressant or MAO inhibitor within the past 2 weeks  Warnings While Using This Medicine:   · Tell your doctor if you are pregnant or breastfeeding, or if you have liver disease, diabetes, cataracts, glaucoma, heart or blood vessel disease, high blood pressure, heart rhythm problems, osteoporosis, thyroid problems, or a history of seizures  · This medicine may cause the following problems:  ¨ Increased risk of pneumonia  ¨ Adrenal gland problems  ¨ Increased risk of paradoxical bronchospasm (trouble breathing right after use) and asthma-related death  ¨ Changes in heart rhythm  ¨ Osteoporosis (when used for a long time)  ¨ Glaucoma or cataracts  · Do not use this medicine to treat acute attacks  You should have another medicine to use for an acute asthma attack or COPD flare-up  Tell your doctor right away if your condition gets worse or you need to use your other medicine more often than usual   · This medicine may weaken your immune system and increase your risk for infection  Tell your doctor about any immune system problems or infections you have, including herpes in your eye or tuberculosis  Tell your doctor right away if you have been exposed to chickenpox or measles    · Your doctor will check your progress and the effects of this medicine at regular visits  Keep all appointments  · Keep all medicine out of the reach of children  Never share your medicine with anyone  Possible Side Effects While Using This Medicine:   Call your doctor right away if you notice any of these side effects:  · Allergic reaction: Itching or hives, swelling in your face or hands, swelling or tingling in your mouth or throat, chest tightness, trouble breathing  · Changes in skin color, dark freckles, weakness, tiredness, nausea, vomiting, weight loss  · Chest pain  · Eye pain, vision loss, seeing halos around lights  · Fast, pounding, or uneven heartbeat  · Fever, chills, cough, runny or stuffy nose, sore throat, body aches  · Lightheadedness, dizziness, fainting  · Worsening of breathing problems, shortness of breath, wheezing  If you notice these less serious side effects, talk with your doctor:   · Dry mouth  · White patches in your mouth or throat, pain when you eat or swallow  If you notice other side effects that you think are caused by this medicine, tell your doctor  Call your doctor for medical advice about side effects  You may report side effects to FDA at 2-609-FDA-9274  © 2017 [address] Information is for End User's use only and may not be sold, redistributed or otherwise used for commercial purposes  The above information is an  only  It is not intended as medical advice for individual conditions or treatments  Talk to your doctor, nurse or pharmacist before following any medical regimen to see if it is safe and effective for you  Umeclidinium (By breathing)   Umeclidinium (ai-pyb-rw-DIN-ee-um)  Treats chronic obstructive pulmonary disease (COPD)  Brand Name(s): Incruse Ellipta   There may be other brand names for this medicine  When This Medicine Should Not Be Used: This medicine is not right for everyone   Do not use it if you had an allergic reaction to umeclidinium or milk proteins  How to Use This Medicine:   Powder  · Your doctor will tell you how much medicine to use  Do not use more than directed  Use this medicine at the same time each day  · To use:   ¨ Open the cover of the inhaler until you hear a click  The inhaler is now ready to use  Do not close the cover until you have taken your dose  If you open and close the cover without inhaling the dose, you will lose the medicine  Do not take more than 1 puff per day  ¨ Before you inhale your dose, breathe out fully  Try to empty your lungs as much as possible  ¨ Place the mouthpiece of the inhaler in your mouth and breathe in a steady, deep breath  Do not breathe in through your nose  ¨ Remove the inhaler from your mouth  Hold your breath for about 3 to 4 seconds, then breathe out slowly  ¨ The inhaler has a window that shows the number of doses that are left  The counter will turn red when the inhaler has fewer than 10 doses left  This will remind you to refill your prescription  · Read and follow the patient instructions that come with this medicine  Talk to your doctor or pharmacist if you have any questions  · Missed dose: Take a dose as soon as you remember  If it is almost time for your next dose, wait until then and take a regular dose  Do not take extra medicine to make up for a missed dose  · Store the medicine in a closed container at room temperature, away from heat, moisture, and direct light  Keep the medicine in the foil tray  Do not open it until you are ready to use the inhaler for the first time  Throw away the medicine 6 weeks after you open it or when the counter reads 0  Drugs and Foods to Avoid:   Ask your doctor or pharmacist before using any other medicine, including over-the-counter medicines, vitamins, and herbal products  · Some medicines can affect how umeclidinium works   Tell your doctor if you are using an anticholinergic medicine, such as the following: ¨ Aclidinium  ¨ Atropine  ¨ Ipratropium  ¨ Tiotropium  Warnings While Using This Medicine:   · Tell your doctor if you are pregnant or breastfeeding, or if you have glaucoma, heart problems, trouble urinating, an enlarged prostate, or a bladder blockage  · This medicine may cause the following problems:  ¨ Increased trouble breathing (may be life-threatening)  ¨ New or worse narrow-angle glaucoma  ¨ New or worse trouble urinating  · Do not use this medicine to treat a COPD attack  Your doctor may prescribe another medicine that you should use when you have a COPD flare-up  · Your doctor will check your progress and the effects of this medicine at regular visits  Keep all appointments  · Keep all medicine out of the reach of children  Never share your medicine with anyone  Possible Side Effects While Using This Medicine:   Call your doctor right away if you notice any of these side effects:  · Allergic reaction: Itching or hives, swelling in your face or hands, swelling or tingling in your mouth or throat, chest tightness, trouble breathing  · Decrease in how much or how often you urinate, difficult or painful urination  · Eye pain, blurred vision, other vision changes  · Worse breathing problems  If you notice other side effects that you think are caused by this medicine, tell your doctor  Call your doctor for medical advice about side effects  You may report side effects to FDA at 1-880-FDA-7821  © 2017 [address] Information is for End User's use only and may not be sold, redistributed or otherwise used for commercial purposes  The above information is an  only  It is not intended as medical advice for individual conditions or treatments  Talk to your doctor, nurse or pharmacist before following any medical regimen to see if it is safe and effective for you

## 2018-02-12 NOTE — PROGRESS NOTES
Assessment/Plan:     Problem List Items Addressed This Visit     None      Visit Diagnoses     COPD, severe ([address] )    -  Primary    Relevant Orders    Ambulatory Referral to Pulmonary Rehabilitation    Complete pulmonary function test            Return in about 4 weeks (around 3/12/2018)  All questions are answered to the patient's satisfaction and understanding  He verbalizes understanding  He is encouraged to call with any further questions or concerns  Portions of the record may have been created with voice recognition software  Occasional wrong word or "sound a like" substitutions may have occurred due to the inherent limitations of voice recognition software  Read the chart carefully and recognize, using context, where substitutions have occurred  ______________________________________________________________________    Chief Complaint:   Chief Complaint   Patient presents with    Follow-up    Shortness of Breath      occurs off and on    Cough      occured from med which has been d'cd       Patient ID: Elicia Kennedy is a 50 y o  y o  male has a past medical history of Chronic sinus complaints; Diverticulosis; GERD (gastroesophageal reflux disease); Heart attack; HTN (hypertension); Hyperlipidemia; Tobacco use; Vasovagal syncope; and Wears glasses  2/12/2018  Patient is here for follow-up visit  Needs disability paperwork filled out as well  COPD- Using [facility]  Shortness of breath is somewhat improved  Cough last week  Had fevers at night last week  This is slightly improved  This appears to be all in relation to new beta blocker given to him  Beta blockers-issues  Postnasal drip- does not use any medications  CAD- stable; will be finishing cardiac rehab after 1 more session  GERD-stable    Shortness of Breath   This is a chronic problem  The current episode started more than 1 month ago  The problem occurs intermittently  The problem has been unchanged  Associated symptoms include a fever  Pertinent negatives include no abdominal pain, chest pain, headaches, leg swelling, rash, sore throat or vomiting  Exacerbated by: random episodes- no clear triggers  sometimes with minimal exertion and climbing stairs  Other times does not have this  The patient has no known risk factors for DVT/PE  He has tried ipratropium inhalers for the symptoms  The treatment provided mild relief  His past medical history is significant for CAD, COPD and a recent surgery  Review of Systems   Constitutional: Positive for chills and fever  Negative for activity change, appetite change and fatigue  HENT: Positive for postnasal drip  Negative for congestion, dental problem, sinus pain, sneezing, sore throat and voice change  Eyes: Negative for visual disturbance  Respiratory: Positive for shortness of breath  See HPI   Cardiovascular: Negative for chest pain, palpitations and leg swelling  Gastrointestinal: Negative for abdominal pain, diarrhea, nausea and vomiting  Endocrine: Negative for heat intolerance  Genitourinary: Negative for difficulty urinating and dysuria  Musculoskeletal: Negative for arthralgias and back pain  Skin: Negative for rash and wound  Allergic/Immunologic: Negative for environmental allergies and food allergies  Neurological: Negative for dizziness, numbness and headaches  Hematological: Negative for adenopathy  Psychiatric/Behavioral: Negative for agitation, behavioral problems and confusion  Smoking history: He reports that he quit smoking about 4 months ago  His smoking use included Cigarettes  He has a 56 00 pack-year smoking history   He has never used smokeless tobacco     The following portions of the patient's history were reviewed and updated as appropriate: allergies, current medications, past family history, past medical history, past social history, past surgical history and problem list     There is no immunization history for the selected administration types on file for this patient  Current Outpatient Prescriptions   Medication Sig Dispense Refill    Aclidinium Bromide (TUDORZA PRESSAIR) 400 MCG/ACT AEPB Inhale      albuterol (PROVENTIL HFA,VENTOLIN HFA) 90 mcg/act inhaler Inhale 2 puffs every 6 (six) hours as needed for wheezing      aspirin 325 mg tablet Take 1 tablet by mouth daily 100 tablet 0    atorvastatin (LIPITOR) 80 mg tablet Take 1 tablet (80 mg total) by mouth daily with dinner 30 tablet 6    omeprazole (PriLOSEC) 20 mg delayed release capsule Take 1 capsule (20 mg total) by mouth daily 30 capsule 0     No current facility-administered medications for this visit  Allergies: Patient has no known allergies  Objective:  Vitals:    02/12/18 1203   BP: 118/82   BP Location: Left arm   Patient Position: Sitting   Cuff Size: Adult   Pulse: 78   Resp: 12   Temp: 97 9 °F (36 6 °C)   TempSrc: Oral   SpO2: 92%   Weight: 73 5 kg (162 lb)   Height: 5' 5 5" (1 664 m)   Oxygen Therapy  SpO2: 92 %    Wt Readings from Last 3 Encounters:   02/12/18 73 5 kg (162 lb)   02/12/18 73 5 kg (162 lb)   02/01/18 72 6 kg (160 lb)     Body mass index is 26 55 kg/m²  Physical Exam   Constitutional: He is oriented to person, place, and time  He appears well-developed and well-nourished  No distress  HENT:   Head: Normocephalic and atraumatic  Mouth/Throat: Oropharyngeal exudate (erythema) present  Mallampati 4   Eyes: EOM are normal  Pupils are equal, round, and reactive to light  Neck: Normal range of motion  Neck supple  No tracheal deviation present  No thyromegaly present  Cardiovascular: Normal rate and regular rhythm  No murmur heard  Pulmonary/Chest: Effort normal and breath sounds normal  No respiratory distress  He has no wheezes  He has no rales  He exhibits no tenderness  Abdominal: Soft  Bowel sounds are normal  He exhibits no distension  There is no tenderness  Musculoskeletal: Normal range of motion   He exhibits no edema    Lymphadenopathy:     He has no cervical adenopathy  Neurological: He is alert and oriented to person, place, and time  No cranial nerve deficit  Skin: Skin is warm and dry  He is not diaphoretic  Psychiatric: He has a normal mood and affect  His behavior is normal    Vitals reviewed  Lab Review:   Lab Results   Component Value Date     02/01/2018    K 4 2 02/01/2018     02/01/2018    CO2 32 02/01/2018    BUN 17 02/01/2018    CREATININE 0 80 02/01/2018    GLUCOSE 117 02/01/2018    GLUCOSE 159 (H) 09/25/2017    CALCIUM 9 5 02/01/2018     Lab Results   Component Value Date    WBC 9 70 02/01/2018    HGB 14 7 02/01/2018    HCT 43 5 02/01/2018    MCV 91 02/01/2018     02/01/2018     No peripheral eosinophilia    Diagnostics:  I have personally reviewed pertinent reports  and I have personally reviewed pertinent films in PACS  Chest x-ray: 2/1/18- showed hyperinflation  CT of chest performed on 9/22/17 with contrast revealed centrilobular and paraseptal emphysema  Office Spirometry Results: 12/12/17  FEV1: 1 67 liters (56)  FVC: 4 27 liters (112%)  FEV1/FVC: 39 1 %     Xr Chest 1 View Portable    Result Date: 2/1/2018  Narrative: CHEST INDICATION:  Chest pain  COMPARISON:  10/27/2017  VIEWS:   AP frontal IMAGES:  1 FINDINGS:  There are median sternotomy wires indicating prior cardiac surgery  Cardiomediastinal silhouette appears unremarkable  Lungs are mildly hyperinflated but clear  No pleural effusions or pneumothorax  Visualized osseous structures appear within normal limits for the patient's age  Impression: No active pulmonary disease   Workstation performed: [id number]

## 2018-02-12 NOTE — PATIENT INSTRUCTIONS
Heart Healthy Diet   WHAT YOU NEED TO KNOW:   A heart healthy diet is an eating plan low in total fat, unhealthy fats, and sodium (salt)  A heart healthy diet helps decrease your risk for heart disease and stroke  Limit the amount of fat you eat to 25% to 35% of your total daily calories  Limit sodium to less than 2,300 mg each day  DISCHARGE INSTRUCTIONS:   Healthy fats:  Healthy fats can help improve cholesterol levels  The risk for heart disease is decreased when cholesterol levels are normal  Choose healthy fats, such as the following:  · Unsaturated fat  is found in foods such as soybean, canola, olive, corn, and safflower oils  It is also found in soft tub margarine that is made with liquid vegetable oil  · Omega-3 fat  is found in certain fish, such as salmon, tuna, and trout, and in walnuts and flaxseed  Unhealthy fats:  Unhealthy fats can cause unhealthy cholesterol levels in your blood and increase your risk of heart disease  Limit unhealthy fats, such as the following:  · Cholesterol  is found in animal foods, such as eggs and lobster, and in dairy products made from whole milk  Limit cholesterol to less than 300 milligrams (mg) each day  You may need to limit cholesterol to 200 mg each day if you have heart disease  · Saturated fat  is found in meats, such as hardwick and hamburger  It is also found in chicken or turkey skin, whole milk, and butter  Limit saturated fat to less than 7% of your total daily calories  Limit saturated fat to less than 6% if you have heart disease or are at increased risk for it  · Trans fat  is found in packaged foods, such as potato chips and cookies  It is also in hard margarine, some fried foods, and shortening  Avoid trans fats as much as possible    Heart healthy foods and drinks to include:  Ask your dietitian or healthcare provider how many servings to have from each of the following food groups:  · Grains:      ¨ Whole-wheat breads, cereals, and pastas, and brown rice    ¨ Low-fat, low-sodium crackers and chips    · Vegetables:      ¨ Broccoli, green beans, green peas, and spinach    ¨ Collards, kale, and lima beans    ¨ Carrots, sweet potatoes, tomatoes, and peppers    ¨ Canned vegetables with no salt added    · Fruits:      ¨ Bananas, peaches, pears, and pineapple    ¨ Grapes, raisins, and dates    ¨ Oranges, tangerines, grapefruit, orange juice, and grapefruit juice    ¨ Apricots, mangoes, melons, and papaya    ¨ Raspberries and strawberries    ¨ Canned fruit with no added sugar    · Low-fat dairy products:      ¨ Nonfat (skim) milk, 1% milk, and low-fat almond, cashew, or soy milks fortified with calcium    ¨ Low-fat cheese, regular or frozen yogurt, and cottage cheese    · Meats and proteins , such as lean cuts of beef and pork (loin, leg, round), skinless chicken and turkey, legumes, soy products, egg whites, and nuts  Foods and drinks to limit or avoid:  Ask your dietitian or healthcare provider about these and other foods that are high in unhealthy fat, sodium, and sugar:  · Snack or packaged foods , such as frozen dinners, cookies, macaroni and cheese, and cereals with more than 300 mg of sodium per serving    · Canned or dry mixes  for cakes, soups, sauces, or gravies    · Vegetables with added sodium , such as instant potatoes, vegetables with added sauces, or regular canned vegetables    · Other foods high in sodium , such as ketchup, barbecue sauce, salad dressing, pickles, olives, soy sauce, and miso    · High-fat dairy foods  such as whole or 2% milk, cream cheese, or sour cream, and cheeses     · High-fat protein foods  such as high-fat cuts of beef (T-bone steaks, ribs), chicken or turkey with skin, and organ meats, such as liver    · Cured or smoked meats , such as hot dogs, hardwick, and sausage    · Unhealthy fats and oils , such as butter, stick margarine, shortening, and cooking oils such as coconut or palm oil    · Food and drinks high in sugar , such as soft drinks (soda), sports drinks, sweetened tea, candy, cake, cookies, pies, and doughnuts  Other diet guidelines to follow:   · Eat more foods containing omega-3 fats  Eat fish high in omega-3 fats at least 2 times a week  · Limit alcohol  Too much alcohol can damage your heart and raise your blood pressure  Women should limit alcohol to 1 drink a day  Men should limit alcohol to 2 drinks a day  A drink of alcohol is 12 ounces of beer, 5 ounces of wine, or 1½ ounces of liquor  · Choose low-sodium foods  High-sodium foods can lead to high blood pressure  Add little or no salt to food you prepare  Use herbs and spices in place of salt  · Eat more fiber  to help lower cholesterol levels  Eat at least 5 servings of fruits and vegetables each day  Eat 3 ounces of whole-grain foods each day  Legumes (beans) are also a good source of fiber  Lifestyle guidelines:   · Do not smoke  Nicotine and other chemicals in cigarettes and cigars can cause lung and heart damage  Ask your healthcare provider for information if you currently smoke and need help to quit  E-cigarettes or smokeless tobacco still contain nicotine  Talk to your healthcare provider before you use these products  · Exercise regularly  to help you maintain a healthy weight and improve your blood pressure and cholesterol levels  Ask your healthcare provider about the best exercise plan for you  Do not start an exercise program without asking your healthcare provider  Follow up with your healthcare provider as directed:  Write down your questions so you remember to ask them during your visits  © 2017 [address] [employer] Information is for End User's use only and may not be sold, redistributed or otherwise used for commercial purposes  All illustrations and images included in CareNotes® are the copyrighted property of A [employer] M [employer]  or Neftaly Larose  The above information is an  only   It is not intended as medical advice for individual conditions or treatments  Talk to your doctor, nurse or pharmacist before following any medical regimen to see if it is safe and effective for you

## 2018-02-13 NOTE — MISCELLANEOUS
Message  called patient to reschedule appt from 11/14 with Alexandra Grant to 11/24 with Dr Jimmie Swanson- he has [employer] and Alexandra Grant is non-par with Lillian Demarco left message to call back        Active Problems    1  Asthma (493 90) (J45 909)   2  CAD, multiple vessel (414 00) (I25 10)   3  Chronic obstructive pulmonary disease, unspecified COPD type (496) (J44 9)   4  Colon polyps (211 3) (K63 5)   5  Diverticulitis of colon (562 11) (K57 32)   6  Fatigue, unspecified type (780 79) (R53 83)   7  GERD (gastroesophageal reflux disease) (530 81) (K21 9)   8  Hyperlipidemia (272 4) (E78 5)   9  Nicotine dependence (305 1) (F17 200)   10  NSTEMI (non-ST elevated myocardial infarction) (410 70) (I21 4)   11  Postop check (V67 00) (Z09)   12  S/P CABG x 2 (V45 81) (Z95 1)    Current Meds   1  Aspirin 325 MG Oral Tablet; Take 1 tablet daily; Therapy: (Recorded:26Oct2017) to Recorded   2  Atorvastatin Calcium 80 MG Oral Tablet; TAKE 1 TABLET DAILY; Therapy: (Recorded:26Oct2017) to Recorded   3  Metoprolol Tartrate 25 MG Oral Tablet; TAKE 0 5 TABLET Twice daily; Therapy: [id number] to (Last Rx:26Oct2017)  Requested for: 26Oct2017 Ordered   4  Omeprazole 20 MG Oral Capsule Delayed Release; take 1 capsule daily  Requested for:   [id number]; Last Rx:31Oct2017 Ordered   5  ProAir  (90 Base) MCG/ACT Inhalation Aerosol Solution; 2  PUFFS Q 4-6   HOURS PRN  ELBERT;   Therapy: [id number] to (Last Rx:02Dhs0076)  Requested for: [id number] Ordered    Allergies    1  No Known Drug Allergies    2  No Known Environmental Allergies   3  No Known Food Allergies    Signatures   Electronically signed by :  Serena Mary, ; Nov 9 2017  3:44PM EST                       (Author)

## 2018-02-14 ENCOUNTER — CLINICAL SUPPORT (OUTPATIENT)
Dept: CARDIAC REHAB | Facility: CLINIC | Age: 48
End: 2018-02-14
Payer: COMMERCIAL

## 2018-02-14 DIAGNOSIS — Z95.1 S/P CABG X 2: ICD-10-CM

## 2018-02-14 PROCEDURE — 93798 PHYS/QHP OP CAR RHAB W/ECG: CPT

## 2018-02-15 ENCOUNTER — HOSPITAL ENCOUNTER (OUTPATIENT)
Dept: PULMONOLOGY | Facility: HOSPITAL | Age: 48
Discharge: HOME | End: 2018-02-15
Attending: INTERNAL MEDICINE
Payer: COMMERCIAL

## 2018-02-15 DIAGNOSIS — J44.9 COPD, SEVERE (HCC): ICD-10-CM

## 2018-02-15 PROCEDURE — 94060 EVALUATION OF WHEEZING: CPT | Performed by: INTERNAL MEDICINE

## 2018-02-15 PROCEDURE — 94060 EVALUATION OF WHEEZING: CPT

## 2018-02-15 PROCEDURE — 94726 PLETHYSMOGRAPHY LUNG VOLUMES: CPT

## 2018-02-15 PROCEDURE — 94726 PLETHYSMOGRAPHY LUNG VOLUMES: CPT | Performed by: INTERNAL MEDICINE

## 2018-02-15 PROCEDURE — 94729 DIFFUSING CAPACITY: CPT | Performed by: INTERNAL MEDICINE

## 2018-02-15 PROCEDURE — 94761 N-INVAS EAR/PLS OXIMETRY MLT: CPT

## 2018-02-15 PROCEDURE — 94729 DIFFUSING CAPACITY: CPT

## 2018-02-15 RX ORDER — ALBUTEROL SULFATE 2.5 MG/3ML
2.5 SOLUTION RESPIRATORY (INHALATION) ONCE
Status: DISCONTINUED | OUTPATIENT
Start: 2018-02-15 | End: 2018-02-19 | Stop reason: HOSPADM

## 2018-02-20 VITALS — HEIGHT: 65 IN | BODY MASS INDEX: 26.74 KG/M2 | WEIGHT: 160.5 LBS

## 2018-02-20 NOTE — PROGRESS NOTES
Yung Melendez   1970     Risk: low     Pre Post % Change Goal   Date: 11/2/17 2/14/17     Physical       Sub Max ETT (mets) 4 3 7 5  10% increase   6MWT (feet) 1580 1670  10% increase   UCSD Dyspnea Score    5 pt decrease   Supplemental O2 use (L)       [facility] (est peak O2) 5 72 Total Score: 24 2     Peak exercise CR/NC (mets) 3 3 4 4  40% increase   Emotional       PHQ9 (> 10 refer to MD) 3 0  4 pt decrease   [place] (lower score = improvement)       Total 16 Total: 17  < 27   Feelings 1 2  < 3   Physical Fitness 4 3  < 3   Social Support 1 1  < 3   Daily Activities 2 2  < 3   Social Activities 1 1  < 3   Pain 2 2  < 3   Overall Health 2 2  < 3   Quality of Life 2 2  < 3   Change in Health 1 2  < 3   Dietary       Rate your plate 60    > 58   Measurements       Weight 147 Weight - Scale: 72 8 kg (160 lb 8 oz)    2 5 - 5%   BMI 23 5 26   19 - 25   Waist Circ  37 35  < 40 M / < 35 F   % Body fat 17 6 23 6  < 25 M / < 33 F   BP left arm               (systolic) 753 720  < 144   (diastolic) 80 78  < 90   Smoking #/day  (if applicable) 0 0  0   Lipids/Glucose (Date)       Total cholesterol    50 - 200   Triglycerides    < 150   HDL    40 - 60   LDL    < 100   A1C    4 0 - 5 6%   Fasting BG

## 2018-02-20 NOTE — PROGRESS NOTES
Cardiac/Pulmonary Rehabilitation Plan of Care   Discharge      Today's date: 2018   Visits: 36  Patient name: Pari Clement      : 1970  Age: 50 y o  MRN: [id number]  Referring Physician: Alyx Reed MD  Provider: Santana Nascimento  Clinician: Daily Segura RN    Dx:   Encounter Diagnosis   Name Primary?  S/P CABG x 2      Date of onset: 2017    Medication compliance: Yes   Comments: none  Fall Risk: Low   Comments: None    EXERCISE/ACTIVITY    Cardiovascular:   Min: 53   METS: 4 4   Hr: 126   RPE: 6   O2 sat: 93    Modalities: Treadmill, UBE, Eliptical , NuStep and Recumbent bike  Strength trainin-3 days / week, 12-15 repitations  and 1-2 sets per modality    Modalities: Lateral raise, Arm curl and upright rows, frontal raise, squats  EKG changes: NSR at rest and with exercise  Dyspnea score: 2-3  Home activity: walking  Goals: increase endurance to return to running and weight lifting  Education: Cardiac rehabilitation, cardiac risk factors, how to use treadmill, elliptical, bike, nu-step, arm ergometer, weights, breathing techniques, posture, THR  Plan: Treadmill 20 minutes at 2  9mph with a 3% incline, recumbent bike 10 minutes level 5, arm ergometer 6 minutes level 3, elliptical 7 minutes, nu-step 10 minutes    Readiness to change: 10    NUTRITION    Weight control:    Starting weight: 147   Current weight:     Waist circumference:    Startin   Current:    Diabetes: N/A  Lipid management: Atorvastatin and low fat diet  Goals: continue to take medication as prescribed and follow a low fat diet  Education: 20% low fat diet, rate your plate score 62  Plan:continue to take medication as prescribed and follow a low fat diet  Readiness to change: 10    PSYCHOSOCIAL    Emotional:    Self-reported stress level: 1   Social support: significant other and  Return to work to decrease financial stress  Goals: continue to treat life as a new beginning   Education: stress and relaxation tips  Plan: continue to be stress free  Readiness to change: 1    OTHER CORE COMPONENTS     Tobacco:   History   Smoking Status    Former Smoker    Packs/day: 2 00    Years: 28 00    Types: Cigarettes    Quit date: 9/22/2017   Smokeless Tobacco    Never Used     Comment: denied hx of exposure to second hand smoke; former smoker, smoked 2ppd for 30 yrs  pt quit 3 mos ago as per [employer]     Blood pressure:    Resting: Resting BP: 126/78   Exercise: Recovery BP: 122/64  Goals: none  Education: low sodium diet, common heart medications  Plan: continue to take medications as prescribed and eat a heart healthy low fat low salt diet  Readiness to change: 10    Mr Nhi Tavera completed the 36 sessions of the cardiac rehabilitation program  He missed several sessions due to URI  His telemetry monitor has been NSR at rest and with exercise  He plans to continue using treadmill at home  He has started lifting weights but not running, He continues to follow a heart healthy diet

## 2018-03-05 ENCOUNTER — OFFICE VISIT (OUTPATIENT)
Dept: PULMONOLOGY | Facility: MEDICAL CENTER | Age: 48
End: 2018-03-05

## 2018-03-05 VITALS
HEIGHT: 66 IN | WEIGHT: 165 LBS | HEART RATE: 88 BPM | OXYGEN SATURATION: 94 % | TEMPERATURE: 98.1 F | BODY MASS INDEX: 26.52 KG/M2 | SYSTOLIC BLOOD PRESSURE: 128 MMHG | RESPIRATION RATE: 12 BRPM | DIASTOLIC BLOOD PRESSURE: 84 MMHG

## 2018-03-05 DIAGNOSIS — Z95.1 S/P CABG X 2: ICD-10-CM

## 2018-03-05 DIAGNOSIS — J43.2 CENTRILOBULAR EMPHYSEMA (HCC): ICD-10-CM

## 2018-03-05 DIAGNOSIS — R07.89 CHEST PAIN, MUSCULOSKELETAL: Primary | ICD-10-CM

## 2018-03-05 PROCEDURE — 99213 OFFICE O/P EST LOW 20 MIN: CPT | Performed by: INTERNAL MEDICINE

## 2018-03-05 NOTE — ASSESSMENT & PLAN NOTE
Recent complete PFT shows evidence of moderate to severe airflow obstruction with a post bronchodilator FEV1 of 1 75 L or 50% of predicted  There is also evidence of severe air trapping as residual volume was 236% of predicted and mild to moderate air trapping with TLC of 8 45 L or 137% of predicted  No significant change in lung volumes after bronchodilator therapy  Diffusion capacity is moderately reduced at 58% predicted  He has had a benefit with Trelegy Ellipta 1 puff daily and feels his breathing has improved at this  He is not needing to use his albuterol rescue inhaler much  I did give him 2 additional samples  He does have a follow-up office visit here on March 14

## 2018-03-05 NOTE — ASSESSMENT & PLAN NOTE
Status post CABG x2 vessels on September 25, 2015  Intraoperative echocardiogram showed LV ejection fraction of 45%  Preop heart catheterization revealed 90% LAD lesion and left main dissection

## 2018-03-05 NOTE — ASSESSMENT & PLAN NOTE
Left posterior upper thoracic pain intermittently over the last week or so  This is sometimes sharp and sometimes hurts with a deep breath  This appears to be musculoskeletal   This is to the left of the T4 thoracic vertebrae  Pain has essentially resolved specially after seen chiropractor today  I did advise him he could take Tylenol for the pain returns our call our office  He could also try an analgesic or heat patch over-the-counter to this region as needed    He is not having the pain now    Recent chest x-ray done February 1 was reviewed and is unremarkable    He can contact our office if the pain returns or worsens     He states his breathing is better and the past 2 weeks and had been previously

## 2018-03-05 NOTE — PROGRESS NOTES
Assessment/Plan:     Problem List Items Addressed This Visit        Respiratory    Centrilobular emphysema ([address] )     Recent complete PFT shows evidence of moderate to severe airflow obstruction with a post bronchodilator FEV1 of 1 75 L or 50% of predicted  There is also evidence of severe air trapping as residual volume was 236% of predicted and mild to moderate air trapping with TLC of 8 45 L or 137% of predicted  No significant change in lung volumes after bronchodilator therapy  Diffusion capacity is moderately reduced at 58% predicted  He has had a benefit with Trelegy Ellipta 1 puff daily and feels his breathing has improved at this  He is not needing to use his albuterol rescue inhaler much  I did give him 2 additional samples  He does have a follow-up office visit here on March 14  Relevant Medications    Fluticasone-Umeclidin-Vilant 100-62 5-25 MCG/INH AEPB       Other    S/P CABG x 2     Status post CABG x2 vessels on September 25, 2015  Intraoperative echocardiogram showed LV ejection fraction of 45%  Preop heart catheterization revealed 90% LAD lesion and left main dissection  Chest pain, musculoskeletal - Primary     Left posterior upper thoracic pain intermittently over the last week or so  This is sometimes sharp and sometimes hurts with a deep breath  This appears to be musculoskeletal   This is to the left of the T4 thoracic vertebrae  Pain has essentially resolved specially after seen chiropractor today  I did advise him he could take Tylenol for the pain returns our call our office  He could also try an analgesic or heat patch over-the-counter to this region as needed  He is not having the pain now    Recent chest x-ray done February 1 was reviewed and is unremarkable    He can contact our office if the pain returns or worsens     He states his breathing is better and the past 2 weeks and had been previously                 No Follow-up on file    All questions are answered to the patient's satisfaction and understanding  He verbalizes understanding  He is encouraged to call with any further questions or concerns  Portions of the record may have been created with voice recognition software  Occasional wrong word or "sound a like" substitutions may have occurred due to the inherent limitations of voice recognition software  Read the chart carefully and recognize, using context, where substitutions have occurred  ______________________________________________________________________    Chief Complaint:   Chief Complaint   Patient presents with    Follow-up     having pain near his upper shoulder   Shortness of Breath     having a hard time taking deep breaths in     Cough     very little  Pt states that his voice is becoming hoarse       Patient ID: Kena Styles is a 50 y o  y o  male has a past medical history of Chronic sinus complaints; Diverticulosis; GERD (gastroesophageal reflux disease); Heart attack; HTN (hypertension); Hyperlipidemia; Tobacco use; Vasovagal syncope; and Wears glasses  3/5/2018  HPI     Kena Styles has history of moderate to severe COPD and presents today complaining some intermittent discomfort in the left posterior shoulder region  This is just to left of the upper thoracic spine around the T4 region  This is a nonradiating pain  He did go to a chiropractor today and the pain is now gone  He was not having any fever, pleuritic chest pain, or anterior chest   He states his breathing is actually better in the last 2 weeks since starting the Trelegy inhaler  He really has had to use his albuterol inhaler  He is not having any fever  I did review chest x-ray from February 1, 2018 and shows some very mild hyperinflation and presence of sternal wires  He quit smoking at the time of his open heart surgery in late September 2017  On September 25, 2017 he underwent CABG x2 vessels with SV to OM1 and LIMA to LAD    He had sustained an acute non STEMI then and also had evidence of left main dissection and 90% lad lesion as per cardiac catheterization  His intraoperative echocardiogram revealed left ventricular ejection fraction of 45%  Jessica Herrera does have some mild cough sometimes in the morning which is mostly nonproductive  No nocturnal dyspnea  Review of Systems   Constitutional: Negative for diaphoresis and fever  Over the past few days he has had some pain in the left shoulder region posterior thorax  The pain comes and goes  HENT: Negative for congestion, rhinorrhea and sinus pressure  Eyes: Negative for redness and itching  Respiratory: Negative for wheezing  He only has mild exertional dyspnea  States his breathing is actually better since starting Trelegy inhaler 2 weeks ago   Cardiovascular: Negative for chest pain and leg swelling  Gastrointestinal: Negative for abdominal pain, nausea and vomiting  Endocrine: Negative for polydipsia and polyphagia  Musculoskeletal: Negative for joint swelling and myalgias  Skin: Negative for rash  Neurological: Negative for dizziness and syncope  Psychiatric/Behavioral: Negative for confusion  Smoking history: He reports that he quit smoking about 5 months ago  His smoking use included Cigarettes  He has a 56 00 pack-year smoking history  He has never used smokeless tobacco     The following portions of the patient's history were reviewed and updated as appropriate: allergies, current medications, past family history, past medical history, past social history, past surgical history and problem list     There is no immunization history for the selected administration types on file for this patient    Current Outpatient Prescriptions   Medication Sig Dispense Refill    albuterol (PROVENTIL HFA,VENTOLIN HFA) 90 mcg/act inhaler Inhale 2 puffs every 6 (six) hours as needed for wheezing      aspirin 325 mg tablet Take 1 tablet by mouth daily 100 tablet 0    Fluticasone-Umeclidin-Vilant 100-62 5-25 MCG/INH AEPB Inhale 1 puff daily      omeprazole (PriLOSEC) 20 mg delayed release capsule Take 1 capsule (20 mg total) by mouth daily 30 capsule 0    atorvastatin (LIPITOR) 80 mg tablet Take 1 tablet (80 mg total) by mouth daily with dinner 30 tablet 6     No current facility-administered medications for this visit  Allergies: Patient has no known allergies  Objective:  Vitals:    03/05/18 1117   BP: 128/84   BP Location: Left arm   Patient Position: Sitting   Cuff Size: Adult   Pulse: 88   Resp: 12   Temp: 98 1 °F (36 7 °C)   TempSrc: Oral   SpO2: 94%   Weight: 74 8 kg (165 lb)   Height: 5' 6" (1 676 m)   Oxygen Therapy  SpO2: 94 %    Wt Readings from Last 3 Encounters:   03/05/18 74 8 kg (165 lb)   02/20/18 72 8 kg (160 lb 8 oz)   02/12/18 73 5 kg (162 lb)     Body mass index is 26 63 kg/m²  Physical Exam   Constitutional: He is oriented to person, place, and time  No distress  Patient is awake, alert, comfortable  No conversational dyspnea  HENT:   Head: Normocephalic  Nose: Nose normal    Mouth/Throat: Oropharynx is clear and moist  No oropharyngeal exudate  Eyes: Conjunctivae are normal  Pupils are equal, round, and reactive to light  No scleral icterus  Neck: Neck supple  No JVD present  Cardiovascular: Normal rate, regular rhythm and normal heart sounds  Pulmonary/Chest: Effort normal  He has no wheezes  He has no rales  Lung sounds are clear to auscultation but are decreased bilaterally    There is no palpable tenderness over upper thoracic spine or left shoulder area   Abdominal: Soft  He exhibits no distension  There is no tenderness  There is no guarding  Musculoskeletal: He exhibits no edema  Lymphadenopathy:     He has no cervical adenopathy  Neurological: He is alert and oriented to person, place, and time  Skin: Skin is warm and dry  No rash noted  He is not diaphoretic  No erythema     Psychiatric: He has a normal mood and affect  His behavior is normal  Thought content normal        Diagnostics:    Complete pulmonary function test done on 02/15/2018    This reveals moderate to severe airflow obstruction  Post bronchodilator FEV1 was 1 75 L or 50% of predicted, FVC was 3 61 L or 81% of predicted and obstructive index was 49%  He had evidence of severe air trapping as residual volume was elevated 236% of predicted and mild hyperinflation with total lung capacity of 8 45 L or 137% of predicted  Diffusion capacity was moderately reduced at 58% of predicted    No significant response to bronchodilator therapy

## 2018-03-06 ENCOUNTER — OFFICE VISIT (OUTPATIENT)
Dept: FAMILY MEDICINE CLINIC | Facility: CLINIC | Age: 48
End: 2018-03-06
Payer: COMMERCIAL

## 2018-03-06 VITALS
DIASTOLIC BLOOD PRESSURE: 85 MMHG | HEART RATE: 84 BPM | WEIGHT: 166 LBS | RESPIRATION RATE: 16 BRPM | BODY MASS INDEX: 26.68 KG/M2 | TEMPERATURE: 98.9 F | HEIGHT: 66 IN | SYSTOLIC BLOOD PRESSURE: 130 MMHG

## 2018-03-06 DIAGNOSIS — M79.10 MUSCLE ACHE: Primary | ICD-10-CM

## 2018-03-06 DIAGNOSIS — I25.10 CAD, MULTIPLE VESSEL: ICD-10-CM

## 2018-03-06 DIAGNOSIS — K21.9 GASTROESOPHAGEAL REFLUX DISEASE WITHOUT ESOPHAGITIS: ICD-10-CM

## 2018-03-06 PROCEDURE — 99214 OFFICE O/P EST MOD 30 MIN: CPT | Performed by: FAMILY MEDICINE

## 2018-03-06 NOTE — PROGRESS NOTES
Chief Complaint   Patient presents with    Follow-up     chronic conditions   shoulder pain and chest discomfort      Patient ID: Vashti Villarreal is a 50 y o  male  HPI  Pt is seeing for f/u COPD -  Was seeing by pulmonologist - better with new inhaler - will start pulmonary rehab next wk -  Was cleared by cardio after recent MI -  F/u in 6 m -  Last 1+ wk noticed neck /upper shoulders posteriorly  -  Started to see chiropractor -  No meds tried     The following portions of the patient's history were reviewed and updated as appropriate: allergies, current medications, past family history, past medical history, past social history, past surgical history and problem list     Review of Systems   Respiratory: Positive for chest tightness and shortness of breath  Negative for cough, choking and wheezing  Cardiovascular: Negative  Gastrointestinal: Negative  Musculoskeletal:        See HPI   Neurological: Negative  Psychiatric/Behavioral: Negative  Current Outpatient Prescriptions   Medication Sig Dispense Refill    albuterol (PROVENTIL HFA,VENTOLIN HFA) 90 mcg/act inhaler Inhale 2 puffs every 6 (six) hours as needed for wheezing      aspirin 325 mg tablet Take 1 tablet by mouth daily 100 tablet 0    atorvastatin (LIPITOR) 80 mg tablet Take 1 tablet (80 mg total) by mouth daily with dinner 30 tablet 6    Fluticasone-Umeclidin-Vilant 100-62 5-25 MCG/INH AEPB Inhale 1 puff daily      omeprazole (PriLOSEC) 20 mg delayed release capsule Take 1 capsule (20 mg total) by mouth daily 30 capsule 0     No current facility-administered medications for this visit  Objective:    /85 (BP Location: Left arm, Patient Position: Sitting, Cuff Size: Standard)   Pulse 84   Temp 98 9 °F (37 2 °C) (Tympanic)   Resp 16   Ht 5' 6" (1 676 m)   Wt 75 3 kg (166 lb)   BMI 26 79 kg/m²        Physical Exam      Labs in chart were reviewed        Assessment/Plan:       Diagnoses and all orders for this visit:    Muscle ache    CAD, multiple vessel  -     Comprehensive metabolic panel; Future  -     Lipid panel;  Future  -     Comprehensive metabolic panel  -     Lipid panel    Gastroesophageal reflux disease without esophagitis        Declined muscle relaxant   rto prn                     Zhou Romero MD

## 2018-03-14 ENCOUNTER — OFFICE VISIT (OUTPATIENT)
Dept: PULMONOLOGY | Facility: MEDICAL CENTER | Age: 48
End: 2018-03-14

## 2018-03-14 VITALS
DIASTOLIC BLOOD PRESSURE: 84 MMHG | OXYGEN SATURATION: 93 % | HEIGHT: 62 IN | TEMPERATURE: 98 F | BODY MASS INDEX: 29.81 KG/M2 | SYSTOLIC BLOOD PRESSURE: 122 MMHG | RESPIRATION RATE: 12 BRPM | HEART RATE: 78 BPM | WEIGHT: 162 LBS

## 2018-03-14 DIAGNOSIS — I27.20 PULMONARY HYPERTENSION (HCC): Primary | ICD-10-CM

## 2018-03-14 DIAGNOSIS — J43.2 CENTRILOBULAR EMPHYSEMA (HCC): Primary | ICD-10-CM

## 2018-03-14 DIAGNOSIS — J44.9 COPD, SEVERE (HCC): ICD-10-CM

## 2018-03-14 DIAGNOSIS — I27.20 PULMONARY HYPERTENSION (HCC): ICD-10-CM

## 2018-03-14 PROBLEM — R09.82 POSTNASAL DRIP: Status: RESOLVED | Noted: 2018-02-12 | Resolved: 2018-03-14

## 2018-03-14 PROBLEM — K21.9 GERD (GASTROESOPHAGEAL REFLUX DISEASE): Status: RESOLVED | Noted: 2017-07-06 | Resolved: 2018-03-14

## 2018-03-14 PROBLEM — R07.89 CHEST PAIN, MUSCULOSKELETAL: Status: RESOLVED | Noted: 2018-03-05 | Resolved: 2018-03-14

## 2018-03-14 PROCEDURE — 99214 OFFICE O/P EST MOD 30 MIN: CPT | Performed by: INTERNAL MEDICINE

## 2018-03-14 NOTE — LETTER
March 14, 2018     [facility], [address]    Patient: Michelle Caruso   YOB: 1970   Date of Visit: 3/14/2018       Dear Dr Nakul Gaffney:     Below are my notes for this consultation  If you have questions, please do not hesitate to call me  Sincerely,        Matilda Alejandra MD        CC: No Recipients  Matilda Alejandra MD  3/14/2018  9:07 AM  Sign at close encounter  Assessment/Plan:     Problem List Items Addressed This Visit        Respiratory    Chronic obstructive pulmonary disease ([address] ) - Primary     Continue with Trelogy  Patient has good response to this  When he has difficulty or chest tightness he is encouraged to use the albuterol inhaler  Pulmonary function test and 6 minutes walk test are reviewed with them today  There was no desaturation upon exertion  FEV1 was 48% with diminished DLCO  Check alpha 1 antitrypsin phenotype and levels  He will be starting pulmonary rehab  Pulmonary hypertension     Check repeat echocardiogram to assess pulmonary artery pressures  Patient did have diminished diffusion capacity on his pulmonary function test therefore will rule out concomitant pulmonary hypertension  His last echocardiogram showed a peak PA pressure of 35 mm Hg  Discussed in detail with the patient and his wife  Return in about 3 months (around 6/14/2018)  All questions are answered to the patient's satisfaction and understanding  He verbalizes understanding  He is encouraged to call with any further questions or concerns  Portions of the record may have been created with voice recognition software  Occasional wrong word or "sound a like" substitutions may have occurred due to the inherent limitations of voice recognition software  Read the chart carefully and recognize, using context, where substitutions have occurred      ______________________________________________________________________    Chief Complaint:  Patient presents today for follow-up of his COPD  Breathing has been improved  Patient ID: Makeda Schumacher is a 50 y o  y o  male has a past medical history of Chronic sinus complaints; Diverticulosis; GERD (gastroesophageal reflux disease); Heart attack; HTN (hypertension); Hyperlipidemia; Tobacco use; Vasovagal syncope; and Wears glasses  3/14/2018  Patient presents today for follow-up visit  His breathing is improved  He presents today with his wife  He was recently seen in our office due to some chest tightness and left-sided back pain  He is seeing a chiropractor and this was musculoskeletal   He does exercise on a daily basis  He can do about a mile before he feels a chest tightness  He does not uses rescue inhaler at that time  With the new inhaler he is able to climb steps and feels less short of breath  No chest pains  No lower extremity edema  No GERD, no postnasal drip  Shortness of Breath   This is a chronic problem  The current episode started more than 1 month ago  The problem occurs intermittently  The problem has been gradually improving  Associated symptoms include chest pain  Pertinent negatives include no abdominal pain, claudication, coryza, ear pain, fever, headaches, hemoptysis, leg pain, leg swelling, neck pain, orthopnea, PND, rash, rhinorrhea, sore throat, sputum production, swollen glands, syncope, vomiting or wheezing  The symptoms are aggravated by exercise  He has tried beta agonist inhalers, ipratropium inhalers and steroid inhalers for the symptoms  The treatment provided significant relief  His past medical history is significant for CAD and COPD  Review of Systems   Constitutional: Negative for fever  HENT: Negative for ear pain, rhinorrhea and sore throat  Respiratory: Positive for shortness of breath  Negative for hemoptysis, sputum production and wheezing  Cardiovascular: Positive for chest pain   Negative for orthopnea, claudication, leg swelling, syncope and PND  Gastrointestinal: Negative for abdominal pain and vomiting  Musculoskeletal: Negative for neck pain  Skin: Negative for rash  Neurological: Negative for headaches  Smoking history: He reports that he quit smoking about 5 months ago  His smoking use included Cigarettes  He has a 56 00 pack-year smoking history  He has never used smokeless tobacco     The following portions of the patient's history were reviewed and updated as appropriate: allergies, current medications, past family history, past medical history, past social history, past surgical history and problem list     There is no immunization history for the selected administration types on file for this patient  Current Outpatient Prescriptions   Medication Sig Dispense Refill    albuterol (PROVENTIL HFA,VENTOLIN HFA) 90 mcg/act inhaler Inhale 2 puffs every 6 (six) hours as needed for wheezing      aspirin 325 mg tablet Take 1 tablet by mouth daily 100 tablet 0    atorvastatin (LIPITOR) 80 mg tablet Take 1 tablet (80 mg total) by mouth daily with dinner 30 tablet 6    omeprazole (PriLOSEC) 20 mg delayed release capsule Take 1 capsule (20 mg total) by mouth daily 30 capsule 0     No current facility-administered medications for this visit  Allergies: Patient has no known allergies  Objective:  Vitals:    03/14/18 0833   BP: 122/84   BP Location: Right arm   Patient Position: Sitting   Cuff Size: Adult   Pulse: 78   Resp: 12   Temp: 98 °F (36 7 °C)   TempSrc: Oral   SpO2: 93%   Weight: 73 5 kg (162 lb)   Height: 5' 2" (1 575 m)   Oxygen Therapy  SpO2: 93 %    Wt Readings from Last 3 Encounters:   03/14/18 73 5 kg (162 lb)   03/06/18 75 3 kg (166 lb)   03/05/18 74 8 kg (165 lb)     Body mass index is 29 63 kg/m²  Physical Exam   Constitutional: He is oriented to person, place, and time  He appears well-developed and well-nourished  No distress  HENT:   Head: Normocephalic and atraumatic     Mouth/Throat: Oropharynx is clear and moist  No oropharyngeal exudate  Eyes: EOM are normal  Pupils are equal, round, and reactive to light  Neck: Normal range of motion  Neck supple  No tracheal deviation present  No thyromegaly present  Cardiovascular: Normal rate and regular rhythm  No murmur heard  Pulmonary/Chest: Effort normal and breath sounds normal  No respiratory distress  He has no wheezes  He has no rales  He exhibits no tenderness  Abdominal: Soft  Bowel sounds are normal  He exhibits no distension  There is no tenderness  Musculoskeletal: Normal range of motion  He exhibits deformity (Left thumb amputation)  He exhibits no edema  Lymphadenopathy:     He has no cervical adenopathy  Neurological: He is alert and oriented to person, place, and time  No cranial nerve deficit  Skin: Skin is warm and dry  He is not diaphoretic  Psychiatric: He has a normal mood and affect  His behavior is normal    Vitals reviewed  Lab Review:   Lab Results   Component Value Date     02/01/2018    K 4 2 02/01/2018     02/01/2018    CO2 32 02/01/2018    BUN 17 02/01/2018    CREATININE 0 80 02/01/2018    GLUCOSE 117 02/01/2018    GLUCOSE 159 (H) 09/25/2017    CALCIUM 9 5 02/01/2018     Lab Results   Component Value Date    WBC 9 70 02/01/2018    HGB 14 7 02/01/2018    HCT 43 5 02/01/2018    MCV 91 02/01/2018     02/01/2018       Diagnostics:  I have personally reviewed pertinent reports  and I have personally reviewed pertinent films in PACS  Chest x-ray performed 2/1/2018 showed no evidence of infiltrates  Complete PFT performed 2/15/2018 showed FEV1 of 48% with diminished obstructive ratio, air trapping, no significant bronchodilator response, diminished diffusion capacity despite correction for alveolar ventilation  6 minutes walk test was performed at that same time

## 2018-03-14 NOTE — ASSESSMENT & PLAN NOTE
Continue with Trelogy  Patient has good response to this  When he has difficulty or chest tightness he is encouraged to use the albuterol inhaler  Pulmonary function test and 6 minutes walk test are reviewed with them today  There was no desaturation upon exertion  FEV1 was 48% with diminished DLCO  Check alpha 1 antitrypsin phenotype and levels  He will be starting pulmonary rehab    Lung screening CT to be performed September 2018

## 2018-03-14 NOTE — ASSESSMENT & PLAN NOTE
Check repeat echocardiogram to assess pulmonary artery pressures  Patient did have diminished diffusion capacity on his pulmonary function test therefore will rule out concomitant pulmonary hypertension  His last echocardiogram showed a peak PA pressure of 35 mm Hg

## 2018-03-14 NOTE — PROGRESS NOTES
Assessment/Plan:     Problem List Items Addressed This Visit        Respiratory    Chronic obstructive pulmonary disease ([address] ) - Primary     Continue with Trelogy  Patient has good response to this  When he has difficulty or chest tightness he is encouraged to use the albuterol inhaler  Pulmonary function test and 6 minutes walk test are reviewed with them today  There was no desaturation upon exertion  FEV1 was 48% with diminished DLCO  Check alpha 1 antitrypsin phenotype and levels  He will be starting pulmonary rehab  Pulmonary hypertension     Check repeat echocardiogram to assess pulmonary artery pressures  Patient did have diminished diffusion capacity on his pulmonary function test therefore will rule out concomitant pulmonary hypertension  His last echocardiogram showed a peak PA pressure of 35 mm Hg  Discussed in detail with the patient and his wife  Return in about 3 months (around 6/14/2018)  All questions are answered to the patient's satisfaction and understanding  He verbalizes understanding  He is encouraged to call with any further questions or concerns  Portions of the record may have been created with voice recognition software  Occasional wrong word or "sound a like" substitutions may have occurred due to the inherent limitations of voice recognition software  Read the chart carefully and recognize, using context, where substitutions have occurred  ______________________________________________________________________    Chief Complaint:  Patient presents today for follow-up of his COPD  Breathing has been improved  Patient ID: Amy Antoine is a 50 y o  y o  male has a past medical history of Chronic sinus complaints; Diverticulosis; GERD (gastroesophageal reflux disease); Heart attack; HTN (hypertension); Hyperlipidemia; Tobacco use; Vasovagal syncope; and Wears glasses  3/14/2018  Patient presents today for follow-up visit    His breathing is improved  He presents today with his wife  He was recently seen in our office due to some chest tightness and left-sided back pain  He is seeing a chiropractor and this was musculoskeletal   He does exercise on a daily basis  He can do about a mile before he feels a chest tightness  He does not uses rescue inhaler at that time  With the new inhaler he is able to climb steps and feels less short of breath  No chest pains  No lower extremity edema  No GERD, no postnasal drip  Shortness of Breath   This is a chronic problem  The current episode started more than 1 month ago  The problem occurs intermittently  The problem has been gradually improving  Associated symptoms include chest pain  Pertinent negatives include no abdominal pain, claudication, coryza, ear pain, fever, headaches, hemoptysis, leg pain, leg swelling, neck pain, orthopnea, PND, rash, rhinorrhea, sore throat, sputum production, swollen glands, syncope, vomiting or wheezing  The symptoms are aggravated by exercise  He has tried beta agonist inhalers, ipratropium inhalers and steroid inhalers for the symptoms  The treatment provided significant relief  His past medical history is significant for CAD and COPD  Review of Systems   Constitutional: Negative for fever  HENT: Negative for ear pain, rhinorrhea and sore throat  Respiratory: Positive for shortness of breath  Negative for hemoptysis, sputum production and wheezing  Cardiovascular: Positive for chest pain  Negative for orthopnea, claudication, leg swelling, syncope and PND  Gastrointestinal: Negative for abdominal pain and vomiting  Musculoskeletal: Negative for neck pain  Skin: Negative for rash  Neurological: Negative for headaches  Smoking history: He reports that he quit smoking about 5 months ago  His smoking use included Cigarettes  He has a 56 00 pack-year smoking history   He has never used smokeless tobacco     The following portions of the patient's history were reviewed and updated as appropriate: allergies, current medications, past family history, past medical history, past social history, past surgical history and problem list     There is no immunization history for the selected administration types on file for this patient  Current Outpatient Prescriptions   Medication Sig Dispense Refill    albuterol (PROVENTIL HFA,VENTOLIN HFA) 90 mcg/act inhaler Inhale 2 puffs every 6 (six) hours as needed for wheezing      aspirin 325 mg tablet Take 1 tablet by mouth daily 100 tablet 0    atorvastatin (LIPITOR) 80 mg tablet Take 1 tablet (80 mg total) by mouth daily with dinner 30 tablet 6    omeprazole (PriLOSEC) 20 mg delayed release capsule Take 1 capsule (20 mg total) by mouth daily 30 capsule 0     No current facility-administered medications for this visit  Allergies: Patient has no known allergies  Objective:  Vitals:    03/14/18 0833   BP: 122/84   BP Location: Right arm   Patient Position: Sitting   Cuff Size: Adult   Pulse: 78   Resp: 12   Temp: 98 °F (36 7 °C)   TempSrc: Oral   SpO2: 93%   Weight: 73 5 kg (162 lb)   Height: 5' 2" (1 575 m)   Oxygen Therapy  SpO2: 93 %    Wt Readings from Last 3 Encounters:   03/14/18 73 5 kg (162 lb)   03/06/18 75 3 kg (166 lb)   03/05/18 74 8 kg (165 lb)     Body mass index is 29 63 kg/m²  Physical Exam   Constitutional: He is oriented to person, place, and time  He appears well-developed and well-nourished  No distress  HENT:   Head: Normocephalic and atraumatic  Mouth/Throat: Oropharynx is clear and moist  No oropharyngeal exudate  Eyes: EOM are normal  Pupils are equal, round, and reactive to light  Neck: Normal range of motion  Neck supple  No tracheal deviation present  No thyromegaly present  Cardiovascular: Normal rate and regular rhythm  No murmur heard  Pulmonary/Chest: Effort normal and breath sounds normal  No respiratory distress  He has no wheezes  He has no rales   He exhibits no tenderness  Abdominal: Soft  Bowel sounds are normal  He exhibits no distension  There is no tenderness  Musculoskeletal: Normal range of motion  He exhibits deformity (Left thumb amputation)  He exhibits no edema  Lymphadenopathy:     He has no cervical adenopathy  Neurological: He is alert and oriented to person, place, and time  No cranial nerve deficit  Skin: Skin is warm and dry  He is not diaphoretic  Psychiatric: He has a normal mood and affect  His behavior is normal    Vitals reviewed  Lab Review:   Lab Results   Component Value Date     02/01/2018    K 4 2 02/01/2018     02/01/2018    CO2 32 02/01/2018    BUN 17 02/01/2018    CREATININE 0 80 02/01/2018    GLUCOSE 117 02/01/2018    GLUCOSE 159 (H) 09/25/2017    CALCIUM 9 5 02/01/2018     Lab Results   Component Value Date    WBC 9 70 02/01/2018    HGB 14 7 02/01/2018    HCT 43 5 02/01/2018    MCV 91 02/01/2018     02/01/2018       Diagnostics:  I have personally reviewed pertinent reports  and I have personally reviewed pertinent films in PACS  Chest x-ray performed 2/1/2018 showed no evidence of infiltrates  Complete PFT performed 2/15/2018 showed FEV1 of 48% with diminished obstructive ratio, air trapping, no significant bronchodilator response, diminished diffusion capacity despite correction for alveolar ventilation  6 minutes walk test was performed at that same time

## 2018-03-16 LAB
ALBUMIN SERPL-MCNC: 4.5 G/DL (ref 3.5–5.5)
ALBUMIN/GLOB SERPL: 1.5 {RATIO} (ref 1.2–2.2)
ALP SERPL-CCNC: 77 IU/L (ref 39–117)
ALT SERPL-CCNC: 25 IU/L (ref 0–44)
AMBIG ABBREV DEFAULT: NORMAL
AMBIG ABBREV DEFAULT: NORMAL
AST SERPL-CCNC: 20 IU/L (ref 0–40)
BILIRUB SERPL-MCNC: 0.3 MG/DL (ref 0–1.2)
BUN SERPL-MCNC: 18 MG/DL (ref 6–24)
BUN/CREAT SERPL: 19 (ref 9–20)
CALCIUM SERPL-MCNC: 9.9 MG/DL (ref 8.7–10.2)
CHLORIDE SERPL-SCNC: 96 MMOL/L (ref 96–106)
CHOLEST SERPL-MCNC: 155 MG/DL (ref 100–199)
CO2 SERPL-SCNC: 24 MMOL/L (ref 18–29)
CREAT SERPL-MCNC: 0.97 MG/DL (ref 0.76–1.27)
GLOBULIN SER-MCNC: 3 G/DL (ref 1.5–4.5)
GLUCOSE SERPL-MCNC: 102 MG/DL (ref 65–99)
HDLC SERPL-MCNC: 46 MG/DL
LDLC SERPL CALC-MCNC: 95 MG/DL (ref 0–99)
MICRODELETION SYND BLD/T FISH: NORMAL
POTASSIUM SERPL-SCNC: 4.7 MMOL/L (ref 3.5–5.2)
PROT SERPL-MCNC: 7.5 G/DL (ref 6–8.5)
SL AMB EGFR AFRICAN AMERICAN: 106 ML/MIN/1.73
SL AMB EGFR NON AFRICAN AMERICAN: 91 ML/MIN/1.73
SL AMB VLDL CHOLESTEROL CALC: 14 MG/DL (ref 5–40)
SODIUM SERPL-SCNC: 139 MMOL/L (ref 134–144)
TRIGL SERPL-MCNC: 69 MG/DL (ref 0–149)

## 2018-03-19 ENCOUNTER — TELEPHONE (OUTPATIENT)
Dept: FAMILY MEDICINE CLINIC | Facility: CLINIC | Age: 48
End: 2018-03-19

## 2018-03-19 ENCOUNTER — HOSPITAL ENCOUNTER (OUTPATIENT)
Dept: NON INVASIVE DIAGNOSTICS | Facility: HOSPITAL | Age: 48
Discharge: HOME | End: 2018-03-19
Attending: INTERNAL MEDICINE
Payer: COMMERCIAL

## 2018-03-19 ENCOUNTER — TELEPHONE (OUTPATIENT)
Dept: PULMONOLOGY | Facility: MEDICAL CENTER | Age: 48
End: 2018-03-19

## 2018-03-19 ENCOUNTER — TRANSCRIBE ORDERS (OUTPATIENT)
Dept: ADMINISTRATIVE | Facility: HOSPITAL | Age: 48
End: 2018-03-19

## 2018-03-19 DIAGNOSIS — I27.20 PULMONARY HYPERTENSION (HCC): ICD-10-CM

## 2018-03-19 PROCEDURE — 93306 TTE W/DOPPLER COMPLETE: CPT

## 2018-03-19 NOTE — TELEPHONE ENCOUNTER
----- Message from Trevon Wang MD sent at 3/16/2018  2:26 PM EDT -----  Pl, advise pt -  Normal labs

## 2018-03-20 PROCEDURE — 93306 TTE W/DOPPLER COMPLETE: CPT | Performed by: INTERNAL MEDICINE

## 2018-03-22 LAB
A1AT PHENOTYP SERPL IFE: NORMAL
A1AT SERPL-MCNC: 191 MG/DL (ref 90–200)

## 2018-03-23 ENCOUNTER — TELEPHONE (OUTPATIENT)
Dept: PULMONOLOGY | Facility: MEDICAL CENTER | Age: 48
End: 2018-03-23

## 2018-03-29 ENCOUNTER — TELEPHONE (OUTPATIENT)
Dept: PULMONOLOGY | Facility: MEDICAL CENTER | Age: 48
End: 2018-03-29

## 2018-04-11 ENCOUNTER — TELEPHONE (OUTPATIENT)
Dept: PULMONOLOGY | Facility: MEDICAL CENTER | Age: 48
End: 2018-04-11

## 2018-04-12 ENCOUNTER — CLINICAL SUPPORT (OUTPATIENT)
Dept: CARDIAC REHAB | Facility: CLINIC | Age: 48
End: 2018-04-12
Payer: MEDICAID

## 2018-04-12 VITALS — WEIGHT: 164 LBS | BODY MASS INDEX: 27.32 KG/M2 | HEIGHT: 65 IN

## 2018-04-12 DIAGNOSIS — J44.9 CHRONIC OBSTRUCTIVE PULMONARY DISEASE, UNSPECIFIED COPD TYPE (HCC): Primary | ICD-10-CM

## 2018-04-12 NOTE — PROGRESS NOTES
Cardiac/Pulmonary Rehabilitation Plan of Care   Initial      Today's date: 2018   Visits: initial  Patient name: Marilyn Faustin      : 1970  Age: 50 y o  MRN: [id number]  Referring Physician: Harvinder Shearer MD  Provider: Santana Nascimento  Clinician: Eren Barton, RN    Dx:   Encounter Diagnosis   Name Primary?     Chronic obstructive pulmonary disease, unspecified COPD type ([facility] ) Yes     Date of onset: 2018    Medication compliance: Yes   Comments: none  Fall Risk: No   Comments: none    EXERCISE/ACTIVITY    Cardiovascular:   Min:    METS:    Hr:    RPE:    O2 sat:     Modalities: Treadmill, AD bike, UBE and Eliptical   Strength trainin-3 days / week, 12-15 repitations  and 1-2 sets per modality    Modalities: Chest press, Lateral raise, Arm curl and frontal raise, shoulder shrugs  EKG changes: N/A  Dyspnea score: 3  Home activity: walking  Goals: increase endurance to walk a longer distance than 1515 feet in 6 minutes in 12 weeks, increase upper body strength in 12 weeks,   Education: explained pulmonary rehabilitation, how the lungs function, COPD, six minute walk test, arm curl, sub max treadmill test, and chair to stand test, breathing techniques to conserve energy  Plan: treadmill 20 minutes 3 1 mph with 2 to 4 % incline, elliptical 5 to 10 minutes crossramp 7, airdyne bike 10 minutes, UBE 5 minutes, increase time and resistance as tolerated  Readiness to change: 10    NUTRITION    Weight control:    Starting weight: 164   Current weight: Weight - Scale: 74 4 kg (164 lb)   Waist circumference:    Startin   Current: Waist circumference (inches): 37 Inches  Diabetes: N/A  Lipid management: heart healthy diet  Goals: decrease weight by 5 to 10 pounds in 12 weeks with increase activity  Education: 20% low fat diet, rate your plate  Plan: continue to eat heart healthy diet  Readiness to change: 10    PSYCHOSOCIAL    Emotional: PHQ-9 Total Score: 0  Self-reported stress level: 1 Social support: girlfriend  Goals: none  Education: breathing techniques to conserve energy  Plan: relaxation tips and tips to conserve energy  Readiness to change: 5    OTHER CORE COMPONENTS     Tobacco:   History   Smoking Status    Former Smoker    Packs/day: 2 00    Years: 28 00    Types: Cigarettes    Quit date: 9/22/2017   Smokeless Tobacco    Never Used     Comment: denied hx of exposure to second hand smoke; former smoker, smoked 2ppd for 30 yrs   pt quit 3 mos ago as per [employer]     Blood pressure:    Resting:     Exercise:    Goals: none  Education: none  Plan: none  Readiness to change: 1

## 2018-04-12 NOTE — PROGRESS NOTES
CARDIAC/PULMONARY REHAB ASSESSMENT    Today's date: 2018   Patient name: Indira Thompson      : 1970       MRN: [id number]  PCP: Yoon Mcwilliams MD  Pulmonlogist: Maxi Collins  Surgeon: N/A  Dx:   Encounter Diagnosis   Name Primary?     Chronic obstructive pulmonary disease, unspecified COPD type ([address] ) Yes     Date of onset: 2018  Cultural needs: none    Height: Height: 5' 5" (165 1 cm)   Weight:  Weight - Scale: 74 4 kg (164 lb)   Medical History:   Past Medical History:   Diagnosis Date    Chronic sinus complaints     last assessed 17    Diverticulosis     GERD (gastroesophageal reflux disease)     off medicine for past few years    Heart attack [facility])     last assessed 17    HTN (hypertension)     Hyperlipidemia     Tobacco use     Vasovagal syncope     last assessed 13    Wears glasses        Physical Limitations: none    Risk Factors   Cholesterol: yes  Smoking: former quit 2017 2ppd, 56 pack years  HTN: yes  DM: none  Obesity: none   Inactivity: walking  Family History:   Family History   Problem Relation Age of Onset    Heart disease Father      CABG    Testicular cancer Father      adenocarcinoma in situ of the testis    Heart defect Father      cardiac disorder    Cancer Father     Diabetes type II Father     Heart disease Maternal Grandfather     Hypertension Mother      benign essential    Diabetes Sister     Arthritis Family     Arthritis Other     Heart defect Other      cardiac disorder    Diabetes Other     Cancer Other      Allergies: No Known Allergies  Other: none    Current Medications:   Current Outpatient Prescriptions   Medication Sig Dispense Refill    albuterol (PROVENTIL HFA,VENTOLIN HFA) 90 mcg/act inhaler Inhale 2 puffs every 6 (six) hours as needed for wheezing      aspirin 325 mg tablet Take 1 tablet by mouth daily 100 tablet 0    atorvastatin (LIPITOR) 80 mg tablet Take 1 tablet (80 mg total) by mouth daily with dinner 30 tablet 6    omeprazole (PriLOSEC) 20 mg delayed release capsule Take 1 capsule (20 mg total) by mouth daily 30 capsule 0     No current facility-administered medications for this visit  Functional Status Prior to Diagnosis for Treatment   Occupation: unemployed  Recreation: fishing, shooting, shooting basketball in [place]  ADLs: self  [place]: yes  Exercise: walking  Other: none    Current Functional Status  Occupation: not working  Recreation: [employer]  ADLs: self  [place]: yes  Exercise: walking  Other: none    Short Term Program Goals:     Long Term Goals: improve upper body strength in 12 weeks, increase endurance to walk longer than 1515 feet in six minutes in 12 weeks,     Ability to reach goals/rehabilitation potential: yes    Projected return to function: 12 weeks  Objective tests: six minute walk test, arm curl, chair to stand test, treadmill sub max test          Nutritional   Fats/Oils: smart balance, gregorio  Sodium:   Sweets/ETOH/Caffeine: fignutant,  Low sodium low fat  Baked chips, lightly salted wheat thins, unsalted peanuts, no alcohol, decaffeinated coffee  Dairy/Eggs: skim milk, egg beaters 0 to 4 a week  Meats:    Beef: 1 serving a week   Fish: 1 serving a week  Chicken/Turkey: 1 serving a week  Pork/Ham: 1 serving a week  Processed Meats: turkey sausage, low sodium turkey deli meat and cheese  Fruits: 0 to 1 serving a day  Vegetables: 2 servings a day  Grains/Beans: 100% whole wheat, potatoes, pasta, rice, cheerios, raisin bran  Supplements:none  Social: cooking girlfriend, shopping both, dining out 0 to 1 time a week    Clinical Implications: continue to eat a heart healthy diet, rate your plate score 62       Goals: decrease weight by 5 to 10 pounds in 12 weeks with increase activity    Emotional/Social  Marital status:   Rate 1-5:    Marriage:    Family:    Financial: 2-3   Relationships:    Spirituality:    Intellectual:     Life Stressors: financial stress to 2-3, denies any other stress    Goals: continue to be stress free    Domestic Violence Screening: No    Comments: denies any abuse

## 2018-04-12 NOTE — PROGRESS NOTES
Smitha Yogi   1970     Risk: low     Pre Post % Change Goal   Date: 4/12/2018      Physical       Sub Max ETT (mets) 7 5   10% increase   6MWT (feet) 1515   10% increase   UCSD Dyspnea Score 59   5 pt decrease   CAT 21      [facility] (est peak O2) 5 72      Peak exercise CR/CO (mets) 3 2   40% increase   Emotional       PHQ9 (> 10 refer to MD) 0   4 pt decrease   [place] (lower score = improvement)       Total 18   < 27   Feelings 3   < 3   Physical Fitness 2   < 3   Social Support 1   < 3   Daily Activities 1   < 3   Social Activities 3   < 3   Pain 2   < 3   Overall Health 3   < 3   Quality of Life 1   < 3   Change in Health 2   < 3   Dietary       Rate your plate 62   > 58   Measurements       Weight 164   2 5 - 5%   BMI 27 3   19 - 25   Waist Circ  37   < 40 M / < 35 F   % Body fat 26 7   < 25 M / < 33 F   BP left arm               (systolic) 525   < 551   (diastolic) 72   < 90   Smoking #/day  (if applicable) 0   0   Lipids/Glucose (Date)       Total cholesterol    50 - 200   Triglycerides    < 150   HDL    40 - 60   LDL    < 100   A1C    4 0 - 5 6%   Fasting BG

## 2018-04-13 ENCOUNTER — TELEPHONE (OUTPATIENT)
Dept: PULMONOLOGY | Facility: MEDICAL CENTER | Age: 48
End: 2018-04-13

## 2018-04-17 ENCOUNTER — APPOINTMENT (OUTPATIENT)
Dept: PULMONOLOGY | Facility: CLINIC | Age: 48
End: 2018-04-17
Payer: MEDICAID

## 2018-04-19 ENCOUNTER — APPOINTMENT (OUTPATIENT)
Dept: PULMONOLOGY | Facility: CLINIC | Age: 48
End: 2018-04-19
Payer: MEDICAID

## 2018-04-24 ENCOUNTER — CLINICAL SUPPORT (OUTPATIENT)
Dept: PULMONOLOGY | Facility: CLINIC | Age: 48
End: 2018-04-24
Payer: MEDICAID

## 2018-04-24 ENCOUNTER — TELEPHONE (OUTPATIENT)
Dept: FAMILY MEDICINE CLINIC | Facility: CLINIC | Age: 48
End: 2018-04-24

## 2018-04-24 DIAGNOSIS — J44.9 CHRONIC OBSTRUCTIVE PULMONARY DISEASE, UNSPECIFIED COPD TYPE (HCC): ICD-10-CM

## 2018-04-24 PROCEDURE — G0424 PULMONARY REHAB W EXER: HCPCS

## 2018-04-24 NOTE — TELEPHONE ENCOUNTER
Left message for pt  Regarding appt  On 4/27  He has [employer] which we do not participate with  Please advise him when he calls back we accept [employer] as an hmo through [place]  Thank you!

## 2018-04-26 ENCOUNTER — CLINICAL SUPPORT (OUTPATIENT)
Dept: PULMONOLOGY | Facility: CLINIC | Age: 48
End: 2018-04-26
Payer: MEDICAID

## 2018-04-26 DIAGNOSIS — J44.9 CHRONIC OBSTRUCTIVE PULMONARY DISEASE, UNSPECIFIED COPD TYPE (HCC): ICD-10-CM

## 2018-04-26 PROCEDURE — G0424 PULMONARY REHAB W EXER: HCPCS

## 2018-04-27 NOTE — TELEPHONE ENCOUNTER
Left message on machine that we are cancelling today's appointment as we do not participate with [facility], which has patient's PCP listed as Dr Fallon Tan

## 2018-05-01 ENCOUNTER — CLINICAL SUPPORT (OUTPATIENT)
Dept: PULMONOLOGY | Facility: CLINIC | Age: 48
End: 2018-05-01
Payer: COMMERCIAL

## 2018-05-01 DIAGNOSIS — J44.9 CHRONIC OBSTRUCTIVE PULMONARY DISEASE, UNSPECIFIED COPD TYPE (HCC): ICD-10-CM

## 2018-05-01 PROCEDURE — G0424 PULMONARY REHAB W EXER: HCPCS

## 2018-05-03 ENCOUNTER — CLINICAL SUPPORT (OUTPATIENT)
Dept: PULMONOLOGY | Facility: CLINIC | Age: 48
End: 2018-05-03
Payer: COMMERCIAL

## 2018-05-03 DIAGNOSIS — J44.9 CHRONIC OBSTRUCTIVE PULMONARY DISEASE, UNSPECIFIED COPD TYPE (HCC): ICD-10-CM

## 2018-05-03 PROCEDURE — G0424 PULMONARY REHAB W EXER: HCPCS

## 2018-05-08 ENCOUNTER — CLINICAL SUPPORT (OUTPATIENT)
Dept: PULMONOLOGY | Facility: CLINIC | Age: 48
End: 2018-05-08
Payer: COMMERCIAL

## 2018-05-08 DIAGNOSIS — J44.9 CHRONIC OBSTRUCTIVE PULMONARY DISEASE, UNSPECIFIED COPD TYPE (HCC): ICD-10-CM

## 2018-05-08 PROCEDURE — G0424 PULMONARY REHAB W EXER: HCPCS

## 2018-05-10 ENCOUNTER — CLINICAL SUPPORT (OUTPATIENT)
Dept: PULMONOLOGY | Facility: CLINIC | Age: 48
End: 2018-05-10
Payer: COMMERCIAL

## 2018-05-10 DIAGNOSIS — J44.9 CHRONIC OBSTRUCTIVE PULMONARY DISEASE, UNSPECIFIED COPD TYPE (HCC): ICD-10-CM

## 2018-05-10 PROCEDURE — G0424 PULMONARY REHAB W EXER: HCPCS

## 2018-05-15 ENCOUNTER — CLINICAL SUPPORT (OUTPATIENT)
Dept: PULMONOLOGY | Facility: CLINIC | Age: 48
End: 2018-05-15
Payer: COMMERCIAL

## 2018-05-15 DIAGNOSIS — J44.9 CHRONIC OBSTRUCTIVE PULMONARY DISEASE, UNSPECIFIED COPD TYPE (HCC): ICD-10-CM

## 2018-05-15 PROCEDURE — G0424 PULMONARY REHAB W EXER: HCPCS

## 2018-05-16 ENCOUNTER — TELEPHONE (OUTPATIENT)
Dept: PULMONOLOGY | Facility: MEDICAL CENTER | Age: 48
End: 2018-05-16

## 2018-05-17 ENCOUNTER — APPOINTMENT (OUTPATIENT)
Dept: PULMONOLOGY | Facility: CLINIC | Age: 48
End: 2018-05-17
Payer: COMMERCIAL

## 2018-05-17 ENCOUNTER — TELEPHONE (OUTPATIENT)
Dept: PULMONOLOGY | Facility: MEDICAL CENTER | Age: 48
End: 2018-05-17

## 2018-05-21 ENCOUNTER — TELEPHONE (OUTPATIENT)
Dept: PULMONOLOGY | Facility: MEDICAL CENTER | Age: 48
End: 2018-05-21

## 2018-05-21 NOTE — TELEPHONE ENCOUNTER
Pls let the patient know i am away but also let him know that we are unable to make those decisions and wont be able to write any letters  I did fill out the paperwork for him before   Thanks

## 2018-05-21 NOTE — TELEPHONE ENCOUNTER
Patient needs a letter stating that in your professional opinion he is disabled and is unable to work       Can you please do this for him?    Please call him and let him know

## 2018-05-22 ENCOUNTER — CLINICAL SUPPORT (OUTPATIENT)
Dept: PULMONOLOGY | Facility: CLINIC | Age: 48
End: 2018-05-22
Payer: COMMERCIAL

## 2018-05-22 DIAGNOSIS — J44.9 CHRONIC OBSTRUCTIVE PULMONARY DISEASE, UNSPECIFIED COPD TYPE (HCC): ICD-10-CM

## 2018-05-22 PROCEDURE — G0424 PULMONARY REHAB W EXER: HCPCS

## 2018-05-24 ENCOUNTER — CLINICAL SUPPORT (OUTPATIENT)
Dept: PULMONOLOGY | Facility: CLINIC | Age: 48
End: 2018-05-24
Payer: COMMERCIAL

## 2018-05-24 DIAGNOSIS — J44.9 CHRONIC OBSTRUCTIVE PULMONARY DISEASE, UNSPECIFIED COPD TYPE (HCC): ICD-10-CM

## 2018-05-24 PROCEDURE — G0424 PULMONARY REHAB W EXER: HCPCS

## 2018-05-29 ENCOUNTER — OFFICE VISIT (OUTPATIENT)
Dept: FAMILY MEDICINE CLINIC | Facility: CLINIC | Age: 48
End: 2018-05-29
Payer: COMMERCIAL

## 2018-05-29 ENCOUNTER — APPOINTMENT (OUTPATIENT)
Dept: PULMONOLOGY | Facility: CLINIC | Age: 48
End: 2018-05-29
Payer: COMMERCIAL

## 2018-05-29 VITALS
BODY MASS INDEX: 28.32 KG/M2 | RESPIRATION RATE: 16 BRPM | HEIGHT: 65 IN | SYSTOLIC BLOOD PRESSURE: 110 MMHG | DIASTOLIC BLOOD PRESSURE: 70 MMHG | TEMPERATURE: 97.7 F | HEART RATE: 64 BPM | WEIGHT: 170 LBS

## 2018-05-29 DIAGNOSIS — J44.9 OBSTRUCTIVE CHRONIC BRONCHITIS WITHOUT EXACERBATION (HCC): Primary | ICD-10-CM

## 2018-05-29 DIAGNOSIS — J43.2 CENTRILOBULAR EMPHYSEMA (HCC): Primary | ICD-10-CM

## 2018-05-29 DIAGNOSIS — R10.84 GENERALIZED ABDOMINAL PAIN: ICD-10-CM

## 2018-05-29 DIAGNOSIS — I25.10 CAD, MULTIPLE VESSEL: ICD-10-CM

## 2018-05-29 DIAGNOSIS — Z95.1 S/P CABG X 2: ICD-10-CM

## 2018-05-29 DIAGNOSIS — J44.9 CHRONIC OBSTRUCTIVE PULMONARY DISEASE, UNSPECIFIED COPD TYPE (HCC): Primary | ICD-10-CM

## 2018-05-29 PROCEDURE — 99214 OFFICE O/P EST MOD 30 MIN: CPT | Performed by: FAMILY MEDICINE

## 2018-05-29 RX ORDER — ROSUVASTATIN CALCIUM 20 MG/1
20 TABLET, COATED ORAL DAILY
Qty: 30 TABLET | Refills: 3 | Status: SHIPPED | OUTPATIENT
Start: 2018-05-29 | End: 2018-09-28 | Stop reason: SDUPTHER

## 2018-05-29 NOTE — PROGRESS NOTES
Chief Complaint   Patient presents with    Follow-up     chronic conditions        Patient ID: Marilyn Faustin is a 50 y o  male  HPI  Pt is seeing for f/u COPD, CAD -  Stable, needs referrals to see cardiologist and pulmonologist, undergoing pulmonary rehab now -  Gained weight, eating a lot - has vague abd discomfort for > 1 y -  Had colonoscopy 1 y ago -  Normal     The following portions of the patient's history were reviewed and updated as appropriate: allergies, current medications, past family history, past medical history, past social history, past surgical history and problem list     Review of Systems   Constitutional: Negative  Respiratory: Negative  Cardiovascular: Negative  Gastrointestinal: Positive for abdominal pain  Negative for abdominal distention, anal bleeding, blood in stool, constipation, diarrhea, nausea, rectal pain and vomiting  Genitourinary: Negative  Musculoskeletal: Negative  Skin: Negative  Neurological: Negative  Current Outpatient Prescriptions   Medication Sig Dispense Refill    albuterol (PROVENTIL HFA,VENTOLIN HFA) 90 mcg/act inhaler Inhale 2 puffs every 6 (six) hours as needed for wheezing      aspirin 325 mg tablet Take 1 tablet by mouth daily 100 tablet 0    Fluticasone-Umeclidin-Vilant (TRELEGY ELLIPTA) 100-62 5-25 MCG/INH AEPB Inhale      rosuvastatin (CRESTOR) 20 MG tablet Take 1 tablet (20 mg total) by mouth daily 30 tablet 3     No current facility-administered medications for this visit  Objective:    /70 (BP Location: Left arm, Patient Position: Sitting, Cuff Size: Large)   Pulse 64   Temp 97 7 °F (36 5 °C) (Tympanic)   Resp 16   Ht 5' 5" (1 651 m)   Wt 77 1 kg (170 lb)   BMI 28 29 kg/m²        Physical Exam   Constitutional: He appears well-nourished  No distress  Cardiovascular: Normal rate and regular rhythm  Exam reveals no gallop  No murmur heard  Pulmonary/Chest: Effort normal  No respiratory distress   He has decreased breath sounds  He has no wheezes  He has no rales  Abdominal: Soft  He exhibits no distension  There is no tenderness  Skin: Skin is warm  Labs in chart were reviewed  Assessment/Plan:         Diagnoses and all orders for this visit:    Chronic obstructive pulmonary disease, unspecified COPD type ([facility] [address] )  -     rosuvastatin (CRESTOR) 20 MG tablet; Take 1 tablet (20 mg total) by mouth daily  -     Ambulatory referral to Pulmonology; Future    CAD, multiple vessel  Was on Lipitor -  Stopped med b/o shoulder pain  -     rosuvastatin (CRESTOR) 20 MG tablet; Take 1 tablet (20 mg total) by mouth daily  -     Ambulatory referral to Cardiology; Future    S/P CABG x 2  -     Ambulatory referral to Cardiology; Future    Generalized abdominal pain  -     Ambulatory referral to Gastroenterology;  Future                                Justice Jameson MD

## 2018-05-30 RX ORDER — FLUTICASONE PROPIONATE AND SALMETEROL 113; 14 UG/1; UG/1
1 POWDER, METERED RESPIRATORY (INHALATION) 2 TIMES DAILY
Qty: 1 EACH | Refills: 5 | Status: SHIPPED | OUTPATIENT
Start: 2018-05-30 | End: 2018-10-02 | Stop reason: CLARIF

## 2018-05-31 ENCOUNTER — CLINICAL SUPPORT (OUTPATIENT)
Dept: PULMONOLOGY | Facility: CLINIC | Age: 48
End: 2018-05-31
Payer: COMMERCIAL

## 2018-05-31 DIAGNOSIS — J44.9 CHRONIC OBSTRUCTIVE PULMONARY DISEASE, UNSPECIFIED COPD TYPE (HCC): ICD-10-CM

## 2018-05-31 PROCEDURE — G0424 PULMONARY REHAB W EXER: HCPCS

## 2018-05-31 NOTE — PROGRESS NOTES
Medical Director 30 day Pulmonary Rehabilitation Review    I certify that I have met with Anna Cullen face-to face to provide a 30 day progress review of his/her pulmonary rehabilitation program at [address] [place] [address]  I have reviewed the most recent individualized treatment plan (ITP), outcomes assessment, and provided opportunity for discussion with the patient      Continue with current treatment plan yes    Please provide the following modifications to the current treatment plan:  Increased workload on the elliptical

## 2018-06-05 ENCOUNTER — CLINICAL SUPPORT (OUTPATIENT)
Dept: PULMONOLOGY | Facility: CLINIC | Age: 48
End: 2018-06-05

## 2018-06-05 DIAGNOSIS — J44.9 CHRONIC OBSTRUCTIVE PULMONARY DISEASE, UNSPECIFIED COPD TYPE (HCC): ICD-10-CM

## 2018-06-05 PROCEDURE — G0424 PULMONARY REHAB W EXER: HCPCS

## 2018-06-07 ENCOUNTER — APPOINTMENT (OUTPATIENT)
Dept: PULMONOLOGY | Facility: CLINIC | Age: 48
End: 2018-06-07
Payer: COMMERCIAL

## 2018-06-07 NOTE — PROGRESS NOTES
Cardiac/Pulmonary Rehabilitation Plan of Care   60 Day      Today's date: 2018   Visits: 11  Patient name: Griffin Parker      : 1970  Age: 50 y o  MRN: [id number]  Referring Physician: Yuan Gao MD  Provider: Lesvia Forbes  Clinician: Piter Quick RN    Dx:   Encounter Diagnosis   Name Primary?  Chronic obstructive pulmonary disease, unspecified COPD type [facility])      Date of onset: 2018    Comments: Mr Bartolo Fiore completed 11 sessions of the pulmonary rehabilitation program  His exercise MET level is 4 2 MET's  His Oxygen saturation rate was 94-95% on room air, 92to 94% during exercise  He completes 50 minutes of cardiovascular exercise with a light weight strength training component  His weight increased 4 pounds since the start of his pulmonary rehabilitation program  He attends weekly educational sessions  Will continue to monitor      Medication compliance: Yes   Comments: none  Fall Risk: No   Comments: none    EXERCISE/ACTIVITY    Cardiovascular:   Min: 50   METS:4 2    Hr: 122   RPE: 4-6   O2 sat: 94-96    Modalities: Treadmill, AD bike, UBE and Eliptical   Strength trainin-3 days / week, 12-15 repitations  and 1-2 sets per modality    Modalities: Chest press, Lateral raise, Arm curl and frontal raise, shoulder shrugs  EKG changes: N/A  Dyspnea score: 2-4  Home activity: walking  Goals: increase endurance to walk a longer distance than 1515 feet in 6 minutes in 12 weeks, increase upper body strength in 12 weeks,   Education: explained pulmonary rehabilitation, how the lungs function, COPD, six minute walk test, arm curl, sub max treadmill test, and chair to stand test, breathing techniques to conserve energy, Pulmonary rehabilitation DVD  Plan: treadmill 20 minutes 3 1 mph with 2 to 4 % incline, elliptical 10 minutes crossramp 7, airdyne bike 10 minutes, UBE 5 minutes, increase time and resistance as tolerated  Readiness to change: 10    NUTRITION    Weight control: Starting weight: 164   Current weight:   168  Waist circumference:    Startin   Current:    Diabetes: N/A  Lipid management: heart healthy diet  Goals: decrease weight by 5 to 10 pounds in 12 weeks with increase activity  Education: 20% low fat diet, rate your plate, COPD taking control  Plan: continue to eat heart healthy diet  Readiness to change: 10    PSYCHOSOCIAL    Emotional:    Self-reported stress level: 1   Social support: girlfriend  Goals: none  Education: breathing techniques to conserve energy  Plan: relaxation tips and tips to conserve energy  Readiness to change: 5    OTHER CORE COMPONENTS     Tobacco:   History   Smoking Status    Former Smoker    Packs/day: 2 00    Years: 28     Types: Cigarettes    Quit date: 2017   Smokeless Tobacco    Never Used     Comment: denied hx of exposure to second hand smoke; former smoker, smoked 2ppd for 30 yrs   pt quit 3 mos ago as per [employer]     Blood pressure:    Resting: Resting BP: 124/62   Exercise: Recovery BP: 118/82  Goals: none  Education: Pulmonary illness  Plan: none  Readiness to change: 1

## 2018-06-12 ENCOUNTER — APPOINTMENT (OUTPATIENT)
Dept: PULMONOLOGY | Facility: CLINIC | Age: 48
End: 2018-06-12
Payer: COMMERCIAL

## 2018-06-14 ENCOUNTER — CLINICAL SUPPORT (OUTPATIENT)
Dept: PULMONOLOGY | Facility: CLINIC | Age: 48
End: 2018-06-14
Payer: COMMERCIAL

## 2018-06-14 DIAGNOSIS — J44.9 CHRONIC OBSTRUCTIVE PULMONARY DISEASE, UNSPECIFIED COPD TYPE (HCC): ICD-10-CM

## 2018-06-14 PROCEDURE — 93798 PHYS/QHP OP CAR RHAB W/ECG: CPT

## 2018-06-19 ENCOUNTER — APPOINTMENT (OUTPATIENT)
Dept: PULMONOLOGY | Facility: CLINIC | Age: 48
End: 2018-06-19
Payer: COMMERCIAL

## 2018-06-21 ENCOUNTER — CLINICAL SUPPORT (OUTPATIENT)
Dept: PULMONOLOGY | Facility: CLINIC | Age: 48
End: 2018-06-21
Payer: COMMERCIAL

## 2018-06-21 DIAGNOSIS — J44.9 CHRONIC OBSTRUCTIVE PULMONARY DISEASE, UNSPECIFIED COPD TYPE (HCC): ICD-10-CM

## 2018-06-21 PROCEDURE — G0424 PULMONARY REHAB W EXER: HCPCS

## 2018-06-26 ENCOUNTER — CLINICAL SUPPORT (OUTPATIENT)
Dept: PULMONOLOGY | Facility: CLINIC | Age: 48
End: 2018-06-26
Payer: COMMERCIAL

## 2018-06-26 DIAGNOSIS — J44.9 OBSTRUCTIVE CHRONIC BRONCHITIS WITHOUT EXACERBATION (HCC): ICD-10-CM

## 2018-06-26 PROCEDURE — G0424 PULMONARY REHAB W EXER: HCPCS

## 2018-06-28 ENCOUNTER — CLINICAL SUPPORT (OUTPATIENT)
Dept: PULMONOLOGY | Facility: CLINIC | Age: 48
End: 2018-06-28
Payer: COMMERCIAL

## 2018-06-28 DIAGNOSIS — J44.9 CHRONIC OBSTRUCTIVE PULMONARY DISEASE, UNSPECIFIED COPD TYPE (HCC): ICD-10-CM

## 2018-06-28 PROCEDURE — G0424 PULMONARY REHAB W EXER: HCPCS

## 2018-06-28 NOTE — PROGRESS NOTES
Medical Director 30 day Pulmonary Rehabilitation Review    I certify that I have met with Jignesh Dc face-to face to provide a 30 day progress review of his/her pulmonary rehabilitation program at [address]    I have reviewed the most recent individualized treatment plan (ITP), outcomes assessment, and provided opportunity for discussion with the patient        Continue with current treatment plan YES    Please provide the following modifications to the current treatment plan: Continue current rehab plan as tolerated     Heather Ellis has been doing well with pulmonary rehab

## 2018-07-02 DIAGNOSIS — J43.2 CENTRILOBULAR EMPHYSEMA (HCC): ICD-10-CM

## 2018-07-03 NOTE — PROGRESS NOTES
Pulmonary Rehabilitation Plan of Care   90 Day      Today's date: 7/3/2018   Visits: 15  Patient name: Destiney Tabares      : 1970  Age: 50 y o  MRN: [id number]  Referring Physician: Israel Galindo MD  Provider: Santana Nascimento  Clinician: Alexa Chang RN    Dx:   Encounter Diagnosis   Name Primary?  Chronic obstructive pulmonary disease, unspecified COPD type [facility])      Date of onset: 2018    Comments: Mr Jarvis Miller completed 15 sessions of the pulmonary rehabilitation program  His exercise MET level is 4 2 MET's  His Oxygen saturation rate was 94-95% on room air, 92 to 94% during exercise  He completes 50 minutes of cardiovascular exercise with a light weight strength training component  His weight increased 6 pounds since the start of his pulmonary rehabilitation program  He attends weekly educational sessions  He states he is feeling better increase energy  Will continue to monitor      Medication compliance: Yes   Comments: none  Fall Risk: No   Comments: none    EXERCISE/ACTIVITY    Cardiovascular:   Min: 50   METS:4 2    Hr: 115   RPE: 4-6   O2 sat: 94-96    Modalities: Treadmill, AD bike, UBE and Eliptical   Strength trainin-3 days / week, 12-15 repitations  and 1-2 sets per modality    Modalities: Chest press, Lateral raise, Arm curl and frontal raise, shoulder shrugs  EKG changes: N/A  Dyspnea score: 2-4  Home activity: walking  Goals: increase endurance to walk a longer distance than 1515 feet in 6 minutes in 12 weeks, increase upper body strength in 12 weeks,   Education: explained pulmonary rehabilitation, how the lungs function, COPD, six minute walk test, arm curl, sub max treadmill test, and chair to stand test, breathing techniques to conserve energy, Pulmonary rehabilitation DVD  Plan: treadmill 20 minutes 3 1 mph with 2 to 4 % incline, elliptical 10 minutes crossramp 7, airdyne bike 10 minutes, UBE 5 minutes, increase time and resistance as tolerated  Readiness to change: 10    NUTRITION    Weight control:    Starting weight: 164   Current weight:   170  Waist circumference:    Startin   Current:    Diabetes: N/A  Lipid management: heart healthy diet  Goals: decrease weight by 5 to 10 pounds in 12 weeks with increase activity  Education: 20% low fat diet, rate your plate, COPD taking control  Plan: continue to eat heart healthy diet  Readiness to change: 10    PSYCHOSOCIAL    Emotional:    Self-reported stress level: 1   Social support: girlfriend  Goals: none  Education: breathing techniques to conserve energy  Plan: relaxation tips and tips to conserve energy  Readiness to change: 5    OTHER CORE COMPONENTS     Tobacco:   History   Smoking Status    Former Smoker    Packs/day: 2 00    Years: 28 00    Types: Cigarettes    Quit date: 2017   Smokeless Tobacco    Never Used     Comment: denied hx of exposure to second hand smoke; former smoker, smoked 2ppd for 30 yrs   pt quit 3 mos ago as per [employer]     Blood pressure:    Resting: Resting BP: 120/84   Exercise: Recovery BP: 152/84  Goals: none  Education: Pulmonary illness  Plan: none  Readiness to change: 1

## 2018-07-05 ENCOUNTER — CLINICAL SUPPORT (OUTPATIENT)
Dept: PULMONOLOGY | Facility: CLINIC | Age: 48
End: 2018-07-05
Payer: COMMERCIAL

## 2018-07-05 DIAGNOSIS — J44.9 CHRONIC OBSTRUCTIVE PULMONARY DISEASE, UNSPECIFIED COPD TYPE (HCC): ICD-10-CM

## 2018-07-05 DIAGNOSIS — J45.20 MILD INTERMITTENT ASTHMA, UNSPECIFIED WHETHER COMPLICATED: Primary | ICD-10-CM

## 2018-07-05 PROCEDURE — G0424 PULMONARY REHAB W EXER: HCPCS

## 2018-07-05 RX ORDER — ALBUTEROL SULFATE 90 UG/1
2 AEROSOL, METERED RESPIRATORY (INHALATION) EVERY 6 HOURS PRN
Qty: 1 INHALER | Refills: 0 | Status: SHIPPED | OUTPATIENT
Start: 2018-07-05 | End: 2018-07-16 | Stop reason: SDUPTHER

## 2018-07-07 NOTE — PROGRESS NOTES
Progress Note - Cardiology Office  [facility]    Kristin Miner 50 y o  male MRN: [id number]  : 1970  Encounter: [id number]      Assessment:     1  S/P CABG x 2    2  CAD, multiple vessel    3  Mixed hyperlipidemia    4  Pulmonary hypertension ([facility] )    5  Chronic obstructive pulmonary disease, unspecified COPD type ([place] [address] )        Discussion Summary and Plan:  1  Coronary artery disease status post CABG x2 in 2017 with LIMA to LAD and SVG vein graft to circumflex  Clinically doing much better  He is much less tired  And has no symptoms of chest pain  2  Dizziness and fatigue  Most likely caused by metoprolol and has significantly improved since they were stopped  3  Shortness of breath  Patient has chronic shortness of breath  Diagnosed to have moderate COPD  Inhalers were changed  Followed up with Pulmonary  Patient has now quit smoking   4  Dyslipidemia  On high-intensity statins  Patient's labs reviewed  Cholesterol is acceptable  He has not done any blood test   Repeat electrolytes and LFTs along with fasting lipids  5  History of tobacco abuse  Heavy smoker throughout her life since he was 13years old  Now quit smoking  Patient encouraged not to go back to smoking  Patient's wife also also been encouraged to quit smoking otherwise patient has high likelihood of going back to smoking     Counseling :  A description of the counseling  As above  He need to be regular in follow-up  Patient's ability to self care: Yes  Medication side effect reviewed with patient in detail and all their questions answered to their satisfaction  HPI :     Kristin Miner is a 50y o  year old male who came for follow up  Patient  was admitted to [facility] in 2017 with 10/10 severe chest pain and had a non ST elevation MI  He was short of breath even climbing 1-2 flights of stair   His cardiac catheterization shows he had a spontaneous dissection of left main as well as significant stenosis of his mid LAD and he underwent successful coronary artery bypass surgery x2 with LIMA to LAD and SVG vein graft to circumflex came for follow-up  Patient used to smoke heavy and has now quit smoking  He has history of I believe COPD but never seen a pulmonologist  He feels fatigue and tired otherwise he is getting better  Some dizziness after taking metoprolol  No fever no chills no other significant complaint   /03/2018 came for follow-up  Gained some weight  He is doing cardiac rehabilitation therapy  Able to walk about 25 minutes on the treadmill  Had an episode of dizziness on Christmas evening  No more episodes since then  He is on very low-dose metoprolol and blood pressure is acceptable  No fever no chills no nausea no vomiting  Diagnosed to have moderate COPD by PFT and started on new inhalers  Came with his wife who still smokes     07/09/2018  Above reviewed  Patient has history of bypass surgery with 2 bypasses  He is feeling lot better  He has  Gained about 40 lb after quitting smoking  But his lung sounds lot better  Denies any chest pain  He is much more active now  No dizziness no lightheadedness no palpitations  He has not seen us for the last 5 6 months  No other significant complaint today  Review of Systems   Constitutional: Negative for activity change, chills, diaphoresis, fever and unexpected weight change  HENT: Negative for congestion  Eyes: Negative for discharge and redness  Respiratory: Negative for cough, chest tightness, shortness of breath and wheezing  Cardiovascular: Negative  Negative for chest pain, palpitations and leg swelling  Gastrointestinal: Negative for abdominal pain, diarrhea and nausea  Endocrine: Negative  Genitourinary: Negative for decreased urine volume and urgency  Musculoskeletal: Negative  Negative for arthralgias, back pain and gait problem     Skin: Negative for rash and wound  Allergic/Immunologic: Negative  Neurological: Negative for dizziness, seizures, syncope, weakness, light-headedness and headaches  Hematological: Negative  Psychiatric/Behavioral: Negative for agitation and confusion  The patient is not nervous/anxious  Historical Information   Past Medical History:   Diagnosis Date    Chronic sinus complaints     last assessed 12/12/17    Diverticulosis     GERD (gastroesophageal reflux disease)     off medicine for past few years    Heart attack [facility])     last assessed 12/12/17    HTN (hypertension)     Hyperlipidemia     Tobacco use     Vasovagal syncope     last assessed 5/1/13    Wears glasses      Past Surgical History:   Procedure Laterality Date    APPENDECTOMY  1974    COLONOSCOPY N/A 8/14/2017    Procedure: COLONOSCOPY;  Surgeon: Danica Ledesma MD;  Location: [place] [address] GI LAB; Service: Gastroenterology    CORONARY ARTERY BYPASS GRAFT N/A 9/25/2017    Procedure: INTRA OP MATTHEW; CORONARY ARTERY BYPASS GRAFT X 2 WITH SVH TO OM1  AND LIMA TO LAD; LEFT LEG EVH;  Surgeon: Linda Early MD;  Location: [facility];  Service: Cardiac Surgery    THUMB AMPUTATION Left 1980     History   Alcohol Use No     Comment: rarely; denied hx of social drinker as per [employer]     History   Drug Use No     History   Smoking Status    Former Smoker    Packs/day: 2 00    Years: 28 00    Types: Cigarettes    Quit date: 9/22/2017   Smokeless Tobacco    Never Used     Comment: denied hx of exposure to second hand smoke; former smoker, smoked 2ppd for 30 yrs   pt quit 3 mos ago as per [employer]     Family History:   Family History   Problem Relation Age of Onset    Heart disease Father         CABG    Testicular cancer Father         adenocarcinoma in situ of the testis    Heart defect Father         cardiac disorder    Cancer Father     Diabetes type II Father     Heart disease Maternal Grandfather     Hypertension Mother         benign essential    Diabetes Sister     Arthritis Family     Arthritis Other     Heart defect Other         cardiac disorder    Diabetes Other     Cancer Other        Meds/Allergies     No Known Allergies    Current Outpatient Prescriptions:     albuterol (PROVENTIL HFA,VENTOLIN HFA) 90 mcg/act inhaler, Inhale 2 puffs every 6 (six) hours as needed for wheezing, Disp: 1 Inhaler, Rfl: 0    aspirin 325 mg tablet, Take 1 tablet by mouth daily, Disp: 100 tablet, Rfl: 0    Fluticasone-Salmeterol (AIRDUO RESPICLICK 310/55) 828-69 MCG/ACT AEPB, Inhale 1 puff 2 (two) times a day, Disp: 1 each, Rfl: 5    rosuvastatin (CRESTOR) 20 MG tablet, Take 1 tablet (20 mg total) by mouth daily, Disp: 30 tablet, Rfl: 3    tiotropium (SPIRIVA) 18 mcg inhalation capsule, Place 1 capsule (18 mcg total) into inhaler and inhale daily, Disp: 30 capsule, Rfl: 5    Vitals: Blood pressure 122/78, pulse 88, height 5' 5" (1 651 m), weight 78 9 kg (174 lb), SpO2 97 %  Body mass index is 28 96 kg/m²  Vitals:    07/10/18 1511   Weight: 78 9 kg (174 lb)     BP Readings from Last 3 Encounters:   07/10/18 122/78   05/29/18 110/70   03/14/18 122/84       Physical Exam:  Physical Exam    Neurologic:  Alert & oriented x 3, no new focal deficits, Not in any acute distress,  Constitutional:  Well developed, well nourished, non-toxic appearance   Eyes:  Pupil equal and reacting to light, conjunctiva normal, No JVP, No LNP   HENT:  Atraumatic, oropharynx moist, Neck- normal range of motion, no tenderness,  Neck supple   Respiratory:  Bilateral air entry, mostly clear to auscultation with rare rhonchi  Cardiovascular: S1-S2 regular with a 2/6 ejection systolic murmur   GI:  Soft, nondistended, normal bowel sounds, nontender, no hepatosplenomegaly appreciated  Musculoskeletal:  No edema, no tenderness, no deformities     Skin:  Well hydrated, no rash   Lymphatic:  No lymphadenopathy noted   Extremities:  No edema and distal pulses are present    Diagnostic Studies Review Cardio:     Stress Test: Echo show normal LV systolic function  Catheterization: Cardiac catheterization in 2017 shows proximal LAD 80% stenosis, spontaneous dissection of the left main, preserved LV systolic function, nonobstructive disease of the circumflex which was medium to small size  Circumflex has 25 -30% stenosis  ECG Report: 10/26/2017 12 lead EKG shows normal sinus rhythm heart rate 60 beats per minute  Nonspecific ST changes  No old EKG to compare       Cardiac testing:   Results for orders placed during the hospital encounter of 18   Echo complete with contrast if indicated    Narrative [address]  [address] [facility]  [phone]    Transthoracic Echocardiogram  2D, M-mode, Doppler, and Color Doppler    Study date:  19-Mar-2018    Patient: Patricia Bustamante  MR number: [id number]  Account number: [de-identified]  : 1970  Age: 50 years  Gender: Male  Status: Routine  Location: Echo lab  Height: 62 in  Weight: 161 7 lb  BP: 119/ 77 mmHg    Sonographer:  Liza Dancer, RCS  Primary Physician:  Sj Cota MD  Referring Physician:  Helen Reed:  [facility]  Interpreting Physician:  DO SOLITARIO Awad    LEFT VENTRICLE:  Systolic function was at the lower limits of normal by visual assessment  Ejection fraction was estimated to be 50 %  There were no regional wall motion abnormalities  There was mild concentric hypertrophy  Doppler parameters were consistent with abnormal left ventricular relaxation (grade 1 diastolic dysfunction)  LEFT ATRIUM:  The atrium was mildly dilated  RIGHT ATRIUM:  The atrium was mildly to moderately dilated  MITRAL VALVE:  There was trace regurgitation  TRICUSPID VALVE:  There was mild regurgitation  Pulmonary artery systolic pressure was within the normal range  Estimated peak PA pressure was 38 mmHg      PROCEDURE: The procedure was performed in the echo lab  This was a routine study  The transthoracic approach was used  The study included complete 2D imaging, M-mode, complete spectral Doppler, and color Doppler  The heart rate was 64 bpm,  at the start of the study  Images were obtained from the parasternal, apical, subcostal, and suprasternal notch acoustic windows  Image quality was adequate  LEFT VENTRICLE: Size was normal  Systolic function was at the lower limits of normal by visual assessment  Ejection fraction was estimated to be 50 %  There were no regional wall motion abnormalities  There was mild concentric hypertrophy  DOPPLER: Doppler parameters were consistent with abnormal left ventricular relaxation (grade 1 diastolic dysfunction)  There was no evidence of elevated ventricular filling pressure by Doppler parameters  RIGHT VENTRICLE: The size was normal  Systolic function was low normal  DOPPLER: Systolic pressure was within the normal range  LEFT ATRIUM: The atrium was mildly dilated  No thrombus was identified  RIGHT ATRIUM: The atrium was mildly to moderately dilated  MITRAL VALVE: Valve structure was normal  There was normal leaflet separation  No echocardiographic evidence for prolapse  DOPPLER: The transmitral velocity was within the normal range  There was no evidence for stenosis  There was trace  regurgitation  AORTIC VALVE: The valve was trileaflet  Leaflets exhibited normal thickness, normal cuspal separation, and sclerosis  DOPPLER: Transaortic velocity was within the normal range  There was no evidence for stenosis  There was no  regurgitation  TRICUSPID VALVE: The valve structure was normal  There was normal leaflet separation  DOPPLER: The transtricuspid velocity was within the normal range  There was mild regurgitation  Pulmonary artery systolic pressure was within the normal  range  Estimated peak PA pressure was 38 mmHg      PULMONIC VALVE: Leaflets exhibited normal thickness, no calcification, and normal cuspal separation  DOPPLER: The transpulmonic velocity was within the normal range  There was trace regurgitation  PERICARDIUM: There was no thickening  There was no pericardial effusion  AORTA: The root exhibited normal size  PULMONARY ARTERY: The size was normal  The morphology appeared normal     SYSTEM MEASUREMENT TABLES    2D mode  AoR Diam 2D: 3 6 cm  LA Diam (2D): 3 6 cm  LA/Ao (2D): 1  FS (2D Teich): 25 6 %  IVSd (2D): 1 01 cm  LVDEV: 108 cm³  LVEDV MOD BP: 131 cm³  LVESV: 53 7 cm³  LVIDd(2D): 4 81 cm  LVISd (2D): 3 58 cm  LVOT Area 2D: 3 14 cm squared  LVPWd (2D): 1 03 cm  SV (Teich): 54 3 cm³    Apical four chamber  LVEF A4C: 55 %    Apical two chamber  LA Area: 22 7 cm squared  LA Volume: 70 cm³  LVEF A2C: 59 %  LVLD A2C: 8 37 cm    Unspecified Scan Mode  NARDA Cont Eq (Peak Kenroy): 2 81 cm squared  LVOT Diam : 2 cm  LVOT Vmax: 1190 mm/s  LVOT Vmax; Mean: 1190 mm/s  Peak Grad ; Mean: 6 mm[Hg]  MV Peak A Kenroy: 642 mm/s  MV Peak E Kenroy  Mean: 632 mm/s  MVA (PHT): 4 15 cm squared  PHT: 53 ms  Max P mm[Hg]  V Max: 2760 mm/s  Vmax: 2410 mm/s  RA Area: 24 9 cm squared  RA Volume: 86 7 cm³  TAPSE: 1 7 cm    [facility]    Prepared and electronically signed by    Christiano Huerta DO  Signed 20-Mar-2018 08:35:00         Imaging:  Chest X-Ray:   Xr Chest Pa & Lateral    Result Date: 10/28/2017  Impression No active pulmonary disease  Hyperinflation  Workstation performed: [id number]       CT-scan of the chest:     Cta Dissection Protocol Chest And Abdomen    Result Date: 2017  Impression No evidence of aortic aneurysm or dissection  Mild to moderate COPD  Mildly thick-walled jejunal loop with slight hyperemia of the bowel, perhaps related to underdistention, cannot entirely exclude enteritis  No inflammatory changes in the adjacent mesentery   Workstation performed: [id number]     Lab Review   Lab Results   Component Value Date WBC 9 70 02/01/2018    HGB 14 7 02/01/2018    HCT 43 5 02/01/2018    MCV 91 02/01/2018    RDW 12 5 02/01/2018     02/01/2018     BMP:  Lab Results   Component Value Date     02/01/2018    K 4 2 02/01/2018     02/01/2018    CO2 32 02/01/2018    ANIONGAP 5 02/01/2018    BUN 18 03/15/2018    CREATININE 0 97 03/15/2018    GLUCOSE 117 02/01/2018    GLUF 95 09/23/2017    CALCIUM 9 5 02/01/2018    EGFR 105 02/01/2018    MG 2 4 09/27/2017     LFT:  Lab Results   Component Value Date    AST 20 02/01/2018    ALT 35 02/01/2018    ALKPHOS 74 02/01/2018    PROT 7 8 02/01/2018    BILITOT 0 60 02/01/2018       Lab Results   Component Value Date    HGBA1C 5 7 09/25/2017     Lipid Profile:   Lab Results   Component Value Date    CHOL 174 09/22/2017    HDL 46 03/15/2018    LDLCALC 110 (H) 09/22/2017    TRIG 69 03/15/2018     Lab Results   Component Value Date    CHOL 174 09/22/2017     Lab Results   Component Value Date    CKTOTAL 104 02/01/2018    TROPONINI <0 02 02/01/2018     Lab Results   Component Value Date    NTBNP 170 (H) 02/01/2018        Dr Tim Reagan MD [facility]      "This note has been constructed using a voice recognition system  Therefore there may be syntax, spelling, and/or grammatical errors   Please call if you have any questions  "

## 2018-07-10 ENCOUNTER — OFFICE VISIT (OUTPATIENT)
Dept: CARDIOLOGY CLINIC | Facility: CLINIC | Age: 48
End: 2018-07-10
Payer: COMMERCIAL

## 2018-07-10 ENCOUNTER — CLINICAL SUPPORT (OUTPATIENT)
Dept: PULMONOLOGY | Facility: CLINIC | Age: 48
End: 2018-07-10
Payer: COMMERCIAL

## 2018-07-10 VITALS
DIASTOLIC BLOOD PRESSURE: 78 MMHG | BODY MASS INDEX: 28.99 KG/M2 | HEART RATE: 88 BPM | HEIGHT: 65 IN | WEIGHT: 174 LBS | SYSTOLIC BLOOD PRESSURE: 122 MMHG | OXYGEN SATURATION: 97 %

## 2018-07-10 DIAGNOSIS — J44.9 CHRONIC OBSTRUCTIVE PULMONARY DISEASE, UNSPECIFIED COPD TYPE (HCC): ICD-10-CM

## 2018-07-10 DIAGNOSIS — I27.20 PULMONARY HYPERTENSION (HCC): ICD-10-CM

## 2018-07-10 DIAGNOSIS — I25.10 CAD, MULTIPLE VESSEL: ICD-10-CM

## 2018-07-10 DIAGNOSIS — E78.2 MIXED HYPERLIPIDEMIA: ICD-10-CM

## 2018-07-10 DIAGNOSIS — Z95.1 S/P CABG X 2: ICD-10-CM

## 2018-07-10 PROCEDURE — G0424 PULMONARY REHAB W EXER: HCPCS

## 2018-07-10 PROCEDURE — 99214 OFFICE O/P EST MOD 30 MIN: CPT | Performed by: INTERNAL MEDICINE

## 2018-07-12 ENCOUNTER — CLINICAL SUPPORT (OUTPATIENT)
Dept: PULMONOLOGY | Facility: CLINIC | Age: 48
End: 2018-07-12
Payer: COMMERCIAL

## 2018-07-12 DIAGNOSIS — J44.9 CHRONIC OBSTRUCTIVE PULMONARY DISEASE, UNSPECIFIED COPD TYPE (HCC): ICD-10-CM

## 2018-07-12 PROCEDURE — G0424 PULMONARY REHAB W EXER: HCPCS

## 2018-07-16 ENCOUNTER — OFFICE VISIT (OUTPATIENT)
Dept: PULMONOLOGY | Facility: MEDICAL CENTER | Age: 48
End: 2018-07-16
Payer: COMMERCIAL

## 2018-07-16 VITALS
TEMPERATURE: 97.6 F | OXYGEN SATURATION: 95 % | RESPIRATION RATE: 12 BRPM | HEIGHT: 65 IN | DIASTOLIC BLOOD PRESSURE: 84 MMHG | WEIGHT: 174 LBS | HEART RATE: 70 BPM | SYSTOLIC BLOOD PRESSURE: 128 MMHG | BODY MASS INDEX: 28.99 KG/M2

## 2018-07-16 DIAGNOSIS — J44.9 CHRONIC OBSTRUCTIVE PULMONARY DISEASE, UNSPECIFIED COPD TYPE (HCC): ICD-10-CM

## 2018-07-16 DIAGNOSIS — J43.2 CENTRILOBULAR EMPHYSEMA (HCC): ICD-10-CM

## 2018-07-16 DIAGNOSIS — I27.20 PULMONARY HYPERTENSION (HCC): Primary | ICD-10-CM

## 2018-07-16 PROCEDURE — 99213 OFFICE O/P EST LOW 20 MIN: CPT | Performed by: INTERNAL MEDICINE

## 2018-07-16 RX ORDER — ALBUTEROL SULFATE 90 UG/1
2 AEROSOL, METERED RESPIRATORY (INHALATION) EVERY 6 HOURS PRN
Qty: 1 INHALER | Refills: 6 | Status: SHIPPED | OUTPATIENT
Start: 2018-07-16 | End: 2019-05-24 | Stop reason: SDUPTHER

## 2018-07-16 NOTE — PROGRESS NOTES
Assessment/Plan:     Problem List Items Addressed This Visit        Respiratory    Chronic obstructive pulmonary disease ([facility] )     We did discuss other options for inhaler therapy as his symptoms are poorly controlled on Airduo  Per his insurance he will need to try Advair next  This is been ordered  Continue with Spiriva  Relevant Medications    albuterol (PROVENTIL HFA,VENTOLIN HFA) 90 mcg/act inhaler    fluticasone-salmeterol (ADVAIR) 250-50 mcg/dose inhaler    Pulmonary hypertension ([place] [address] ) - Primary     No significant pulmonary hypertension on echo that was repeated  Return in about 6 months (around 1/16/2019)  All questions are answered to the patient's satisfaction and understanding  He verbalizes understanding  He is encouraged to call with any further questions or concerns  Portions of the record may have been created with voice recognition software  Occasional wrong word or "sound a like" substitutions may have occurred due to the inherent limitations of voice recognition software  Read the chart carefully and recognize, using context, where substitutions have occurred  Electronically Signed by Ericka Sánchez MD    ______________________________________________________________________    Chief Complaint:   Chief Complaint   Patient presents with    Follow-up     4m  Pt     Cough     more productive  Pt prefers trelegy and not inhalers currently using     Shortness of Breath     about the same  Patient ID: Isidoro Gottron is a 50 y o  y o  male has a past medical history of Chronic sinus complaints; Diverticulosis; GERD (gastroesophageal reflux disease); Heart attack ([facility] ); HTN (hypertension); Hyperlipidemia; Tobacco use; Vasovagal syncope; and Wears glasses  7/16/2018  Patient presents for follow-up visit  Overall he has been feeling slightly worse with his breathing  He felt better using the Trelegy     Now that he has been on Airduo and Spiriva he feels like his breathing is uncontrolled and he is needing to use his rescue inhaler more often  He is running out of his rescue inhaler  No fevers, or cough  He is undergoing pulmonary rehab and continues to do well with this  Review of Systems   All other systems reviewed and are negative  Smoking history: He reports that he quit smoking about 9 months ago  His smoking use included Cigarettes  He has a 56 00 pack-year smoking history  He has never used smokeless tobacco     The following portions of the patient's history were reviewed and updated as appropriate: allergies, current medications, past family history, past medical history, past social history, past surgical history and problem list     There is no immunization history for the selected administration types on file for this patient  Current Outpatient Prescriptions   Medication Sig Dispense Refill    albuterol (PROVENTIL HFA,VENTOLIN HFA) 90 mcg/act inhaler Inhale 2 puffs every 6 (six) hours as needed for wheezing 1 Inhaler 0    aspirin 325 mg tablet Take 1 tablet by mouth daily 100 tablet 0    Fluticasone-Salmeterol (AIRDUO RESPICLICK 523/36) 945-46 MCG/ACT AEPB Inhale 1 puff 2 (two) times a day 1 each 5    rosuvastatin (CRESTOR) 20 MG tablet Take 1 tablet (20 mg total) by mouth daily 30 tablet 3    tiotropium (SPIRIVA) 18 mcg inhalation capsule Place 1 capsule (18 mcg total) into inhaler and inhale daily 30 capsule 5     No current facility-administered medications for this visit  Allergies: Patient has no known allergies  Objective:  Vitals:    07/16/18 1043   BP: 128/84   BP Location: Left arm   Patient Position: Sitting   Cuff Size: Adult   Pulse: 70   Resp: 12   Temp: 97 6 °F (36 4 °C)   TempSrc: Oral   SpO2: 95%   Weight: 78 9 kg (174 lb)   Height: 5' 5" (1 651 m)   Oxygen Therapy  SpO2: 95 %      Wt Readings from Last 3 Encounters:   07/16/18 78 9 kg (174 lb)   07/10/18 78 9 kg (174 lb)   05/29/18 77 1 kg (170 lb)     Body mass index is 28 96 kg/m²  Physical Exam   Constitutional: He is oriented to person, place, and time  He appears well-nourished  HENT:   Head: Atraumatic  Eyes: EOM are normal    Neck: Neck supple  Cardiovascular: Normal rate and regular rhythm  No murmur heard  Pulmonary/Chest: Effort normal and breath sounds normal    Abdominal: Soft  Bowel sounds are normal    Musculoskeletal: Normal range of motion  Neurological: He is alert and oriented to person, place, and time  Skin: Skin is warm and dry  Psychiatric: He has a normal mood and affect  His behavior is normal    Vitals reviewed         Diagnostics:  No new chest imaging

## 2018-07-17 ENCOUNTER — CLINICAL SUPPORT (OUTPATIENT)
Dept: PULMONOLOGY | Facility: CLINIC | Age: 48
End: 2018-07-17
Payer: COMMERCIAL

## 2018-07-17 DIAGNOSIS — J44.9 CHRONIC OBSTRUCTIVE PULMONARY DISEASE, UNSPECIFIED COPD TYPE (HCC): ICD-10-CM

## 2018-07-17 PROCEDURE — G0424 PULMONARY REHAB W EXER: HCPCS

## 2018-07-18 NOTE — ASSESSMENT & PLAN NOTE
We did discuss other options for inhaler therapy as his symptoms are poorly controlled on Airduo  Per his insurance he will need to try Advair next  This is been ordered  Continue with Spiriva

## 2018-07-24 ENCOUNTER — CLINICAL SUPPORT (OUTPATIENT)
Dept: PULMONOLOGY | Facility: CLINIC | Age: 48
End: 2018-07-24
Payer: COMMERCIAL

## 2018-07-24 DIAGNOSIS — J44.9 CHRONIC OBSTRUCTIVE PULMONARY DISEASE, UNSPECIFIED COPD TYPE (HCC): ICD-10-CM

## 2018-07-24 PROCEDURE — G0424 PULMONARY REHAB W EXER: HCPCS

## 2018-07-26 ENCOUNTER — APPOINTMENT (OUTPATIENT)
Dept: PULMONOLOGY | Facility: CLINIC | Age: 48
End: 2018-07-26
Payer: COMMERCIAL

## 2018-07-31 ENCOUNTER — CLINICAL SUPPORT (OUTPATIENT)
Dept: PULMONOLOGY | Facility: CLINIC | Age: 48
End: 2018-07-31
Payer: COMMERCIAL

## 2018-07-31 ENCOUNTER — TELEPHONE (OUTPATIENT)
Dept: PULMONOLOGY | Facility: MEDICAL CENTER | Age: 48
End: 2018-07-31

## 2018-07-31 DIAGNOSIS — J44.9 CHRONIC OBSTRUCTIVE PULMONARY DISEASE, UNSPECIFIED COPD TYPE (HCC): ICD-10-CM

## 2018-07-31 PROCEDURE — G0424 PULMONARY REHAB W EXER: HCPCS

## 2018-08-02 ENCOUNTER — CLINICAL SUPPORT (OUTPATIENT)
Dept: PULMONOLOGY | Facility: CLINIC | Age: 48
End: 2018-08-02
Payer: COMMERCIAL

## 2018-08-02 VITALS — HEIGHT: 65 IN | BODY MASS INDEX: 29.07 KG/M2 | WEIGHT: 174.5 LBS

## 2018-08-02 DIAGNOSIS — J44.1 CHRONIC OBSTRUCTIVE PULMONARY DISEASE WITH ACUTE EXACERBATION (HCC): ICD-10-CM

## 2018-08-02 PROCEDURE — G0424 PULMONARY REHAB W EXER: HCPCS

## 2018-08-02 NOTE — PROGRESS NOTES
Rashad Underwood   1970     Risk: low     Pre Post % Change Goal   Date: 4/12/2018 8/3/2018     Physical       Sub Max ETT (mets) 7 5 7 5 0 0% 10% increase   6MWT (feet) 1515 1560 3 0% 10% increase   [facility] 65 63     CAT 21 21     Curls/chair to stands 26   20 29   22     Peak exercise CR/NH (mets) 3 2 4 2  40% increase   Emotional       PHQ9 (> 10 refer to MD) 0 5  4 pt decrease   [place] (lower score = improvement)       Total 18 21 16 7% < 27   Feelings 2 2 0 0% < 3   Physical Fitness 3 3  0 0% < 3   Social Support 1 1 0 0% < 3   Daily Activities 1 2 100 % < 3   Social Activities 1 2 100 0% < 3   Pain 3 2 -33 3% < 3   Overall Health 3 3 0 0% < 3   Quality of Life 2 3 50 0% < 3   Change in Health 2 3 50 0% < 3   Dietary       Rate your plate 62 59 -6 8% > 58   Measurements       Weight 164 174 5 6 4% 2 5 - 5%   BMI 27 3 28 2 3 3% 19 - 25   Waist Circ  37 40 8 1% < 40 M / < 35 F   % Body fat 26 7 27 8 4 1% < 25 M / < 33 F   BP left arm               (systolic) 522 573 -4 2% < 911   (diastolic) 72 84 74 6% < 90   Smoking #/day  (if applicable) 0 0  0

## 2018-08-02 NOTE — PROGRESS NOTES
Pulmonary Rehabilitation Plan of Care   Discharge      Today's date: 2018   Visits: 22  Patient name: Jignesh Dc      : 1970  Age: 50 y o  MRN: [id number]  Referring Physician: Suyapa Jennings MD  Provider: Santana Nascimento  Clinician: Mary Carmen Carrera RN    Dx:   Encounter Diagnosis   Name Primary?  Chronic obstructive pulmonary disease with acute exacerbation [facility])      Date of onset: 2018    Comments: Mr Demetris Hamilton completed 22 sessions of the pulmonary rehabilitation program  His exercise MET level is 4 2 MET's  His Oxygen saturation rate was 94-95% on room air, 92 to 94% during exercise  He completes 50 minutes of cardiovascular exercise with a light weight strength training component  His weight increased 6 pounds since the start of his pulmonary rehabilitation program  He attends weekly educational sessions  He states he is feeling better increase energy and strength  He states he is exercising at home five days a week for 60 minutes for 5 miles a day  Walking and light weight strength training program  He will continue to follow-up with his pulmonologist  His upper and lower body strength increase  He was able to increase his arm curls from 26 to 29 in 30 seconds, and increase his chair to stands form 20 to 22 in 30 seconds  He increased his walking distance in 6 minutes from 115 to 1560 feet  His weight increased from 164 to 174 5 pounds (10 5 pounds) since the start of his pulmonary rehabilitation program  He will continue to exercise at home       Medication compliance: Yes   Comments: none  Fall Risk: No   Comments: none    EXERCISE/ACTIVITY    Cardiovascular:   Min: 50   METS:4 2    Hr: 115   RPE: 4-6   O2 sat: 94-96    Modalities: Treadmill, AD bike, UBE and Eliptical   Strength trainin-3 days / week, 12-15 repitations  and 1-2 sets per modality    Modalities: Chest press, Lateral raise, Arm curl and frontal raise, shoulder shrugs  EKG changes: N/A  Dyspnea score: 2-4  Home activity: walking  Goals: increase endurance to walk a longer distance than 1515 feet in 6 minutes in 12 weeks, increase upper body strength in 12 weeks, (MET Goal)  Education: explained pulmonary rehabilitation, how the lungs function, COPD, six minute walk test, arm curl, sub max treadmill test, and chair to stand test, breathing techniques to conserve energy, Pulmonary rehabilitation DVD  Plan: treadmill 20 minutes 3 1 mph with 2 to 4 % incline, elliptical 10 minutes crossramp 7, airdyne bike 10 minutes, UBE 5 minutes, increase time and resistance as tolerated  Readiness to change: 10    NUTRITION    Weight control:    Starting weight: 164   Current weight: Weight - Scale: 79 2 kg (174 lb 8 oz) 170  Waist circumference:    Startin   Current: Waist circumference (inches): 40 Inches  Diabetes: N/A  Lipid management: heart healthy diet  Goals: decrease weight by 5 to 10 pounds in 12 weeks with increase activity(Will continue to work on goal)  Education: 20% low fat diet, rate your plate, COPD taking control  Plan: continue to eat heart healthy diet  Readiness to change: 10    PSYCHOSOCIAL    Emotional: PHQ-9 Total Score: 5  Self-reported stress level: 1   Social support: girlfriend  Goals: none  Education: breathing techniques to conserve energy  Plan: relaxation tips and tips to conserve energy  Readiness to change: 5    OTHER CORE COMPONENTS     Tobacco:   History   Smoking Status    Former Smoker    Packs/day: 2 00    Years: 28 00    Types: Cigarettes    Quit date: 2017   Smokeless Tobacco    Never Used     Comment: denied hx of exposure to second hand smoke; former smoker, smoked 2ppd for 30 yrs   pt quit 3 mos ago as per [employer]     Blood pressure:    Resting: Resting BP: 122/84   Exercise: Recovery BP: 112/68  Goals: none  Education: Pulmonary illness  Plan: none  Readiness to change: 1

## 2018-08-07 ENCOUNTER — APPOINTMENT (OUTPATIENT)
Dept: PULMONOLOGY | Facility: CLINIC | Age: 48
End: 2018-08-07
Payer: COMMERCIAL

## 2018-08-09 ENCOUNTER — APPOINTMENT (OUTPATIENT)
Dept: PULMONOLOGY | Facility: CLINIC | Age: 48
End: 2018-08-09
Payer: COMMERCIAL

## 2018-08-29 ENCOUNTER — OFFICE VISIT (OUTPATIENT)
Dept: FAMILY MEDICINE CLINIC | Facility: CLINIC | Age: 48
End: 2018-08-29
Payer: COMMERCIAL

## 2018-08-29 VITALS
BODY MASS INDEX: 28.82 KG/M2 | TEMPERATURE: 98.4 F | WEIGHT: 173 LBS | HEART RATE: 72 BPM | DIASTOLIC BLOOD PRESSURE: 80 MMHG | HEIGHT: 65 IN | RESPIRATION RATE: 12 BRPM | SYSTOLIC BLOOD PRESSURE: 110 MMHG

## 2018-08-29 DIAGNOSIS — I27.20 PULMONARY HYPERTENSION (HCC): ICD-10-CM

## 2018-08-29 DIAGNOSIS — J44.9 CHRONIC OBSTRUCTIVE PULMONARY DISEASE, UNSPECIFIED COPD TYPE (HCC): Primary | ICD-10-CM

## 2018-08-29 DIAGNOSIS — M54.16 LUMBAR RADICULOPATHY: ICD-10-CM

## 2018-08-29 DIAGNOSIS — Z23 NEED FOR INFLUENZA VACCINATION: ICD-10-CM

## 2018-08-29 DIAGNOSIS — E78.2 MIXED HYPERLIPIDEMIA: ICD-10-CM

## 2018-08-29 DIAGNOSIS — I25.10 CAD, MULTIPLE VESSEL: ICD-10-CM

## 2018-08-29 PROCEDURE — 90686 IIV4 VACC NO PRSV 0.5 ML IM: CPT

## 2018-08-29 PROCEDURE — 99214 OFFICE O/P EST MOD 30 MIN: CPT | Performed by: FAMILY MEDICINE

## 2018-08-29 PROCEDURE — 90471 IMMUNIZATION ADMIN: CPT

## 2018-08-29 NOTE — PROGRESS NOTES
Chief Complaint   Patient presents with    Follow-up   COPD, CAD      Patient ID: La Leo is a 50 y o  male  HPI  Pt is seeing for f/u COPD and CAD -  Applied for disability -  Has few days per months when using rescue inhaler 4 times a day and Sob with mild exertion -  Other days is able to walk 1-2 miles -  Did not stop daily inhalers  -  F/u with pulmonologist    The following portions of the patient's history were reviewed and updated as appropriate: allergies, current medications, past family history, past medical history, past social history, past surgical history and problem list     Review of Systems   Constitutional: Negative  Respiratory: Negative for cough, choking, chest tightness and wheezing  Cardiovascular: Negative  Gastrointestinal: Negative  Genitourinary: Negative  Musculoskeletal:        Has sciatica pain from R sided low back down to R leg on and off for last few y    Skin: Negative  Neurological: Negative  Current Outpatient Prescriptions   Medication Sig Dispense Refill    albuterol (PROVENTIL HFA,VENTOLIN HFA) 90 mcg/act inhaler Inhale 2 puffs every 6 (six) hours as needed for wheezing 1 Inhaler 6    aspirin 325 mg tablet Take 1 tablet by mouth daily 100 tablet 0    fluticasone-salmeterol (ADVAIR) 250-50 mcg/dose inhaler Inhale 1 puff 2 (two) times a day Rinse mouth after use  1 Inhaler 0    Fluticasone-Salmeterol (AIRDUO RESPICLICK 654/81) 191-20 MCG/ACT AEPB Inhale 1 puff 2 (two) times a day 1 each 5    rosuvastatin (CRESTOR) 20 MG tablet Take 1 tablet (20 mg total) by mouth daily 30 tablet 3    tiotropium (SPIRIVA) 18 mcg inhalation capsule Place 1 capsule (18 mcg total) into inhaler and inhale daily 30 capsule 5     No current facility-administered medications for this visit          Objective:    /80 (BP Location: Right arm, Patient Position: Sitting, Cuff Size: Standard)   Pulse 72   Temp 98 4 °F (36 9 °C) (Tympanic)   Resp 12   Ht 5' 5" (1 651 m)   Wt 78 5 kg (173 lb)   BMI 28 79 kg/m²        Physical Exam   Constitutional: He appears well-nourished  No distress  Cardiovascular: Normal rate and regular rhythm  Exam reveals no gallop  No murmur heard  Pulmonary/Chest: Effort normal  No respiratory distress  He has decreased breath sounds  He has no wheezes  He has no rales  Abdominal: Soft  Musculoskeletal: He exhibits no edema  Neurological: No cranial nerve deficit  Skin: No rash noted  No pallor  Psychiatric: He has a normal mood and affect  Assessment/Plan:         Diagnoses and all orders for this visit:    Chronic obstructive pulmonary disease, unspecified COPD type ([facility] [address] )    Pulmonary hypertension (HCC)    CAD, multiple vessel    Mixed hyperlipidemia    All above are stable      Lumbar radiculopathy  -     Ambulatory referral to Orthopedic Surgery;  Future    Need for influenza vaccination  -     influenza vaccine, [id number], quadrivalent, 0 5 mL, preservative-free (SYRINGE, SINGLE-DOSE VIAL), for adult and pediatric patients 3 yr+ ([facility], [place]          rto in 4 m                   Doris Owen MD

## 2018-09-07 ENCOUNTER — OFFICE VISIT (OUTPATIENT)
Dept: OBGYN CLINIC | Facility: CLINIC | Age: 48
End: 2018-09-07
Payer: COMMERCIAL

## 2018-09-07 VITALS
SYSTOLIC BLOOD PRESSURE: 118 MMHG | HEIGHT: 66 IN | HEART RATE: 74 BPM | WEIGHT: 176 LBS | BODY MASS INDEX: 28.28 KG/M2 | DIASTOLIC BLOOD PRESSURE: 78 MMHG

## 2018-09-07 DIAGNOSIS — M62.838 SPASM OF RIGHT PIRIFORMIS MUSCLE: Primary | ICD-10-CM

## 2018-09-07 DIAGNOSIS — M54.16 LUMBAR RADICULOPATHY: ICD-10-CM

## 2018-09-07 PROCEDURE — 99203 OFFICE O/P NEW LOW 30 MIN: CPT | Performed by: ORTHOPAEDIC SURGERY

## 2018-09-07 NOTE — PROGRESS NOTES
Assessment/Plan:  1  Spasm of right piriformis muscle  Ambulatory referral to Physical Therapy   2  Lumbar radiculopathy  Ambulatory referral to Orthopedic Surgery    Ambulatory referral to Physical Therapy     Fletcher Lorenzo has right-sided buttock pain consistent with piriformis spasm  I think his sciatica is coming more from his buttock region rather than his low back on exam   I would like for him to start physical therapy focusing on this area  I also gave him a handout with stretching exercises he can begin on his own  I will see him back in 6 weeks for repeat evaluation in the future we could consider imaging of the low back if needed  Subjective:   Melly Narayanan is a 50 y o  male who presents for evaluation for right-sided sciatica  He denies any specific injury or fall  He states that he has had this type of discomfort in the buttock radiating down his leg for the past 30 years off and on  He denies any weakness or numbness in his leg today  He states it seems to increase with more activity  He does work out on a regular basis and does a lot of walking  He feels that the pain is a intermittent sharp stabbing sensation in the right buttocks  It does radiate occasionally down his leg into the lateral aspect of the right lower extremity  He denies any numbness into his feet  He denies any difficulty ambulating  He has tried treatment with a chiropractor which has not helped  He has not tried any medications and tries to avoid medications in general   He denies any previous imaging of his low back or hip  Review of Systems   Constitutional: Negative for chills, fever and unexpected weight change  HENT: Negative for hearing loss, nosebleeds and sore throat  Eyes: Negative for pain, redness and visual disturbance  Respiratory: Negative for cough, shortness of breath and wheezing  Cardiovascular: Negative for chest pain, palpitations and leg swelling     Gastrointestinal: Negative for abdominal pain, nausea and vomiting  Endocrine: Positive for polyuria  Negative for polyphagia  Genitourinary: Negative for dysuria and hematuria  Musculoskeletal:        See HPI   Skin: Negative for rash and wound  Neurological: Negative for dizziness, numbness and headaches  Psychiatric/Behavioral: Negative for decreased concentration and suicidal ideas  The patient is not nervous/anxious  Past Medical History:   Diagnosis Date    Chronic sinus complaints     last assessed 12/12/17    Diverticulosis     GERD (gastroesophageal reflux disease)     off medicine for past few years    Heart attack [facility])     last assessed 12/12/17    HTN (hypertension)     Hyperlipidemia     Tobacco use     Vasovagal syncope     last assessed 5/1/13    Wears glasses        Past Surgical History:   Procedure Laterality Date    APPENDECTOMY  1974    COLONOSCOPY N/A 8/14/2017    Procedure: COLONOSCOPY;  Surgeon: Pedro Vazquez MD;  Location: [facility] GI LAB;   Service: Gastroenterology    CORONARY ARTERY BYPASS GRAFT N/A 9/25/2017    Procedure: INTRA OP MATTHEW; CORONARY ARTERY BYPASS GRAFT X 2 WITH SVH TO OM1  AND LIMA TO LAD; LEFT LEG EVH;  Surgeon: Blanca Covington MD;  Location: [facility];  Service: Cardiac Surgery    THUMB AMPUTATION Left 1980       Family History   Problem Relation Age of Onset    Heart disease Father         CABG    Testicular cancer Father         adenocarcinoma in situ of the testis    Heart defect Father         cardiac disorder    Cancer Father     Diabetes type II Father     Heart disease Maternal Grandfather     Hypertension Mother         benign essential    Diabetes Sister     Arthritis Family     Arthritis Other     Heart defect Other         cardiac disorder    Diabetes Other     Cancer Other        Social History     Occupational History          [employer]     Social History Main Topics    Smoking status: Former Smoker     Packs/day: 2 00     Years: 28 00     Types: Cigarettes     Quit date: 9/22/2017    Smokeless tobacco: Never Used      Comment: denied hx of exposure to second hand smoke; former smoker, smoked 2ppd for 30 yrs  pt quit 3 mos ago as per [employer]    Alcohol use No      Comment: rarely; denied hx of social drinker as per [employer]    Drug use: No    Sexual activity: Not on file         Current Outpatient Prescriptions:     albuterol (PROVENTIL HFA,VENTOLIN HFA) 90 mcg/act inhaler, Inhale 2 puffs every 6 (six) hours as needed for wheezing, Disp: 1 Inhaler, Rfl: 6    aspirin 325 mg tablet, Take 1 tablet by mouth daily, Disp: 100 tablet, Rfl: 0    fluticasone-salmeterol (ADVAIR) 250-50 mcg/dose inhaler, Inhale 1 puff 2 (two) times a day Rinse mouth after use , Disp: 1 Inhaler, Rfl: 0    Fluticasone-Salmeterol (AIRDUO RESPICLICK 337/60) 831-81 MCG/ACT AEPB, Inhale 1 puff 2 (two) times a day, Disp: 1 each, Rfl: 5    rosuvastatin (CRESTOR) 20 MG tablet, Take 1 tablet (20 mg total) by mouth daily, Disp: 30 tablet, Rfl: 3    tiotropium (SPIRIVA) 18 mcg inhalation capsule, Place 1 capsule (18 mcg total) into inhaler and inhale daily, Disp: 30 capsule, Rfl: 5    No Known Allergies    Objective:  Vitals:    09/07/18 0810   BP: 118/78   Pulse: 74       Back Exam     Tenderness   Back tenderness location: Right periformis muscle tenderness  Tests   Straight leg raise right: positive  Straight leg raise left: negative    Reflexes   Patellar: normal    Other   Toe Walk: normal  Heel Walk: normal            Physical Exam   Constitutional: He is oriented to person, place, and time  He appears well-developed  HENT:   Head: Normocephalic and atraumatic  Eyes: Conjunctivae are normal    Neck: Neck supple  Cardiovascular: Intact distal pulses  Pulmonary/Chest: Effort normal    Abdominal: Soft  Neurological: He is alert and oriented to person, place, and time  Skin: Skin is warm and dry  Psychiatric: He has a normal mood and affect   His behavior is normal    Vitals reviewed

## 2018-09-20 ENCOUNTER — EVALUATION (OUTPATIENT)
Dept: PHYSICAL THERAPY | Facility: CLINIC | Age: 48
End: 2018-09-20
Payer: COMMERCIAL

## 2018-09-20 DIAGNOSIS — M62.838 SPASM OF RIGHT PIRIFORMIS MUSCLE: ICD-10-CM

## 2018-09-20 DIAGNOSIS — M54.16 LUMBAR RADICULOPATHY: ICD-10-CM

## 2018-09-20 PROCEDURE — G8981 BODY POS CURRENT STATUS: HCPCS | Performed by: PHYSICAL THERAPIST

## 2018-09-20 PROCEDURE — 97161 PT EVAL LOW COMPLEX 20 MIN: CPT | Performed by: PHYSICAL THERAPIST

## 2018-09-20 PROCEDURE — G8982 BODY POS GOAL STATUS: HCPCS | Performed by: PHYSICAL THERAPIST

## 2018-09-20 NOTE — PROGRESS NOTES
PT Evaluation     Today's date: 2018  Patient name: Tiffany Archuleta  : 1970  MRN: [id number]  Referring provider: Annette Branch DO  Dx: No diagnosis found  Assessment  Impairments: abnormal or restricted ROM, impaired physical strength, lacks appropriate home exercise program, pain with function and poor body mechanics    Assessment details: Magali Real with signs and symptoms consistent with Spasm of right piriformis muscle  Lumbar radiculopathy, with loss of range of motion, strength and spinal stabilization  Presents with high reactivity  Tiffany Archuleta would benefit with physical therapy to address these impairments to return to prior level of function  Understanding of Dx/Px/POC: good   Prognosis: fair    Goals  STG  Initiate HEP  Able to walk 3 miles without pain in 3 weeks  LTG  Independent with HEP  Able to sit and drive for 1 hour without pain  Plan  Planned therapy interventions: manual therapy, neuromuscular re-education, patient education, postural training, strengthening, stretching, therapeutic exercise and home exercise program  Frequency: 2x week  Duration in visits: 12  Duration in weeks: 6  Treatment plan discussed with: patient        Subjective Evaluation    History of Present Illness  Mechanism of injury: Patient reports episodes of low back pain 30 years, have treated with chiropractic care, he is an avid exercise person  Walks 3 miles per day    Recurrent probem    Quality of life: good    Pain  Current pain ratin  At best pain ratin  At worst pain ratin  Location: right buttock  Quality: radiating  Relieving factors: change in position  Aggravating factors: sitting  Progression: no change    Social Support  Steps to enter house: yes  Stairs in house: yes   Lives in: multiple-level home    Employment status: not working  Hand dominance: right    Treatments  Current treatment: physical therapy  Patient Goals  Patient goals for therapy: decreased pain, increased motion, increased strength, independence with ADLs/IADLs and return to sport/leisure activities          Objective     Special Questions  Positive for night pain and disturbed sleep  Negative for bladder dysfunction, bowel dysfunction and saddle (S4) numbness    Palpation     Right Tenderness of the iliopsoas       Additional Palpation Details  Extended sacrum    Neurological Testing     Sensation     Lumbar   Left   Intact: light touch, pin prick and sharp/dull discrimination    Right   Intact: light touch, pin prick and sharp/dull discrimination    Active Range of Motion     Lumbar   Flexion: 45 degrees with pain  Extension: 25 degrees     Strength/Myotome Testing     Lumbar   Left   Normal strength    Right   Normal strength      Flowsheet Rows      Most Recent Value   PT/OT G-Codes   Current Score  51   Projected Score  71   FOTO information reviewed  Yes   Assessment Type  Evaluation   G code set  Changing & Maintaining Body Position   Changing and Maintaining Body Position Current Status ()  CJ   Changing and Maintaining Body Position Goal Status ()  CJ          Precautions:COPD, CABG x 3 2017    Daily Treatment Diary     Manual  9/20            STM psoas 5 min            Jt mob sacral flexion 5 min                                                       Exercise Diary  9/20            Pelvic press 10x            Sphynx 10x            Prone press up 10x                                                                                                                                                                                                                                             Modalities

## 2018-09-25 ENCOUNTER — OFFICE VISIT (OUTPATIENT)
Dept: PHYSICAL THERAPY | Facility: CLINIC | Age: 48
End: 2018-09-25
Payer: COMMERCIAL

## 2018-09-25 DIAGNOSIS — M62.838 SPASM OF RIGHT PIRIFORMIS MUSCLE: Primary | ICD-10-CM

## 2018-09-25 DIAGNOSIS — R10.84 GENERALIZED ABDOMINAL PAIN: ICD-10-CM

## 2018-09-25 PROCEDURE — 97110 THERAPEUTIC EXERCISES: CPT | Performed by: PHYSICAL THERAPIST

## 2018-09-25 PROCEDURE — 97112 NEUROMUSCULAR REEDUCATION: CPT | Performed by: PHYSICAL THERAPIST

## 2018-09-25 NOTE — PROGRESS NOTES
Daily Note     Today's date: 2018  Patient name: Chikis Slade  : 1970  MRN: [id number]  Referring provider: Alexsandra Hogan DO  Dx:   Encounter Diagnosis     ICD-10-CM    1  Spasm of right piriformis muscle M62 838    2  Generalized abdominal pain R10 84 Ambulatory referral to Gastroenterology                  Subjective: I feel better after doing the HEP  Objective: See treatment diary below      Assessment: Tolerated treatment well  Patient demonstrated fatigue post treatment and exhibited good technique with therapeutic exercises      Plan: Continue per plan of care        Manual              STM psoas 5 min            Jt mob sacral flexion 5 min                                                       Exercise Diary             Pelvic press 10x            Sphynx 10x            Prone press up 10x            Neurac supine pelvic lift  2x5           Neurac Bridge  2x5           Neurac SDLY hip ABD  2x5           Neurac SDLY Hip ADD  2x5           Neurac Prone Plank  2x3           PB supine pelvic lift  2x5           PB Lower abdominal  2x5           PB trunk rotation  2x10                                                                                                                                    Modalities

## 2018-09-28 ENCOUNTER — OFFICE VISIT (OUTPATIENT)
Dept: PHYSICAL THERAPY | Facility: CLINIC | Age: 48
End: 2018-09-28
Payer: COMMERCIAL

## 2018-09-28 DIAGNOSIS — I25.10 CAD, MULTIPLE VESSEL: ICD-10-CM

## 2018-09-28 DIAGNOSIS — J44.9 CHRONIC OBSTRUCTIVE PULMONARY DISEASE, UNSPECIFIED COPD TYPE (HCC): ICD-10-CM

## 2018-09-28 DIAGNOSIS — M54.16 LUMBAR RADICULOPATHY: Primary | ICD-10-CM

## 2018-09-28 PROCEDURE — 97112 NEUROMUSCULAR REEDUCATION: CPT | Performed by: PHYSICAL THERAPIST

## 2018-09-28 RX ORDER — ROSUVASTATIN CALCIUM 20 MG/1
TABLET, COATED ORAL
Qty: 30 TABLET | Refills: 6 | Status: SHIPPED | OUTPATIENT
Start: 2018-09-28 | End: 2019-05-16 | Stop reason: SDUPTHER

## 2018-09-28 NOTE — PROGRESS NOTES
Daily Note     Today's date: 2018  Patient name: Suzanne Springer  : 1970  MRN: [id number]  Referring provider: Krista Callahan DO  Dx:   Encounter Diagnosis     ICD-10-CM    1  Lumbar radiculopathy M54 16                   Subjective: My sciatica has been flared up the past 2 days  Objective: See treatment diary below      Assessment: Tolerated treatment well  Patient demonstrated fatigue post treatment and exhibited good technique with therapeutic exercises      Plan: Continue per plan of care        Manual              STM psoas 5 min            Jt mob sacral flexion 5 min                                                       Exercise Diary            Pelvic press 10x            Sphynx 10x            Prone press up 10x            Neurac supine pelvic lift  2x5 2x5          Neurac Bridge  2x5 2x5          Neurac SDLY hip ABD  2x5 2x5          Neurac SDLY Hip ADD  2x5 2x5          Neurac Prone Plank  2x3 2x5          PB supine pelvic lift  2x5           PB Lower abdominal  2x5           PB trunk rotation  2x10           Neurac prone setting   [id number]                                                                                                                      Modalities

## 2018-10-01 ENCOUNTER — OFFICE VISIT (OUTPATIENT)
Dept: PHYSICAL THERAPY | Facility: CLINIC | Age: 48
End: 2018-10-01
Payer: COMMERCIAL

## 2018-10-01 DIAGNOSIS — M54.16 LUMBAR RADICULOPATHY: Primary | ICD-10-CM

## 2018-10-01 PROCEDURE — 97110 THERAPEUTIC EXERCISES: CPT | Performed by: PHYSICAL THERAPIST

## 2018-10-01 PROCEDURE — 97112 NEUROMUSCULAR REEDUCATION: CPT | Performed by: PHYSICAL THERAPIST

## 2018-10-01 NOTE — PROGRESS NOTES
Daily Note     Today's date: 10/1/2018  Patient name: Peter Sterling  : 1970  MRN: [id number]  Referring provider: Sonya Alvarez DO  Dx:   Encounter Diagnosis     ICD-10-CM    1  Lumbar radiculopathy M54 16                   Subjective: I still have pain after sitting  Objective: See treatment diary below      Assessment: Tolerated treatment well  Patient demonstrated fatigue post treatment and exhibited good technique with therapeutic exercises      Plan: Continue per plan of care        Manual              STM psoas 5 min            Jt mob sacral flexion 5 min                                                       Exercise Diary  9/20 9/25 9/28 10/1         Pelvic press 10x            Sphynx 10x            Prone press up 10x            Neurac supine pelvic lift  2x5 2x5          Neurac Bridge  2x5 2x5          Neurac SDLY hip ABD  2x5 2x5          Neurac SDLY Hip ADD  2x5 2x5          Neurac Prone Plank  2x3 2x5          PB supine pelvic lift  2x5  20x         PB Lower abdominal  2x5  20x         PB trunk rotation  2x10  20x         Neurac prone setting   [id number]          PB Piriformis stretch    10x         PB Supine bridge back stroke    10x         PB Hip EXT    10x         PB Lift oposites    10x         ITB supine stretch    10x                                                    Modalities

## 2018-10-02 ENCOUNTER — OFFICE VISIT (OUTPATIENT)
Dept: OBGYN CLINIC | Facility: CLINIC | Age: 48
End: 2018-10-02
Payer: COMMERCIAL

## 2018-10-02 ENCOUNTER — OFFICE VISIT (OUTPATIENT)
Dept: FAMILY MEDICINE CLINIC | Facility: CLINIC | Age: 48
End: 2018-10-02
Payer: COMMERCIAL

## 2018-10-02 VITALS
TEMPERATURE: 97.7 F | RESPIRATION RATE: 16 BRPM | DIASTOLIC BLOOD PRESSURE: 80 MMHG | SYSTOLIC BLOOD PRESSURE: 140 MMHG | HEART RATE: 76 BPM | BODY MASS INDEX: 27.97 KG/M2 | HEIGHT: 66 IN | WEIGHT: 174 LBS

## 2018-10-02 VITALS
SYSTOLIC BLOOD PRESSURE: 134 MMHG | HEIGHT: 66 IN | BODY MASS INDEX: 27.32 KG/M2 | DIASTOLIC BLOOD PRESSURE: 88 MMHG | HEART RATE: 100 BPM | WEIGHT: 170 LBS

## 2018-10-02 DIAGNOSIS — M54.16 LUMBAR RADICULOPATHY: ICD-10-CM

## 2018-10-02 DIAGNOSIS — M54.16 LUMBAR RADICULOPATHY: Primary | ICD-10-CM

## 2018-10-02 DIAGNOSIS — M62.838 SPASM OF RIGHT PIRIFORMIS MUSCLE: Primary | ICD-10-CM

## 2018-10-02 PROCEDURE — 99213 OFFICE O/P EST LOW 20 MIN: CPT | Performed by: ORTHOPAEDIC SURGERY

## 2018-10-02 PROCEDURE — 99214 OFFICE O/P EST MOD 30 MIN: CPT | Performed by: FAMILY MEDICINE

## 2018-10-02 RX ORDER — CYCLOBENZAPRINE HCL 10 MG
10 TABLET ORAL 3 TIMES DAILY PRN
Qty: 30 TABLET | Refills: 0 | Status: SHIPPED | OUTPATIENT
Start: 2018-10-02 | End: 2019-03-21 | Stop reason: ALTCHOICE

## 2018-10-02 RX ORDER — PREDNISONE 20 MG/1
20 TABLET ORAL
Qty: 15 TABLET | Refills: 0 | Status: SHIPPED | OUTPATIENT
Start: 2018-10-02 | End: 2018-10-07

## 2018-10-02 NOTE — PROGRESS NOTES
Chief Complaint   Patient presents with    Back Pain        Patient ID: Wilburn Councilman is a 50 y o  male  HPI  Pt is seeing for f/u R side low back pain with radiation down to R leg started 1 m ago -  Was seen by sport medicine doctor -  Sent for PT -  Went 3 times, not better -  Not taking any meds  -  On  mg for CAD     The following portions of the patient's history were reviewed and updated as appropriate: allergies, current medications, past family history, past medical history, past social history, past surgical history and problem list     Review of Systems   Constitutional: Negative  Respiratory: Negative  Cardiovascular: Negative  Musculoskeletal: Positive for back pain  Negative for arthralgias and gait problem  Skin: Negative  Neurological: Negative  Current Outpatient Prescriptions   Medication Sig Dispense Refill    albuterol (PROVENTIL HFA,VENTOLIN HFA) 90 mcg/act inhaler Inhale 2 puffs every 6 (six) hours as needed for wheezing 1 Inhaler 6    aspirin 325 mg tablet Take 1 tablet by mouth daily 100 tablet 0    fluticasone-salmeterol (ADVAIR) 250-50 mcg/dose inhaler Inhale 1 puff 2 (two) times a day Rinse mouth after use  1 Inhaler 0    rosuvastatin (CRESTOR) 20 MG tablet TAKE ONE TABLET BY MOUTH EVERY DAY 30 tablet 6    tiotropium (SPIRIVA) 18 mcg inhalation capsule Place 1 capsule (18 mcg total) into inhaler and inhale daily 30 capsule 5    cyclobenzaprine (FLEXERIL) 10 mg tablet Take 1 tablet (10 mg total) by mouth 3 (three) times a day as needed for muscle spasms 30 tablet 0     No current facility-administered medications for this visit  Objective:    /80 (BP Location: Left arm, Patient Position: Sitting, Cuff Size: Standard)   Pulse 76   Temp 97 7 °F (36 5 °C) (Tympanic)   Resp 16   Ht 5' 6" (1 676 m)   Wt 78 9 kg (174 lb)   BMI 28 08 kg/m²        Physical Exam   Constitutional: No distress  Musculoskeletal: He exhibits tenderness  Lumbar back: He exhibits decreased range of motion, pain and spasm  He exhibits no swelling  R side    Neurological: No cranial nerve deficit  Skin: No rash noted  No pallor  Assessment/Plan:         Diagnoses and all orders for this visit:    Lumbar radiculopathy  -     cyclobenzaprine (FLEXERIL) 10 mg tablet;  Take 1 tablet (10 mg total) by mouth 3 (three) times a day as needed for muscle spasms          Was advised to cont PT and f/u with ortho group     rto prn                 Nestor Cornejo MD

## 2018-10-02 NOTE — PROGRESS NOTES
Assessment/Plan:  1  Spasm of right piriformis muscle     2  Lumbar radiculopathy  predniSONE 20 mg tablet       Moshe Fay continues to have right sided sciatica which I do believe is located with his piriformis  Is not clearly coming from the lumbar spine he does not have a reproducible shooting pain other than direct palpation  I advised him to continue with physical therapy  We will try to add a short course of steroids as well as the muscle relaxer to help  I will see him back after physical therapy to see if he is improving  If he has continued pain at that time we would consider x-ray or MRI if indicated  Subjective:   Vidhya Ni is a 50 y o  male who presents for follow-up for right-sided buttock discomfort  I saw him about 1 month ago with symptoms consistent with piriformis syndrome  He has been doing physical therapy but has only attended 3 sessions to this point  He states that the therapy helps only at that time and then the pain worsens afterwards  He is having increased discomfort in the past few days  He did see his primary care physician who prescribed a muscle relaxer which he has not yet taken  He denies any new injury  He denies any weakness in his right lower extremity  Review of Systems   Constitutional: Negative for chills, fever and unexpected weight change  HENT: Negative for hearing loss, nosebleeds and sore throat  Eyes: Negative for pain, redness and visual disturbance  Respiratory: Negative for cough, shortness of breath and wheezing  Cardiovascular: Negative for chest pain, palpitations and leg swelling  Gastrointestinal: Negative for abdominal pain, nausea and vomiting  Endocrine: Negative for polyphagia and polyuria  Genitourinary: Negative for dysuria and hematuria  Musculoskeletal:        See HPI   Skin: Negative for rash and wound  Neurological: Negative for dizziness, numbness and headaches     Psychiatric/Behavioral: Negative for decreased concentration and suicidal ideas  The patient is not nervous/anxious  Past Medical History:   Diagnosis Date    Chronic sinus complaints     last assessed 12/12/17    Diverticulosis     GERD (gastroesophageal reflux disease)     off medicine for past few years    Heart attack [facility])     last assessed 12/12/17    HTN (hypertension)     Hyperlipidemia     Tobacco use     Vasovagal syncope     last assessed 5/1/13    Wears glasses        Past Surgical History:   Procedure Laterality Date    APPENDECTOMY  1974    COLONOSCOPY N/A 8/14/2017    Procedure: COLONOSCOPY;  Surgeon: Toni Alvarez MD;  Location[facility] GI LAB; Service: Gastroenterology    CORONARY ARTERY BYPASS GRAFT N/A 9/25/2017    Procedure: INTRA OP MATTHEW; CORONARY ARTERY BYPASS GRAFT X 2 WITH SVH TO OM1  AND LIMA TO LAD; LEFT LEG EVH;  Surgeon: Ish Metcalf MD;  Location: [facility];  Service: Cardiac Surgery    THUMB AMPUTATION Left 1980       Family History   Problem Relation Age of Onset    Heart disease Father         CABG    Testicular cancer Father         adenocarcinoma in situ of the testis    Heart defect Father         cardiac disorder    Cancer Father     Diabetes type II Father     Heart disease Maternal Grandfather     Hypertension Mother         benign essential    Diabetes Sister     Arthritis Family     Arthritis Other     Heart defect Other         cardiac disorder    Diabetes Other     Cancer Other        Social History     Occupational History    [employer]     Social History Main Topics    Smoking status: Former Smoker     Packs/day: 2 00     Years: 28 00     Types: Cigarettes     Quit date: 9/22/2017    Smokeless tobacco: Never Used      Comment: denied hx of exposure to second hand smoke; former smoker, smoked 2ppd for 30 yrs   pt quit 3 mos ago as per [employer]    Alcohol use No      Comment: rarely; denied hx of social drinker as per Allscripts    Drug use: No    Sexual activity: Not on file         Current Outpatient Prescriptions:     albuterol (PROVENTIL HFA,VENTOLIN HFA) 90 mcg/act inhaler, Inhale 2 puffs every 6 (six) hours as needed for wheezing, Disp: 1 Inhaler, Rfl: 6    aspirin 325 mg tablet, Take 1 tablet by mouth daily, Disp: 100 tablet, Rfl: 0    cyclobenzaprine (FLEXERIL) 10 mg tablet, Take 1 tablet (10 mg total) by mouth 3 (three) times a day as needed for muscle spasms, Disp: 30 tablet, Rfl: 0    fluticasone-salmeterol (ADVAIR) 250-50 mcg/dose inhaler, Inhale 1 puff 2 (two) times a day Rinse mouth after use , Disp: 1 Inhaler, Rfl: 0    rosuvastatin (CRESTOR) 20 MG tablet, TAKE ONE TABLET BY MOUTH EVERY DAY, Disp: 30 tablet, Rfl: 6    tiotropium (SPIRIVA) 18 mcg inhalation capsule, Place 1 capsule (18 mcg total) into inhaler and inhale daily, Disp: 30 capsule, Rfl: 5    predniSONE 20 mg tablet, Take 1 tablet (20 mg total) by mouth 3 (three) times a day with meals for 5 days, Disp: 15 tablet, Rfl: 0    No Known Allergies    Objective:  Vitals:    10/02/18 1533   BP: 134/88   Pulse: 100       Right Hip Exam     Tenderness   The patient is experiencing no tenderness  Range of Motion   The patient has normal right hip ROM  Right hip external rotation: Pain with external rotation  Muscle Strength   The patient has normal right hip strength  Tests   MARY: positive      Back Exam     Tenderness   Back tenderness location: Pinpoint tenderness right piriformis  No tenderness to the lumbar spine or SI joint  Muscle Strength   The patient has normal back strength  Tests   Straight leg raise right: negative  Straight leg raise left: negative    Other   Sensation: normal  Gait: normal             Physical Exam   Constitutional: He is oriented to person, place, and time  He appears well-developed  HENT:   Head: Normocephalic and atraumatic  Eyes: Conjunctivae are normal    Neck: Neck supple  Cardiovascular: Intact distal pulses  Pulmonary/Chest: Effort normal    Neurological: He is alert and oriented to person, place, and time  Skin: Skin is warm and dry  Psychiatric: He has a normal mood and affect  His behavior is normal    Vitals reviewed

## 2018-10-03 ENCOUNTER — OFFICE VISIT (OUTPATIENT)
Dept: PHYSICAL THERAPY | Facility: CLINIC | Age: 48
End: 2018-10-03
Payer: COMMERCIAL

## 2018-10-03 DIAGNOSIS — M54.16 LUMBAR RADICULOPATHY: Primary | ICD-10-CM

## 2018-10-03 PROCEDURE — 97110 THERAPEUTIC EXERCISES: CPT | Performed by: PHYSICAL THERAPIST

## 2018-10-03 PROCEDURE — 97112 NEUROMUSCULAR REEDUCATION: CPT | Performed by: PHYSICAL THERAPIST

## 2018-10-03 NOTE — PROGRESS NOTES
Daily Note     Today's date: 10/3/2018  Patient name: Calixto Saavedra  : 1970  MRN: [id number]  Referring provider: Horace Seals DO  Dx:   Encounter Diagnosis     ICD-10-CM    1  Lumbar radiculopathy M54 16                   Subjective: Still with low back pain, Dr Hugh Dickerson started me on pregnisone  Objective: See treatment diary below      Assessment: Tolerated treatment well  Patient demonstrated fatigue post treatment and exhibited good technique with therapeutic exercises      Plan: Continue per plan of care  Daily Note     Today's date: 10/3/2018  Patient name: Calixto Saavedra  : 1970  MRN: [id number]  Referring provider: Horace Seals DO  Dx:   Encounter Diagnosis     ICD-10-CM    1  Lumbar radiculopathy M54 16                   Subjective: I still have pain after sitting  Objective: See treatment diary below      Assessment: Tolerated treatment well  Patient demonstrated fatigue post treatment and exhibited good technique with therapeutic exercises, highly motivated with core strengthening  Plan: Continue per plan of care        Manual              STM psoas 5 min            Jt mob sacral flexion 5 min                                                       Exercise Diary  9/20 9/25 9/28 10/1 10/3        Pelvic press 10x            Sphynx 10x            Prone press up 10x            Neurac supine pelvic lift  2x5 2x5          Neurac Bridge  2x5 2x5          Neurac SDLY hip ABD  2x5 2x5          Neurac SDLY Hip ADD  2x5 2x5          Neurac Prone Plank  2x3 2x5          PB supine pelvic lift  2x5  20x         PB Lower abdominal  2x5  20x         PB trunk rotation  2x10  20x         Neurac prone setting   [id number]          PB Piriformis stretch    10x         PB Supine bridge back stroke    10x         PB Hip EXT    10x         PB Lift oposites    10x         ITB supine stretch    10x         Vivek abdominal stability     #15 4 x 15        Port Carbon lo to hi     #9 2x15 Vivek hi to lo      #10 2x15            Shoulder wheel     2x10        Plank wheel     2x10        Hip Ext on inverted bosu     2x5        Procur back stretch     2x5

## 2018-10-07 ENCOUNTER — HOSPITAL ENCOUNTER (EMERGENCY)
Facility: HOSPITAL | Age: 48
Discharge: HOME | End: 2018-10-07
Attending: EMERGENCY MEDICINE | Admitting: EMERGENCY MEDICINE
Payer: COMMERCIAL

## 2018-10-07 ENCOUNTER — APPOINTMENT (EMERGENCY)
Dept: RADIOLOGY | Facility: HOSPITAL | Age: 48
End: 2018-10-07
Payer: COMMERCIAL

## 2018-10-07 VITALS
RESPIRATION RATE: 22 BRPM | HEART RATE: 121 BPM | OXYGEN SATURATION: 95 % | DIASTOLIC BLOOD PRESSURE: 80 MMHG | TEMPERATURE: 98.6 F | SYSTOLIC BLOOD PRESSURE: 110 MMHG

## 2018-10-07 DIAGNOSIS — M54.30 SCIATICA: Primary | ICD-10-CM

## 2018-10-07 PROCEDURE — 99284 EMERGENCY DEPT VISIT MOD MDM: CPT

## 2018-10-07 PROCEDURE — 96374 THER/PROPH/DIAG INJ IV PUSH: CPT

## 2018-10-07 PROCEDURE — 72131 CT LUMBAR SPINE W/O DYE: CPT

## 2018-10-07 PROCEDURE — 96375 TX/PRO/DX INJ NEW DRUG ADDON: CPT

## 2018-10-07 RX ORDER — METHOCARBAMOL 500 MG/1
500 TABLET, FILM COATED ORAL 2 TIMES DAILY
Qty: 10 TABLET | Refills: 0 | Status: SHIPPED | OUTPATIENT
Start: 2018-10-07 | End: 2019-07-18

## 2018-10-07 RX ORDER — FAMOTIDINE 20 MG/1
20 TABLET, FILM COATED ORAL 2 TIMES DAILY
Qty: 14 TABLET | Refills: 0 | Status: SHIPPED | OUTPATIENT
Start: 2018-10-07 | End: 2019-01-16

## 2018-10-07 RX ORDER — NAPROXEN 500 MG/1
500 TABLET ORAL 2 TIMES DAILY WITH MEALS
Qty: 10 TABLET | Refills: 0 | Status: SHIPPED | OUTPATIENT
Start: 2018-10-07 | End: 2019-07-18

## 2018-10-07 RX ORDER — HYDROMORPHONE HCL/PF 1 MG/ML
0.5 SYRINGE (ML) INJECTION ONCE
Status: COMPLETED | OUTPATIENT
Start: 2018-10-07 | End: 2018-10-07

## 2018-10-07 RX ORDER — KETOROLAC TROMETHAMINE 30 MG/ML
15 INJECTION, SOLUTION INTRAMUSCULAR; INTRAVENOUS ONCE
Status: COMPLETED | OUTPATIENT
Start: 2018-10-07 | End: 2018-10-07

## 2018-10-07 RX ADMIN — HYDROMORPHONE HYDROCHLORIDE 0.5 MG: 1 INJECTION, SOLUTION INTRAMUSCULAR; INTRAVENOUS; SUBCUTANEOUS at 17:00

## 2018-10-07 RX ADMIN — KETOROLAC TROMETHAMINE 15 MG: 30 INJECTION, SOLUTION INTRAMUSCULAR at 16:49

## 2018-10-07 NOTE — DISCHARGE INSTRUCTIONS
Sciatica   WHAT YOU NEED TO KNOW:   Sciatica is a condition that causes pain along your sciatic nerve  The sciatic nerve runs from your spine through both sides of your buttocks  It then runs down the back of your thigh, into your lower leg and foot  Your sciatic nerve may be compressed, inflamed, irritated, or stretched  DISCHARGE INSTRUCTIONS:   Medicines:   · NSAIDs:  These medicines decrease swelling and pain  NSAIDs are available without a doctor's order  Ask your healthcare provider which medicine is right for you  Ask how much to take and when to take it  Take as directed  NSAIDs can cause stomach bleeding or kidney problems if not taken correctly  · Acetaminophen: This medicine decreases pain  Acetaminophen is available without a doctor's order  Ask how much to take and when to take it  Follow directions  Acetaminophen can cause liver damage if not taken correctly  · Muscle relaxers  help decrease pain and muscle spasms  · Take your medicine as directed  Contact your healthcare provider if you think your medicine is not helping or if you have side effects  Tell him of her if you are allergic to any medicine  Keep a list of the medicines, vitamins, and herbs you take  Include the amounts, and when and why you take them  Bring the list or the pill bottles to follow-up visits  Carry your medicine list with you in case of an emergency  Follow up with your healthcare provider as directed:  Write down your questions so you remember to ask them during your visits  Manage your symptoms:   · Activity:  Decrease your activity  Do not lift heavy objects or twist your back for at least 6 weeks  Slowly return to your usual activity  · Ice:  Ice helps decrease swelling and pain  Ice may also help prevent tissue damage  Use an ice pack, or put crushed ice in a plastic bag  Cover it with a towel and place it on your low back or leg for 15 to 20 minutes every hour or as directed      · Heat:  Heat helps decrease pain and muscle spasms  Apply heat on the area for 20 to 30 minutes every 2 hours for as many days as directed  · Physical therapy:  You may need to see physical therapist to teach you exercises to help improve movement and strength, and to decrease pain  An occupational therapist teaches you skills to help with your daily activities  · Use assistive devices if directed: You may need to wear back support, such as a back brace  You may need crutches, a cane, or a walker to decrease stress on your lower back and leg muscles  Ask your healthcare provider for more information about assistive devices and how to use them correctly  Self-care:   · Avoid pressure on your back and legs:  Do not  lift heavy objects, or stand or sit for long periods of time  · Lift objects safely:  Keep your back straight and bend your knees when you  an object  Do not bend or twist your back when you lift  · Maintain a healthy weight:  Ask your healthcare provider how much you should weigh  Ask him to help you create a weight loss plan if you are overweight  · Exercise:  Ask your healthcare provider about the best stretching, warmup, and exercise plan for you  Contact your healthcare provider if:   · You have pain in your lower back at night or when resting  · You have pain in your lower back with numbness below the knee  · You have weakness in one leg only  · You have questions or concerns about your condition or care  Return to the emergency department if:   · You have trouble holding back your urine or bowel movements  · You have weakness in both legs  · You have numbness in your groin or buttocks  © 2017 [address] Information is for End User's use only and may not be sold, redistributed or otherwise used for commercial purposes  All illustrations and images included in CareNotes® are the copyrighted property of [employer]  or Neftaly Larose    The above information is an  only  It is not intended as medical advice for individual conditions or treatments  Talk to your doctor, nurse or pharmacist before following any medical regimen to see if it is safe and effective for you

## 2018-10-07 NOTE — ED PROVIDER NOTES
History  Chief Complaint   Patient presents with    Back Pain     Patient states that he has pain in the right side that shoots down his right leg and his toes are numb  Started a week ago and was seen by pcp       41-year-old male presents with right-sided lower back pain radiating down his right lower extremity that started over 3 weeks ago getting progressively worse  He has seen his family doctor and also orthopedic doctor who referred them to physical therapy and gave him Flexeril which is not helping the pain  He denies saddle anesthesia no urinary retention bowel incontinence urinary incontinence dysuria urgency frequency abdominal pain chest pain dyspnea or any other symptoms  He does have numbness of his 2nd and 3rd toes  No trauma noted  History provided by:  Patient   used: No        Prior to Admission Medications   Prescriptions Last Dose Informant Patient Reported? Taking? albuterol (PROVENTIL HFA,VENTOLIN HFA) 90 mcg/act inhaler  Self No No   Sig: Inhale 2 puffs every 6 (six) hours as needed for wheezing   aspirin 325 mg tablet  Self No No   Sig: Take 1 tablet by mouth daily   cyclobenzaprine (FLEXERIL) 10 mg tablet   No No   Sig: Take 1 tablet (10 mg total) by mouth 3 (three) times a day as needed for muscle spasms   fluticasone-salmeterol (ADVAIR) 250-50 mcg/dose inhaler  Self No No   Sig: Inhale 1 puff 2 (two) times a day Rinse mouth after use     predniSONE 20 mg tablet   No No   Sig: Take 1 tablet (20 mg total) by mouth 3 (three) times a day with meals for 5 days   rosuvastatin (CRESTOR) 20 MG tablet  Self No No   Sig: TAKE ONE TABLET BY MOUTH EVERY DAY   tiotropium (SPIRIVA) 18 mcg inhalation capsule  Self No No   Sig: Place 1 capsule (18 mcg total) into inhaler and inhale daily      Facility-Administered Medications: None       Past Medical History:   Diagnosis Date    Chronic sinus complaints     last assessed 12/12/17    Diverticulosis     GERD (gastroesophageal reflux disease)     off medicine for past few years    [facility])     last assessed 12/12/17    HTN (hypertension)     Hyperlipidemia     Tobacco use     Vasovagal syncope     last assessed 5/1/13    Wears glasses        Past Surgical History:   Procedure Laterality Date    APPENDECTOMY  1974    COLONOSCOPY N/A 8/14/2017    Procedure: COLONOSCOPY;  Surgeon: Americo Tinajero MD;  Location: [place] [address] GI LAB; Service: Gastroenterology    CORONARY ARTERY BYPASS GRAFT N/A 9/25/2017    Procedure: INTRA OP MATTHEW; CORONARY ARTERY BYPASS GRAFT X 2 WITH SVH TO OM1  AND LIMA TO LAD; LEFT LEG EVH;  Surgeon: Jaspal Combs MD;  Location: [facility] OR;  Service: Cardiac Surgery    THUMB AMPUTATION Left 1980       Family History   Problem Relation Age of Onset    Heart disease Father         CABG    Testicular cancer Father         adenocarcinoma in situ of the testis    Heart defect Father         cardiac disorder    Cancer Father     Diabetes type II Father     Heart disease Maternal Grandfather     Hypertension Mother         benign essential    Diabetes Sister     Arthritis Family     Arthritis Other     Heart defect Other         cardiac disorder    Diabetes Other     Cancer Other      I have reviewed and agree with the history as documented  Social History   Substance Use Topics    Smoking status: Former Smoker     Packs/day: 2 00     Years: 28 00     Types: Cigarettes     Quit date: 9/22/2017    Smokeless tobacco: Never Used      Comment: denied hx of exposure to second hand smoke; former smoker, smoked 2ppd for 30 yrs  pt quit 3 mos ago as per [employer]    Alcohol use No      Comment: rarely; denied hx of social drinker as per [employer]        Review of Systems   All other systems reviewed and are negative  Physical Exam  Physical Exam   Constitutional: He is oriented to person, place, and time  He appears well-developed and well-nourished     HENT:   Head: Normocephalic and atraumatic  Eyes: Pupils are equal, round, and reactive to light  EOM are normal    Neck: Normal range of motion  Neck supple  Cardiovascular: Normal rate and regular rhythm  Pulmonary/Chest: Effort normal and breath sounds normal    Abdominal: Soft  Bowel sounds are normal    Musculoskeletal: Normal range of motion  Right-sided lumbar paravertebral tenderness noted  No midline tenderness noted  No step-offs  Positive straight leg testing on the right side  Neurological: He is alert and oriented to person, place, and time  Skin: Skin is warm and dry  Psychiatric: He has a normal mood and affect  Nursing note and vitals reviewed  Vital Signs  ED Triage Vitals   Temperature Pulse Respirations Blood Pressure SpO2   10/07/18 1605 10/07/18 1605 10/07/18 1605 10/07/18 1606 10/07/18 1605   98 6 °F (37 °C) (!) 121 22 110/80 95 %      Temp Source Heart Rate Source Patient Position - Orthostatic VS BP Location FiO2 (%)   10/07/18 1605 -- -- -- --   Tympanic          Pain Score       10/07/18 1605       Worst Possible Pain           Vitals:    10/07/18 1605 10/07/18 1606   BP:  110/80   Pulse: (!) 121        Visual Acuity      ED Medications  Medications   ketorolac (TORADOL) injection 15 mg (15 mg Intravenous Given 10/7/18 1649)   HYDROmorphone (DILAUDID) injection 0 5 mg (0 5 mg Intravenous Given 10/7/18 1700)       Diagnostic Studies  Results Reviewed     None                 CT spine lumbar without contrast   Final Result by Juan Vargas DO (10/07 1728)      Small focal right paracentral disc protrusion at L5-S1 abutting and posteriorly displacing the right S1 nerve  Correlate for right S1 radiculopathy           Workstation performed: [id number]                    Procedures  Procedures       Phone Contacts  ED Phone Contact    ED Course                               MDM  Number of Diagnoses or Management Options  Sciatica:   Diagnosis management comments: Diagnostic evaluation:  Evaluated the patient with a CT scan of the lumbar spine, results reviewed and discussed with the patient at length  Therapeutic evaluation:  I gave the patient IV pain medications in the ED, improved his symptoms  Counseled him follow up with Spine surgery  I strictly instructed to return to the ED with any symptoms consistent with saddle anesthesia bowel incontinence or retention  He verbalized understanding instructions had no further questions the time of discharge  Amount and/or Complexity of Data Reviewed  Clinical lab tests: ordered and reviewed  Tests in the radiology section of CPT®: ordered and reviewed  Tests in the medicine section of CPT®: ordered and reviewed    Patient Progress  Patient progress: stable    CritCare Time    Disposition  Final diagnoses:   Sciatica     Time reflects when diagnosis was documented in both MDM as applicable and the Disposition within this note     Time User Action Codes Description Comment    10/7/2018  5:58 PM Dave Rockwell Add [M54 30] Sciatica       ED Disposition     ED Disposition Condition Comment    Discharge  Nay Rosa discharge to home/self care      Condition at discharge: Stable        Follow-up Information     Follow up With Specialties Details Why Contact Info Additional Information    Gaviota Pruitt MD Family Medicine Schedule an appointment as soon as possible for a visit  [address] [facility] [address] [facility] Emergency Department Emergency Medicine  If symptoms worsen [address] [facility], [place], [place], [address]          Discharge Medication List as of 10/7/2018  5:58 PM      START taking these medications    Details   famotidine (PEPCID) 20 mg tablet Take 1 tablet (20 mg total) by mouth 2 (two) times a day for 7 days, Starting Sun 10/7/2018, Until Sun 10/14/2018, Print      methocarbamol (ROBAXIN) 500 mg tablet Take 1 tablet (500 mg total) by mouth 2 (two) times a day for 5 days, Starting Sun 10/7/2018, Until Fri 10/12/2018, Print      naproxen (NAPROSYN) 500 mg tablet Take 1 tablet (500 mg total) by mouth 2 (two) times a day with meals for 5 days, Starting Sun 10/7/2018, Until Fri 10/12/2018, Print         CONTINUE these medications which have NOT CHANGED    Details   albuterol (PROVENTIL HFA,VENTOLIN HFA) 90 mcg/act inhaler Inhale 2 puffs every 6 (six) hours as needed for wheezing, Starting Mon 7/16/2018, Normal      aspirin 325 mg tablet Take 1 tablet by mouth daily, Starting Fri 9/29/2017, Normal      cyclobenzaprine (FLEXERIL) 10 mg tablet Take 1 tablet (10 mg total) by mouth 3 (three) times a day as needed for muscle spasms, Starting Tue 10/2/2018, Normal      fluticasone-salmeterol (ADVAIR) 250-50 mcg/dose inhaler Inhale 1 puff 2 (two) times a day Rinse mouth after use , Starting Mon 7/16/2018, Normal      predniSONE 20 mg tablet Take 1 tablet (20 mg total) by mouth 3 (three) times a day with meals for 5 days, Starting Tue 10/2/2018, Until Sun 10/7/2018, Normal      rosuvastatin (CRESTOR) 20 MG tablet TAKE ONE TABLET BY MOUTH EVERY DAY, Normal      tiotropium (SPIRIVA) 18 mcg inhalation capsule Place 1 capsule (18 mcg total) into inhaler and inhale daily, Starting Mon 7/2/2018, Normal           No discharge procedures on file      ED Provider  Electronically Signed by           Verona Cox DO  10/07/18 5526

## 2018-10-08 ENCOUNTER — TELEPHONE (OUTPATIENT)
Dept: ADMINISTRATIVE | Facility: OTHER | Age: 48
End: 2018-10-08

## 2018-10-08 ENCOUNTER — APPOINTMENT (OUTPATIENT)
Dept: PHYSICAL THERAPY | Facility: CLINIC | Age: 48
End: 2018-10-08
Payer: COMMERCIAL

## 2018-10-08 NOTE — TELEPHONE ENCOUNTER
LMOM for patient to call [facility]  He needs a follow up appointment for this ED visit  ED visit documented in ED log

## 2018-12-10 ENCOUNTER — OFFICE VISIT (OUTPATIENT)
Dept: FAMILY MEDICINE CLINIC | Facility: CLINIC | Age: 48
End: 2018-12-10
Payer: COMMERCIAL

## 2018-12-10 VITALS
DIASTOLIC BLOOD PRESSURE: 70 MMHG | SYSTOLIC BLOOD PRESSURE: 102 MMHG | HEART RATE: 72 BPM | WEIGHT: 178 LBS | TEMPERATURE: 98.2 F | BODY MASS INDEX: 28.73 KG/M2 | RESPIRATION RATE: 14 BRPM

## 2018-12-10 DIAGNOSIS — J06.9 ACUTE URI: Primary | ICD-10-CM

## 2018-12-10 PROCEDURE — 1036F TOBACCO NON-USER: CPT | Performed by: NURSE PRACTITIONER

## 2018-12-10 PROCEDURE — 99213 OFFICE O/P EST LOW 20 MIN: CPT | Performed by: NURSE PRACTITIONER

## 2018-12-10 RX ORDER — AZITHROMYCIN 250 MG/1
TABLET, FILM COATED ORAL
Qty: 6 TABLET | Refills: 0 | Status: SHIPPED | OUTPATIENT
Start: 2018-12-10 | End: 2018-12-14

## 2018-12-10 NOTE — PROGRESS NOTES
Assessment:      URI      Plan:      Discussed diagnosis and treatment of URI  Suggested symptomatic OTC remedies  Nasal saline spray for congestion  Zithromax per orders  Follow up as needed  Call in 7 days if symptoms aren't resolving  Subjective:       History was provided by the patient  Melissa Hussein is a 46 y o  male who presents for evaluation of symptoms of a URI  Symptoms include chest congestion, myalgias, productive cough, sinus and nasal congestion and swollen glands  Onset of symptoms was 8 days ago, gradually worsening since that time  Denies  lightheadedness, shortness of breath, sore throat and wheezing  He is drinking moderate amounts of fluids  Evaluation to date: none  Treatment to date: none Former smoker  +copd  Denies sick contacts  The following portions of the patient's history were reviewed and updated as appropriate: allergies, current medications, past family history, past medical history, past social history, past surgical history and problem list     Review of Systems  Pertinent items are noted in HPI        Objective:      /70 (BP Location: Left arm, Patient Position: Sitting, Cuff Size: Adult)   Pulse 72   Temp 98 2 °F (36 8 °C) (Temporal)   Resp 14   Wt 80 7 kg (178 lb)   BMI 28 73 kg/m²   General appearance: alert and oriented, in no acute distress  Head: Normocephalic, without obvious abnormality, sinuses nontender to percussion  Ears: normal TM's and external ear canals both ears  Nose: no discharge, turbinates red  Throat: abnormal findings: mild oropharyngeal erythema  Lungs: clear to auscultation bilaterally  Heart: regular rate and rhythm, S1, S2 normal, no murmur, click, rub or gallop  Pulses: 2+ and symmetric  Lymph nodes: Cervical adenopathy: none  Neurologic: Grossly normal

## 2018-12-10 NOTE — PATIENT INSTRUCTIONS
Upper Respiratory Infection   AMBULATORY CARE:   An upper respiratory infection  is also called a common cold  It can affect your nose, throat, ears, and sinuses  Common signs and symptoms include the following:  Cold symptoms are usually worst for the first 3 to 5 days  You may have any of the following:  · Runny or stuffy nose    · Sneezing and coughing    · Sore throat or hoarseness    · Red, watery, and sore eyes    · Fatigue     · Chills and fever    · Headache, body aches, or sore muscles  Seek care immediately if:   · You have chest pain or trouble breathing  Contact your healthcare provider if:   · You have a fever over 102ºF (39°C)  · Your sore throat gets worse or you see white or yellow spots in your throat  · Your symptoms get worse after 3 to 5 days or your cold is not better in 14 days  · You have a rash anywhere on your skin  · You have large, tender lumps in your neck  · You have thick, green or yellow drainage from your nose  · You cough up thick yellow, green, or bloody mucus  · You have vomiting for more than 24 hours and cannot keep fluids down  · You have a bad earache  · You have questions or concerns about your condition or care  Treatment for a cold: There is no cure for the common cold  Colds are caused by viruses and do not get better with antibiotics  Most people get better in 7 to 14 days  You may continue to cough for 2 to 3 weeks  The following may help decrease your symptoms:  · Decongestants  help reduce nasal congestion and help you breathe more easily  If you take decongestant pills, they may make you feel restless or not able to sleep  Do not use decongestant sprays for more than a few days  · Cough suppressants  help reduce coughing  Ask your healthcare provider which type of cough medicine is best for you  · NSAIDs , such as ibuprofen, help decrease swelling, pain, and fever   NSAIDs can cause stomach bleeding or kidney problems in certain people  If you take blood thinner medicine, always ask your healthcare provider if NSAIDs are safe for you  Always read the medicine label and follow directions  · Acetaminophen  decreases pain and fever  It is available without a doctor's order  Ask how much to take and how often to take it  Follow directions  Read the labels of all other medicines you are using to see if they also contain acetaminophen, or ask your doctor or pharmacist  Acetaminophen can cause liver damage if not taken correctly  Do not use more than 4 grams (4,000 milligrams) total of acetaminophen in one day  Manage your cold:   · Rest as much as possible  Slowly start to do more each day  · Drink more liquids as directed  Liquids will help thin and loosen mucus so you can cough it up  Liquids will also help prevent dehydration  Liquids that help prevent dehydration include water, fruit juice, and broth  Do not drink liquids that contain caffeine  Caffeine can increase your risk for dehydration  Ask your healthcare provider how much liquid to drink each day  · Soothe a sore throat  Gargle with warm salt water  This helps your sore throat feel better  Make salt water by dissolving ¼ teaspoon salt in 1 cup warm water  You may also suck on hard candy or throat lozenges  You may use a sore throat spray  · Use a humidifier or vaporizer  Use a cool mist humidifier or a vaporizer to increase air moisture in your home  This may make it easier for you to breathe and help decrease your cough  · Use saline nasal drops as directed  These help relieve congestion  · Apply petroleum-based jelly around the outside of your nostrils  This can decrease irritation from blowing your nose  · Do not smoke  Nicotine and other chemicals in cigarettes and cigars can make your symptoms worse  They can also cause infections such as bronchitis or pneumonia   Ask your healthcare provider for information if you currently smoke and need help to quit  E-cigarettes or smokeless tobacco still contain nicotine  Talk to your healthcare provider before you use these products  Prevent spreading your cold to others:   · Try to stay away from other people during the first 2 to 3 days of your cold when it is more easily spread  · Do not share food or drinks  · Do not share hand towels with household members  · Wash your hands often, especially after you blow your nose  Turn away from other people and cover your mouth and nose with a tissue when you sneeze or cough  Follow up with your healthcare provider as directed:  Write down your questions so you remember to ask them during your visits  © 2017 [facility] Information is for End User's use only and may not be sold, redistributed or otherwise used for commercial purposes  All illustrations and images included in CareNotes® are the copyrighted property of [employer]  or Neftaly Larose  The above information is an  only  It is not intended as medical advice for individual conditions or treatments  Talk to your doctor, nurse or pharmacist before following any medical regimen to see if it is safe and effective for you

## 2019-01-07 DIAGNOSIS — J43.2 CENTRILOBULAR EMPHYSEMA (HCC): ICD-10-CM

## 2019-01-08 NOTE — PROGRESS NOTES
Progress Note - Cardiology Office  [facility]    Michelle Caruso 46 y o  male MRN: [id number]  : 1970  Encounter: [id number]      Assessment:     1  CAD, multiple vessel    2  S/P CABG x 2    3  Mixed hyperlipidemia    4  Chronic obstructive pulmonary disease, unspecified COPD type ([facility] )    5  Pulmonary hypertension ([facility] )    6  Lumbar radiculopathy        Discussion Summary and Plan:  1  Coronary artery disease status post CABG x2 in 2017 with LIMA to LAD and SVG vein graft to circumflex  No symptoms of angina  Patient exercised about 1 hr a day  Has gained some weight advised to lose weight  Continue aspirin  Advised to be compliant with medications  2  Dizziness and fatigue  No more episodes his metoprolol       3  COPD  Patient has history of COPD with some exertional shortness of breath which is not changed  He is on inhalers follow up with Pulmonary  4  Dyslipidemia  On high-intensity statins  Patient's labs reviewed  Patient blood test from 2019 reviewed       5  History of tobacco abuse  Heavy smoker throughout her life since he was 13years old  Now quit smoking  Patient encouraged not to go back to smoking  Discussed with patient's mother who was present with the patient  6  Recent lumbar radiculopathy now improved  Counseling :  A description of the counseling  As above  He need to be regular in follow-up  Patient's ability to self care: Yes  Medication side effect reviewed with patient in detail and all their questions answered to their satisfaction  HPI :     Michelle Carsuo is a 46y o  year old male who came for follow up  Patient  was admitted to [facility] in 2017 with 10/10 severe chest pain and had a non ST elevation MI  He was short of breath even climbing 1-2 flights of stair   His cardiac catheterization shows he had a spontaneous dissection of left main as well as significant stenosis of his mid LAD and he underwent successful coronary artery bypass surgery x2 with LIMA to LAD and SVG vein graft to circumflex came for follow-up  Patient used to smoke heavy and has now quit smoking  He has history of I believe COPD but never seen a pulmonologist  He feels fatigue and tired otherwise he is getting better  Some dizziness after taking metoprolol  No fever no chills no other significant complaint       01/10/2019  Above reviewed  Patient came for follow-up  He has gained about 40 -50 lb  He has quit smoking  Denies any chest pain or any shortness of breath  Blood test reviewed are excellent  His COPD meds are still being adjusted as he has some insurance issues  No fever no chills no nausea no vomiting no PND no orthopnea  He just had blood test done in January which was reviewed and there are excellent  He is compliant with his medications  Patient does regular exercise 1 hr a day without any issues  Review of Systems   Constitutional: Negative for activity change, chills, diaphoresis, fever and unexpected weight change  Patient has gained about 50 lb since quit smoking   HENT: Negative for congestion  Eyes: Negative for discharge and redness  Respiratory: Negative for cough, chest tightness, shortness of breath and wheezing  Cardiovascular: Negative  Negative for chest pain, palpitations and leg swelling  Gastrointestinal: Negative for abdominal pain, diarrhea and nausea  Endocrine: Negative  Genitourinary: Negative for decreased urine volume and urgency  Musculoskeletal: Positive for back pain  Negative for arthralgias and gait problem  Skin: Negative for rash and wound  Allergic/Immunologic: Negative  Neurological: Negative for dizziness, seizures, syncope, weakness, light-headedness and headaches  Hematological: Negative  Psychiatric/Behavioral: Negative for agitation and confusion  The patient is not nervous/anxious          Historical Information   Past Medical History:   Diagnosis Date    Chronic sinus complaints     last assessed 12/12/17    Diverticulosis     GERD (gastroesophageal reflux disease)     off medicine for past few years    Heart attack [facility])     last assessed 12/12/17    HTN (hypertension)     Hyperlipidemia     Tobacco use     Vasovagal syncope     last assessed 5/1/13    Wears glasses      Past Surgical History:   Procedure Laterality Date    APPENDECTOMY  1974    COLONOSCOPY N/A 8/14/2017    Procedure: COLONOSCOPY;  Surgeon: Yodit Pierce MD;  Location: [place] [address] GI LAB; Service: Gastroenterology    CORONARY ARTERY BYPASS GRAFT N/A 9/25/2017    Procedure: INTRA OP MATTHEW; CORONARY ARTERY BYPASS GRAFT X 2 WITH SVH TO OM1  AND LIMA TO LAD; LEFT LEG EVH;  Surgeon: Debria Hatchet, MD;  Location: [facility];  Service: Cardiac Surgery    THUMB AMPUTATION Left 1980     History   Alcohol Use No     Comment: rarely; denied hx of social drinker as per [employer]     History   Drug Use No     History   Smoking Status    Former Smoker    Packs/day: 2 00    Years: 28 00    Types: Cigarettes    Quit date: 9/22/2017   Smokeless Tobacco    Never Used     Comment: denied hx of exposure to second hand smoke; former smoker, smoked 2ppd for 30 yrs   pt quit 3 mos ago as per [employer]     Family History:   Family History   Problem Relation Age of Onset    Heart disease Father         CABG    Testicular cancer Father         adenocarcinoma in situ of the testis    Heart defect Father         cardiac disorder    Cancer Father     Diabetes type II Father     Heart disease Maternal Grandfather     Hypertension Mother         benign essential    Diabetes Sister     Arthritis Family     Arthritis Other     Heart defect Other         cardiac disorder    Diabetes Other     Cancer Other        Meds/Allergies     No Known Allergies    Current Outpatient Prescriptions:     ADVAIR DISKUS 250-50 MCG/DOSE inhaler, INHALE ONE PUFF BY MOUTH TWICE A DAY, Disp: 60 each, Rfl: 0    albuterol (PROVENTIL HFA,VENTOLIN HFA) 90 mcg/act inhaler, Inhale 2 puffs every 6 (six) hours as needed for wheezing, Disp: 1 Inhaler, Rfl: 6    aspirin 325 mg tablet, Take 1 tablet by mouth daily, Disp: 100 tablet, Rfl: 0    fluticasone-salmeterol (ADVAIR) 250-50 mcg/dose inhaler, Inhale 1 puff 2 (two) times a day Rinse mouth after use , Disp: 1 Inhaler, Rfl: 0    rosuvastatin (CRESTOR) 20 MG tablet, TAKE ONE TABLET BY MOUTH EVERY DAY, Disp: 30 tablet, Rfl: 6    tiotropium (SPIRIVA) 18 mcg inhalation capsule, Place 1 capsule (18 mcg total) into inhaler and inhale daily, Disp: 30 capsule, Rfl: 5    cyclobenzaprine (FLEXERIL) 10 mg tablet, Take 1 tablet (10 mg total) by mouth 3 (three) times a day as needed for muscle spasms (Patient not taking: Reported on 1/10/2019 ), Disp: 30 tablet, Rfl: 0    famotidine (PEPCID) 20 mg tablet, Take 1 tablet (20 mg total) by mouth 2 (two) times a day for 7 days, Disp: 14 tablet, Rfl: 0    methocarbamol (ROBAXIN) 500 mg tablet, Take 1 tablet (500 mg total) by mouth 2 (two) times a day for 5 days, Disp: 10 tablet, Rfl: 0    naproxen (NAPROSYN) 500 mg tablet, Take 1 tablet (500 mg total) by mouth 2 (two) times a day with meals for 5 days, Disp: 10 tablet, Rfl: 0    Vitals: Blood pressure 110/80, pulse 87, height 5' 6" (1 676 m), weight 82 9 kg (182 lb 12 8 oz), SpO2 96 %  Body mass index is 29 5 kg/m²  Vitals:    01/10/19 1306   Weight: 82 9 kg (182 lb 12 8 oz)     BP Readings from Last 3 Encounters:   01/10/19 110/80   12/10/18 102/70   10/07/18 110/80       Physical Exam:  Physical Exam   Constitutional: He is oriented to person, place, and time  He appears well-developed  No distress  HENT:   Head: Normocephalic and atraumatic  Eyes: Pupils are equal, round, and reactive to light  Neck: Normal range of motion  Neck supple  No JVD present  No thyromegaly present     Cardiovascular: Normal rate, regular rhythm and intact distal pulses  Murmur heard  S1-S2 regular with 2/6 ejection systolic murmur  Pulmonary/Chest: Effort normal  No respiratory distress  He has no wheezes  He has no rales  Bilateral air entry with some coarse breath sounds and prolonged expiratory phase  Abdominal: Soft  Bowel sounds are normal  He exhibits no distension  There is no tenderness  There is no rebound  Musculoskeletal: Normal range of motion  He exhibits no edema or tenderness  Neurological: He is alert and oriented to person, place, and time  Skin: Skin is warm and dry  He is not diaphoretic  Psychiatric: He has a normal mood and affect  His behavior is normal  Judgment normal          Diagnostic Studies Review Cardio:     Stress Test: Echo show normal LV systolic function  Catheterization: Cardiac catheterization in 2017 shows proximal LAD 80% stenosis, spontaneous dissection of the left main, preserved LV systolic function, nonobstructive disease of the circumflex which was medium to small size  Circumflex has 25 -30% stenosis  ECG Report: 10/26/2017 12 lead EKG shows normal sinus rhythm heart rate 60 beats per minute  Nonspecific ST changes  No old EKG to compare     Twelve lead EKG done 01/10/2019 shows normal sinus rhythm heart rate 83 beats per minute  No significant ST changes        Cardiac testing:   Results for orders placed during the hospital encounter of 18   Echo complete with contrast if indicated    Narrative JORGE ANALI [facility]  [address] [place] [address]  [phone]    Transthoracic Echocardiogram  2D, M-mode, Doppler, and Color Doppler    Study date:  19-Mar-2018    Patient: Dimitri Colmenares  MR number: [id number]  Account number: [de-identified]  : 1970  Age: 50 years  Gender: Male  Status: Routine  Location: Echo lab  Height: 62 in  Weight: 161 7 lb  BP: 119/ 77 mmHg    Sonographer:  ELÍAS Chavez  Primary Physician:  Gaviota Pruitt MD  Referring Physician:  Jen Hale:  Yamileth Hodges [facility]  Interpreting Physician:  Rubens Poster, DO    SUMMARY    LEFT VENTRICLE:  Systolic function was at the lower limits of normal by visual assessment  Ejection fraction was estimated to be 50 %  There were no regional wall motion abnormalities  There was mild concentric hypertrophy  Doppler parameters were consistent with abnormal left ventricular relaxation (grade 1 diastolic dysfunction)  LEFT ATRIUM:  The atrium was mildly dilated  RIGHT ATRIUM:  The atrium was mildly to moderately dilated  MITRAL VALVE:  There was trace regurgitation  TRICUSPID VALVE:  There was mild regurgitation  Pulmonary artery systolic pressure was within the normal range  Estimated peak PA pressure was 38 mmHg  PROCEDURE: The procedure was performed in the echo lab  This was a routine study  The transthoracic approach was used  The study included complete 2D imaging, M-mode, complete spectral Doppler, and color Doppler  The heart rate was 64 bpm,  at the start of the study  Images were obtained from the parasternal, apical, subcostal, and suprasternal notch acoustic windows  Image quality was adequate  LEFT VENTRICLE: Size was normal  Systolic function was at the lower limits of normal by visual assessment  Ejection fraction was estimated to be 50 %  There were no regional wall motion abnormalities  There was mild concentric hypertrophy  DOPPLER: Doppler parameters were consistent with abnormal left ventricular relaxation (grade 1 diastolic dysfunction)  There was no evidence of elevated ventricular filling pressure by Doppler parameters  RIGHT VENTRICLE: The size was normal  Systolic function was low normal  DOPPLER: Systolic pressure was within the normal range  LEFT ATRIUM: The atrium was mildly dilated  No thrombus was identified  RIGHT ATRIUM: The atrium was mildly to moderately dilated      MITRAL VALVE: Valve structure was normal  There was normal leaflet separation  No echocardiographic evidence for prolapse  DOPPLER: The transmitral velocity was within the normal range  There was no evidence for stenosis  There was trace  regurgitation  AORTIC VALVE: The valve was trileaflet  Leaflets exhibited normal thickness, normal cuspal separation, and sclerosis  DOPPLER: Transaortic velocity was within the normal range  There was no evidence for stenosis  There was no  regurgitation  TRICUSPID VALVE: The valve structure was normal  There was normal leaflet separation  DOPPLER: The transtricuspid velocity was within the normal range  There was mild regurgitation  Pulmonary artery systolic pressure was within the normal  range  Estimated peak PA pressure was 38 mmHg  PULMONIC VALVE: Leaflets exhibited normal thickness, no calcification, and normal cuspal separation  DOPPLER: The transpulmonic velocity was within the normal range  There was trace regurgitation  PERICARDIUM: There was no thickening  There was no pericardial effusion  AORTA: The root exhibited normal size  PULMONARY ARTERY: The size was normal  The morphology appeared normal     SYSTEM MEASUREMENT TABLES    2D mode  AoR Diam 2D: 3 6 cm  LA Diam (2D): 3 6 cm  LA/Ao (2D): 1  FS (2D Teich): 25 6 %  IVSd (2D): 1 01 cm  LVDEV: 108 cm³  LVEDV MOD BP: 131 cm³  LVESV: 53 7 cm³  LVIDd(2D): 4 81 cm  LVISd (2D): 3 58 cm  LVOT Area 2D: 3 14 cm squared  LVPWd (2D): 1 03 cm  SV (Teich): 54 3 cm³    Apical four chamber  LVEF A4C: 55 %    Apical two chamber  LA Area: 22 7 cm squared  LA Volume: 70 cm³  LVEF A2C: 59 %  LVLD A2C: 8 37 cm    Unspecified Scan Mode  NARDA Cont Eq (Peak Kenroy): 2 81 cm squared  LVOT Diam : 2 cm  LVOT Vmax: 1190 mm/s  LVOT Vmax; Mean: 1190 mm/s  Peak Grad ; Mean: 6 mm[Hg]  MV Peak A Kenroy: 642 mm/s  MV Peak E Kenroy   Mean: 632 mm/s  MVA (PHT): 4 15 cm squared  PHT: 53 ms  Max P mm[Hg]  V Max: 2760 mm/s  Vmax: 2410 mm/s  RA Area: 24 9 cm squared  RA Volume: 86 7 cm³  TAPSE: 1 7 cm    [facility]    Prepared and electronically signed by    Mirna Welsh DO  Signed 20-Mar-2018 08:35:00         Imaging:  Chest X-Ray:   Xr Chest Pa & Lateral    Result Date: 10/28/2017  Impression No active pulmonary disease  Hyperinflation  Workstation performed: [id number]       CT-scan of the chest:     Cta Dissection Protocol Chest And Abdomen    Result Date: 9/22/2017  Impression No evidence of aortic aneurysm or dissection  Mild to moderate COPD  Mildly thick-walled jejunal loop with slight hyperemia of the bowel, perhaps related to underdistention, cannot entirely exclude enteritis  No inflammatory changes in the adjacent mesentery  Workstation performed: [id number]     Lab Review   Lab Results   Component Value Date    WBC 9 70 02/01/2018    HGB 14 7 02/01/2018    HCT 43 5 02/01/2018    MCV 91 02/01/2018    RDW 12 5 02/01/2018     02/01/2018     BMP:  Lab Results   Component Value Date    K 4 5 01/09/2019     01/09/2019    CO2 23 01/09/2019    BUN 17 01/09/2019    CREATININE 0 80 02/01/2018    GLUCOSE 159 (H) 09/25/2017    GLUF 95 09/23/2017    CALCIUM 9 5 02/01/2018    EGFR 105 02/01/2018    MG 2 4 09/27/2017     LFT:  Lab Results   Component Value Date    AST 20 02/01/2018    ALT 35 02/01/2018    ALKPHOS 74 02/01/2018       Lab Results   Component Value Date    HGBA1C 5 7 09/25/2017     Lipid Profile:   Lab Results   Component Value Date    HDL 45 01/09/2019    LDLCALC 110 (H) 09/22/2017    TRIG 112 01/09/2019     No results found for: CHOL  Lab Results   Component Value Date    CKTOTAL 104 02/01/2018    TROPONINI <0 02 02/01/2018     Lab Results   Component Value Date    NTBNP 170 (H) 02/01/2018        Dr Brook Mahmood MD [facility] [place]      "This note has been constructed using a voice recognition system  Therefore there may be syntax, spelling, and/or grammatical errors   Please call if you have any questions  "

## 2019-01-10 ENCOUNTER — TELEPHONE (OUTPATIENT)
Dept: CARDIOLOGY CLINIC | Facility: CLINIC | Age: 49
End: 2019-01-10

## 2019-01-10 ENCOUNTER — OFFICE VISIT (OUTPATIENT)
Dept: CARDIOLOGY CLINIC | Facility: CLINIC | Age: 49
End: 2019-01-10
Payer: COMMERCIAL

## 2019-01-10 VITALS
HEIGHT: 66 IN | HEART RATE: 87 BPM | DIASTOLIC BLOOD PRESSURE: 80 MMHG | SYSTOLIC BLOOD PRESSURE: 110 MMHG | WEIGHT: 182.8 LBS | OXYGEN SATURATION: 96 % | BODY MASS INDEX: 29.38 KG/M2

## 2019-01-10 DIAGNOSIS — Z95.1 S/P CABG X 2: ICD-10-CM

## 2019-01-10 DIAGNOSIS — M54.16 LUMBAR RADICULOPATHY: ICD-10-CM

## 2019-01-10 DIAGNOSIS — E78.2 MIXED HYPERLIPIDEMIA: ICD-10-CM

## 2019-01-10 DIAGNOSIS — I25.10 CAD, MULTIPLE VESSEL: ICD-10-CM

## 2019-01-10 DIAGNOSIS — I27.20 PULMONARY HYPERTENSION (HCC): ICD-10-CM

## 2019-01-10 DIAGNOSIS — J44.9 CHRONIC OBSTRUCTIVE PULMONARY DISEASE, UNSPECIFIED COPD TYPE (HCC): ICD-10-CM

## 2019-01-10 LAB
ALBUMIN SERPL-MCNC: 4.5 G/DL (ref 3.5–5.5)
ALBUMIN/GLOB SERPL: 2.1 {RATIO} (ref 1.2–2.2)
ALP SERPL-CCNC: 58 IU/L (ref 39–117)
ALT SERPL-CCNC: 27 IU/L (ref 0–44)
AST SERPL-CCNC: 21 IU/L (ref 0–40)
BILIRUB SERPL-MCNC: 0.4 MG/DL (ref 0–1.2)
BUN SERPL-MCNC: 17 MG/DL (ref 6–24)
BUN/CREAT SERPL: 15 (ref 9–20)
CALCIUM SERPL-MCNC: 9.5 MG/DL (ref 8.7–10.2)
CHLORIDE SERPL-SCNC: 102 MMOL/L (ref 96–106)
CHOLEST SERPL-MCNC: 132 MG/DL (ref 100–199)
CHOLEST/HDLC SERPL: 2.9 RATIO (ref 0–5)
CO2 SERPL-SCNC: 23 MMOL/L (ref 20–29)
CREAT SERPL-MCNC: 1.14 MG/DL (ref 0.76–1.27)
GLOBULIN SER-MCNC: 2.1 G/DL (ref 1.5–4.5)
GLUCOSE SERPL-MCNC: 91 MG/DL (ref 65–99)
HDLC SERPL-MCNC: 45 MG/DL
LDLC SERPL CALC-MCNC: 65 MG/DL (ref 0–99)
POTASSIUM SERPL-SCNC: 4.5 MMOL/L (ref 3.5–5.2)
PROT SERPL-MCNC: 6.6 G/DL (ref 6–8.5)
SL AMB EGFR AFRICAN AMERICAN: 86 ML/MIN/1.73
SL AMB EGFR NON AFRICAN AMERICAN: 75 ML/MIN/1.73
SL AMB VLDL CHOLESTEROL CALC: 22 MG/DL (ref 5–40)
SODIUM SERPL-SCNC: 140 MMOL/L (ref 134–144)
TRIGL SERPL-MCNC: 112 MG/DL (ref 0–149)

## 2019-01-10 PROCEDURE — 99214 OFFICE O/P EST MOD 30 MIN: CPT | Performed by: INTERNAL MEDICINE

## 2019-01-10 PROCEDURE — 93000 ELECTROCARDIOGRAM COMPLETE: CPT | Performed by: INTERNAL MEDICINE

## 2019-01-10 NOTE — TELEPHONE ENCOUNTER
----- Message from Norbert Gallegos MD sent at 1/10/2019  1:13 PM EST -----  Patient labs are acceptable  Continue same medications  Patient is advised to follow up  appointment regularly  Please call patient about the  about results

## 2019-01-16 ENCOUNTER — OFFICE VISIT (OUTPATIENT)
Dept: PULMONOLOGY | Facility: MEDICAL CENTER | Age: 49
End: 2019-01-16
Payer: COMMERCIAL

## 2019-01-16 VITALS
SYSTOLIC BLOOD PRESSURE: 124 MMHG | RESPIRATION RATE: 12 BRPM | HEART RATE: 85 BPM | BODY MASS INDEX: 29.25 KG/M2 | WEIGHT: 182 LBS | TEMPERATURE: 97.8 F | DIASTOLIC BLOOD PRESSURE: 76 MMHG | HEIGHT: 66 IN | OXYGEN SATURATION: 97 %

## 2019-01-16 DIAGNOSIS — J43.2 CENTRILOBULAR EMPHYSEMA (HCC): Primary | ICD-10-CM

## 2019-01-16 DIAGNOSIS — G47.19 EXCESSIVE DAYTIME SLEEPINESS: ICD-10-CM

## 2019-01-16 PROBLEM — I27.20 PULMONARY HYPERTENSION (HCC): Status: RESOLVED | Noted: 2018-03-14 | Resolved: 2019-01-16

## 2019-01-16 PROCEDURE — 99214 OFFICE O/P EST MOD 30 MIN: CPT | Performed by: INTERNAL MEDICINE

## 2019-01-16 NOTE — ASSESSMENT & PLAN NOTE
Patient does have daytime sleepiness and restless sleep that started after his heart attack  He has had weight gain  He has multiple awakenings at night any is not sure what is waking him up  He does have a crowded oropharynx and is at risk for sleep apnea therefore would recommend diagnostic study to assess and if present then proceed with therapy  Pathophysiology of obstructive sleep apnea is discussed with the patient today

## 2019-01-16 NOTE — LETTER
January 16, 2019     Dee Sanches MD  [address] [facility]    Patient: Leticia Cadena   YOB: 1970   Date of Visit: 1/16/2019       Dear Dr Jr Morgan: Thank you for referring Leticia Cadena to me for evaluation  Below are my notes for this consultation  If you have questions, please do not hesitate to call me  I look forward to following your patient along with you  Sincerely,        Cinthya Guerrero MD        CC: No Recipients  Cinthya Guerrero MD  1/16/2019 12:50 PM  Sign at close encounter  Assessment/Plan:     Problem List Items Addressed This Visit        Respiratory    Chronic obstructive pulmonary disease ([facility] [address] ) - Primary     Patient has persistent symptoms despite changed to Advair  He did feel the best on Trelegy however his insurance does not cover this  Will increase Advair dosing to 500/50 and patient is to continue with Spiriva at this time  Should his symptoms not improved then will need to change his inhaler combination    Patient is due for repeat pulmonary function testing to assess the stability of his COPD  This is ordered for him today  Relevant Medications    fluticasone-salmeterol (ADVAIR) 500-50 mcg/dose inhaler    Other Relevant Orders    Complete pulmonary function test       Other    Excessive daytime sleepiness     Patient does have daytime sleepiness and restless sleep that started after his heart attack  He has had weight gain  He has multiple awakenings at night any is not sure what is waking him up  He does have a crowded oropharynx and is at risk for sleep apnea therefore would recommend diagnostic study to assess and if present then proceed with therapy  Pathophysiology of obstructive sleep apnea is discussed with the patient today  Relevant Orders    Home Study            Follow-up in 3 months  All questions are answered to the patient's satisfaction and understanding  He verbalizes understanding    He is encouraged to call with any further questions or concerns  Portions of the record may have been created with voice recognition software  Occasional wrong word or "sound a like" substitutions may have occurred due to the inherent limitations of voice recognition software  Read the chart carefully and recognize, using context, where substitutions have occurred  Electronically Signed by Leon Villegas MD    ______________________________________________________________________    Chief Complaint:   Chief Complaint   Patient presents with    Shortness of Breath     pt states that about he same since having URI, sinus issues     Cough     occurs at time        Patient ID: Ayan Figueroa is a 46 y o  y o  male has a past medical history of Chronic sinus complaints; Diverticulosis; GERD (gastroesophageal reflux disease); Heart attack ([address] ); HTN (hypertension); Hyperlipidemia; Sciatica; Tobacco use; Vasovagal syncope; and Wears glasses  1/16/2019  Patient presents today for follow-up visit  Does get short of breath almost on a daily basis  Going up and down stairs, showering bothers him  When he bends over to dry off he has trouble breathing  No clear cough  No fevers or chills  Had a sinus infection about 2 months ago  He exercises everyday  He completed pulmonary rehab  He does see a chiropractor for his sciatica  No snoring  +tossing and turning at night  He gets about 2 hours without waking  This has been going on since his heart attack  No GERD  He gets heartburn with decaf coffee    HPI    Review of Systems   All other systems reviewed and are negative  Smoking history: He reports that he quit smoking about 15 months ago  His smoking use included Cigarettes  He has a 56 00 pack-year smoking history  He has never used smokeless tobacco     Occasional second hand smoke exposure       Immunization History   Administered Date(s) Administered    Influenza, injectable, quadrivalent, preservative free 0 5 mL 08/29/2018     Current Outpatient Prescriptions   Medication Sig Dispense Refill    ADVAIR DISKUS 250-50 MCG/DOSE inhaler INHALE ONE PUFF BY MOUTH TWICE A DAY 60 each 0    albuterol (PROVENTIL HFA,VENTOLIN HFA) 90 mcg/act inhaler Inhale 2 puffs every 6 (six) hours as needed for wheezing 1 Inhaler 6    aspirin 325 mg tablet Take 1 tablet by mouth daily 100 tablet 0    fluticasone-salmeterol (ADVAIR) 250-50 mcg/dose inhaler Inhale 1 puff 2 (two) times a day Rinse mouth after use  1 Inhaler 0    rosuvastatin (CRESTOR) 20 MG tablet TAKE ONE TABLET BY MOUTH EVERY DAY 30 tablet 6    tiotropium (SPIRIVA) 18 mcg inhalation capsule Place 1 capsule (18 mcg total) into inhaler and inhale daily 30 capsule 5    cyclobenzaprine (FLEXERIL) 10 mg tablet Take 1 tablet (10 mg total) by mouth 3 (three) times a day as needed for muscle spasms (Patient not taking: Reported on 1/16/2019 ) 30 tablet 0    methocarbamol (ROBAXIN) 500 mg tablet Take 1 tablet (500 mg total) by mouth 2 (two) times a day for 5 days 10 tablet 0    naproxen (NAPROSYN) 500 mg tablet Take 1 tablet (500 mg total) by mouth 2 (two) times a day with meals for 5 days 10 tablet 0     No current facility-administered medications for this visit  Allergies: Patient has no known allergies  Objective:  Vitals:    01/16/19 1029   BP: 124/76   BP Location: Right arm   Patient Position: Sitting   Cuff Size: Standard   Pulse: 85   Resp: 12   Temp: 97 8 °F (36 6 °C)   TempSrc: Tympanic   SpO2: 97%   Weight: 82 6 kg (182 lb)   Height: 5' 6" (1 676 m)   Oxygen Therapy  SpO2: 97 %    Wt Readings from Last 3 Encounters:   01/16/19 82 6 kg (182 lb)   01/10/19 82 9 kg (182 lb 12 8 oz)   12/10/18 80 7 kg (178 lb)     Body mass index is 29 38 kg/m²  Physical Exam   Constitutional: He is oriented to person, place, and time  He appears well-developed and well-nourished  No distress  HENT:   Head: Normocephalic and atraumatic     Mouth/Throat: Oropharynx is clear and moist  No oropharyngeal exudate  Eyes: Pupils are equal, round, and reactive to light  EOM are normal    Neck: Normal range of motion  Neck supple  Cardiovascular: Normal rate and regular rhythm  No murmur heard  Pulmonary/Chest: Effort normal and breath sounds normal  No respiratory distress  He has no wheezes  He has no rales  He exhibits no tenderness  Abdominal: Soft  Bowel sounds are normal  He exhibits no distension  There is no tenderness  Musculoskeletal: Normal range of motion  He exhibits no edema  Neurological: He is alert and oriented to person, place, and time  No cranial nerve deficit  Skin: Skin is warm and dry  He is not diaphoretic  Psychiatric: He has a normal mood and affect  His behavior is normal    Vitals reviewed

## 2019-01-16 NOTE — PROGRESS NOTES
Assessment/Plan:     Problem List Items Addressed This Visit        Respiratory    Chronic obstructive pulmonary disease ([address] ) - Primary     Patient has persistent symptoms despite changed to Advair  He did feel the best on Trelegy however his insurance does not cover this  Will increase Advair dosing to 500/50 and patient is to continue with Spiriva at this time  Should his symptoms not improved then will need to change his inhaler combination    Patient is due for repeat pulmonary function testing to assess the stability of his COPD  This is ordered for him today  Relevant Medications    fluticasone-salmeterol (ADVAIR) 500-50 mcg/dose inhaler    Other Relevant Orders    Complete pulmonary function test       Other    Excessive daytime sleepiness     Patient does have daytime sleepiness and restless sleep that started after his heart attack  He has had weight gain  He has multiple awakenings at night any is not sure what is waking him up  He does have a crowded oropharynx and is at risk for sleep apnea therefore would recommend diagnostic study to assess and if present then proceed with therapy  Pathophysiology of obstructive sleep apnea is discussed with the patient today  Relevant Orders    Home Study            Follow-up in 3 months  All questions are answered to the patient's satisfaction and understanding  He verbalizes understanding  He is encouraged to call with any further questions or concerns  Portions of the record may have been created with voice recognition software  Occasional wrong word or "sound a like" substitutions may have occurred due to the inherent limitations of voice recognition software  Read the chart carefully and recognize, using context, where substitutions have occurred      Electronically Signed by Cristine Avila MD    ______________________________________________________________________    Chief Complaint:   Chief Complaint   Patient presents with   [facility] Shortness of Breath     pt states that about he same since having URI, sinus issues     Cough     occurs at time        Patient ID: Lynn Vaz is a 46 y o  y o  male has a past medical history of Chronic sinus complaints; Diverticulosis; GERD (gastroesophageal reflux disease); Heart attack ([address] ); HTN (hypertension); Hyperlipidemia; Sciatica; Tobacco use; Vasovagal syncope; and Wears glasses  1/16/2019  Patient presents today for follow-up visit  Does get short of breath almost on a daily basis  Going up and down stairs, showering bothers him  When he bends over to dry off he has trouble breathing  No clear cough  No fevers or chills  Had a sinus infection about 2 months ago  He exercises everyday  He completed pulmonary rehab  He does see a chiropractor for his sciatica  No snoring  +tossing and turning at night  He gets about 2 hours without waking  This has been going on since his heart attack  No GERD  He gets heartburn with decaf coffee    HPI    Review of Systems   All other systems reviewed and are negative  Smoking history: He reports that he quit smoking about 15 months ago  His smoking use included Cigarettes  He has a 56 00 pack-year smoking history  He has never used smokeless tobacco     Occasional second hand smoke exposure  Immunization History   Administered Date(s) Administered    Influenza, injectable, quadrivalent, preservative free 0 5 mL 08/29/2018     Current Outpatient Prescriptions   Medication Sig Dispense Refill    ADVAIR DISKUS 250-50 MCG/DOSE inhaler INHALE ONE PUFF BY MOUTH TWICE A DAY 60 each 0    albuterol (PROVENTIL HFA,VENTOLIN HFA) 90 mcg/act inhaler Inhale 2 puffs every 6 (six) hours as needed for wheezing 1 Inhaler 6    aspirin 325 mg tablet Take 1 tablet by mouth daily 100 tablet 0    fluticasone-salmeterol (ADVAIR) 250-50 mcg/dose inhaler Inhale 1 puff 2 (two) times a day Rinse mouth after use   1 Inhaler 0    rosuvastatin (CRESTOR) 20 MG tablet TAKE ONE TABLET BY MOUTH EVERY DAY 30 tablet 6    tiotropium (SPIRIVA) 18 mcg inhalation capsule Place 1 capsule (18 mcg total) into inhaler and inhale daily 30 capsule 5    cyclobenzaprine (FLEXERIL) 10 mg tablet Take 1 tablet (10 mg total) by mouth 3 (three) times a day as needed for muscle spasms (Patient not taking: Reported on 1/16/2019 ) 30 tablet 0    methocarbamol (ROBAXIN) 500 mg tablet Take 1 tablet (500 mg total) by mouth 2 (two) times a day for 5 days 10 tablet 0    naproxen (NAPROSYN) 500 mg tablet Take 1 tablet (500 mg total) by mouth 2 (two) times a day with meals for 5 days 10 tablet 0     No current facility-administered medications for this visit  Allergies: Patient has no known allergies  Objective:  Vitals:    01/16/19 1029   BP: 124/76   BP Location: Right arm   Patient Position: Sitting   Cuff Size: Standard   Pulse: 85   Resp: 12   Temp: 97 8 °F (36 6 °C)   TempSrc: Tympanic   SpO2: 97%   Weight: 82 6 kg (182 lb)   Height: 5' 6" (1 676 m)   Oxygen Therapy  SpO2: 97 %    Wt Readings from Last 3 Encounters:   01/16/19 82 6 kg (182 lb)   01/10/19 82 9 kg (182 lb 12 8 oz)   12/10/18 80 7 kg (178 lb)     Body mass index is 29 38 kg/m²  Physical Exam   Constitutional: He is oriented to person, place, and time  He appears well-developed and well-nourished  No distress  HENT:   Head: Normocephalic and atraumatic  Mouth/Throat: Oropharynx is clear and moist  No oropharyngeal exudate  Eyes: Pupils are equal, round, and reactive to light  EOM are normal    Neck: Normal range of motion  Neck supple  Cardiovascular: Normal rate and regular rhythm  No murmur heard  Pulmonary/Chest: Effort normal and breath sounds normal  No respiratory distress  He has no wheezes  He has no rales  He exhibits no tenderness  Abdominal: Soft  Bowel sounds are normal  He exhibits no distension  There is no tenderness  Musculoskeletal: Normal range of motion  He exhibits no edema  Neurological: He is alert and oriented to person, place, and time  No cranial nerve deficit  Skin: Skin is warm and dry  He is not diaphoretic  Psychiatric: He has a normal mood and affect  His behavior is normal    Vitals reviewed

## 2019-01-16 NOTE — ASSESSMENT & PLAN NOTE
Patient has persistent symptoms despite changed to Advair  He did feel the best on Trelegy however his insurance does not cover this  Will increase Advair dosing to 500/50 and patient is to continue with Spiriva at this time  Should his symptoms not improved then will need to change his inhaler combination    Patient is due for repeat pulmonary function testing to assess the stability of his COPD  This is ordered for him today

## 2019-01-18 ENCOUNTER — OFFICE VISIT (OUTPATIENT)
Dept: FAMILY MEDICINE CLINIC | Facility: CLINIC | Age: 49
End: 2019-01-18
Payer: COMMERCIAL

## 2019-01-18 VITALS
HEIGHT: 66 IN | BODY MASS INDEX: 28.93 KG/M2 | TEMPERATURE: 98.1 F | RESPIRATION RATE: 12 BRPM | HEART RATE: 64 BPM | DIASTOLIC BLOOD PRESSURE: 76 MMHG | WEIGHT: 180 LBS | SYSTOLIC BLOOD PRESSURE: 136 MMHG

## 2019-01-18 DIAGNOSIS — R42 DIZZY: ICD-10-CM

## 2019-01-18 DIAGNOSIS — J32.9 SINUSITIS, UNSPECIFIED CHRONICITY, UNSPECIFIED LOCATION: Primary | ICD-10-CM

## 2019-01-18 PROCEDURE — 99213 OFFICE O/P EST LOW 20 MIN: CPT | Performed by: FAMILY MEDICINE

## 2019-01-18 RX ORDER — AMOXICILLIN AND CLAVULANATE POTASSIUM 875; 125 MG/1; MG/1
1 TABLET, FILM COATED ORAL EVERY 12 HOURS SCHEDULED
Qty: 14 TABLET | Refills: 0 | Status: SHIPPED | OUTPATIENT
Start: 2019-01-18 | End: 2019-01-25

## 2019-01-18 RX ORDER — MECLIZINE HCL 12.5 MG/1
12.5 TABLET ORAL DAILY PRN
Qty: 15 TABLET | Refills: 0 | Status: SHIPPED | OUTPATIENT
Start: 2019-01-18 | End: 2019-03-21 | Stop reason: ALTCHOICE

## 2019-01-18 NOTE — PROGRESS NOTES
Beba Velasquez 1970 male MRN: [id number]    [facility] PRACTICE ACUTE OFFICE VISIT  [facility] - [address]      ASSESSMENT/PLAN  eBba Velasquez is a 46 y o  male presents to the office for    Diagnoses and all orders for this visit:    Sinusitis, unspecified chronicity, unspecified location  -     amoxicillin-clavulanate (AUGMENTIN) 875-125 mg per tablet; Take 1 tablet by mouth every 12 (twelve) hours for 7 days    Dizzy  -     meclizine (ANTIVERT) 12 5 MG tablet; Take 1 tablet (12 5 mg total) by mouth daily as needed for dizziness       Unsure the etiology at this time the patient's dizziness and feeling off balance  Did have mild nystagmus with gaze to the left unsure if that is the patient's baseline  However Tamela-Hallpike was negative  Will start the patient on Augmentin twice a day for 7 days to treat for possible sinusitis  And will have the patient take in time for 30 minutes prior to walking into a [employer] given that the bright lights to seem to be the trigger of his off balance  If the patient returns in 1 week with similar symptoms would recommend sending him to Neurology for an MRI and further testing  Disposition:    Future Appointments  Date Time Provider Urmila Stock   1/29/2019 9:45 AM [facility] ROOM [address] [place] [address] [address] [place] [address]   1/30/2019 7:30  [facility] Ester Leung [id number]  CC: Dizziness (head pressure times 1 week)      HPI:  Beba Velasquez is a 46 y o  male who presents for an acute appointment  One week of feeling significant had pressure worse when looking down and upright lights  Patient states that he has tried multiple over-the-counter medications  States that when he is walking at [employer] the bright lights make him feel like he is going to be off balance  Wife thinks that he has a case of vertigo    Patient does state that he has had sinus pressure and sinus pain but denies any fevers or chills at this time   First time having these kind of episodes    On anf off chest cold for months  Review of Systems  Please see HPI  Historical Information   The patient history was reviewed as follows:  Past Medical History:   Diagnosis Date    Chronic sinus complaints     last assessed 12/12/17    Diverticulosis     GERD (gastroesophageal reflux disease)     off medicine for past few years    Heart attack [facility])     last assessed 12/12/17    HTN (hypertension)     Hyperlipidemia     Sciatica     Tobacco use     Vasovagal syncope     last assessed 5/1/13    Wears glasses          Past Surgical History:   Procedure Laterality Date    APPENDECTOMY  1974    COLONOSCOPY N/A 8/14/2017    Procedure: COLONOSCOPY;  Surgeon: Americo Tinajero MD;  [facility] GI LAB;   Service: Gastroenterology    CORONARY ARTERY BYPASS GRAFT N/A 9/25/2017    Procedure: INTRA OP MATTHEW; CORONARY ARTERY BYPASS GRAFT X 2 WITH SVH TO OM1  AND LIMA TO LAD; LEFT LEG EVH;  Surgeon: Jaspal Combs MD;  Location: [facility];  Service: Cardiac Surgery    THUMB AMPUTATION Left 1980     Family History   Problem Relation Age of Onset    Heart disease Father         CABG    Testicular cancer Father         adenocarcinoma in situ of the testis    Heart defect Father         cardiac disorder    Cancer Father     Diabetes type II Father     Heart disease Maternal Grandfather     Hypertension Mother         benign essential    Diabetes Sister     Arthritis Family     Arthritis Other     Heart defect Other         cardiac disorder    Diabetes Other     Cancer Other       Social History   History   Alcohol Use No     Comment: rarely; denied hx of social drinker as per [employer]     History   Drug Use No     History   Smoking Status    Former Smoker    Packs/day: 2 00    Years: 28 00    Types: Cigarettes    Quit date: 9/22/2017   Smokeless Tobacco    Never Used     Comment: denied hx of exposure to second hand smoke; former smoker, smoked 2ppd for 30 yrs  pt quit 3 mos ago as per [employer]       Medications:     Current Outpatient Prescriptions:     ADVAIR DISKUS 250-50 MCG/DOSE inhaler, INHALE ONE PUFF BY MOUTH TWICE A DAY, Disp: 60 each, Rfl: 0    albuterol (PROVENTIL HFA,VENTOLIN HFA) 90 mcg/act inhaler, Inhale 2 puffs every 6 (six) hours as needed for wheezing, Disp: 1 Inhaler, Rfl: 6    aspirin 325 mg tablet, Take 1 tablet by mouth daily, Disp: 100 tablet, Rfl: 0    fluticasone-salmeterol (ADVAIR) 250-50 mcg/dose inhaler, Inhale 1 puff 2 (two) times a day Rinse mouth after use , Disp: 1 Inhaler, Rfl: 0    fluticasone-salmeterol (ADVAIR) 500-50 mcg/dose inhaler, Inhale 1 puff 2 (two) times a day Rinse mouth after use , Disp: 1 Inhaler, Rfl: 0    rosuvastatin (CRESTOR) 20 MG tablet, TAKE ONE TABLET BY MOUTH EVERY DAY, Disp: 30 tablet, Rfl: 6    tiotropium (SPIRIVA) 18 mcg inhalation capsule, Place 1 capsule (18 mcg total) into inhaler and inhale daily, Disp: 30 capsule, Rfl: 5    cyclobenzaprine (FLEXERIL) 10 mg tablet, Take 1 tablet (10 mg total) by mouth 3 (three) times a day as needed for muscle spasms (Patient not taking: Reported on 1/16/2019 ), Disp: 30 tablet, Rfl: 0    methocarbamol (ROBAXIN) 500 mg tablet, Take 1 tablet (500 mg total) by mouth 2 (two) times a day for 5 days, Disp: 10 tablet, Rfl: 0    naproxen (NAPROSYN) 500 mg tablet, Take 1 tablet (500 mg total) by mouth 2 (two) times a day with meals for 5 days, Disp: 10 tablet, Rfl: 0    No Known Allergies    OBJECTIVE  Vitals:   Vitals:    01/18/19 1559   BP: 136/76   BP Location: Left arm   Patient Position: Sitting   Cuff Size: Standard   Pulse: 64   Resp: 12   Temp: 98 1 °F (36 7 °C)   TempSrc: Temporal   Weight: 81 6 kg (180 lb)   Height: 5' 6" (1 676 m)         Physical Exam   Constitutional: He is oriented to person, place, and time  Vital signs are normal  He appears well-developed and well-nourished  HENT:   Head: Normocephalic and atraumatic     Right Ear: Hearing normal    Left Ear: Hearing normal    Eyes: Pupils are equal, round, and reactive to light  Conjunctivae and EOM are normal    Neck: Normal range of motion  Neck supple  Cardiovascular: Normal rate, regular rhythm, S1 normal, S2 normal, normal heart sounds and intact distal pulses  No murmur heard  Pulmonary/Chest: Effort normal and breath sounds normal  No respiratory distress  He has no wheezes  Abdominal: Soft  Bowel sounds are normal  He exhibits no distension  There is no tenderness  Musculoskeletal: Normal range of motion  He exhibits no edema  Neurological: He is alert and oriented to person, place, and time  He has normal strength  Mild nystagmus with left gaze Tamela-Hallpike negative   Skin: Skin is warm  No rash noted  Psychiatric: He has a normal mood and affect  His speech is normal and behavior is normal  Judgment and thought content normal    Vitals reviewed                   Ronaldo Nur MD,   [facility]  1/18/2019

## 2019-01-25 ENCOUNTER — OFFICE VISIT (OUTPATIENT)
Dept: FAMILY MEDICINE CLINIC | Facility: CLINIC | Age: 49
End: 2019-01-25
Payer: COMMERCIAL

## 2019-01-25 VITALS
HEIGHT: 66 IN | HEART RATE: 84 BPM | SYSTOLIC BLOOD PRESSURE: 134 MMHG | RESPIRATION RATE: 18 BRPM | TEMPERATURE: 98.3 F | WEIGHT: 179 LBS | BODY MASS INDEX: 28.77 KG/M2 | DIASTOLIC BLOOD PRESSURE: 80 MMHG

## 2019-01-25 DIAGNOSIS — R42 VERTIGO: Primary | ICD-10-CM

## 2019-01-25 PROCEDURE — 99212 OFFICE O/P EST SF 10 MIN: CPT | Performed by: FAMILY MEDICINE

## 2019-01-25 PROCEDURE — 3008F BODY MASS INDEX DOCD: CPT | Performed by: FAMILY MEDICINE

## 2019-01-25 PROCEDURE — 1036F TOBACCO NON-USER: CPT | Performed by: FAMILY MEDICINE

## 2019-01-25 NOTE — PROGRESS NOTES
Chief Complaint   Patient presents with    Follow-up        Patient ID: Beba Velasquez is a 46 y o  male  HPI  Pt is seeing for f/u vertigo - was Tx with AB for sinus infection -  Feeling better  -  No N/V     The following portions of the patient's history were reviewed and updated as appropriate: allergies, current medications, past family history, past medical history, past social history, past surgical history and problem list     Review of Systems   Constitutional: Negative  HENT: Negative  Respiratory: Negative  Cardiovascular: Negative  Neurological: Negative  Current Outpatient Prescriptions   Medication Sig Dispense Refill    ADVAIR DISKUS 250-50 MCG/DOSE inhaler INHALE ONE PUFF BY MOUTH TWICE A DAY 60 each 0    albuterol (PROVENTIL HFA,VENTOLIN HFA) 90 mcg/act inhaler Inhale 2 puffs every 6 (six) hours as needed for wheezing 1 Inhaler 6    amoxicillin-clavulanate (AUGMENTIN) 875-125 mg per tablet Take 1 tablet by mouth every 12 (twelve) hours for 7 days 14 tablet 0    aspirin 325 mg tablet Take 1 tablet by mouth daily 100 tablet 0    fluticasone-salmeterol (ADVAIR) 250-50 mcg/dose inhaler Inhale 1 puff 2 (two) times a day Rinse mouth after use  1 Inhaler 0    fluticasone-salmeterol (ADVAIR) 500-50 mcg/dose inhaler Inhale 1 puff 2 (two) times a day Rinse mouth after use   1 Inhaler 0    rosuvastatin (CRESTOR) 20 MG tablet TAKE ONE TABLET BY MOUTH EVERY DAY 30 tablet 6    tiotropium (SPIRIVA) 18 mcg inhalation capsule Place 1 capsule (18 mcg total) into inhaler and inhale daily 30 capsule 5    cyclobenzaprine (FLEXERIL) 10 mg tablet Take 1 tablet (10 mg total) by mouth 3 (three) times a day as needed for muscle spasms (Patient not taking: Reported on 1/16/2019 ) 30 tablet 0    meclizine (ANTIVERT) 12 5 MG tablet Take 1 tablet (12 5 mg total) by mouth daily as needed for dizziness (Patient not taking: Reported on 1/25/2019 ) 15 tablet 0    methocarbamol (ROBAXIN) 500 mg tablet Take 1 tablet (500 mg total) by mouth 2 (two) times a day for 5 days 10 tablet 0    naproxen (NAPROSYN) 500 mg tablet Take 1 tablet (500 mg total) by mouth 2 (two) times a day with meals for 5 days 10 tablet 0     No current facility-administered medications for this visit  Objective:    /80 (BP Location: Left arm, Patient Position: Sitting, Cuff Size: Large)   Pulse 84   Temp 98 3 °F (36 8 °C) (Tympanic)   Resp 18   Ht 5' 6" (1 676 m)   Wt 81 2 kg (179 lb)   BMI 28 89 kg/m²        Physical Exam   Constitutional: No distress  HENT:   Right Ear: Tympanic membrane normal    Left Ear: Tympanic membrane normal    Mouth/Throat: No oropharyngeal exudate  Cardiovascular: Normal rate and regular rhythm  No murmur heard  Pulmonary/Chest: Effort normal  He has decreased breath sounds  He has no wheezes  He has no rales  He exhibits no tenderness                 Assessment/Plan:         Diagnoses and all orders for this visit:    Vertigo        Resolved     rto prn                     Rayn Lou MD

## 2019-01-29 ENCOUNTER — HOSPITAL ENCOUNTER (OUTPATIENT)
Dept: PULMONOLOGY | Facility: HOSPITAL | Age: 49
Discharge: HOME | End: 2019-01-29
Attending: INTERNAL MEDICINE
Payer: COMMERCIAL

## 2019-01-29 DIAGNOSIS — J43.2 CENTRILOBULAR EMPHYSEMA (HCC): ICD-10-CM

## 2019-01-29 PROCEDURE — 94060 EVALUATION OF WHEEZING: CPT | Performed by: INTERNAL MEDICINE

## 2019-01-29 PROCEDURE — 94726 PLETHYSMOGRAPHY LUNG VOLUMES: CPT | Performed by: INTERNAL MEDICINE

## 2019-01-29 PROCEDURE — 94060 EVALUATION OF WHEEZING: CPT

## 2019-01-29 PROCEDURE — 94726 PLETHYSMOGRAPHY LUNG VOLUMES: CPT

## 2019-01-29 PROCEDURE — 94729 DIFFUSING CAPACITY: CPT | Performed by: INTERNAL MEDICINE

## 2019-01-29 PROCEDURE — 94729 DIFFUSING CAPACITY: CPT

## 2019-01-29 PROCEDURE — 94760 N-INVAS EAR/PLS OXIMETRY 1: CPT

## 2019-01-29 RX ORDER — ALBUTEROL SULFATE 2.5 MG/3ML
2.5 SOLUTION RESPIRATORY (INHALATION) ONCE
Status: DISCONTINUED | OUTPATIENT
Start: 2019-01-29 | End: 2019-02-02 | Stop reason: HOSPADM

## 2019-02-01 ENCOUNTER — TELEPHONE (OUTPATIENT)
Dept: PULMONOLOGY | Facility: MEDICAL CENTER | Age: 49
End: 2019-02-01

## 2019-02-13 DIAGNOSIS — J43.2 CENTRILOBULAR EMPHYSEMA (HCC): ICD-10-CM

## 2019-02-13 RX ORDER — TIOTROPIUM BROMIDE 18 UG/1
CAPSULE ORAL; RESPIRATORY (INHALATION)
Qty: 30 EACH | Refills: 5 | Status: SHIPPED | OUTPATIENT
Start: 2019-02-13 | End: 2019-08-28 | Stop reason: SDUPTHER

## 2019-02-15 ENCOUNTER — HOSPITAL ENCOUNTER (OUTPATIENT)
Dept: SLEEP CENTER | Facility: CLINIC | Age: 49
Discharge: HOME | End: 2019-02-15
Payer: COMMERCIAL

## 2019-02-15 DIAGNOSIS — G47.19 EXCESSIVE DAYTIME SLEEPINESS: ICD-10-CM

## 2019-02-15 PROCEDURE — G0399 HOME SLEEP TEST/TYPE 3 PORTA: HCPCS

## 2019-02-15 PROCEDURE — 95806 SLEEP STUDY UNATT&RESP EFFT: CPT | Performed by: INTERNAL MEDICINE

## 2019-02-15 PROCEDURE — 95806 SLEEP STUDY UNATT&RESP EFFT: CPT

## 2019-02-21 DIAGNOSIS — G47.33 OSA (OBSTRUCTIVE SLEEP APNEA): Primary | ICD-10-CM

## 2019-03-21 ENCOUNTER — OFFICE VISIT (OUTPATIENT)
Dept: FAMILY MEDICINE CLINIC | Facility: CLINIC | Age: 49
End: 2019-03-21
Payer: COMMERCIAL

## 2019-03-21 VITALS
HEART RATE: 84 BPM | DIASTOLIC BLOOD PRESSURE: 72 MMHG | BODY MASS INDEX: 28.93 KG/M2 | TEMPERATURE: 98.7 F | SYSTOLIC BLOOD PRESSURE: 110 MMHG | HEIGHT: 66 IN | WEIGHT: 180 LBS | RESPIRATION RATE: 14 BRPM

## 2019-03-21 DIAGNOSIS — J06.9 VIRAL URI: Primary | ICD-10-CM

## 2019-03-21 PROCEDURE — 99213 OFFICE O/P EST LOW 20 MIN: CPT | Performed by: NURSE PRACTITIONER

## 2019-03-21 NOTE — PATIENT INSTRUCTIONS
Upper Respiratory Infection   AMBULATORY CARE:   An upper respiratory infection  is also called a common cold  It can affect your nose, throat, ears, and sinuses  Common signs and symptoms include the following:  Cold symptoms are usually worst for the first 3 to 5 days  You may have any of the following:  · Runny or stuffy nose    · Sneezing and coughing    · Sore throat or hoarseness    · Red, watery, and sore eyes    · Fatigue     · Chills and fever    · Headache, body aches, or sore muscles  Seek care immediately if:   · You have chest pain or trouble breathing  Contact your healthcare provider if:   · You have a fever over 102ºF (39°C)  · Your sore throat gets worse or you see white or yellow spots in your throat  · Your symptoms get worse after 3 to 5 days or your cold is not better in 14 days  · You have a rash anywhere on your skin  · You have large, tender lumps in your neck  · You have thick, green or yellow drainage from your nose  · You cough up thick yellow, green, or bloody mucus  · You have vomiting for more than 24 hours and cannot keep fluids down  · You have a bad earache  · You have questions or concerns about your condition or care  Treatment for a cold: There is no cure for the common cold  Colds are caused by viruses and do not get better with antibiotics  Most people get better in 7 to 14 days  You may continue to cough for 2 to 3 weeks  The following may help decrease your symptoms:  · Decongestants  help reduce nasal congestion and help you breathe more easily  If you take decongestant pills, they may make you feel restless or not able to sleep  Do not use decongestant sprays for more than a few days  · Cough suppressants  help reduce coughing  Ask your healthcare provider which type of cough medicine is best for you  · NSAIDs , such as ibuprofen, help decrease swelling, pain, and fever   NSAIDs can cause stomach bleeding or kidney problems in certain people  If you take blood thinner medicine, always ask your healthcare provider if NSAIDs are safe for you  Always read the medicine label and follow directions  · Acetaminophen  decreases pain and fever  It is available without a doctor's order  Ask how much to take and how often to take it  Follow directions  Read the labels of all other medicines you are using to see if they also contain acetaminophen, or ask your doctor or pharmacist  Acetaminophen can cause liver damage if not taken correctly  Do not use more than 4 grams (4,000 milligrams) total of acetaminophen in one day  Manage your cold:   · Rest as much as possible  Slowly start to do more each day  · Drink more liquids as directed  Liquids will help thin and loosen mucus so you can cough it up  Liquids will also help prevent dehydration  Liquids that help prevent dehydration include water, fruit juice, and broth  Do not drink liquids that contain caffeine  Caffeine can increase your risk for dehydration  Ask your healthcare provider how much liquid to drink each day  · Soothe a sore throat  Gargle with warm salt water  This helps your sore throat feel better  Make salt water by dissolving ¼ teaspoon salt in 1 cup warm water  You may also suck on hard candy or throat lozenges  You may use a sore throat spray  · Use a humidifier or vaporizer  Use a cool mist humidifier or a vaporizer to increase air moisture in your home  This may make it easier for you to breathe and help decrease your cough  · Use saline nasal drops as directed  These help relieve congestion  · Apply petroleum-based jelly around the outside of your nostrils  This can decrease irritation from blowing your nose  · Do not smoke  Nicotine and other chemicals in cigarettes and cigars can make your symptoms worse  They can also cause infections such as bronchitis or pneumonia   Ask your healthcare provider for information if you currently smoke and need help to quit  E-cigarettes or smokeless tobacco still contain nicotine  Talk to your healthcare provider before you use these products  Prevent spreading your cold to others:   · Try to stay away from other people during the first 2 to 3 days of your cold when it is more easily spread  · Do not share food or drinks  · Do not share hand towels with household members  · Wash your hands often, especially after you blow your nose  Turn away from other people and cover your mouth and nose with a tissue when you sneeze or cough  Follow up with your healthcare provider as directed:  Write down your questions so you remember to ask them during your visits  © 2017 [facility] Information is for End User's use only and may not be sold, redistributed or otherwise used for commercial purposes  All illustrations and images included in CareNotes® are the copyrighted property of [employer]  or Neft[employer] Larose  The above information is an  only  It is not intended as medical advice for individual conditions or treatments  Talk to your doctor, nurse or pharmacist before following any medical regimen to see if it is safe and effective for you

## 2019-03-21 NOTE — PROGRESS NOTES
Assessment:      viral upper respiratory illness    no evidence of bacterial infection or bronchitis on exam    Plan:      Discussed diagnosis and treatment of URI  Discussed the importance of avoiding unnecessary antibiotic therapy  Suggested symptomatic OTC remedies  Nasal saline spray for congestion  Follow up as needed  Call in 4 days if symptoms aren't resolving  Subjective:       History was provided by the patient  Marcy Foy is a 46 y o  male who presents for evaluation of symptoms of a URI  Symptoms include dry cough, post nasal drip, sinus and nasal congestion and brown phlegm in morning  Onset of symptoms was 1 day ago, unchanged since that time  denies lightheadedness, low grade fever, purulent nasal discharge, shortness of breath and wheezing  He is drinking moderate amounts of fluids  Evaluation to date: none  Treatment to date: cough suppressants  The following portions of the patient's history were reviewed and updated as appropriate: allergies, current medications, past family history, past medical history, past social history, past surgical history and problem list     Review of Systems  Pertinent items are noted in HPI        Objective:      /72 (BP Location: Left arm, Patient Position: Sitting, Cuff Size: Adult)   Pulse 84   Temp 98 7 °F (37 1 °C) (Tympanic)   Resp 14   Ht 5' 6" (1 676 m)   Wt 81 6 kg (180 lb)   BMI 29 05 kg/m²   General appearance: alert and oriented, in no acute distress  Head: Normocephalic, without obvious abnormality, sinuses nontender to percussion  Ears: normal TM's and external ear canals both ears  Nose: no discharge, turbinates red  Throat: abnormal findings: mild oropharyngeal erythema  Lungs: clear to auscultation bilaterally  Heart: regular rate and rhythm, S1, S2 normal, no murmur, click, rub or gallop  Pulses: 2+ and symmetric  Lymph nodes: Cervical adenopathy: +  Neurologic: Grossly normal

## 2019-03-27 DIAGNOSIS — J06.9 ACUTE URI: Primary | ICD-10-CM

## 2019-03-27 RX ORDER — AZITHROMYCIN 250 MG/1
TABLET, FILM COATED ORAL
Qty: 6 TABLET | Refills: 0 | Status: SHIPPED | OUTPATIENT
Start: 2019-03-27 | End: 2019-03-31

## 2019-03-27 RX ORDER — FLUTICASONE PROPIONATE 50 MCG
2 SPRAY, SUSPENSION (ML) NASAL DAILY
Qty: 16 G | Refills: 0 | Status: SHIPPED | OUTPATIENT
Start: 2019-03-27 | End: 2019-12-30 | Stop reason: SDUPTHER

## 2019-04-01 DIAGNOSIS — J43.2 CENTRILOBULAR EMPHYSEMA (HCC): ICD-10-CM

## 2019-04-15 ENCOUNTER — OFFICE VISIT (OUTPATIENT)
Dept: FAMILY MEDICINE CLINIC | Facility: CLINIC | Age: 49
End: 2019-04-15
Payer: COMMERCIAL

## 2019-04-15 VITALS
WEIGHT: 185 LBS | HEART RATE: 78 BPM | BODY MASS INDEX: 29.73 KG/M2 | RESPIRATION RATE: 16 BRPM | SYSTOLIC BLOOD PRESSURE: 140 MMHG | TEMPERATURE: 97.8 F | DIASTOLIC BLOOD PRESSURE: 80 MMHG | HEIGHT: 66 IN

## 2019-04-15 DIAGNOSIS — R10.11 RUQ ABDOMINAL PAIN: Primary | ICD-10-CM

## 2019-04-15 PROCEDURE — 3008F BODY MASS INDEX DOCD: CPT | Performed by: FAMILY MEDICINE

## 2019-04-15 PROCEDURE — 1036F TOBACCO NON-USER: CPT | Performed by: FAMILY MEDICINE

## 2019-04-15 PROCEDURE — 99214 OFFICE O/P EST MOD 30 MIN: CPT | Performed by: FAMILY MEDICINE

## 2019-04-18 ENCOUNTER — TELEPHONE (OUTPATIENT)
Dept: FAMILY MEDICINE CLINIC | Facility: CLINIC | Age: 49
End: 2019-04-18

## 2019-04-18 LAB
ALBUMIN SERPL-MCNC: 4.5 G/DL (ref 3.5–5.5)
ALBUMIN/GLOB SERPL: 1.9 {RATIO} (ref 1.2–2.2)
ALP SERPL-CCNC: 62 IU/L (ref 39–117)
ALT SERPL-CCNC: 32 IU/L (ref 0–44)
AST SERPL-CCNC: 28 IU/L (ref 0–40)
BILIRUB SERPL-MCNC: 0.3 MG/DL (ref 0–1.2)
BUN SERPL-MCNC: 22 MG/DL (ref 6–24)
BUN/CREAT SERPL: 19 (ref 9–20)
CALCIUM SERPL-MCNC: 9.9 MG/DL (ref 8.7–10.2)
CHLORIDE SERPL-SCNC: 102 MMOL/L (ref 96–106)
CO2 SERPL-SCNC: 25 MMOL/L (ref 20–29)
CREAT SERPL-MCNC: 1.16 MG/DL (ref 0.76–1.27)
ERYTHROCYTE [DISTWIDTH] IN BLOOD BY AUTOMATED COUNT: 13.4 % (ref 12.3–15.4)
GLOBULIN SER-MCNC: 2.4 G/DL (ref 1.5–4.5)
GLUCOSE SERPL-MCNC: 105 MG/DL (ref 65–99)
HCT VFR BLD AUTO: 44.3 % (ref 37.5–51)
HGB BLD-MCNC: 14.9 G/DL (ref 13–17.7)
MCH RBC QN AUTO: 29.4 PG (ref 26.6–33)
MCHC RBC AUTO-ENTMCNC: 33.6 G/DL (ref 31.5–35.7)
MCV RBC AUTO: 87 FL (ref 79–97)
PLATELET # BLD AUTO: 200 X10E3/UL (ref 150–379)
POTASSIUM SERPL-SCNC: 4.8 MMOL/L (ref 3.5–5.2)
PROT SERPL-MCNC: 6.9 G/DL (ref 6–8.5)
RBC # BLD AUTO: 5.07 X10E6/UL (ref 4.14–5.8)
SL AMB EGFR AFRICAN AMERICAN: 84 ML/MIN/1.73
SL AMB EGFR NON AFRICAN AMERICAN: 73 ML/MIN/1.73
SODIUM SERPL-SCNC: 140 MMOL/L (ref 134–144)
WBC # BLD AUTO: 4.2 X10E3/UL (ref 3.4–10.8)

## 2019-05-16 DIAGNOSIS — I25.10 CAD, MULTIPLE VESSEL: ICD-10-CM

## 2019-05-16 RX ORDER — ROSUVASTATIN CALCIUM 20 MG/1
TABLET, COATED ORAL
Qty: 30 TABLET | Refills: 6 | Status: SHIPPED | OUTPATIENT
Start: 2019-05-16 | End: 2020-03-02 | Stop reason: SDUPTHER

## 2019-05-24 DIAGNOSIS — J43.2 CENTRILOBULAR EMPHYSEMA (HCC): ICD-10-CM

## 2019-05-24 RX ORDER — ALBUTEROL SULFATE 90 UG/1
2 AEROSOL, METERED RESPIRATORY (INHALATION) EVERY 4 HOURS PRN
Qty: 1 INHALER | Refills: 7 | Status: SHIPPED | OUTPATIENT
Start: 2019-05-24 | End: 2019-08-28 | Stop reason: SDUPTHER

## 2019-06-17 ENCOUNTER — TELEPHONE (OUTPATIENT)
Dept: CARDIOLOGY CLINIC | Facility: CLINIC | Age: 49
End: 2019-06-17

## 2019-06-29 NOTE — PROGRESS NOTES
Progress Note - Cardiology Office  [facility]    Rashad Underwood 46 y o  male MRN: [id number]  : 1970  Encounter: [id number]      Assessment:     1  CAD, multiple vessel    2  S/P CABG x 2    3  LUCY (obstructive sleep apnea)    4  Centrilobular emphysema ([address] )    5  Mixed hyperlipidemia        Discussion Summary and Plan:  1  Coronary artery disease status post CABG x2 in 2017 with LIMA to LAD and SVG vein graft to circumflex  No symptoms of angina  Patient exercised about 1 hr a day  Has gained some weight advised to lose weight  He is very active  He has no symptoms of chest pain or shortness of breath  Came with his wife who provided additional information      2  Dizziness and fatigue  No more episodes  He is not on any AV node blocking drug    3  COPD  Patient has history of COPD with some exertional shortness of breath which is not changed  He is on inhalers follow up with Pulmonary  He still get some exertional shortness of breath but not changed  4  Dyslipidemia  On high-intensity statins  Patient's labs reviewed  Patient blood test from 2019 reviewed  Labs are acceptable    5  History of tobacco abuse  Heavy smoker throughout her life since he was 13years old  Now quit smoking  Patient encouraged not to go back to smoking  Discussed with patient's mother who was present with the patient  6  Recent lumbar radiculopathy now improved  Counseling :  A description of the counseling  As above  He need to be regular in follow-up  Patient's ability to self care: Yes  Medication side effect reviewed with patient in detail and all their questions answered to their satisfaction  HPI :     Rashad Underwood is a 46y o  year old male who came for follow up  Patient  was admitted to [facility] in 2017 with 10/10 severe chest pain and had a non ST elevation MI   He was short of breath even climbing 1-2 flights of stair  His cardiac catheterization shows he had a spontaneous dissection of left main as well as significant stenosis of his mid LAD and he underwent successful coronary artery bypass surgery x2 with LIMA to LAD and SVG vein graft to circumflex came for follow-up  Patient used to smoke heavy and has now quit smoking  He has history of I believe COPD but never seen a pulmonologist  He feels fatigue and tired otherwise he is getting better  Some dizziness after taking metoprolol  No fever no chills no other significant complaint       01/10/2019  Above reviewed  Patient came for follow-up  He has gained about 40 -50 lb  He has quit smoking  Denies any chest pain or any shortness of breath  Blood test reviewed are excellent  His COPD meds are still being adjusted as he has some insurance issues  No fever no chills no nausea no vomiting no PND no orthopnea  He just had blood test done in January which was reviewed and there are excellent  He is compliant with his medications  Patient does regular exercise 1 hr a day without any issues  Review of Systems   Constitutional: Negative for activity change, chills, diaphoresis, fever and unexpected weight change  HENT: Negative for congestion  Eyes: Negative for discharge and redness  Respiratory: Negative for cough, chest tightness, shortness of breath and wheezing  Cardiovascular: Negative  Negative for chest pain, palpitations and leg swelling  Gastrointestinal: Negative for abdominal pain, diarrhea and nausea  Endocrine: Negative  Genitourinary: Negative for decreased urine volume and urgency  Musculoskeletal: Negative  Negative for arthralgias, back pain and gait problem  Skin: Negative for rash and wound  Allergic/Immunologic: Negative  Neurological: Negative for dizziness, seizures, syncope, weakness, light-headedness and headaches  Hematological: Negative      Psychiatric/Behavioral: Negative for agitation and confusion  The patient is not nervous/anxious  Historical Information   Past Medical History:   Diagnosis Date    Chronic sinus complaints     last assessed 17    Diverticulosis     GERD (gastroesophageal reflux disease)     off medicine for past few years    Heart [facility])     last assessed 17    HTN (hypertension)     Hyperlipidemia     Sciatica     Tobacco use     Vasovagal syncope     last assessed 13    Wears glasses      Past Surgical History:   Procedure Laterality Date    APPENDECTOMY      COLONOSCOPY N/A 2017    Procedure: COLONOSCOPY;  Surgeon: Tracie Graf MD;  [facility]; Service: Gastroenterology    CORONARY ARTERY BYPASS GRAFT N/A 2017    Procedure: INTRA OP MATTHEW; CORONARY ARTERY BYPASS GRAFT X 2 WITH SVH TO OM1  AND LIMA TO LAD; LEFT LEG EVH;  Surgeon: Juan Carlos Bocanegra MD;  Location: [facility];  Service: Cardiac Surgery    THUMB AMPUTATION Left      Social History     Substance and Sexual Activity   Alcohol Use No    Comment: rarely; denied hx of social drinker as per [employer]     Social History     Substance and Sexual Activity   Drug Use No     Social History     Tobacco Use   Smoking Status Former Smoker    Packs/day: 2 00    Years: 28 00    Pack years: 56 00    Types: Cigarettes    Last attempt to quit: 2017    Years since quittin 7   Smokeless Tobacco Never Used   Tobacco Comment    denied hx of exposure to second hand smoke; former smoker, smoked 2ppd for 30 yrs   pt quit 3 mos ago as per [employer]     Family History:   Family History   Problem Relation Age of Onset    Heart disease Father         CABG    Testicular cancer Father         adenocarcinoma in situ of the testis    Heart defect Father         cardiac disorder    Cancer Father     Diabetes type II Father     Heart disease Maternal Grandfather     Hypertension Mother         benign essential    Diabetes Sister     Arthritis Family     Arthritis Other     Heart defect Other         cardiac disorder    Diabetes Other     Cancer Other        Meds/Allergies     No Known Allergies    Current Outpatient Medications:     ADVAIR DISKUS 250-50 MCG/DOSE inhaler, INHALE ONE PUFF BY MOUTH TWICE A DAY, Disp: 60 each, Rfl: 0    albuterol (VENTOLIN HFA) 90 mcg/act inhaler, Inhale 2 puffs every 4 (four) hours as needed for wheezing or shortness of breath, Disp: 1 Inhaler, Rfl: 7    aspirin 325 mg tablet, Take 1 tablet by mouth daily, Disp: 100 tablet, Rfl: 0    fluticasone-salmeterol (ADVAIR) 500-50 mcg/dose inhaler, Inhale 1 puff 2 (two) times a day Rinse mouth after use , Disp: 1 Inhaler, Rfl: 0    rosuvastatin (CRESTOR) 20 MG tablet, TAKE ONE TABLET BY MOUTH EVERY DAY, Disp: 30 tablet, Rfl: 6    SPIRIVA HANDIHALER 18 MCG inhalation capsule, INHALE ONE CAPSULE VIA HANDIHALER BY MOUTH EVERY DAY, Disp: 30 each, Rfl: 5    fluticasone (FLONASE) 50 mcg/act nasal spray, 2 sprays into each nostril daily (Patient not taking: Reported on 7/1/2019), Disp: 16 g, Rfl: 0    methocarbamol (ROBAXIN) 500 mg tablet, Take 1 tablet (500 mg total) by mouth 2 (two) times a day for 5 days, Disp: 10 tablet, Rfl: 0    naproxen (NAPROSYN) 500 mg tablet, Take 1 tablet (500 mg total) by mouth 2 (two) times a day with meals for 5 days, Disp: 10 tablet, Rfl: 0    Vitals: Blood pressure 118/78, pulse 96, height 5' 6" (1 676 m), weight 85 5 kg (188 lb 8 oz), SpO2 93 %  Body mass index is 30 42 kg/m²  Vitals:    07/01/19 1505   Weight: 85 5 kg (188 lb 8 oz)     BP Readings from Last 3 Encounters:   07/01/19 118/78   04/15/19 140/80   03/21/19 110/72       Physical Exam:  Physical Exam   Constitutional: He is oriented to person, place, and time  He appears well-developed and well-nourished  No distress  HENT:   Head: Normocephalic and atraumatic  Eyes: Pupils are equal, round, and reactive to light  Neck: Neck supple  No JVD present  No tracheal deviation present   No thyromegaly present  Cardiovascular: Normal rate, regular rhythm, S1 normal and S2 normal  Exam reveals no gallop, no S3, no S4, no distant heart sounds and no friction rub  Murmur heard  Systolic (ejection) murmur is present with a grade of 2/6  Pulmonary/Chest: Effort normal and breath sounds normal  No respiratory distress  He has no wheezes  He has no rales  He exhibits no tenderness  Abdominal: Soft  Bowel sounds are normal  He exhibits no distension  There is no tenderness  Musculoskeletal: He exhibits no edema or deformity  Neurological: He is alert and oriented to person, place, and time  Skin: Skin is warm and dry  No rash noted  He is not diaphoretic  No pallor  Psychiatric: He has a normal mood and affect  His behavior is normal  Judgment normal          Diagnostic Studies Review Cardio:     Stress Test: Echo show normal LV systolic function  Catheterization: Cardiac catheterization in September 2017 shows proximal LAD 80% stenosis, spontaneous dissection of the left main, preserved LV systolic function, nonobstructive disease of the circumflex which was medium to small size  Circumflex has 25 -30% stenosis  ECG Report: 10/26/2017 12 lead EKG shows normal sinus rhythm heart rate 60 beats per minute  Nonspecific ST changes  No old EKG to compare     Twelve lead EKG done 01/10/2019 shows normal sinus rhythm heart rate 83 beats per minute  No significant ST changes  Twelve lead EKG 07/01/2019 shows normal sinus rhythm heart rate 88 beats per minute  No significant ST changes no change from old EKG        Cardiac testing:   Results for orders placed during the hospital encounter of 03/19/18   Echo complete with contrast if indicated    [address] [phone]    Transthoracic Echocardiogram  2D, M-mode, Doppler, and Color Doppler    Study date:  19-Mar-2018    Patient: Marleen Freire  MR number: [id number]  Account number: [de-identified]  : 1970  Age: 50 years  Gender: Male  Status: Routine  Location: Echo lab  Height: 62 in  Weight: 161 7 lb  BP: 119/ 77 mmHg    Sonographer:  ELÍAS Maher  Primary Physician:  Melba Giles MD  Referring Physician:  Cristi Gloverw:  Jyoti Silverio [facility]  Interpreting Physician:  DO SOLITARIO Breaux    LEFT VENTRICLE:  Systolic function was at the lower limits of normal by visual assessment  Ejection fraction was estimated to be 50 %  There were no regional wall motion abnormalities  There was mild concentric hypertrophy  Doppler parameters were consistent with abnormal left ventricular relaxation (grade 1 diastolic dysfunction)  LEFT ATRIUM:  The atrium was mildly dilated  RIGHT ATRIUM:  The atrium was mildly to moderately dilated  MITRAL VALVE:  There was trace regurgitation  TRICUSPID VALVE:  There was mild regurgitation  Pulmonary artery systolic pressure was within the normal range  Estimated peak PA pressure was 38 mmHg  PROCEDURE: The procedure was performed in the echo lab  This was a routine study  The transthoracic approach was used  The study included complete 2D imaging, M-mode, complete spectral Doppler, and color Doppler  The heart rate was 64 bpm,  at the start of the study  Images were obtained from the parasternal, apical, subcostal, and suprasternal notch acoustic windows  Image quality was adequate  LEFT VENTRICLE: Size was normal  Systolic function was at the lower limits of normal by visual assessment  Ejection fraction was estimated to be 50 %  There were no regional wall motion abnormalities  There was mild concentric hypertrophy  DOPPLER: Doppler parameters were consistent with abnormal left ventricular relaxation (grade 1 diastolic dysfunction)  There was no evidence of elevated ventricular filling pressure by Doppler parameters      RIGHT VENTRICLE: The size was normal  Systolic function was low normal  DOPPLER: Systolic pressure was within the normal range  LEFT ATRIUM: The atrium was mildly dilated  No thrombus was identified  RIGHT ATRIUM: The atrium was mildly to moderately dilated  MITRAL VALVE: Valve structure was normal  There was normal leaflet separation  No echocardiographic evidence for prolapse  DOPPLER: The transmitral velocity was within the normal range  There was no evidence for stenosis  There was trace  regurgitation  AORTIC VALVE: The valve was trileaflet  Leaflets exhibited normal thickness, normal cuspal separation, and sclerosis  DOPPLER: Transaortic velocity was within the normal range  There was no evidence for stenosis  There was no  regurgitation  TRICUSPID VALVE: The valve structure was normal  There was normal leaflet separation  DOPPLER: The transtricuspid velocity was within the normal range  There was mild regurgitation  Pulmonary artery systolic pressure was within the normal  range  Estimated peak PA pressure was 38 mmHg  PULMONIC VALVE: Leaflets exhibited normal thickness, no calcification, and normal cuspal separation  DOPPLER: The transpulmonic velocity was within the normal range  There was trace regurgitation  PERICARDIUM: There was no thickening  There was no pericardial effusion  AORTA: The root exhibited normal size      PULMONARY ARTERY: The size was normal  The morphology appeared normal     SYSTEM MEASUREMENT TABLES    2D mode  AoR Diam 2D: 3 6 cm  LA Diam (2D): 3 6 cm  LA/Ao (2D): 1  FS (2D Teich): 25 6 %  IVSd (2D): 1 01 cm  LVDEV: 108 cm³  LVEDV MOD BP: 131 cm³  LVESV: 53 7 cm³  LVIDd(2D): 4 81 cm  LVISd (2D): 3 58 cm  LVOT Area 2D: 3 14 cm squared  LVPWd (2D): 1 03 cm  SV (Teich): 54 3 cm³    Apical four chamber  LVEF A4C: 55 %    Apical two chamber  LA Area: 22 7 cm squared  LA Volume: 70 cm³  LVEF A2C: 59 %  LVLD A2C: 8 37 cm    Unspecified Scan Mode  NARDA Cont Eq (Peak Kenroy): 2 81 cm squared  LVOT Diam : 2 cm  LVOT Vmax: 1190 mm/s  LVOT Vmax; Mean: 1190 mm/s  Peak Grad ; Mean: 6 mm[Hg]  MV Peak A Kenroy: 642 mm/s  MV Peak E Kenroy  Mean: 632 mm/s  MVA (PHT): 4 15 cm squared  PHT: 53 ms  Max P mm[Hg]  V Max: 2760 mm/s  Vmax: 2410 mm/s  RA Area: 24 9 cm squared  RA Volume: 86 7 cm³  TAPSE: 1 7 cm    [employer] Accredited Echocardiography Laboratory    Prepared and electronically signed by    Ammon Pisano DO  Signed 20-Mar-2018 08:35:00         Imaging:  Chest X-Ray:   Xr Chest Pa & Lateral    Result Date: 10/28/2017  Impression No active pulmonary disease  Hyperinflation  Workstation performed: [id number]       CT-scan of the chest:     Cta Dissection Protocol Chest And Abdomen    Result Date: 2017  Impression No evidence of aortic aneurysm or dissection  Mild to moderate COPD  Mildly thick-walled jejunal loop with slight hyperemia of the bowel, perhaps related to underdistention, cannot entirely exclude enteritis  No inflammatory changes in the adjacent mesentery   Workstation performed: [id number]     Lab Review   Lab Results   Component Value Date    WBC 4 2 2019    HGB 14 9 2019    HCT 44 3 2019    MCV 87 2019    RDW 13 4 2019     2019     BMP:  Lab Results   Component Value Date    K 4 8 2019     2019    CO2 25 2019    BUN 22 2019    CREATININE 1 16 2019    GLUCOSE 159 (H) 2017    GLUF 95 2017    CALCIUM 9 5 2018    EGFR 105 2018    MG 2 4 2017     LFT:  Lab Results   Component Value Date    AST 28 2019    ALT 32 2019    ALKPHOS 74 2018       Lab Results   Component Value Date    HGBA1C 5 7 2017     Lipid Profile:   Lab Results   Component Value Date    HDL 45 2019    LDLCALC 110 (H) 2017    TRIG 112 2019     No results found for: CHOL  Lab Results   Component Value Date    CKTOTAL 104 2018    TROPONINI <0 02 2018     Lab Results   Component Value Date    NTBNP 170 (H) 02/01/2018        Dr Carola Guzman MD [facility]      "This note has been constructed using a voice recognition system  Therefore there may be syntax, spelling, and/or grammatical errors   Please call if you have any questions  "

## 2019-07-01 ENCOUNTER — OFFICE VISIT (OUTPATIENT)
Dept: CARDIOLOGY CLINIC | Facility: CLINIC | Age: 49
End: 2019-07-01
Payer: COMMERCIAL

## 2019-07-01 VITALS
OXYGEN SATURATION: 93 % | BODY MASS INDEX: 30.29 KG/M2 | DIASTOLIC BLOOD PRESSURE: 78 MMHG | HEART RATE: 96 BPM | HEIGHT: 66 IN | SYSTOLIC BLOOD PRESSURE: 118 MMHG | WEIGHT: 188.5 LBS

## 2019-07-01 DIAGNOSIS — J43.2 CENTRILOBULAR EMPHYSEMA (HCC): ICD-10-CM

## 2019-07-01 DIAGNOSIS — G47.33 OSA (OBSTRUCTIVE SLEEP APNEA): ICD-10-CM

## 2019-07-01 DIAGNOSIS — I25.10 CAD, MULTIPLE VESSEL: ICD-10-CM

## 2019-07-01 DIAGNOSIS — E78.2 MIXED HYPERLIPIDEMIA: ICD-10-CM

## 2019-07-01 DIAGNOSIS — Z95.1 S/P CABG X 2: ICD-10-CM

## 2019-07-01 PROCEDURE — 93000 ELECTROCARDIOGRAM COMPLETE: CPT | Performed by: INTERNAL MEDICINE

## 2019-07-01 PROCEDURE — 99214 OFFICE O/P EST MOD 30 MIN: CPT | Performed by: INTERNAL MEDICINE

## 2019-07-18 ENCOUNTER — OFFICE VISIT (OUTPATIENT)
Dept: FAMILY MEDICINE CLINIC | Facility: CLINIC | Age: 49
End: 2019-07-18
Payer: COMMERCIAL

## 2019-07-18 VITALS
SYSTOLIC BLOOD PRESSURE: 120 MMHG | DIASTOLIC BLOOD PRESSURE: 88 MMHG | WEIGHT: 185.6 LBS | HEART RATE: 88 BPM | TEMPERATURE: 98.1 F | RESPIRATION RATE: 16 BRPM | HEIGHT: 66 IN | BODY MASS INDEX: 29.83 KG/M2

## 2019-07-18 DIAGNOSIS — J43.2 CENTRILOBULAR EMPHYSEMA (HCC): ICD-10-CM

## 2019-07-18 DIAGNOSIS — J30.89 SEASONAL ALLERGIC RHINITIS DUE TO OTHER ALLERGIC TRIGGER: Primary | ICD-10-CM

## 2019-07-18 PROCEDURE — 1036F TOBACCO NON-USER: CPT | Performed by: NURSE PRACTITIONER

## 2019-07-18 PROCEDURE — 3008F BODY MASS INDEX DOCD: CPT | Performed by: NURSE PRACTITIONER

## 2019-07-18 PROCEDURE — 99213 OFFICE O/P EST LOW 20 MIN: CPT | Performed by: NURSE PRACTITIONER

## 2019-07-18 NOTE — PROGRESS NOTES
Assessment/Plan   Allergic rhinitis  No evidence of bacterial or viral infection on exam    The case discussed with patient using patient centered shared decision making  The patient was counseled regarding instructions for management,-- risk factor reductions,-- prognosis,-- impressions,-- risks and benefits of treatment options,-- importance of compliance with treatment  I have reviewed the instructions with the patient, answering all questions to his satisfaction  Medications: resume flonase, allegra  Antibiotics not advised at this time  Allergen avoidance discussed  Follow-up in 2 weeks  Call in 3 days if sxs progress as discussed    Subjective   Marilyn Faustin is a 46 y o  male who presents for evaluation and treatment of allergic symptoms  Symptoms include:feeling warm, clear rhinorrhea, cough, itchy eyes, postnasal drip and aches and are present in a seasonal pattern  Precipitants include: unknown  Treatment currently includes none  has used flonase and allegra in the past  using inhalers as prescribed  The following portions of the patient's history were reviewed and updated as appropriate: allergies, current medications, past family history, past medical history, past social history, past surgical history and problem list     Review of Systems  Pertinent items are noted in HPI      Objective   /88 (BP Location: Left arm, Patient Position: Sitting, Cuff Size: Standard)   Pulse 88   Temp 98 1 °F (36 7 °C)   Resp 16   Ht 5' 6" (1 676 m)   Wt 84 2 kg (185 lb 9 6 oz)   BMI 29 96 kg/m²   General appearance: alert and oriented, in no acute distress  Head: Normocephalic, without obvious abnormality, atraumatic  Ears: normal TM's and external ear canals both ears  Nose: no discharge, turbinates pale  Throat: abnormal findings: mild oropharyngeal erythema  Lungs: clear to auscultation bilaterally  Heart: regular rate and rhythm, S1, S2 normal, no murmur, click, rub or gallop  Lymph nodes: Cervical, supraclavicular, and axillary nodes normal

## 2019-08-28 DIAGNOSIS — J43.2 CENTRILOBULAR EMPHYSEMA (HCC): ICD-10-CM

## 2019-08-28 RX ORDER — ALBUTEROL SULFATE 90 UG/1
2 AEROSOL, METERED RESPIRATORY (INHALATION) EVERY 4 HOURS PRN
Qty: 1 INHALER | Refills: 7 | Status: SHIPPED | OUTPATIENT
Start: 2019-08-28 | End: 2020-10-28

## 2019-08-29 DIAGNOSIS — J43.2 CENTRILOBULAR EMPHYSEMA (HCC): Primary | ICD-10-CM

## 2019-08-29 RX ORDER — FLUTICASONE PROPIONATE AND SALMETEROL 113; 14 UG/1; UG/1
1 POWDER, METERED RESPIRATORY (INHALATION) 2 TIMES DAILY
Qty: 1 INHALER | Refills: 6 | Status: SHIPPED | OUTPATIENT
Start: 2019-08-29 | End: 2020-05-08

## 2019-09-03 ENCOUNTER — OFFICE VISIT (OUTPATIENT)
Dept: PULMONOLOGY | Facility: MEDICAL CENTER | Age: 49
End: 2019-09-03
Payer: COMMERCIAL

## 2019-09-03 ENCOUNTER — TELEPHONE (OUTPATIENT)
Dept: FAMILY MEDICINE CLINIC | Facility: CLINIC | Age: 49
End: 2019-09-03

## 2019-09-03 ENCOUNTER — TELEPHONE (OUTPATIENT)
Dept: PULMONOLOGY | Facility: MEDICAL CENTER | Age: 49
End: 2019-09-03

## 2019-09-03 VITALS
TEMPERATURE: 97.4 F | RESPIRATION RATE: 12 BRPM | SYSTOLIC BLOOD PRESSURE: 144 MMHG | BODY MASS INDEX: 30.22 KG/M2 | OXYGEN SATURATION: 96 % | HEART RATE: 72 BPM | WEIGHT: 188 LBS | DIASTOLIC BLOOD PRESSURE: 82 MMHG | HEIGHT: 66 IN

## 2019-09-03 DIAGNOSIS — G47.33 OSA (OBSTRUCTIVE SLEEP APNEA): ICD-10-CM

## 2019-09-03 DIAGNOSIS — R06.00 DYSPNEA ON EXERTION: Primary | ICD-10-CM

## 2019-09-03 DIAGNOSIS — G47.19 EXCESSIVE DAYTIME SLEEPINESS: ICD-10-CM

## 2019-09-03 DIAGNOSIS — J43.2 CENTRILOBULAR EMPHYSEMA (HCC): ICD-10-CM

## 2019-09-03 PROBLEM — R06.09 DYSPNEA ON EXERTION: Status: ACTIVE | Noted: 2019-09-03

## 2019-09-03 PROCEDURE — 99213 OFFICE O/P EST LOW 20 MIN: CPT | Performed by: NURSE PRACTITIONER

## 2019-09-03 NOTE — PROGRESS NOTES
Assessment/Plan:     Problem List Items Addressed This Visit        Respiratory    Centrilobular emphysema ([address] )     Patient has history of centrilobular emphysema  Complete PFT were reviewed with patient  He has a history of decrease in diffusion capacity  He currently is on air Duo and Spiriva  Feel that this is optimal therapy for him  His air Duo is 113/14 mics 1 puff b i d  As well as Spiriva  As per insurance guidelines, he was not able to afford Trelegy  He is using his rescue inhaler several times per day  Currently patient does not feel that his air Duo is working  He is on 113/14 mics 1 puff b i d  I am ordering a CTA of the chest   Patient is agreeable  LUCY (obstructive sleep apnea)     Patient has history of obstructive sleep apnea  He was diagnosed via home sleep study February 2019  Respiratory event index was 7  He also had a severely elevated snore index  Patient also had event related hypoxemia with lowest oxygen saturation of 79%  He is now currently using an auto CPAP  He is compliant in the usage  His average usage is 5 hours and 23 minutes  It appears that his average CPAP pressure is 9 7 and apneic hypopnea index is really reduced to 1 5  Patient is aware that this treatment is an important as he has history of sleep apnea, severe centrilobular emphysema and coronary artery disease  He does report feeling more refreshed in the morning  Patient continues to use an benefit from auto CPAP  Other    Excessive daytime sleepiness    Dyspnea on exertion - Primary     Patient has worsening dyspnea on exertion  Room air oxygen at rest is 96% and walking up and down steps as well as walking 60 ft was 93%  He does have history of centrilobular emphysema  He had complete pulmonary function test done January of 2019  Forced vital capacity was 3 27 L or 77% of predicted, FEV1 was 1 59 L or 46% obstruction ratio was 48  Residual volume was 185% of predicted  Diffusion CT capacity was 49 and corrected for alveolar ventilation was 56  There was evidence of severe air trapping  Likely this is a combination of both coronary artery disease as well as severe centrilobular emphysema  Because of patient's report of worsening dyspnea I will order CTA of chest   This could be done at patient's convenience  Return in about 6 months (around 3/3/2020)  All questions are answered to the patient's satisfaction and understanding  He verbalizes understanding  He is encouraged to call with any further questions or concerns  Portions of the record may have been created with voice recognition software  Occasional wrong word or "sound a like" substitutions may have occurred due to the inherent limitations of voice recognition software  Read the chart carefully and recognize, using context, where substitutions have occurred  HPI:  Nelly Vergara is a 12-year-old male with history of cigarette smoking  He last was seen in the office by Dr Hugo Neighbor January 2019  He did have a complete pulmonary function test which showed severe air trapping  There was also severe obstructive defect without significant bronchodilator response diffusion capacity was decreased to 56 mL/min of mercury  Last chest x-ray was done February 2018  There was no active pulmonary disease  However his lungs were mildly hyperinflated  Patient has a history of coronary artery disease  Last 2D echocardiogram was done in March 2018  Ejection fraction was 50%  There was grade 1 diastolic dysfunction  Tricuspid valve showed mild regurgitation with estimated PA pressure within the normal range  PA pressure was 38 mmHg  He was last seen by cardiologist Dr Sweta Adorno in July of 2019  He has a history of coronary artery disease as well as centrilobular emphysema  He is status post CABG x2 in September 2017  No symptoms of angina      Electronically Signed by Mona Garcia, CRNP    ______________________________________________________________________    Chief Complaint:   Chief Complaint   Patient presents with    Shortness of Breath     pt states not as well  occurs  during short distances  no cough/ wheeze    Sleep Apnea     pt states that machine is helping at night   Patient ID: Jumana Harper is a 46 y o  y o  male has a past medical history of Chronic sinus complaints, Diverticulosis, GERD (gastroesophageal reflux disease), Heart attack ([address] ), HTN (hypertension), Hyperlipidemia, Sciatica, Tobacco use, Vasovagal syncope, and Wears glasses  9/3/2019  Patient presents today for follow-up visit  Shortness of Breath   This is a chronic problem  The current episode started more than 1 year ago  The problem occurs daily  The problem has been gradually worsening  The symptoms are aggravated by exercise  The patient has no known risk factors for DVT/PE  He has tried beta agonist inhalers, rest, steroid inhalers and ipratropium inhalers for the symptoms  The treatment provided mild relief  His past medical history is significant for CAD and chronic lung disease  Review of Systems   Constitutional: Negative  HENT: Negative  Eyes: Negative  Respiratory: Positive for shortness of breath  Cardiovascular: Negative  Gastrointestinal: Negative  Endocrine: Negative  Genitourinary: Negative  Musculoskeletal: Negative  Skin: Negative  Allergic/Immunologic: Negative  Neurological: Negative  Hematological: Negative  Psychiatric/Behavioral: Negative  Smoking history: He reports that he quit smoking about 1 years ago  His smoking use included cigarettes  He has a 56 00 pack-year smoking history   He has never used smokeless tobacco     The following portions of the patient's history were reviewed and updated as appropriate: allergies, current medications, past family history, past medical history, past social history, past surgical history and problem list     Immunization History   Administered Date(s) Administered    Influenza, injectable, quadrivalent, preservative free 0 5 mL 08/29/2018     Current Outpatient Medications   Medication Sig Dispense Refill    albuterol (VENTOLIN HFA) 90 mcg/act inhaler Inhale 2 puffs every 4 (four) hours as needed for wheezing or shortness of breath 1 Inhaler 7    aspirin 325 mg tablet Take 1 tablet by mouth daily 100 tablet 0    fluticasone (FLONASE) 50 mcg/act nasal spray 2 sprays into each nostril daily 16 g 0    fluticasone-salmeterol (AIRDUO RESPICLICK) 049-26 mcg/act dry powder inhaler Inhale 1 puff 2 (two) times a day Rinse mouth after use  1 Inhaler 6    rosuvastatin (CRESTOR) 20 MG tablet TAKE ONE TABLET BY MOUTH EVERY DAY 30 tablet 6    tiotropium (SPIRIVA HANDIHALER) 18 mcg inhalation capsule Use one capsule daily 30 each 5     No current facility-administered medications for this visit  Allergies: Patient has no known allergies  Objective:  Vitals:    09/03/19 1013   BP: 144/82   BP Location: Left arm   Patient Position: Sitting   Cuff Size: Standard   Pulse: 72   Resp: 12   Temp: (!) 97 4 °F (36 3 °C)   TempSrc: Tympanic   SpO2: 96%   Weight: 85 3 kg (188 lb)   Height: 5' 6" (1 676 m)   Oxygen Therapy  SpO2: 96 %    Wt Readings from Last 3 Encounters:   09/03/19 85 3 kg (188 lb)   07/18/19 84 2 kg (185 lb 9 6 oz)   07/01/19 85 5 kg (188 lb 8 oz)     Body mass index is 30 34 kg/m²  Physical Exam   Constitutional: He is oriented to person, place, and time  He appears well-developed and well-nourished  HENT:   Head: Normocephalic and atraumatic  Mouth/Throat: Oropharynx is clear and moist    Mallampati 2   Eyes: Pupils are equal, round, and reactive to light  EOM are normal    Neck: Normal range of motion  Neck supple  Cardiovascular: Normal rate and regular rhythm  Pulmonary/Chest: Effort normal and breath sounds normal    Abdominal: Soft   Bowel sounds are normal    Musculoskeletal: Normal range of motion  Right lower leg: Normal         Left lower leg: Normal    Neurological: He is alert and oriented to person, place, and time  Skin: Skin is warm and dry  Capillary refill takes less than 2 seconds  Psychiatric: He has a normal mood and affect  His behavior is normal        Lab Review:   Office Visit on 04/15/2019   Component Date Value    White Blood Cell Count 04/17/2019 4 2     Red Blood Cell Count 04/17/2019 5 07     Hemoglobin 04/17/2019 14 9     HCT 04/17/2019 44 3     MCV 04/17/2019 87     MCH 04/17/2019 29 4     MCHC 04/17/2019 33 6     RDW 04/17/2019 13 4     Platelet Count 30/33/6888 200     Glucose, Random 04/17/2019 105*    BUN 04/17/2019 22     Creatinine 04/17/2019 1 16     eGFR Non  04/17/2019 73     eGFR  04/17/2019 84     SL AMB BUN/CREATININE RA* 04/17/2019 19     Sodium 04/17/2019 140     Potassium 04/17/2019 4 8     Chloride 04/17/2019 102     CO2 04/17/2019 25     CALCIUM 04/17/2019 9 9     Protein, Total 04/17/2019 6 9     Albumin 04/17/2019 4 5     Globulin, Total 04/17/2019 2 4     Albumin/Globulin Ratio 04/17/2019 1 9     TOTAL BILIRUBIN 04/17/2019 0 3     Alk Phos Isoenzymes 04/17/2019 62     AST 04/17/2019 28     ALT 04/17/2019 32        Diagnostics:  I have personally reviewed pertinent films in PACS    Office Spirometry Results:     ESS:    No results found

## 2019-09-03 NOTE — ASSESSMENT & PLAN NOTE
Patient has worsening dyspnea on exertion  Room air oxygen at rest is 96% and walking up and down steps as well as walking 60 ft was 93%  He does have history of centrilobular emphysema  He had complete pulmonary function test done January of 2019  Forced vital capacity was 3 27 L or 77% of predicted, FEV1 was 1 59 L or 46% obstruction ratio was 48  Residual volume was 185% of predicted  Diffusion CT capacity was 49 and corrected for alveolar ventilation was 56  There was evidence of severe air trapping  Likely this is a combination of both coronary artery disease as well as severe centrilobular emphysema  Because of patient's report of worsening dyspnea I will order CTA of chest   This could be done at patient's convenience

## 2019-09-03 NOTE — ASSESSMENT & PLAN NOTE
Patient has history of centrilobular emphysema  Complete PFT were reviewed with patient  He has a history of decrease in diffusion capacity  He currently is on air Duo and Spiriva  Feel that this is optimal therapy for him  His air Duo is 113/14 mics 1 puff b i d  As well as Spiriva  As per insurance guidelines, he was not able to afford Trelegy  He is using his rescue inhaler several times per day  Currently patient does not feel that his air Duo is working  He is on 113/14 mics 1 puff b i d  I am ordering a CTA of the chest   Patient is agreeable

## 2019-09-03 NOTE — ASSESSMENT & PLAN NOTE
Patient has history of obstructive sleep apnea  He was diagnosed via home sleep study February 2019  Respiratory event index was 7  He also had a severely elevated snore index  Patient also had event related hypoxemia with lowest oxygen saturation of 79%  He is now currently using an auto CPAP  He is compliant in the usage  His average usage is 5 hours and 23 minutes  It appears that his average CPAP pressure is 9 7 and apneic hypopnea index is really reduced to 1 5  Patient is aware that this treatment is an important as he has history of sleep apnea, severe centrilobular emphysema and coronary artery disease  He does report feeling more refreshed in the morning  Patient continues to use an benefit from auto CPAP

## 2019-09-03 NOTE — PATIENT INSTRUCTIONS
Dyspnea   WHAT YOU NEED TO KNOW:   Dyspnea is breathing difficulty or discomfort  You may have labored, painful, or shallow breathing  You may feel breathless or short of breath  Dyspnea can occur during rest or with activity  You may have dyspnea for a short time, or it might become chronic  Dyspnea is often a symptom of a disease or condition  DISCHARGE INSTRUCTIONS:   Return to the emergency department if:   · Your signs and symptoms are the same or worse within 24 hours of treatment  · You have shaking chills or a fever over 102°F      · You have new pain, pressure, or tightness in your chest      · You have a new or worse cough or wheezing, or you cough up blood  · You feel like you cannot get enough air  · The skin over your ribs or on your neck sinks in when you breathe  · You have a severe headache with vomiting and abdominal pain  · You feel confused or dizzy  Contact your healthcare provider or specialist if:   · You have questions or concerns about your condition or care  Medicines:   · Medicines  may be used to treat the cause of your dyspnea  Medicines may reduce swelling in your airway or decrease extra fluid from around your heart or lungs  Other medicines may be used to decrease anxiety and help you feel calm and relaxed  · Take your medicine as directed  Contact your healthcare provider if you think your medicine is not helping or if you have side effects  Tell him or her if you are allergic to any medicine  Keep a list of the medicines, vitamins, and herbs you take  Include the amounts, and when and why you take them  Bring the list or the pill bottles to follow-up visits  Carry your medicine list with you in case of an emergency  Manage long-term dyspnea:   · Create an action plan  You and your healthcare provider can work together to create a plan for how to handle episodes of dyspnea   The plan can include daily activities, treatment changes, and what to do if you have severe breathing problems  · Lean forward on your elbows when you sit  This helps your lungs expand and may make it easier to breathe  · Use pursed-lip breathing any time you feel short of breath  Breathe in through your nose and then slowly breathe out through your mouth with your lips slightly puckered  It should take you twice as long to breathe out as it did to breathe in  · Do not smoke  Nicotine and other chemicals in cigarettes and cigars can cause lung damage and make it harder to breathe  Ask your healthcare provider for information if you currently smoke and need help to quit  E-cigarettes or smokeless tobacco still contain nicotine  Talk to your healthcare provider before you use these products  · Reach or maintain a healthy weight  Your healthcare provider can help you create a safe weight loss plan if you are overweight  · Exercise as directed  Exercise can help your lungs work more easily  Exercise can also help you lose weight if needed  Try to get at least 30 minutes of exercise most days of the week  Your healthcare provider can help you create an exercise plan that is safe for you  Follow up with your healthcare provider or specialist as directed:  Write down your questions so you remember to ask them during your visits  © 2017 [employer]  Information is for End User's use only and may not be sold, redistributed or otherwise used for commercial purposes  All illustrations and images included in CareNotes® are the copyrighted property of [employer]  or [employer]  The above information is an  only  It is not intended as medical advice for individual conditions or treatments  Talk to your doctor, nurse or pharmacist before following any medical regimen to see if it is safe and effective for you

## 2019-09-03 NOTE — TELEPHONE ENCOUNTER
----- Message from [facility] sent at 9/3/2019 10:01 AM EDT -----  Regarding: referral  Hello,    Can you please put a referral in for this patient? He is here now in our Pulmonary office at [address]  Thank you!

## 2019-09-13 ENCOUNTER — TELEPHONE (OUTPATIENT)
Dept: PULMONOLOGY | Facility: MEDICAL CENTER | Age: 49
End: 2019-09-13

## 2019-09-13 DIAGNOSIS — R06.00 DYSPNEA ON EXERTION: Primary | ICD-10-CM

## 2019-09-13 NOTE — TELEPHONE ENCOUNTER
Patient needs recent labs drawn for cta scheduled on Wednesday  History of high blood pressure so they need more recent labs than the ones from April

## 2019-09-17 LAB
BUN SERPL-MCNC: 14 MG/DL (ref 6–24)
BUN/CREAT SERPL: 12 (ref 9–20)
CALCIUM SERPL-MCNC: 9.6 MG/DL (ref 8.7–10.2)
CHLORIDE SERPL-SCNC: 105 MMOL/L (ref 96–106)
CO2 SERPL-SCNC: 24 MMOL/L (ref 20–29)
CREAT SERPL-MCNC: 1.13 MG/DL (ref 0.76–1.27)
GLUCOSE SERPL-MCNC: 99 MG/DL (ref 65–99)
POTASSIUM SERPL-SCNC: 4.7 MMOL/L (ref 3.5–5.2)
SL AMB EGFR AFRICAN AMERICAN: 87 ML/MIN/1.73
SL AMB EGFR NON AFRICAN AMERICAN: 75 ML/MIN/1.73
SODIUM SERPL-SCNC: 142 MMOL/L (ref 134–144)

## 2019-09-18 ENCOUNTER — HOSPITAL ENCOUNTER (OUTPATIENT)
Dept: RADIOLOGY | Facility: HOSPITAL | Age: 49
Discharge: HOME | End: 2019-09-18
Payer: COMMERCIAL

## 2019-09-18 DIAGNOSIS — R06.00 DYSPNEA ON EXERTION: ICD-10-CM

## 2019-09-18 DIAGNOSIS — J43.2 CENTRILOBULAR EMPHYSEMA (HCC): ICD-10-CM

## 2019-09-18 PROCEDURE — 71275 CT ANGIOGRAPHY CHEST: CPT

## 2019-09-18 RX ADMIN — IOHEXOL 85 ML: 350 INJECTION, SOLUTION INTRAVENOUS at 10:40

## 2019-09-18 NOTE — PROGRESS NOTES
Patient is aware of results of CTA of chest   He is instructed to use Trelegy 1 puff daily  Once he is finished with the sample of the Trelegy, he will use air Duo and Spiriva  There was no evidence of any pulmonary embolism  He does have evidence of severe air trapping  Moderate emphysema was noted

## 2019-09-23 ENCOUNTER — TELEPHONE (OUTPATIENT)
Dept: PULMONOLOGY | Facility: MEDICAL CENTER | Age: 49
End: 2019-09-23

## 2019-09-23 ENCOUNTER — OFFICE VISIT (OUTPATIENT)
Dept: FAMILY MEDICINE CLINIC | Facility: CLINIC | Age: 49
End: 2019-09-23
Payer: COMMERCIAL

## 2019-09-23 VITALS
DIASTOLIC BLOOD PRESSURE: 80 MMHG | BODY MASS INDEX: 30.7 KG/M2 | HEIGHT: 66 IN | HEART RATE: 96 BPM | WEIGHT: 191 LBS | SYSTOLIC BLOOD PRESSURE: 136 MMHG | TEMPERATURE: 98 F | RESPIRATION RATE: 16 BRPM

## 2019-09-23 DIAGNOSIS — IMO0002 STIFFNESS OF EXTREMITY: ICD-10-CM

## 2019-09-23 DIAGNOSIS — J43.2 CENTRILOBULAR EMPHYSEMA (HCC): Primary | ICD-10-CM

## 2019-09-23 DIAGNOSIS — Z23 NEED FOR INFLUENZA VACCINATION: ICD-10-CM

## 2019-09-23 DIAGNOSIS — L98.9 SKIN LESION: ICD-10-CM

## 2019-09-23 DIAGNOSIS — K76.89 HEPATIC CYST: Primary | ICD-10-CM

## 2019-09-23 PROCEDURE — 99214 OFFICE O/P EST MOD 30 MIN: CPT | Performed by: FAMILY MEDICINE

## 2019-09-23 PROCEDURE — 90682 RIV4 VACC RECOMBINANT DNA IM: CPT | Performed by: FAMILY MEDICINE

## 2019-09-23 PROCEDURE — 90471 IMMUNIZATION ADMIN: CPT | Performed by: FAMILY MEDICINE

## 2019-09-23 NOTE — PROGRESS NOTES
Chief Complaint   Patient presents with    Follow-up     multiple issues        Patient ID: Chikis Slade is a 46 y o  male  HPI  Pt is seeing for f/u recent CT chest that showed liver cyst - asymptomatic, on high dose of stain for CAD - has muscle aches and " stiffness when initiating a walk"     The following portions of the patient's history were reviewed and updated as appropriate: allergies, current medications, past family history, past medical history, past social history, past surgical history and problem list     Review of Systems   Constitutional: Negative  Respiratory: Positive for shortness of breath (has COPD - under pulmonologist care ) and wheezing  Cardiovascular: Negative  Negative for chest pain, palpitations and leg swelling  Gastrointestinal: Negative  Genitourinary: Negative  Musculoskeletal: Positive for myalgias  Negative for back pain, gait problem and neck pain  Skin: Negative  Neurological: Negative  Current Outpatient Medications   Medication Sig Dispense Refill    albuterol (VENTOLIN HFA) 90 mcg/act inhaler Inhale 2 puffs every 4 (four) hours as needed for wheezing or shortness of breath 1 Inhaler 7    aspirin 325 mg tablet Take 1 tablet by mouth daily 100 tablet 0    fluticasone (FLONASE) 50 mcg/act nasal spray 2 sprays into each nostril daily 16 g 0    fluticasone-salmeterol (AIRDUO RESPICLICK) 934-40 mcg/act dry powder inhaler Inhale 1 puff 2 (two) times a day Rinse mouth after use  1 Inhaler 6    rosuvastatin (CRESTOR) 20 MG tablet TAKE ONE TABLET BY MOUTH EVERY DAY 30 tablet 6    tiotropium (SPIRIVA HANDIHALER) 18 mcg inhalation capsule Use one capsule daily 30 each 5    umeclidinium bromide (INCRUSE ELLIPTA) 62 5 mcg/inh AEPB inhaler Inhale 1 puff daily (Patient not taking: Reported on 9/23/2019) 1 Inhaler 5     No current facility-administered medications for this visit          Objective:    /80 (BP Location: Left arm, Patient Position: Sitting, Cuff Size: Large)   Pulse 96   Temp 98 °F (36 7 °C) (Tympanic)   Resp 16   Ht 5' 6" (1 676 m)   Wt 86 6 kg (191 lb)   BMI 30 83 kg/m²        Physical Exam   Cardiovascular: Normal rate  Pulmonary/Chest: Effort normal    Abdominal: Soft  There is no hepatomegaly  There is no tenderness  Musculoskeletal: He exhibits no edema, tenderness or deformity  Skin:                    Assessment/Plan:         Diagnoses and all orders for this visit:    Hepatic cyst  -     US abdomen limited; Future    Need for influenza vaccination  -     influenza vaccine, 0535-3659, quadrivalent, recombinant, PF, 0 5 mL, for patients 18 yr+ (FLUBLOK)    Stiffness of extremity  Likely 2 to statins   Skin lesion  -     Ambulatory referral to Dermatology; Future            BMI Counseling: Body mass index is 30 83 kg/m²  Discussed the patient's BMI with him  The BMI is above normal  Nutrition recommendations include reducing portion sizes, decreasing overall calorie intake, 3-5 servings of fruits/vegetables daily, reducing fast food intake and consuming healthier snacks  Exercise recommendations include exercising 3-5 times per week       rto prn             Juan Acuna MD

## 2019-09-25 ENCOUNTER — TELEPHONE (OUTPATIENT)
Dept: PULMONOLOGY | Facility: MEDICAL CENTER | Age: 49
End: 2019-09-25

## 2019-09-25 DIAGNOSIS — J43.2 CENTRILOBULAR EMPHYSEMA (HCC): Primary | ICD-10-CM

## 2019-09-26 ENCOUNTER — HOSPITAL ENCOUNTER (OUTPATIENT)
Dept: RADIOLOGY | Facility: HOSPITAL | Age: 49
Discharge: HOME | End: 2019-09-26
Attending: FAMILY MEDICINE
Payer: COMMERCIAL

## 2019-09-26 DIAGNOSIS — K76.89 HEPATIC CYST: ICD-10-CM

## 2019-09-26 PROCEDURE — 76705 ECHO EXAM OF ABDOMEN: CPT

## 2019-09-30 ENCOUNTER — TELEPHONE (OUTPATIENT)
Dept: FAMILY MEDICINE CLINIC | Facility: CLINIC | Age: 49
End: 2019-09-30

## 2019-09-30 NOTE — TELEPHONE ENCOUNTER
----- Message from Sj Cota MD sent at 9/30/2019  4:07 PM EDT -----  Pl, advise pt -  US showed small liver cyst and small  R kidney cyst -  Nothing to worry about

## 2019-10-15 ENCOUNTER — OFFICE VISIT (OUTPATIENT)
Dept: FAMILY MEDICINE CLINIC | Facility: CLINIC | Age: 49
End: 2019-10-15
Payer: COMMERCIAL

## 2019-10-15 VITALS
DIASTOLIC BLOOD PRESSURE: 80 MMHG | BODY MASS INDEX: 31.34 KG/M2 | OXYGEN SATURATION: 97 % | WEIGHT: 195 LBS | TEMPERATURE: 97.3 F | HEIGHT: 66 IN | RESPIRATION RATE: 18 BRPM | HEART RATE: 96 BPM | SYSTOLIC BLOOD PRESSURE: 134 MMHG

## 2019-10-15 DIAGNOSIS — J44.1 COPD WITH ACUTE EXACERBATION (HCC): Primary | ICD-10-CM

## 2019-10-15 PROCEDURE — 3008F BODY MASS INDEX DOCD: CPT | Performed by: FAMILY MEDICINE

## 2019-10-15 PROCEDURE — 99214 OFFICE O/P EST MOD 30 MIN: CPT | Performed by: FAMILY MEDICINE

## 2019-10-15 RX ORDER — LEVOFLOXACIN 500 MG/1
500 TABLET, FILM COATED ORAL EVERY 24 HOURS
Qty: 7 TABLET | Refills: 0 | Status: SHIPPED | OUTPATIENT
Start: 2019-10-15 | End: 2019-10-22

## 2019-10-15 NOTE — PROGRESS NOTES
Chief Complaint   Patient presents with    Cough    Shortness of Breath        Patient ID: Narendra Matson is a 46 y o  male  HPI  Pt is seeing for cough and chest tightness x 2 days -  Using rescue inhaler more often - twice this am -  stopped Airduo -  On Incruse  -  Has COPD and CAD -  Under pulmonologist and cardiologist care     The following portions of the patient's history were reviewed and updated as appropriate: allergies, current medications, past family history, past medical history, past social history, past surgical history and problem list     Review of Systems   Respiratory: Positive for cough and chest tightness  Negative for shortness of breath and wheezing  Cardiovascular: Negative for chest pain, palpitations and leg swelling  Gastrointestinal: Negative  Musculoskeletal: Positive for myalgias (chronic 2 to statin )  All other systems reviewed and are negative  Current Outpatient Medications   Medication Sig Dispense Refill    albuterol (VENTOLIN HFA) 90 mcg/act inhaler Inhale 2 puffs every 4 (four) hours as needed for wheezing or shortness of breath 1 Inhaler 7    aspirin 325 mg tablet Take 1 tablet by mouth daily 100 tablet 0    fluticasone (FLONASE) 50 mcg/act nasal spray 2 sprays into each nostril daily 16 g 0    fluticasone-salmeterol (AIRDUO RESPICLICK) 783-66 mcg/act dry powder inhaler Inhale 1 puff 2 (two) times a day Rinse mouth after use  1 Inhaler 6    rosuvastatin (CRESTOR) 20 MG tablet TAKE ONE TABLET BY MOUTH EVERY DAY 30 tablet 6    tiotropium (SPIRIVA HANDIHALER) 18 mcg inhalation capsule Use one capsule daily 30 each 5    umeclidinium bromide (INCRUSE ELLIPTA) 62 5 mcg/inh AEPB inhaler Inhale 1 puff daily 1 Inhaler 5     No current facility-administered medications for this visit          Objective:    /80 (BP Location: Left arm, Patient Position: Sitting, Cuff Size: Standard)   Pulse 96   Temp (!) 97 3 °F (36 3 °C) (Tympanic)   Resp 18   Ht 5' 6" (1 676 m)   Wt 88 5 kg (195 lb)   SpO2 97%   BMI 31 47 kg/m²        Physical Exam   Constitutional: He does not appear ill  Cardiovascular: Normal rate and regular rhythm  No murmur heard  Pulmonary/Chest: Effort normal  No respiratory distress  He has decreased breath sounds  He has no wheezes  He has no rhonchi  He has no rales  Musculoskeletal:        Right lower leg: He exhibits no edema  Left lower leg: He exhibits no edema  Assessment/Plan:         Diagnoses and all orders for this visit:    COPD with acute exacerbation ([facility] [address] )  -     levofloxacin (LEVAQUIN) 500 mg tablet;  Take 1 tablet (500 mg total) by mouth every 24 hours for 7 days      declined cough med  Was advised to restart Airduo    rto in 1 wk if not better  Pt to call in 2 days with progress                     Frieda Hammonds MD

## 2019-10-30 ENCOUNTER — TELEPHONE (OUTPATIENT)
Dept: FAMILY MEDICINE CLINIC | Facility: CLINIC | Age: 49
End: 2019-10-30

## 2019-10-30 NOTE — TELEPHONE ENCOUNTER
Dr Antelmo Walls:    FYI:  Patient wanted to let you know that he is feeling much better with ABX that you had given him

## 2019-11-11 ENCOUNTER — OFFICE VISIT (OUTPATIENT)
Dept: FAMILY MEDICINE CLINIC | Facility: CLINIC | Age: 49
End: 2019-11-11
Payer: COMMERCIAL

## 2019-11-11 VITALS
SYSTOLIC BLOOD PRESSURE: 134 MMHG | DIASTOLIC BLOOD PRESSURE: 80 MMHG | OXYGEN SATURATION: 97 % | HEART RATE: 112 BPM | TEMPERATURE: 97.6 F | WEIGHT: 193 LBS | BODY MASS INDEX: 31.02 KG/M2 | RESPIRATION RATE: 18 BRPM | HEIGHT: 66 IN

## 2019-11-11 DIAGNOSIS — R05.9 COUGH: Primary | ICD-10-CM

## 2019-11-11 PROCEDURE — 99213 OFFICE O/P EST LOW 20 MIN: CPT | Performed by: FAMILY MEDICINE

## 2019-11-11 PROCEDURE — 1036F TOBACCO NON-USER: CPT | Performed by: FAMILY MEDICINE

## 2019-11-11 RX ORDER — PROMETHAZINE HYDROCHLORIDE AND CODEINE PHOSPHATE 6.25; 1 MG/5ML; MG/5ML
5 SYRUP ORAL EVERY 4 HOURS PRN
Qty: 120 ML | Refills: 0 | Status: SHIPPED | OUTPATIENT
Start: 2019-11-11 | End: 2019-12-30 | Stop reason: SDUPTHER

## 2019-11-11 RX ORDER — AZITHROMYCIN 250 MG/1
TABLET, FILM COATED ORAL
Qty: 6 TABLET | Refills: 0 | Status: SHIPPED | OUTPATIENT
Start: 2019-11-11 | End: 2019-11-16

## 2019-11-11 NOTE — PROGRESS NOTES
Chief Complaint   Patient presents with    Cough    Nasal Congestion        Patient ID: Moreno Fnie is a 44 y o  male  HPI  Pt is seeing for cough and chest congestion x 2 days, has severe COPD, did not stop daily inhalers -  Did not use rescue inhaler - no fever, no SOB or wheezing     The following portions of the patient's history were reviewed and updated as appropriate: allergies, current medications, past family history, past medical history, past social history, past surgical history and problem list     Review of Systems   HENT: Positive for postnasal drip and rhinorrhea  Negative for sinus pressure, sinus pain and sore throat  Gastrointestinal: Negative  All other systems reviewed and are negative  Current Outpatient Medications   Medication Sig Dispense Refill    albuterol (VENTOLIN HFA) 90 mcg/act inhaler Inhale 2 puffs every 4 (four) hours as needed for wheezing or shortness of breath 1 Inhaler 7    aspirin 325 mg tablet Take 1 tablet by mouth daily 100 tablet 0    fluticasone (FLONASE) 50 mcg/act nasal spray 2 sprays into each nostril daily 16 g 0    fluticasone-salmeterol (AIRDUO RESPICLICK) 356-95 mcg/act dry powder inhaler Inhale 1 puff 2 (two) times a day Rinse mouth after use  1 Inhaler 6    rosuvastatin (CRESTOR) 20 MG tablet TAKE ONE TABLET BY MOUTH EVERY DAY 30 tablet 6    umeclidinium bromide (INCRUSE ELLIPTA) 62 5 mcg/inh AEPB inhaler Inhale 1 puff daily 1 Inhaler 5     No current facility-administered medications for this visit  Objective:    /80 (BP Location: Left arm, Patient Position: Sitting, Cuff Size: Standard)   Pulse (!) 112   Temp 97 6 °F (36 4 °C) (Tympanic)   Resp 18   Ht 5' 6" (1 676 m)   Wt 87 5 kg (193 lb)   SpO2 97%   BMI 31 15 kg/m²        Physical Exam   Constitutional: No distress     HENT:   Right Ear: Tympanic membrane normal    Left Ear: Tympanic membrane normal    Nose: Right sinus exhibits no maxillary sinus tenderness and no frontal sinus tenderness  Left sinus exhibits no maxillary sinus tenderness and no frontal sinus tenderness  Mouth/Throat: No oropharyngeal exudate, posterior oropharyngeal edema or posterior oropharyngeal erythema  Cardiovascular: Normal rate  Pulmonary/Chest: Effort normal  No respiratory distress  He has decreased breath sounds  He has no wheezes  He has no rhonchi  He has no rales  Labs in chart were reviewed  Assessment/Plan:         Diagnoses and all orders for this visit:    Cough  -     promethazine-codeine (PHENERGAN WITH CODEINE) 6 25-10 mg/5 mL syrup; Take 5 mL by mouth every 4 (four) hours as needed for cough  -     azithromycin (ZITHROMAX) 250 mg tablet; Take 2 tablets (500 mg total) by mouth daily for 1 day, THEN 1 tablet (250 mg total) daily for 4 days  BMI Counseling: Body mass index is 31 15 kg/m²  Discussed the patient's BMI with him  The BMI is above normal  Nutrition recommendations include reducing portion sizes, decreasing overall calorie intake, 3-5 servings of fruits/vegetables daily, reducing fast food intake, consuming healthier snacks and decreasing soda and/or juice intake  Exercise recommendations include exercising 3-5 times per week         rto prn         Arelis Schaefer MD

## 2019-12-30 ENCOUNTER — OFFICE VISIT (OUTPATIENT)
Dept: URGENT CARE | Facility: CLINIC | Age: 49
End: 2019-12-30
Payer: COMMERCIAL

## 2019-12-30 VITALS
HEIGHT: 66 IN | SYSTOLIC BLOOD PRESSURE: 138 MMHG | RESPIRATION RATE: 20 BRPM | BODY MASS INDEX: 31.66 KG/M2 | TEMPERATURE: 96.9 F | HEART RATE: 83 BPM | DIASTOLIC BLOOD PRESSURE: 76 MMHG | WEIGHT: 197 LBS | OXYGEN SATURATION: 97 %

## 2019-12-30 DIAGNOSIS — R06.2 WHEEZE: ICD-10-CM

## 2019-12-30 DIAGNOSIS — J06.9 VIRAL URI WITH COUGH: Primary | ICD-10-CM

## 2019-12-30 PROCEDURE — 99213 OFFICE O/P EST LOW 20 MIN: CPT | Performed by: NURSE PRACTITIONER

## 2019-12-30 RX ORDER — METHYLPREDNISOLONE 4 MG/1
TABLET ORAL
Qty: 21 TABLET | Refills: 0 | Status: SHIPPED | OUTPATIENT
Start: 2019-12-30 | End: 2020-03-18

## 2019-12-30 RX ORDER — FLUTICASONE PROPIONATE 50 MCG
1 SPRAY, SUSPENSION (ML) NASAL 2 TIMES DAILY
Qty: 1 BOTTLE | Refills: 0 | Status: SHIPPED | OUTPATIENT
Start: 2019-12-30 | End: 2020-03-18

## 2019-12-30 RX ORDER — DEXTROMETHORPHAN HYDROBROMIDE AND PROMETHAZINE HYDROCHLORIDE 15; 6.25 MG/5ML; MG/5ML
5 SOLUTION ORAL 4 TIMES DAILY PRN
Qty: 100 ML | Refills: 0 | Status: SHIPPED | OUTPATIENT
Start: 2019-12-30 | End: 2020-01-04

## 2019-12-30 NOTE — PATIENT INSTRUCTIONS
-Take steroid as prescribed  -Use inhaler as prescribed by your MD  -Use Flonase as prescribed 2 times per day x 5 days after using Neti Pot   -Use cough medicine as per instructions on box as needed  If you were prescribed a cough medicine take it as prescribed  It may cause drowsiness so take it at home only or before bed     -If using several medications be sure to read all of the ingredients so you are not taking too many of the same medications   -Take Tylenol/Ibuprofen as needed  -Stay hydrated, drink lots of fluids, soups, and tea with honey   -Warm salt water gargles may help with sore throat  -Use cool or warm humidifier     -Your symptoms may last up to 14 days, continue supportive therapy as needed   -Follow up with your PCP if your symptoms become worse or do not improve  -If you have difficulty breathing, can not catch your breath or feel short of breath go directly to the emergency room

## 2020-01-02 ENCOUNTER — OFFICE VISIT (OUTPATIENT)
Dept: FAMILY MEDICINE CLINIC | Facility: CLINIC | Age: 50
End: 2020-01-02
Payer: COMMERCIAL

## 2020-01-02 VITALS
HEART RATE: 72 BPM | RESPIRATION RATE: 14 BRPM | DIASTOLIC BLOOD PRESSURE: 82 MMHG | TEMPERATURE: 98.8 F | HEIGHT: 66 IN | BODY MASS INDEX: 30.53 KG/M2 | WEIGHT: 190 LBS | SYSTOLIC BLOOD PRESSURE: 152 MMHG

## 2020-01-02 DIAGNOSIS — J06.9 UPPER RESPIRATORY TRACT INFECTION, UNSPECIFIED TYPE: Primary | ICD-10-CM

## 2020-01-02 DIAGNOSIS — J43.2 CENTRILOBULAR EMPHYSEMA (HCC): ICD-10-CM

## 2020-01-02 PROCEDURE — 99213 OFFICE O/P EST LOW 20 MIN: CPT | Performed by: FAMILY MEDICINE

## 2020-01-02 PROCEDURE — 3008F BODY MASS INDEX DOCD: CPT | Performed by: FAMILY MEDICINE

## 2020-01-02 RX ORDER — PROMETHAZINE HYDROCHLORIDE AND CODEINE PHOSPHATE 6.25; 1 MG/5ML; MG/5ML
5 SYRUP ORAL EVERY 4 HOURS PRN
Qty: 120 ML | Refills: 0 | Status: SHIPPED | OUTPATIENT
Start: 2020-01-02 | End: 2020-03-18

## 2020-01-02 RX ORDER — AZITHROMYCIN 250 MG/1
TABLET, FILM COATED ORAL
Qty: 6 TABLET | Refills: 0 | Status: SHIPPED | OUTPATIENT
Start: 2020-01-02 | End: 2020-01-06

## 2020-01-02 NOTE — PROGRESS NOTES
Kelly Villalba 1970 male MRN: [id number]    FAMILY PRACTICE OFFICE VISIT  [facility] - [address]      ASSESSMENT/PLAN  Kelly Villalba is a 44 y o  male presents to the office for    Diagnoses and all orders for this visit:    Upper respiratory tract infection, unspecified type  -     azithromycin (ZITHROMAX) 250 mg tablet; Take 2 tablets today then 1 tablet daily x 4 days  -     promethazine-codeine (PHENERGAN WITH CODEINE) 6 25-10 mg/5 mL syrup; Take 5 mL by mouth every 4 (four) hours as needed for cough    Centrilobular emphysema (HCC)     Abx given history of COPD , and risk of PNA given bronchospasm  Patient educated on the cough lingering x 2 weeks  Calls pharmacy given that phenergen DM back order-> sent in the one with Codiene  Patient and wife seem to have felt that the cough would get better right away with ABX  Explained that this isn't the case  I agreed with management of Urgent care  At this time there was no signs of COPD flare  HIGH risk of turning into Bronchitits  Would recommend using inhalers as prescribed  RTC in 1 week if no relief  Future Appointments   Date Time Provider Urmila Stock   1/2/2020  5:00 PM León Robison MD [facility]  [address]   2/3/2020 10:00 AM HERM[facility]          SUBJECTIVE  CC: Cold Like Symptoms (cough/congestion/runny nose started Sat went to [facility] Monday)      HPI:  Kelly Villalba is a 44 y o  male who presents for an acute appointment  Recently see in UC on Monday and believe that he wasn't treated correctly  His wife and son are present  This Saturday started with dry cough, chills, and felt feverish  Then went into head cold  Patient denies any wheezing, however wife feels as if he has been  PT states that his symptoms have only worsen with the cough  Wasn't able to get syrup given Back order  Wants abx likes his PCP gives him  NOT SOB or expressing GI/ symptoms   Denies any other sick contacts at this time  Review of Systems   Constitutional: Positive for activity change and fatigue  Negative for appetite change, chills and fever  HENT: Positive for congestion and rhinorrhea  Respiratory: Positive for cough  Negative for chest tightness and shortness of breath  Cardiovascular: Negative for chest pain and leg swelling  Gastrointestinal: Negative for abdominal distention, abdominal pain, constipation, diarrhea, nausea and vomiting  All other systems reviewed and are negative        Historical Information   The patient history was reviewed as follows:  Past Medical History:   Diagnosis Date    Chronic sinus complaints     last assessed 12/12/17    Diverticulosis     GERD (gastroesophageal reflux disease)     off medicine for past few years    Heart attack [facility])     last assessed 12/12/17    HTN (hypertension)     Hyperlipidemia     Sciatica     Tobacco use     Vasovagal syncope     last assessed 5/1/13    Wears glasses          Medications:     Current Outpatient Medications:     albuterol (VENTOLIN HFA) 90 mcg/act inhaler, Inhale 2 puffs every 4 (four) hours as needed for wheezing or shortness of breath, Disp: 1 Inhaler, Rfl: 7    aspirin 325 mg tablet, Take 1 tablet by mouth daily, Disp: 100 tablet, Rfl: 0    fluticasone (FLONASE) 50 mcg/act nasal spray, 1 spray into each nostril 2 (two) times a day for 5 days, Disp: 1 Bottle, Rfl: 0    fluticasone-salmeterol (AIRDUO RESPICLICK) 324-33 mcg/act dry powder inhaler, Inhale 1 puff 2 (two) times a day Rinse mouth after use , Disp: 1 Inhaler, Rfl: 6    methylPREDNISolone 4 MG tablet therapy pack, Use as directed on package, Disp: 21 tablet, Rfl: 0    rosuvastatin (CRESTOR) 20 MG tablet, TAKE ONE TABLET BY MOUTH EVERY DAY, Disp: 30 tablet, Rfl: 6    umeclidinium bromide (INCRUSE ELLIPTA) 62 5 mcg/inh AEPB inhaler, Inhale 1 puff daily, Disp: 1 Inhaler, Rfl: 5    Promethazine-DM (PHENERGAN-DM) 6 25-15 mg/5 mL oral syrup, Take 5 mL by mouth 4 (four) times a day as needed for cough for up to 5 days (Patient not taking: Reported on 1/2/2020), Disp: 100 mL, Rfl: 0    No Known Allergies    OBJECTIVE  Vitals:   Vitals:    01/02/20 1644   BP: 152/82   BP Location: Left arm   Patient Position: Sitting   Cuff Size: Standard   Pulse: 72   Resp: 14   Temp: 98 8 °F (37 1 °C)   TempSrc: Tympanic   Weight: 86 2 kg (190 lb)   Height: 5' 6" (1 676 m)         Physical Exam   Constitutional: He is oriented to person, place, and time  He appears well-developed and well-nourished  HENT:   Head: Normocephalic and atraumatic  Eyes: Pupils are equal, round, and reactive to light  Conjunctivae and EOM are normal    Neck: Normal range of motion  Neck supple  Cardiovascular: Normal rate, regular rhythm, normal heart sounds and intact distal pulses  Pulmonary/Chest: Effort normal  No respiratory distress  He has wheezes (Mild wheeze over the lower bases, cleared with coughing)  Musculoskeletal: Normal range of motion  He exhibits no edema  Neurological: He is alert and oriented to person, place, and time  Skin: Skin is warm  Capillary refill takes less than 2 seconds  Vitals reviewed                   Marvin Lundberg MD,   [facility]  1/2/2020

## 2020-01-06 NOTE — PROGRESS NOTES
[id number]        NAME: Derik Cade is a 44 y o  male  : 1970    MRN: [id number]  DATE: 2019  TIME: 3:35 pm    Assessment and Plan   Viral URI with cough [J06 9, B97 89]  1  Viral URI with cough  Promethazine-DM (PHENERGAN-DM) 6 25-15 mg/5 mL oral syrup    fluticasone (FLONASE) 50 mcg/act nasal spray   2  Wheeze  methylPREDNISolone 4 MG tablet therapy pack         Patient Instructions     -Take steroid as prescribed  -Use inhaler as prescribed by your MD  -Use Flonase as prescribed 2 times per day x 5 days after using Neti Pot   -Use cough medicine as per instructions on box as needed  If you were prescribed a cough medicine take it as prescribed  It may cause drowsiness so take it at home only or before bed     -If using several medications be sure to read all of the ingredients so you are not taking too many of the same medications   -Take Tylenol/Ibuprofen as needed  -Stay hydrated, drink lots of fluids, soups, and tea with honey   -Warm salt water gargles may help with sore throat  -Use cool or warm humidifier     -Your symptoms may last up to 14 days, continue supportive therapy as needed   -Follow up with your PCP if your symptoms become worse or do not improve  -If you have difficulty breathing, can not catch your breath or feel short of breath go directly to the emergency room  Follow up with PCP in 3-5 days  Proceed to  ER if symptoms worsen  Chief Complaint     Chief Complaint   Patient presents with    Cold Like Symptoms     sxs began Saturday: scratchy throat, cough, head/chest congestion, stuffy nose at night  History of Present Illness       45 y/o male presents with URI symptoms that began 3 days ago  He c/o scratchy throat, cough, wheezing, congestion, rhinorrhea, chills, and tactile fevers  He denies any N/V, SOB, difficulty breathing, CP, or ear pain  He has a history of COPD  He used his rescue inhaler x 1 yesterday    He took 1 dose of Musinex yesterday  He has not taken any tylenol/ibuprofen  Review of Systems   Review of Systems   Constitutional: Positive for chills and fatigue  Negative for fever  HENT: Positive for congestion, postnasal drip, rhinorrhea and sore throat  Negative for ear pain, sinus pressure and sinus pain  Respiratory: Positive for cough and wheezing  Negative for chest tightness and shortness of breath  Cardiovascular: Negative for chest pain  Gastrointestinal: Negative for nausea and vomiting  Neurological: Negative for headaches           Current Medications       Current Outpatient Medications:     albuterol (VENTOLIN HFA) 90 mcg/act inhaler, Inhale 2 puffs every 4 (four) hours as needed for wheezing or shortness of breath, Disp: 1 Inhaler, Rfl: 7    aspirin 325 mg tablet, Take 1 tablet by mouth daily, Disp: 100 tablet, Rfl: 0    fluticasone-salmeterol (AIRDUO RESPICLICK) 184-44 mcg/act dry powder inhaler, Inhale 1 puff 2 (two) times a day Rinse mouth after use , Disp: 1 Inhaler, Rfl: 6    rosuvastatin (CRESTOR) 20 MG tablet, TAKE ONE TABLET BY MOUTH EVERY DAY, Disp: 30 tablet, Rfl: 6    umeclidinium bromide (INCRUSE ELLIPTA) 62 5 mcg/inh AEPB inhaler, Inhale 1 puff daily, Disp: 1 Inhaler, Rfl: 5    azithromycin (ZITHROMAX) 250 mg tablet, Take 2 tablets today then 1 tablet daily x 4 days, Disp: 6 tablet, Rfl: 0    fluticasone (FLONASE) 50 mcg/act nasal spray, 1 spray into each nostril 2 (two) times a day for 5 days, Disp: 1 Bottle, Rfl: 0    methylPREDNISolone 4 MG tablet therapy pack, Use as directed on package, Disp: 21 tablet, Rfl: 0    promethazine-codeine (PHENERGAN WITH CODEINE) 6 25-10 mg/5 mL syrup, Take 5 mL by mouth every 4 (four) hours as needed for cough, Disp: 120 mL, Rfl: 0    Current Allergies     Allergies as of 12/30/2019    (No Known Allergies)            The following portions of the patient's history were reviewed and updated as appropriate: allergies, current medications, past family history, past medical history, past social history, past surgical history and problem list      Past Medical History:   Diagnosis Date    Chronic sinus complaints     last assessed 12/12/17    Diverticulosis     GERD (gastroesophageal reflux disease)     off medicine for past few years    Heart attack [facility])     last assessed 12/12/17    HTN (hypertension)     Hyperlipidemia     Sciatica     Tobacco use     Vasovagal syncope     last assessed 5/1/13    Wears glasses        Past Surgical History:   Procedure Laterality Date    APPENDECTOMY  1974    COLONOSCOPY N/A 8/14/2017    Procedure: COLONOSCOPY;  Surgeon: Karl Hopper MD;  Location: [place] [address] GI LAB; Service: Gastroenterology    CORONARY ARTERY BYPASS GRAFT N/A 9/25/2017    Procedure: INTRA OP MATTHEW; CORONARY ARTERY BYPASS GRAFT X 2 WITH SVH TO OM1  AND LIMA TO LAD; LEFT LEG EVH;  Surgeon: Lan Douglas MD;  Location: [facility] OR;  Service: Cardiac Surgery    THUMB AMPUTATION Left 1980       Family History   Problem Relation Age of Onset    Heart disease Father         CABG    Testicular cancer Father         adenocarcinoma in situ of the testis    Heart defect Father         cardiac disorder    Cancer Father     Diabetes type II Father     Heart disease Maternal Grandfather     Hypertension Mother         benign essential    Diabetes Sister     Arthritis Family     Arthritis Other     Heart defect Other         cardiac disorder    Diabetes Other     Cancer Other          Medications have been verified  Objective   /76   Pulse 83   Temp (!) 96 9 °F (36 1 °C)   Resp 20   Ht 5' 6" (1 676 m)   Wt 89 4 kg (197 lb)   SpO2 97%   BMI 31 80 kg/m²        Physical Exam     Physical Exam   Constitutional: He is oriented to person, place, and time  Vital signs are normal  He appears well-developed and well-nourished  No distress     HENT:   Right Ear: Tympanic membrane, external ear and ear canal normal    Left Ear: Tympanic membrane, external ear and ear canal normal    Nose: Mucosal edema and rhinorrhea present  Mouth/Throat: Posterior oropharyngeal erythema present  No posterior oropharyngeal edema  Posterior pharynx is red and inflamed, cobblestone in appearance  Cardiovascular: Normal rate and regular rhythm  Pulmonary/Chest: Effort normal  No respiratory distress  He has wheezes in the right upper field and the left upper field  End expiratory wheeze present   Lymphadenopathy:     He has no cervical adenopathy  Neurological: He is alert and oriented to person, place, and time  Skin: Skin is warm and dry  Psychiatric: He has a normal mood and affect  His speech is normal    Nursing note and vitals reviewed

## 2020-02-03 ENCOUNTER — OFFICE VISIT (OUTPATIENT)
Dept: PULMONOLOGY | Facility: MEDICAL CENTER | Age: 50
End: 2020-02-03
Payer: COMMERCIAL

## 2020-02-03 VITALS
TEMPERATURE: 96.9 F | SYSTOLIC BLOOD PRESSURE: 128 MMHG | BODY MASS INDEX: 31.82 KG/M2 | HEIGHT: 65 IN | DIASTOLIC BLOOD PRESSURE: 82 MMHG | WEIGHT: 191 LBS | RESPIRATION RATE: 12 BRPM | OXYGEN SATURATION: 92 % | HEART RATE: 86 BPM

## 2020-02-03 DIAGNOSIS — R06.00 DYSPNEA ON EXERTION: ICD-10-CM

## 2020-02-03 DIAGNOSIS — G47.33 OSA (OBSTRUCTIVE SLEEP APNEA): ICD-10-CM

## 2020-02-03 DIAGNOSIS — R06.02 SOB (SHORTNESS OF BREATH): Primary | ICD-10-CM

## 2020-02-03 DIAGNOSIS — J43.2 CENTRILOBULAR EMPHYSEMA (HCC): ICD-10-CM

## 2020-02-03 PROBLEM — G47.19 EXCESSIVE DAYTIME SLEEPINESS: Status: RESOLVED | Noted: 2019-01-16 | Resolved: 2020-02-03

## 2020-02-03 PROCEDURE — 99214 OFFICE O/P EST MOD 30 MIN: CPT | Performed by: NURSE PRACTITIONER

## 2020-02-03 PROCEDURE — 94010 BREATHING CAPACITY TEST: CPT | Performed by: NURSE PRACTITIONER

## 2020-02-03 PROCEDURE — 1036F TOBACCO NON-USER: CPT | Performed by: NURSE PRACTITIONER

## 2020-02-03 PROCEDURE — 3008F BODY MASS INDEX DOCD: CPT | Performed by: NURSE PRACTITIONER

## 2020-02-03 RX ORDER — FLUTICASONE FUROATE AND VILANTEROL 200; 25 UG/1; UG/1
1 POWDER RESPIRATORY (INHALATION) DAILY
Qty: 1 EACH | Refills: 0 | Status: SHIPPED | OUTPATIENT
Start: 2020-02-03 | End: 2020-03-18

## 2020-02-03 NOTE — PROGRESS NOTES
Assessment/Plan:     Problem List Items Addressed This Visit        Respiratory    Centrilobular emphysema ([address] )     Patient has a history of centrilobular emphysema  He had a complete PFT that was done January 29, 2019  Obstruction ratio was 48 forced vital capacity was 3 27 L or 77% of predicted, FEV1 was 1 59 L or 46% of predicted  Total lung capacity was 125% of predicted and residual volume was 185% of predicted diffusion capacity was reduced to 49 and it corrected for alveolar volume was 56  There was evidence of severe air trapping  Patient is on optimal therapy  He is using air Duo 113 mcg 1 puff b i d  As well as in cruise 1 puff daily  He reports that he was ill the past couple of months with an upper respiratory infection he has been feeling more short of breath since that time  For the next 2 weeks he will use Breo 200 mcg 1 puff daily instead of is air Duo  He will also continue with in cruise  He feels this is more beneficial in breathing better, then I will change his AirDuo to higher dose  Patient does appear stable at this time  I did review results of complete PFT  Did personally review images of last CT of chest   This was done in September 2019  I reviewed this with patient  Moderate centrilobular and paraseptal emphysematous changes were noted  He also has a small hiatal hernia         LUCY (obstructive sleep apnea)     Patient has a history of sleep apnea  He was diagnosed via home study  Respiratory event index was 7 with a severely elevated snore index he also had event related hypoxemia with lowest oxygen concentration of 79%  He currently is using benefitting from auto CPAP  This is CEP from 6-20 cm of water pressure  His last 30 days has shown excellent compliance  He is using at 30/30 days in average usage is 6 hours and 43 minutes    Is his auto CPAP pressure is 10 and apneic hypopnea index is 1 5 events per hour            Other    Dyspnea on exertion     Patient has dyspnea on exertion room air oxygen at rest was 95% with walking 2-3 laps or 120 ft was 93%  He did have evidence of hypoxemia when he had his home sleep study for which nocturnal hypoxemia was noted  However he is now compliant in using auto CPAP  He did have an MI in September of 2017  His last 2D echo was done in 2018  He has a history a grade 1 diastolic dysfunction with ejection fraction of 50%  There was also mild regurgitation of tricuspid valve with estimated PA pressure  30 mmHg  He continues to follow with his cardiologist   He is on Crestor 20 mg daily  He is also on aspirin 325 daily he underwent coronary artery bypass x2  He appears stable at this           Other Visit Diagnoses     SOB (shortness of breath)    -  Primary    Relevant Orders    POCT spirometry (Completed)            No follow-ups on file  All questions are answered to the patient's satisfaction and understanding  He verbalizes understanding  He is encouraged to call with any further questions or concerns  Portions of the record may have been created with voice recognition software  Occasional wrong word or "sound a like" substitutions may have occurred due to the inherent limitations of voice recognition software  Read the chart carefully and recognize, using context, where substitutions have occurred  HPI:  Halima Ramesh is a 15-year-old male with a history of coronary artery disease  He status post CABG and also has history of centrilobular emphysema hyperlipidemia and obstructive sleep apnea  He is currently stable with his coronary artery disease and does follow with [facility] cardiologist Dr Terri Vail  His last visit was July of 2019  Patient is a former smoker  He smoked for most of his adult life  He he stop smoking approximately 3 years ago  He did have a CTA of his chest that was done in September 2019   Moderate centrilobular and paraseptal emphysema was seen throughout the lungs he also has a small hiatal hernia  Electronically Signed by Briana Walterssins, HERMES    ______________________________________________________________________    Chief Complaint:   Chief Complaint   Patient presents with    Shortness of Breath     pt states not too good  pt states depends weather  pt states that he hfeels like he can;t catch his breath  occurs sporadic       Patient ID: Kierra Francis is a 44 y o  y o  male has a past medical history of Chronic sinus complaints, Diverticulosis, GERD (gastroesophageal reflux disease), Heart attack ([address] ), HTN (hypertension), Hyperlipidemia, Sciatica, Tobacco use, Vasovagal syncope, and Wears glasses  2/3/2020  Patient presents today for follow-up visit  Shortness of Breath   This is a chronic problem  The current episode started more than 1 year ago  The problem occurs daily  The problem has been gradually worsening  The symptoms are aggravated by exercise  The patient has no known risk factors for DVT/PE  He has tried beta agonist inhalers, ipratropium inhalers, rest and steroid inhalers for the symptoms  The treatment provided mild relief  His past medical history is significant for COPD  Review of Systems   Unable to perform ROS: Patient nonverbal   Constitutional: Negative  HENT: Negative  Eyes: Negative  Respiratory: Positive for chest tightness and shortness of breath  Gastrointestinal: Negative  Endocrine: Negative  Genitourinary: Negative  Musculoskeletal: Negative  Skin: Negative  Allergic/Immunologic: Negative  Neurological: Negative  Hematological: Negative  Psychiatric/Behavioral: Negative  Smoking history: He reports that he quit smoking about 2 years ago  His smoking use included cigarettes  He has a 56 00 pack-year smoking history   He has never used smokeless tobacco     The following portions of the patient's history were reviewed and updated as appropriate: allergies, current medications, past family history, past medical history, past social history, past surgical history and problem list     Immunization History   Administered Date(s) Administered    Influenza, injectable, quadrivalent, preservative free 0 5 mL 08/29/2018    Influenza, recombinant, quadrivalent,injectable, preservative free 09/23/2019     Current Outpatient Medications   Medication Sig Dispense Refill    albuterol (VENTOLIN HFA) 90 mcg/act inhaler Inhale 2 puffs every 4 (four) hours as needed for wheezing or shortness of breath 1 Inhaler 7    aspirin 325 mg tablet Take 1 tablet by mouth daily 100 tablet 0    fluticasone-salmeterol (AIRDUO RESPICLICK) 223-71 mcg/act dry powder inhaler Inhale 1 puff 2 (two) times a day Rinse mouth after use  1 Inhaler 6    rosuvastatin (CRESTOR) 20 MG tablet TAKE ONE TABLET BY MOUTH EVERY DAY 30 tablet 6    umeclidinium bromide (INCRUSE ELLIPTA) 62 5 mcg/inh AEPB inhaler Inhale 1 puff daily 1 Inhaler 5    fluticasone (FLONASE) 50 mcg/act nasal spray 1 spray into each nostril 2 (two) times a day for 5 days 1 Bottle 0    methylPREDNISolone 4 MG tablet therapy pack Use as directed on package (Patient not taking: Reported on 2/3/2020) 21 tablet 0    promethazine-codeine (PHENERGAN WITH CODEINE) 6 25-10 mg/5 mL syrup Take 5 mL by mouth every 4 (four) hours as needed for cough (Patient not taking: Reported on 2/3/2020) 120 mL 0     No current facility-administered medications for this visit  Allergies: Patient has no known allergies  Objective:  Vitals:    02/03/20 0954   BP: 128/82   BP Location: Left arm   Patient Position: Sitting   Cuff Size: Standard   Pulse: 86   Resp: 12   Temp: (!) 96 9 °F (36 1 °C)   TempSrc: Tympanic   SpO2: 92%   Weight: 86 6 kg (191 lb)   Height: 5' 5 25" (1 657 m)   Oxygen Therapy  SpO2: 92 %    Wt Readings from Last 3 Encounters:   02/03/20 86 6 kg (191 lb)   01/02/20 86 2 kg (190 lb)   12/30/19 89 4 kg (197 lb)     Body mass index is 31 54 kg/m²      Physical Exam   Constitutional: He is oriented to person, place, and time  He appears well-developed and well-nourished  BMI 31   HENT:   Head: Normocephalic and atraumatic  Mouth/Throat: Oropharynx is clear and moist    Mallampati 4   Eyes: Pupils are equal, round, and reactive to light  EOM are normal    Neck: Normal range of motion  Neck supple  Cardiovascular: Normal rate and regular rhythm  Pulmonary/Chest: Effort normal and breath sounds normal    Abdominal: Soft  Bowel sounds are normal    Musculoskeletal: Normal range of motion  Right lower leg: Normal         Left lower leg: Normal    Neurological: He is alert and oriented to person, place, and time  Skin: Skin is warm and dry  Capillary refill takes less than 2 seconds  Psychiatric: He has a normal mood and affect  His behavior is normal        Lab Review:   Orders Only on 09/13/2019   Component Date Value    Glucose, Random 09/16/2019 99     BUN 09/16/2019 14     Creatinine 09/16/2019 1 13     eGFR Non  09/16/2019 75     eGFR  09/16/2019 87     SL AMB BUN/CREATININE RA* 09/16/2019 12     Sodium 09/16/2019 142     Potassium 09/16/2019 4 7     Chloride 09/16/2019 105     CO2 09/16/2019 24     CALCIUM 09/16/2019 9 6        Past Surgical History:   Procedure Laterality Date    APPENDECTOMY  1974    COLONOSCOPY N/A 8/14/2017    Procedure: COLONOSCOPY;  Surgeon: Monica Bingham MD;  Location: [place] [address] [facility];   Service: Gastroenterology    CORONARY ARTERY BYPASS GRAFT N/A 9/25/2017    Procedure: INTRA OP MATTHEW; CORONARY ARTERY BYPASS GRAFT X 2 WITH SVH TO OM1  AND LIMA TO LAD; LEFT LEG EVH;  Surgeon: Soco Walker MD;  Location:  MAIN OR;  Service: Cardiac Surgery    THUMB AMPUTATION Left 1980        Family History   Problem Relation Age of Onset    Heart disease Father         CABG    Testicular cancer Father         adenocarcinoma in situ of the testis    Heart defect Father         cardiac disorder    Cancer Father     Diabetes type II Father     Heart disease Maternal Grandfather     Hypertension Mother         benign essential    Diabetes Sister     Arthritis Family     Arthritis Other     Heart defect Other         cardiac disorder    Diabetes Other     Cancer Other         Diagnostics:  I have personally reviewed pertinent films in PACS    Office Spirometry Results:  FEV1: 1 72 liters(52%)  FVC: 3 52 liters(83%)  FEV1/FVC: 48 9 %  ESS:    No results found

## 2020-02-03 NOTE — ASSESSMENT & PLAN NOTE
Patient has a history of centrilobular emphysema  He had a complete PFT that was done January 29, 2019  Obstruction ratio was 48 forced vital capacity was 3 27 L or 77% of predicted, FEV1 was 1 59 L or 46% of predicted  Total lung capacity was 125% of predicted and residual volume was 185% of predicted diffusion capacity was reduced to 49 and it corrected for alveolar volume was 56  There was evidence of severe air trapping  Patient is on optimal therapy  He is using air Duo 113 mcg 1 puff b i d  As well as in cruise 1 puff daily  He reports that he was ill the past couple of months with an upper respiratory infection he has been feeling more short of breath since that time  For the next 2 weeks he will use Breo 200 mcg 1 puff daily instead of is air Duo  He will also continue with in cruise  He feels this is more beneficial in breathing better, then I will change his AirDuo to higher dose  Patient does appear stable at this time  I did review results of complete PFT  Did personally review images of last CT of chest   This was done in September 2019  I reviewed this with patient  Moderate centrilobular and paraseptal emphysematous changes were noted    He also has a small hiatal hernia

## 2020-02-03 NOTE — PATIENT INSTRUCTIONS
Emphysema   WHAT YOU NEED TO KNOW:   Emphysema is a long-term lung disease that is most commonly caused by smoking  It is part of a group of lung diseases called chronic obstructive pulmonary disease (COPD)  Emphysema damages the alveoli (air sacs) in your lungs  This makes it hard for your lungs to send oxygen to the rest of your body  DISCHARGE INSTRUCTIONS:   Call 911 if:   · You have pain, pressure, or fullness in your chest that lasts more than a few minutes or returns  · You have pain or discomfort in your back, neck, jaw, stomach, or arm  Return to the emergency department if:   · You have shortness of breath that is so severe you cannot talk  · You have a sudden cold sweat  · You cough up blood  · You are confused, dizzy, or feel like you may pass out  Contact your healthcare provider if:   · You have a fever  · You have trouble doing your usual activities because it is hard to breathe  · You need to use your inhalers or take breathing treatments more often than usual      · You cough up more sputum than is normal for you  · You wheeze more than is normal for you  · Your legs or ankles are swollen  · You have questions or concerns about your condition or care  Do not smoke: If you smoke, it is never too late to quit  Quitting smoking will improve your health and the health of those around you  If you smoke, ask for information about how to stop  Medicines:   · Bronchodilators  help open your airway so you can breathe better  They are most often taken through one of the following devices:     ¨ An inhaler  is a handheld device that delivers medicine that you breathe in  ¨ A nebulizer  is a machine that turns liquid medicine into mist that you breathe in through a mouthpiece  · Steroids  decrease swelling in your lungs  They may be inhaled or taken as a pill  · Take your medicine as directed    Contact your healthcare provider if you think your medicine is not helping or if you have side effects  Tell him or her if you are allergic to any medicine  Keep a list of the medicines, vitamins, and herbs you take  Include the amounts, and when and why you take them  Bring the list or the pill bottles to follow-up visits  Carry your medicine list with you in case of an emergency  Follow up with your healthcare provider or pulmonologist as directed:  Write down your questions so you remember to ask them during your visits  Emphysema exacerbation:  An exacerbation is when your symptoms suddenly get worse  You may have a harder time breathing, your cough may get worse, and you may cough up more sputum  You may have a fever, increased heart rate, or feel sleepy  An exacerbation may be caused by a lung infection, air pollution, or other lung irritants  Sometimes the cause of an exacerbation is unknown  Your healthcare provider or pulmonologist may change your treatment to help relieve exacerbations  Manage your emphysema and help prevent an exacerbation:   · Avoid irritants  Wear protective gear if your workplace has dust or chemicals that bother you  Stay inside when air quality is bad  · Seek treatment  Get early treatment if your symptoms are getting worse  This may help you recover faster  Know what to do in case of an exacerbation  · Exercise daily  Talk to your healthcare provider about the best exercise plan for you  Exercise can help decrease breathing problems and improve your health  · Use pursed-lip breathing  Pursed-lip breathing can be used any time you feel short of breath  It can be especially helpful before you start an activity  ¨ Count to 2 while you take a deep breath in through your nose  ¨ Slowly breathe out through your mouth with your lips slightly puckered  You should make a quiet hissing sound as you breathe out      ¨ Repeat this exercise 4 or 5 times a day Once you are used to doing pursed-lip breathing, you can use it any time you need more air  · Use sleep positions that help you breathe better  Sleep with your upper body raised if you have trouble breathing when you lie down  Use foam wedges or elevate the head of your bed  Use a device that will tilt your whole body, or bend your body at the waist  The device should not bend your body at the upper back or neck  You may sleep better in a recliner  · Ask your healthcare provider about the flu and pneumonia vaccines  All adults should get the flu (influenza) vaccine every year as soon as it becomes available  The pneumonia vaccine is given to adults aged 72 or older to prevent pneumococcal disease, such as pneumonia  Adults aged 23 to 59 years who are at high risk for pneumococcal disease also should get the pneumococcal vaccine  It may need to be repeated 1 or 5 years later  © 2017 [address] Information is for End User's use only and may not be sold, redistributed or otherwise used for commercial purposes  All illustrations and images included in CareNotes® are the copyrighted property of [employer]  or [employer]  The above information is an  only  It is not intended as medical advice for individual conditions or treatments  Talk to your doctor, nurse or pharmacist before following any medical regimen to see if it is safe and effective for you

## 2020-02-03 NOTE — ASSESSMENT & PLAN NOTE
Patient has dyspnea on exertion room air oxygen at rest was 95% with walking 2-3 laps or 120 ft was 93%  He did have evidence of hypoxemia when he had his home sleep study for which nocturnal hypoxemia was noted  However he is now compliant in using auto CPAP  He did have an MI in September of 2017  His last 2D echo was done in 2018  He has a history a grade 1 diastolic dysfunction with ejection fraction of 50%  There was also mild regurgitation of tricuspid valve with estimated PA pressure  30 mmHg  He continues to follow with his cardiologist   He is on Crestor 20 mg daily  He is also on aspirin 325 daily he underwent coronary artery bypass x2    He appears stable at this

## 2020-02-03 NOTE — ASSESSMENT & PLAN NOTE
Patient has a history of sleep apnea  He was diagnosed via home study  Respiratory event index was 7 with a severely elevated snore index he also had event related hypoxemia with lowest oxygen concentration of 79%  He currently is using benefitting from auto CPAP  This is CEP from 6-20 cm of water pressure  His last 30 days has shown excellent compliance  He is using at 30/30 days in average usage is 6 hours and 43 minutes    Is his auto CPAP pressure is 10 and apneic hypopnea index is 1 5 events per hour

## 2020-02-24 NOTE — PROGRESS NOTES
Patient called and is feeling better  He was using Breo 200 mcg 1 puff daily  He completed this recently  He continues to have some cough  I have instructed him to resume the air Duo 113 mcg and in cruise 1 puff daily additionally, he had some questions in regard to his complete PFT  I did review this with him and sending him a hard copy of the report

## 2020-03-02 DIAGNOSIS — I25.10 CAD, MULTIPLE VESSEL: ICD-10-CM

## 2020-03-02 RX ORDER — ROSUVASTATIN CALCIUM 20 MG/1
20 TABLET, COATED ORAL DAILY
Qty: 90 TABLET | Refills: 0 | Status: SHIPPED | OUTPATIENT
Start: 2020-03-02 | End: 2020-06-22

## 2020-03-18 ENCOUNTER — OFFICE VISIT (OUTPATIENT)
Dept: FAMILY MEDICINE CLINIC | Facility: CLINIC | Age: 50
End: 2020-03-18
Payer: COMMERCIAL

## 2020-03-18 VITALS
OXYGEN SATURATION: 96 % | HEIGHT: 65 IN | RESPIRATION RATE: 14 BRPM | BODY MASS INDEX: 31.82 KG/M2 | HEART RATE: 88 BPM | SYSTOLIC BLOOD PRESSURE: 120 MMHG | WEIGHT: 191 LBS | TEMPERATURE: 97.8 F | DIASTOLIC BLOOD PRESSURE: 80 MMHG

## 2020-03-18 DIAGNOSIS — Z00.00 ROUTINE MEDICAL EXAM: Primary | ICD-10-CM

## 2020-03-18 DIAGNOSIS — R03.0 ELEVATED BP WITHOUT DIAGNOSIS OF HYPERTENSION: ICD-10-CM

## 2020-03-18 PROCEDURE — 99396 PREV VISIT EST AGE 40-64: CPT | Performed by: FAMILY MEDICINE

## 2020-03-18 NOTE — PROGRESS NOTES
Chief Complaint   Patient presents with    Physical Exam        Patient ID: Jacob Castaneda is a 44 y o  male  HPI  Pt is seeing for CPE     The following portions of the patient's history were reviewed and updated as appropriate: allergies, current medications, past family history, past medical history, past social history, past surgical history and problem list     Review of Systems   Constitutional: Negative for fatigue, fever and unexpected weight change  HENT: Negative for congestion, ear discharge, ear pain, hearing loss, rhinorrhea, sinus pressure, sore throat and trouble swallowing  Eyes: Negative  Respiratory: Negative  Under pulmonologist care    Cardiovascular: Negative  Under cardiologist care    Gastrointestinal: Negative  Endocrine: Negative  Genitourinary: Negative  Musculoskeletal: Negative  Skin: Negative  Neurological: Negative for dizziness, weakness, light-headedness and numbness  Hematological: Negative  Psychiatric/Behavioral: Negative  Current Outpatient Medications   Medication Sig Dispense Refill    albuterol (VENTOLIN HFA) 90 mcg/act inhaler Inhale 2 puffs every 4 (four) hours as needed for wheezing or shortness of breath 1 Inhaler 7    aspirin 325 mg tablet Take 1 tablet by mouth daily 100 tablet 0    fluticasone-salmeterol (AIRDUO RESPICLICK) 678-18 mcg/act dry powder inhaler Inhale 1 puff 2 (two) times a day Rinse mouth after use  1 Inhaler 6    rosuvastatin (CRESTOR) 20 MG tablet Take 1 tablet (20 mg total) by mouth daily 90 tablet 0    fluticasone-vilanterol (BREO ELLIPTA) 200-25 MCG/INH inhaler Inhale 1 puff daily 1 each 0     No current facility-administered medications for this visit          Objective:    /80   Pulse 88   Temp 97 8 °F (36 6 °C) (Tympanic)   Resp 14   Ht 5' 5 25" (1 657 m)   Wt 86 6 kg (191 lb)   SpO2 96%   BMI 31 54 kg/m²        Physical Exam   Constitutional: He is oriented to person, place, and time  He appears well-developed and well-nourished  No distress  HENT:   Head: Normocephalic and atraumatic  Right Ear: Hearing, tympanic membrane, external ear and ear canal normal    Left Ear: Hearing, tympanic membrane, external ear and ear canal normal    Mouth/Throat: Oropharynx is clear and moist    Neck: Neck supple  No JVD present  No thyroid mass and no thyromegaly present  Cardiovascular: Normal rate and regular rhythm  Exam reveals distant heart sounds  Exam reveals no gallop  No murmur heard  Pulmonary/Chest: No accessory muscle usage  No tachypnea  No respiratory distress  He has decreased breath sounds  He has no wheezes  He has no rhonchi  He has no rales  Abdominal: Soft  Bowel sounds are normal  There is no tenderness  Lymphadenopathy:     He has no cervical adenopathy  Neurological: He is alert and oriented to person, place, and time  He has normal strength  No cranial nerve deficit  Skin: Skin is intact  Psychiatric: He has a normal mood and affect  His behavior is normal          Labs in chart were reviewed  Assessment/Plan:         Diagnoses and all orders for this visit:    Routine medical exam  -     Comprehensive metabolic panel; Future  -     Lipid panel; Future  -     Hemoglobin A1C; Future  -     TSH, 3rd generation; Future  -     Comprehensive metabolic panel  -     Lipid panel  -     Hemoglobin A1C  -     TSH, 3rd generation    Elevated BP without diagnosis of hypertension  -     Lipid panel; Future  -     TSH, 3rd generation; Future  -     Lipid panel  -     TSH, 3rd generation  Elevated BP at initial BP check and 2 m ago at sick visit  Was advised to monitor BP at home   Pt currently follows low Na diet           BMI Counseling: Body mass index is 31 54 kg/m²  Discussed the patient's BMI with him   The BMI is above normal  Nutrition recommendations include reducing portion sizes, decreasing overall calorie intake, 3-5 servings of fruits/vegetables daily, reducing fast food intake and consuming healthier snacks  Exercise recommendations include exercising 3-5 times per week         rto in 1 m for BP check           Yuni Brandt MD

## 2020-03-19 LAB — HBA1C MFR BLD HPLC: 5.3 %

## 2020-03-20 ENCOUNTER — TELEPHONE (OUTPATIENT)
Dept: FAMILY MEDICINE CLINIC | Facility: CLINIC | Age: 50
End: 2020-03-20

## 2020-03-20 LAB
ALBUMIN SERPL-MCNC: 4.9 G/DL (ref 3.8–4.9)
ALBUMIN/GLOB SERPL: 2.5 {RATIO} (ref 1.2–2.2)
ALP SERPL-CCNC: 67 IU/L (ref 39–117)
ALT SERPL-CCNC: 34 IU/L (ref 0–44)
AST SERPL-CCNC: 29 IU/L (ref 0–40)
BILIRUB SERPL-MCNC: 0.4 MG/DL (ref 0–1.2)
BUN SERPL-MCNC: 15 MG/DL (ref 6–24)
BUN/CREAT SERPL: 12 (ref 9–20)
CALCIUM SERPL-MCNC: 9.9 MG/DL (ref 8.7–10.2)
CHLORIDE SERPL-SCNC: 102 MMOL/L (ref 96–106)
CHOLEST SERPL-MCNC: 114 MG/DL (ref 100–199)
CHOLEST/HDLC SERPL: 3.4 RATIO (ref 0–5)
CO2 SERPL-SCNC: 24 MMOL/L (ref 20–29)
CREAT SERPL-MCNC: 1.24 MG/DL (ref 0.76–1.27)
EST. AVERAGE GLUCOSE BLD GHB EST-MCNC: 105 MG/DL
GLOBULIN SER-MCNC: 2 G/DL (ref 1.5–4.5)
GLUCOSE SERPL-MCNC: 96 MG/DL (ref 65–99)
HBA1C MFR BLD: 5.3 % (ref 4.8–5.6)
HDLC SERPL-MCNC: 34 MG/DL
LDLC SERPL CALC-MCNC: 59 MG/DL (ref 0–99)
MICRODELETION SYND BLD/T FISH: NORMAL
POTASSIUM SERPL-SCNC: 5.3 MMOL/L (ref 3.5–5.2)
PROT SERPL-MCNC: 6.9 G/DL (ref 6–8.5)
SL AMB EGFR AFRICAN AMERICAN: 77 ML/MIN/1.73
SL AMB EGFR NON AFRICAN AMERICAN: 67 ML/MIN/1.73
SL AMB VLDL CHOLESTEROL CALC: 21 MG/DL (ref 5–40)
SODIUM SERPL-SCNC: 142 MMOL/L (ref 134–144)
TRIGL SERPL-MCNC: 105 MG/DL (ref 0–149)
TSH SERPL DL<=0.005 MIU/L-ACNC: 0.83 UIU/ML (ref 0.45–4.5)

## 2020-03-20 NOTE — TELEPHONE ENCOUNTER
----- Message from Noreen Nieto MD sent at 3/20/2020  9:38 AM EDT -----  Pl, advise pt -  All labs are normal except for HDL ( good cholesterol ) is little low -  Should follow low cholesterol diet  -  Cont same meds

## 2020-04-16 ENCOUNTER — TELEMEDICINE (OUTPATIENT)
Dept: FAMILY MEDICINE CLINIC | Facility: CLINIC | Age: 50
End: 2020-04-16
Payer: COMMERCIAL

## 2020-04-16 DIAGNOSIS — Z20.828 EXPOSURE TO SARS-ASSOCIATED CORONAVIRUS: ICD-10-CM

## 2020-04-16 DIAGNOSIS — Z20.828 EXPOSURE TO SARS-ASSOCIATED CORONAVIRUS: Primary | ICD-10-CM

## 2020-04-16 PROCEDURE — 99213 OFFICE O/P EST LOW 20 MIN: CPT | Performed by: FAMILY MEDICINE

## 2020-04-20 ENCOUNTER — TELEMEDICINE (OUTPATIENT)
Dept: FAMILY MEDICINE CLINIC | Facility: CLINIC | Age: 50
End: 2020-04-20
Payer: COMMERCIAL

## 2020-04-20 VITALS — DIASTOLIC BLOOD PRESSURE: 79 MMHG | SYSTOLIC BLOOD PRESSURE: 126 MMHG

## 2020-04-20 DIAGNOSIS — R03.0 ELEVATED BP WITHOUT DIAGNOSIS OF HYPERTENSION: Primary | ICD-10-CM

## 2020-04-20 DIAGNOSIS — R19.7 DIARRHEA, UNSPECIFIED TYPE: ICD-10-CM

## 2020-04-20 PROCEDURE — 99213 OFFICE O/P EST LOW 20 MIN: CPT | Performed by: FAMILY MEDICINE

## 2020-04-21 LAB — CORONAVIRUS 2019-NCOV: NOT DETECTED

## 2020-04-22 ENCOUNTER — TELEPHONE (OUTPATIENT)
Dept: FAMILY MEDICINE CLINIC | Facility: CLINIC | Age: 50
End: 2020-04-22

## 2020-05-08 DIAGNOSIS — J43.2 CENTRILOBULAR EMPHYSEMA (HCC): ICD-10-CM

## 2020-05-08 RX ORDER — FLUTICASONE PROPIONATE AND SALMETEROL 113; 14 UG/1; UG/1
POWDER, METERED RESPIRATORY (INHALATION)
Qty: 1 EACH | Refills: 11 | Status: SHIPPED | OUTPATIENT
Start: 2020-05-08 | End: 2020-06-23

## 2020-06-22 DIAGNOSIS — I25.10 CAD, MULTIPLE VESSEL: ICD-10-CM

## 2020-06-22 DIAGNOSIS — J43.2 CENTRILOBULAR EMPHYSEMA (HCC): ICD-10-CM

## 2020-06-22 RX ORDER — ROSUVASTATIN CALCIUM 20 MG/1
TABLET, COATED ORAL
Qty: 90 TABLET | Refills: 0 | Status: SHIPPED | OUTPATIENT
Start: 2020-06-22 | End: 2020-11-16 | Stop reason: SDUPTHER

## 2020-06-23 RX ORDER — FLUTICASONE PROPIONATE AND SALMETEROL 113; 14 UG/1; UG/1
1 POWDER, METERED RESPIRATORY (INHALATION) 2 TIMES DAILY
Qty: 1 EACH | Refills: 6 | Status: SHIPPED | OUTPATIENT
Start: 2020-06-23 | End: 2020-11-09

## 2020-07-02 ENCOUNTER — OFFICE VISIT (OUTPATIENT)
Dept: FAMILY MEDICINE CLINIC | Facility: CLINIC | Age: 50
End: 2020-07-02
Payer: COMMERCIAL

## 2020-07-02 ENCOUNTER — TRANSCRIBE ORDERS (OUTPATIENT)
Dept: ADMINISTRATIVE | Facility: HOSPITAL | Age: 50
End: 2020-07-02

## 2020-07-02 VITALS
HEART RATE: 72 BPM | DIASTOLIC BLOOD PRESSURE: 80 MMHG | HEIGHT: 65 IN | WEIGHT: 188 LBS | TEMPERATURE: 98.8 F | RESPIRATION RATE: 16 BRPM | SYSTOLIC BLOOD PRESSURE: 120 MMHG | BODY MASS INDEX: 31.32 KG/M2 | OXYGEN SATURATION: 96 %

## 2020-07-02 DIAGNOSIS — J43.9 PULMONARY EMPHYSEMA, UNSPECIFIED EMPHYSEMA TYPE (HCC): Primary | ICD-10-CM

## 2020-07-02 DIAGNOSIS — R19.4 BOWEL HABIT CHANGES: ICD-10-CM

## 2020-07-02 DIAGNOSIS — K58.1 IRRITABLE BOWEL SYNDROME WITH CONSTIPATION: Primary | ICD-10-CM

## 2020-07-02 PROCEDURE — 99214 OFFICE O/P EST MOD 30 MIN: CPT | Performed by: FAMILY MEDICINE

## 2020-07-02 PROCEDURE — 3008F BODY MASS INDEX DOCD: CPT | Performed by: FAMILY MEDICINE

## 2020-07-02 PROCEDURE — 1036F TOBACCO NON-USER: CPT | Performed by: FAMILY MEDICINE

## 2020-07-02 RX ORDER — PREDNISONE 5 MG/1
TABLET ORAL
COMMUNITY
End: 2020-11-03

## 2020-07-02 NOTE — PROGRESS NOTES
Chief Complaint   Patient presents with    Diarrhea        Patient ID: Peter Sterling is a 44 y o  male  HPI  Pt is seeing for multiple BMs a day ( 5-6 times) for last few years -  No therapy tried, usually formed stool but very small amount per defecation -  No abd pain, had colonoscopy 3 y ago - polyps were found - no associated symptoms     The following portions of the patient's history were reviewed and updated as appropriate: allergies, current medications, past family history, past medical history, past social history, past surgical history and problem list     Review of Systems   Constitutional: Negative  Respiratory: Negative  Cardiovascular: Negative  Gastrointestinal: Negative for abdominal pain, nausea and vomiting  Genitourinary: Negative  Musculoskeletal: Negative  Skin: Negative  Neurological: Negative  Current Outpatient Medications   Medication Sig Dispense Refill    albuterol (VENTOLIN HFA) 90 mcg/act inhaler Inhale 2 puffs every 4 (four) hours as needed for wheezing or shortness of breath 1 Inhaler 7    aspirin 325 mg tablet Take 1 tablet by mouth daily 100 tablet 0    fluticasone-salmeterol (AIRDUO RESPICLICK) 281-89 mcg/act dry powder inhaler Inhale 1 puff 2 (two) times a day 1 each 6    predniSONE 5 MG (21) TBPK Take by mouth      rosuvastatin (CRESTOR) 20 MG tablet TAKE ONE TABLET BY MOUTH EVERY DAY 90 tablet 0    umeclidinium bromide (INCRUSE ELLIPTA) 62 5 mcg/inh AEPB inhaler Inhale 1 puff daily 1 Inhaler 5     No current facility-administered medications for this visit  Objective:    /80 (BP Location: Left arm, Patient Position: Sitting, Cuff Size: Large)   Pulse 72   Temp 98 8 °F (37 1 °C) (Tympanic)   Resp 16   Ht 5' 5 25" (1 657 m)   Wt 85 3 kg (188 lb)   SpO2 96%   BMI 31 05 kg/m²        Physical Exam   Constitutional: No distress  Obese    Cardiovascular: Normal rate  Pulmonary/Chest: Effort normal  No respiratory distress  He has decreased breath sounds  He has no wheezes  He has no rales  Abdominal: Soft  He exhibits no distension and no mass  There is no tenderness  There is no rebound  Musculoskeletal: He exhibits no edema  Labs in chart were reviewed  Assessment/Plan:         Diagnoses and all orders for this visit:    Irritable bowel syndrome with constipation    Bowel habit changes    Other orders  -     predniSONE 5 MG (21) TBPK; Take by mouth      was advised to try Metamucil and probiotic OTC  Phone f/u in 1 m       BMI Counseling: Body mass index is 31 05 kg/m²  Discussed the patient's BMI with him  The BMI is above normal  Nutrition recommendations include reducing portion sizes, decreasing overall calorie intake, 3-5 servings of fruits/vegetables daily and reducing fast food intake  Exercise recommendations include exercising 3-5 times per week           rto prphani Rothman Ma, MD

## 2020-07-10 DIAGNOSIS — J43.2 CENTRILOBULAR EMPHYSEMA (HCC): ICD-10-CM

## 2020-07-10 PROCEDURE — U0003 INFECTIOUS AGENT DETECTION BY NUCLEIC ACID (DNA OR RNA); SEVERE ACUTE RESPIRATORY SYNDROME CORONAVIRUS 2 (SARS-COV-2) (CORONAVIRUS DISEASE [COVID-19]), AMPLIFIED PROBE TECHNIQUE, MAKING USE OF HIGH THROUGHPUT TECHNOLOGIES AS DESCRIBED BY CMS-2020-01-R: HCPCS

## 2020-07-11 LAB
INPATIENT: NORMAL
SARS-COV-2 RNA SPEC QL NAA+PROBE: NOT DETECTED

## 2020-07-14 NOTE — CONSULTS
Assessment   1  Ingrowing nail (703 0) (L60 0)   2  Onychomycosis (110 1) (B35 1)   3  Cellulitis of toe of left foot (681 10) (L03 032)    Plan    · Cefadroxil 500 MG Oral Capsule; TAKE 1 CAPSULE TWICE DAILY   · Follow-up visit in 1 week Evaluation and Treatment  Follow-up  Status: Hold For -    Scheduling  Requested for: 19XQY7755   · It is important to take good care of your feet ; Status:Complete;   Done: 40HWO9745   · You can treat and prevent ingrown toenails ; Status:Complete;   Done: 64LIQ6141    Discussion/Summary      Warm water and Epsom salt soaks  Neosporin dressing daily  Discussed permanent removal of ingrown of recurrent  Discussed oral and topical treatments for nail fungus  The patient was counseled regarding instructions for management,-- patient and family education,-- importance of compliance with treatment  Chief Complaint   Left foot third toe infection  has been walking more recently has been in cardiac rehab had open heart surgery back in September  The patient patient has had previous removal  of ingrown nails of big toes with this was years ago  For the past week he has had pain and swelling in left 3rd digit  There is some redness for the past few days  Patient also has Thick, discolored dystrophic toenails  Active Problems   1  Asthma (493 90) (J45 909)   2  CAD, multiple vessel (414 00) (I25 10)   3  Chronic obstructive pulmonary disease, unspecified COPD type (496) (J44 9)   4  Colon polyps (211 3) (K63 5)   5  COPD, moderate (496) (J44 9)   6  Diverticulitis of colon (562 11) (K57 32)   7  Fatigue, unspecified type (780 79) (R53 83)   8  GERD (gastroesophageal reflux disease) (530 81) (K21 9)   9  Hyperlipidemia (272 4) (E78 5)   10  Nicotine dependence (305 1) (F17 200)   11  NSTEMI (non-ST elevated myocardial infarction) (410 70) (I21 4)   12  Postop check (V67 00) (Z09)   13  S/P CABG x 2 (V45 81) (Z95 1)   14   Shortness of breath on exertion (786 05) (R06 02)    Past Medical History    · History of Chronic sinus complaints (786 9) (R09 89)   · History of Cough (786 2) (R05)   · History of Groin discomfort (789 09) (R10 30)   · History of acute bronchitis (V12 69) (Z87 09)   · History of acute sinusitis (V12 69) (Z87 09)   · History of angina (V12 59) (Z86 79)   · History of bronchitis (V12 69) (Z87 09)   · History of heart attack (412) (I25 2)   · History of heartburn (V12 79) (J65 021)   · History of hypertension (V12 59) (Z86 79)   · History of otitis media (V12 49) (Z86 69)   · History of Skin lesion (709 9) (L98 9)   · History of Vasovagal syncope (780 2) (R55)     The active problems and past medical history were reviewed and updated today  Surgical History    · History of Appendectomy   · History of CABG     The surgical history was reviewed and updated today         Family History   Mother    · Family history of Benign Essential Hypertension   · Denied: Family history of colon cancer   · Denied: Family history of colonic polyps   · Denied: Family history of liver disease  Father    · Family history of Adenocarcinoma In Situ Of The Testis   · Family history of Cholecystectomy   · Family history of cardiac disorder (V17 49) (Z82 49)   · Denied: Family history of colon cancer   · Denied: Family history of colonic polyps   · Family history of diabetes mellitus (V18 0) (Z83 3)   · Denied: Family history of liver disease   · Family history of malignant neoplasm (V16 9) (Z80 9)   · Family history of Heart Surgery   · Family history of Type 2 Diabetes Mellitus  Sister    · Family history of diabetes mellitus (V18 0) (Z83 3)  Grandmother    · Family history of arthritis (V17 7) (Z82 61)   · Family history of cardiac disorder (V17 49) (Z82 49)   · Family history of diabetes mellitus (V18 0) (Z83 3)   · Family history of malignant neoplasm (V16 9) (Z80 9)  Grandfather    · Family history of cardiac disorder (V17 49) (Z82 49)  Family History    · Family history of arthritis (V17 7) (Z82 61)     The family history was reviewed and updated today  Social History    · Denied: History of Being A Social Drinker   · History of Current Every Day Smoker (305 1)   ·    · Denied: History of Exposure to second hand smoke   · Feels safe at home   · History of Former smoker (V15 82) (J50 591)   · Former smoker (V15 82) (M59 970)   · pt smoked 2 ppd for 30 yrs  pt quit 3 mos ago  · Occupation   · works at [employer] as    · Pets/Animals: Cat   · Pets/Animals: Dog   · Denied: History of Recreational drug use   · Denied: History of Travel history  The social history was reviewed and updated today  Current Meds    1  Aspirin 325 MG Oral Tablet; Take 1 tablet daily; Therapy: (Recorded:26Oct2017) to Recorded   2  Atorvastatin Calcium 80 MG Oral Tablet; TAKE 1 TABLET DAILY; Therapy: (Recorded:26Oct2017) to Recorded   3  Metoprolol Tartrate 25 MG Oral Tablet; TAKE 0 5 TABLET Twice daily; Therapy: 77TLU2722 to (Last Rx:26Oct2017)  Requested for: 26Oct2017 Ordered   4  Omeprazole 20 MG Oral Capsule Delayed Release; take 1 capsule daily  Requested for:     97JRF5994; Last Rx:31Oct2017 Ordered   5  ProAir  (90 Base) MCG/ACT Inhalation Aerosol Solution; 2  PUFFS Q 4-6 HOURS     PRN  ELBERT;     Therapy: 01QLN7555 to (Last Halley Rides)  Requested for: 63HRS7201 Ordered   6  Tudorza Pressair 400 MCG/ACT Inhalation Aerosol Powder Breath Activated; 1 INH 2     times a day, samples given; Therapy: 40PMQ5490 to (Last Rx:20Nov2017)  Requested for: 20Nov2017 Ordered     The medication list was reviewed and updated today  Allergies   1  No Known Drug Allergies  2  No Known Environmental Allergies   3  No Known Food Allergies    Vitals    Recorded: 12AHF4313 03:50PM   Height 5 ft 5 25 in   Weight 156 lb    BMI Calculated 25 76   BSA Calculated 1 78     Physical Exam   Left Foot: Appearance: Normal except as noted: excessive pronation-- and-- pes planus  Evaluation of the third toe nail demonstrates an ingrown nail  Ingrown lateral border  Positive paronychia positive erythema to MPJ  Negative purulence negative fluctuance  All nails thick discolored dystrophic positive subungual debris  Significant pes planus bilateral     Right Foot: Appearance: Normal except as noted: excessive pronation-- and-- pes planus  Neurological Exam: performed  Light touch was intact bilaterally  Vibratory sensation was intact bilaterally  Response to monofilament test was intact bilaterally  Vascular Exam: performed Dorsalis pedis pulses were 2/4 bilaterally  Posterior tibial pulses were 2/4 bilaterally  Capillary refill time was between 1-3 seconds bilaterally  Procedure   Wedge resection of ingrown nail left 3rd digit lateral border under ethyl chloride anesthesia dressing applied      Future Appointments      Date/Time Provider Specialty Site   02/12/2018 12:00 PM BRIDGER Raza  Pulmonary Medicine [facility]   01/03/2018 11:00 AM BRIDGER Vargas  Cardiology [address]     Signatures    Electronically signed by :  Tabatha Vasquez DPM; Dec 27 2017  5:21PM EST                       (Author) Purse String (Simple) Text: Given the location of the defect and the characteristics of the surrounding skin a purse string simple closure was deemed most appropriate.  Undermining was performed circumferentially around the surgical defect.  A purse string suture was then placed and tightened.

## 2020-07-16 ENCOUNTER — HOSPITAL ENCOUNTER (OUTPATIENT)
Dept: PULMONOLOGY | Facility: HOSPITAL | Age: 50
Discharge: HOME | End: 2020-07-16
Payer: COMMERCIAL

## 2020-07-16 DIAGNOSIS — J43.2 CENTRILOBULAR EMPHYSEMA (HCC): Primary | ICD-10-CM

## 2020-07-16 DIAGNOSIS — J43.9 PULMONARY EMPHYSEMA, UNSPECIFIED EMPHYSEMA TYPE (HCC): ICD-10-CM

## 2020-07-16 PROCEDURE — 94010 BREATHING CAPACITY TEST: CPT

## 2020-07-16 PROCEDURE — 94729 DIFFUSING CAPACITY: CPT

## 2020-07-16 PROCEDURE — 94726 PLETHYSMOGRAPHY LUNG VOLUMES: CPT

## 2020-07-16 PROCEDURE — 94010 BREATHING CAPACITY TEST: CPT | Performed by: INTERNAL MEDICINE

## 2020-07-16 PROCEDURE — 94729 DIFFUSING CAPACITY: CPT | Performed by: INTERNAL MEDICINE

## 2020-07-16 PROCEDURE — 94726 PLETHYSMOGRAPHY LUNG VOLUMES: CPT | Performed by: INTERNAL MEDICINE

## 2020-07-16 PROCEDURE — 94760 N-INVAS EAR/PLS OXIMETRY 1: CPT

## 2020-09-08 ENCOUNTER — OFFICE VISIT (OUTPATIENT)
Dept: FAMILY MEDICINE CLINIC | Facility: CLINIC | Age: 50
End: 2020-09-08
Payer: COMMERCIAL

## 2020-09-08 VITALS
TEMPERATURE: 98.3 F | HEIGHT: 65 IN | DIASTOLIC BLOOD PRESSURE: 75 MMHG | HEART RATE: 88 BPM | SYSTOLIC BLOOD PRESSURE: 132 MMHG | WEIGHT: 192 LBS | RESPIRATION RATE: 18 BRPM | BODY MASS INDEX: 31.99 KG/M2

## 2020-09-08 DIAGNOSIS — D17.1 LIPOMA OF TORSO: Primary | ICD-10-CM

## 2020-09-08 PROCEDURE — 99213 OFFICE O/P EST LOW 20 MIN: CPT | Performed by: FAMILY MEDICINE

## 2020-09-08 PROCEDURE — 1036F TOBACCO NON-USER: CPT | Performed by: FAMILY MEDICINE

## 2020-09-08 NOTE — PROGRESS NOTES
Chief Complaint   Patient presents with    Mass     left side        Patient ID: Michelle Clayton is a 44 y o  male  HPI  Pt is seeing for a small asymptomatic lump on L sided chest wall felt by his wife 1 wk ago  - not sure how long the lump is present     The following portions of the patient's history were reviewed and updated as appropriate: allergies, current medications, past family history, past medical history, past social history, past surgical history and problem list     Review of Systems   Constitutional: Negative  HENT: Negative  Respiratory: Negative  Cardiovascular: Negative  Current Outpatient Medications   Medication Sig Dispense Refill    albuterol (VENTOLIN HFA) 90 mcg/act inhaler Inhale 2 puffs every 4 (four) hours as needed for wheezing or shortness of breath 1 Inhaler 7    aspirin 325 mg tablet Take 1 tablet by mouth daily 100 tablet 0    fluticasone-salmeterol (AIRDUO RESPICLICK) 839-13 mcg/act dry powder inhaler Inhale 1 puff 2 (two) times a day 1 each 6    predniSONE 5 MG (21) TBPK Take by mouth      rosuvastatin (CRESTOR) 20 MG tablet TAKE ONE TABLET BY MOUTH EVERY DAY 90 tablet 0    umeclidinium bromide (INCRUSE ELLIPTA) 62 5 mcg/inh AEPB inhaler Inhale 1 puff daily 1 Inhaler 5     No current facility-administered medications for this visit  Objective:    /75   Pulse 88   Temp 98 3 °F (36 8 °C) (Tympanic)   Resp 18   Ht 5' 5 25" (1 657 m)   Wt 87 1 kg (192 lb)   BMI 31 71 kg/m²        Physical Exam  Chest:      Chest wall: No mass, swelling or tenderness  Skin:     Findings: No rash  Assessment/Plan:         Diagnoses and all orders for this visit:    Lipoma of torso             BMI Counseling: Body mass index is 31 71 kg/m²  Discussed the patient's BMI with him   The BMI is above normal  Nutrition recommendations include reducing portion sizes, decreasing overall calorie intake, 3-5 servings of fruits/vegetables daily and reducing fast food intake  Exercise recommendations include exercising 3-5 times per week           rto prn         Sparkle Acuna MD

## 2020-10-27 DIAGNOSIS — J43.2 CENTRILOBULAR EMPHYSEMA (HCC): ICD-10-CM

## 2020-10-28 RX ORDER — ALBUTEROL SULFATE 90 UG/1
AEROSOL, METERED RESPIRATORY (INHALATION)
Qty: 18 G | Refills: 7 | Status: SHIPPED | OUTPATIENT
Start: 2020-10-28

## 2020-11-03 ENCOUNTER — TELEPHONE (OUTPATIENT)
Dept: FAMILY MEDICINE CLINIC | Facility: CLINIC | Age: 50
End: 2020-11-03

## 2020-11-03 ENCOUNTER — TELEMEDICINE (OUTPATIENT)
Dept: FAMILY MEDICINE CLINIC | Facility: CLINIC | Age: 50
End: 2020-11-03
Payer: COMMERCIAL

## 2020-11-03 DIAGNOSIS — J43.2 CENTRILOBULAR EMPHYSEMA (HCC): Primary | ICD-10-CM

## 2020-11-03 DIAGNOSIS — J06.9 UPPER RESPIRATORY TRACT INFECTION, UNSPECIFIED TYPE: ICD-10-CM

## 2020-11-03 PROCEDURE — 1036F TOBACCO NON-USER: CPT | Performed by: FAMILY MEDICINE

## 2020-11-03 PROCEDURE — 99213 OFFICE O/P EST LOW 20 MIN: CPT | Performed by: FAMILY MEDICINE

## 2020-11-03 RX ORDER — AZITHROMYCIN 250 MG/1
TABLET, FILM COATED ORAL
Qty: 6 TABLET | Refills: 0 | Status: SHIPPED | OUTPATIENT
Start: 2020-11-03 | End: 2020-11-07

## 2020-11-03 RX ORDER — PREDNISONE 20 MG/1
40 TABLET ORAL DAILY
Qty: 10 TABLET | Refills: 0 | Status: SHIPPED | OUTPATIENT
Start: 2020-11-03 | End: 2020-11-08

## 2020-11-03 RX ORDER — DEXTROMETHORPHAN HYDROBROMIDE AND PROMETHAZINE HYDROCHLORIDE 15; 6.25 MG/5ML; MG/5ML
5 SOLUTION ORAL 4 TIMES DAILY PRN
Qty: 473 ML | Refills: 0 | Status: SHIPPED | OUTPATIENT
Start: 2020-11-03 | End: 2020-11-09

## 2020-11-09 ENCOUNTER — HOSPITAL ENCOUNTER (EMERGENCY)
Facility: HOSPITAL | Age: 50
Discharge: HOME | End: 2020-11-09
Attending: EMERGENCY MEDICINE
Payer: COMMERCIAL

## 2020-11-09 ENCOUNTER — APPOINTMENT (EMERGENCY)
Dept: RADIOLOGY | Facility: HOSPITAL | Age: 50
End: 2020-11-09
Payer: COMMERCIAL

## 2020-11-09 VITALS
RESPIRATION RATE: 18 BRPM | HEART RATE: 70 BPM | OXYGEN SATURATION: 97 % | DIASTOLIC BLOOD PRESSURE: 68 MMHG | WEIGHT: 190 LBS | SYSTOLIC BLOOD PRESSURE: 130 MMHG | BODY MASS INDEX: 31.38 KG/M2 | TEMPERATURE: 98.1 F

## 2020-11-09 DIAGNOSIS — R20.2 RIGHT LEG PARESTHESIAS: ICD-10-CM

## 2020-11-09 DIAGNOSIS — M54.50 LOW BACK PAIN: Primary | ICD-10-CM

## 2020-11-09 DIAGNOSIS — R20.2 LEFT LEG PARESTHESIAS: ICD-10-CM

## 2020-11-09 LAB
ALBUMIN SERPL BCP-MCNC: 3.2 G/DL (ref 3.5–5)
ALP SERPL-CCNC: 58 U/L (ref 46–116)
ALT SERPL W P-5'-P-CCNC: 27 U/L (ref 12–78)
ANION GAP SERPL CALCULATED.3IONS-SCNC: 4 MMOL/L (ref 4–13)
AST SERPL W P-5'-P-CCNC: 18 U/L (ref 5–45)
BACTERIA UR QL AUTO: NORMAL /HPF
BASOPHILS # BLD AUTO: 0.07 THOUSANDS/ΜL (ref 0–0.1)
BASOPHILS NFR BLD AUTO: 1 % (ref 0–1)
BILIRUB SERPL-MCNC: 0.2 MG/DL (ref 0.2–1)
BILIRUB UR QL STRIP: NEGATIVE
BUN SERPL-MCNC: 24 MG/DL (ref 5–25)
CALCIUM ALBUM COR SERPL-MCNC: 9 MG/DL (ref 8.3–10.1)
CALCIUM SERPL-MCNC: 8.4 MG/DL (ref 8.3–10.1)
CHLORIDE SERPL-SCNC: 107 MMOL/L (ref 100–108)
CK SERPL-CCNC: 68 U/L (ref 39–308)
CLARITY UR: CLEAR
CO2 SERPL-SCNC: 29 MMOL/L (ref 21–32)
COLOR UR: YELLOW
CREAT SERPL-MCNC: 1.23 MG/DL (ref 0.6–1.3)
EOSINOPHIL # BLD AUTO: 0.36 THOUSAND/ΜL (ref 0–0.61)
EOSINOPHIL NFR BLD AUTO: 6 % (ref 0–6)
ERYTHROCYTE [DISTWIDTH] IN BLOOD BY AUTOMATED COUNT: 11.8 % (ref 11.6–15.1)
GFR SERPL CREATININE-BSD FRML MDRD: 67 ML/MIN/1.73SQ M
GLUCOSE SERPL-MCNC: 90 MG/DL (ref 65–140)
GLUCOSE UR STRIP-MCNC: NEGATIVE MG/DL
HCT VFR BLD AUTO: 44.4 % (ref 36.5–49.3)
HGB BLD-MCNC: 14.2 G/DL (ref 12–17)
HGB UR QL STRIP.AUTO: ABNORMAL
IMM GRANULOCYTES # BLD AUTO: 0.03 THOUSAND/UL (ref 0–0.2)
IMM GRANULOCYTES NFR BLD AUTO: 1 % (ref 0–2)
KETONES UR STRIP-MCNC: NEGATIVE MG/DL
LEUKOCYTE ESTERASE UR QL STRIP: NEGATIVE
LIPASE SERPL-CCNC: 148 U/L (ref 73–393)
LYMPHOCYTES # BLD AUTO: 2.33 THOUSANDS/ΜL (ref 0.6–4.47)
LYMPHOCYTES NFR BLD AUTO: 37 % (ref 14–44)
MCH RBC QN AUTO: 29.8 PG (ref 26.8–34.3)
MCHC RBC AUTO-ENTMCNC: 32 G/DL (ref 31.4–37.4)
MCV RBC AUTO: 93 FL (ref 82–98)
MONOCYTES # BLD AUTO: 0.56 THOUSAND/ΜL (ref 0.17–1.22)
MONOCYTES NFR BLD AUTO: 9 % (ref 4–12)
NEUTROPHILS # BLD AUTO: 2.89 THOUSANDS/ΜL (ref 1.85–7.62)
NEUTS SEG NFR BLD AUTO: 46 % (ref 43–75)
NITRITE UR QL STRIP: NEGATIVE
NON-SQ EPI CELLS URNS QL MICRO: NORMAL /HPF
NRBC BLD AUTO-RTO: 0 /100 WBCS
PH UR STRIP.AUTO: 5.5 [PH]
PLATELET # BLD AUTO: 215 THOUSANDS/UL (ref 149–390)
PMV BLD AUTO: 9.2 FL (ref 8.9–12.7)
POTASSIUM SERPL-SCNC: 4.4 MMOL/L (ref 3.5–5.3)
PROT SERPL-MCNC: 6.4 G/DL (ref 6.4–8.2)
PROT UR STRIP-MCNC: NEGATIVE MG/DL
RBC # BLD AUTO: 4.77 MILLION/UL (ref 3.88–5.62)
RBC #/AREA URNS AUTO: NORMAL /HPF
SODIUM SERPL-SCNC: 140 MMOL/L (ref 136–145)
SP GR UR STRIP.AUTO: 1.01 (ref 1–1.03)
UROBILINOGEN UR QL STRIP.AUTO: 0.2 E.U./DL
WBC # BLD AUTO: 6.24 THOUSAND/UL (ref 4.31–10.16)
WBC #/AREA URNS AUTO: NORMAL /HPF

## 2020-11-09 PROCEDURE — G1004 CDSM NDSC: HCPCS

## 2020-11-09 PROCEDURE — 72148 MRI LUMBAR SPINE W/O DYE: CPT

## 2020-11-09 PROCEDURE — 36415 COLL VENOUS BLD VENIPUNCTURE: CPT | Performed by: EMERGENCY MEDICINE

## 2020-11-09 PROCEDURE — 85025 COMPLETE CBC W/AUTO DIFF WBC: CPT | Performed by: EMERGENCY MEDICINE

## 2020-11-09 PROCEDURE — 99284 EMERGENCY DEPT VISIT MOD MDM: CPT

## 2020-11-09 PROCEDURE — 51798 US URINE CAPACITY MEASURE: CPT

## 2020-11-09 PROCEDURE — 80053 COMPREHEN METABOLIC PANEL: CPT | Performed by: EMERGENCY MEDICINE

## 2020-11-09 PROCEDURE — 82550 ASSAY OF CK (CPK): CPT | Performed by: EMERGENCY MEDICINE

## 2020-11-09 PROCEDURE — 81001 URINALYSIS AUTO W/SCOPE: CPT | Performed by: EMERGENCY MEDICINE

## 2020-11-09 PROCEDURE — 99285 EMERGENCY DEPT VISIT HI MDM: CPT | Performed by: EMERGENCY MEDICINE

## 2020-11-09 PROCEDURE — 83690 ASSAY OF LIPASE: CPT | Performed by: EMERGENCY MEDICINE

## 2020-11-10 ENCOUNTER — TELEPHONE (OUTPATIENT)
Dept: PHYSICAL THERAPY | Facility: OTHER | Age: 50
End: 2020-11-10

## 2020-11-13 ENCOUNTER — TELEPHONE (OUTPATIENT)
Dept: PHYSICAL THERAPY | Facility: OTHER | Age: 50
End: 2020-11-13

## 2020-11-16 DIAGNOSIS — I25.10 CAD, MULTIPLE VESSEL: ICD-10-CM

## 2020-11-16 RX ORDER — ROSUVASTATIN CALCIUM 20 MG/1
20 TABLET, COATED ORAL DAILY
Qty: 90 TABLET | Refills: 0 | Status: SHIPPED | OUTPATIENT
Start: 2020-11-16 | End: 2021-04-06

## 2020-12-23 ENCOUNTER — TELEMEDICINE (OUTPATIENT)
Dept: FAMILY MEDICINE CLINIC | Facility: CLINIC | Age: 50
End: 2020-12-23
Payer: COMMERCIAL

## 2020-12-23 DIAGNOSIS — K57.92 DIVERTICULITIS: Primary | ICD-10-CM

## 2020-12-23 PROCEDURE — 99214 OFFICE O/P EST MOD 30 MIN: CPT | Performed by: FAMILY MEDICINE

## 2020-12-23 RX ORDER — CIPROFLOXACIN 500 MG/1
500 TABLET, FILM COATED ORAL EVERY 12 HOURS SCHEDULED
Qty: 14 TABLET | Refills: 0 | Status: SHIPPED | OUTPATIENT
Start: 2020-12-23 | End: 2020-12-30

## 2020-12-23 RX ORDER — METRONIDAZOLE 500 MG/1
500 TABLET ORAL EVERY 8 HOURS SCHEDULED
Qty: 21 TABLET | Refills: 0 | Status: SHIPPED | OUTPATIENT
Start: 2020-12-23 | End: 2020-12-30

## 2020-12-26 ENCOUNTER — TELEMEDICINE (OUTPATIENT)
Dept: FAMILY MEDICINE CLINIC | Facility: CLINIC | Age: 50
End: 2020-12-26
Payer: COMMERCIAL

## 2020-12-26 DIAGNOSIS — K57.92 DIVERTICULITIS: Primary | ICD-10-CM

## 2020-12-26 PROCEDURE — 99213 OFFICE O/P EST LOW 20 MIN: CPT | Performed by: FAMILY MEDICINE

## 2020-12-26 PROCEDURE — 1036F TOBACCO NON-USER: CPT | Performed by: FAMILY MEDICINE

## 2021-03-10 DIAGNOSIS — Z23 ENCOUNTER FOR IMMUNIZATION: ICD-10-CM

## 2021-03-28 ENCOUNTER — IMMUNIZATIONS (OUTPATIENT)
Dept: FAMILY MEDICINE CLINIC | Facility: HOSPITAL | Age: 51
End: 2021-03-28

## 2021-03-28 DIAGNOSIS — Z23 ENCOUNTER FOR IMMUNIZATION: Primary | ICD-10-CM

## 2021-03-28 PROCEDURE — 0001A SARS-COV-2 / COVID-19 MRNA VACCINE (PFIZER-BIONTECH) 30 MCG: CPT

## 2021-03-28 PROCEDURE — 91300 SARS-COV-2 / COVID-19 MRNA VACCINE (PFIZER-BIONTECH) 30 MCG: CPT

## 2021-04-01 ENCOUNTER — TELEPHONE (OUTPATIENT)
Dept: FAMILY MEDICINE CLINIC | Facility: CLINIC | Age: 51
End: 2021-04-01

## 2021-04-02 DIAGNOSIS — E78.2 MIXED HYPERLIPIDEMIA: Primary | ICD-10-CM

## 2021-04-05 DIAGNOSIS — I25.10 CAD, MULTIPLE VESSEL: ICD-10-CM

## 2021-04-06 LAB
ALBUMIN SERPL-MCNC: 4.8 G/DL (ref 3.8–4.9)
ALBUMIN/GLOB SERPL: 2.1 {RATIO} (ref 1.2–2.2)
ALP SERPL-CCNC: 73 IU/L (ref 39–117)
ALT SERPL-CCNC: 28 IU/L (ref 0–44)
AST SERPL-CCNC: 27 IU/L (ref 0–40)
BILIRUB SERPL-MCNC: 0.7 MG/DL (ref 0–1.2)
BUN SERPL-MCNC: 20 MG/DL (ref 6–24)
BUN/CREAT SERPL: 18 (ref 9–20)
CALCIUM SERPL-MCNC: 9.8 MG/DL (ref 8.7–10.2)
CHLORIDE SERPL-SCNC: 98 MMOL/L (ref 96–106)
CHOLEST SERPL-MCNC: 107 MG/DL (ref 100–199)
CHOLEST/HDLC SERPL: 2.5 RATIO (ref 0–5)
CO2 SERPL-SCNC: 23 MMOL/L (ref 20–29)
CREAT SERPL-MCNC: 1.11 MG/DL (ref 0.76–1.27)
GLOBULIN SER-MCNC: 2.3 G/DL (ref 1.5–4.5)
GLUCOSE SERPL-MCNC: 86 MG/DL (ref 65–99)
HDLC SERPL-MCNC: 43 MG/DL
LDLC SERPL CALC-MCNC: 50 MG/DL (ref 0–99)
MICRODELETION SYND BLD/T FISH: NORMAL
POTASSIUM SERPL-SCNC: 4.8 MMOL/L (ref 3.5–5.2)
PROT SERPL-MCNC: 7.1 G/DL (ref 6–8.5)
SL AMB EGFR AFRICAN AMERICAN: 88 ML/MIN/1.73
SL AMB EGFR NON AFRICAN AMERICAN: 76 ML/MIN/1.73
SL AMB VLDL CHOLESTEROL CALC: 14 MG/DL (ref 5–40)
SODIUM SERPL-SCNC: 138 MMOL/L (ref 134–144)
TRIGL SERPL-MCNC: 65 MG/DL (ref 0–149)

## 2021-04-06 RX ORDER — ROSUVASTATIN CALCIUM 20 MG/1
TABLET, COATED ORAL
Qty: 90 TABLET | Refills: 0 | Status: SHIPPED | OUTPATIENT
Start: 2021-04-06 | End: 2021-08-19

## 2021-04-13 ENCOUNTER — OFFICE VISIT (OUTPATIENT)
Dept: FAMILY MEDICINE CLINIC | Facility: CLINIC | Age: 51
End: 2021-04-13
Payer: COMMERCIAL

## 2021-04-13 VITALS
DIASTOLIC BLOOD PRESSURE: 80 MMHG | WEIGHT: 173.6 LBS | BODY MASS INDEX: 28.92 KG/M2 | SYSTOLIC BLOOD PRESSURE: 122 MMHG | RESPIRATION RATE: 14 BRPM | OXYGEN SATURATION: 98 % | HEART RATE: 86 BPM | TEMPERATURE: 98.5 F | HEIGHT: 65 IN

## 2021-04-13 DIAGNOSIS — Z00.00 ROUTINE MEDICAL EXAM: Primary | ICD-10-CM

## 2021-04-13 PROCEDURE — 3008F BODY MASS INDEX DOCD: CPT | Performed by: FAMILY MEDICINE

## 2021-04-13 PROCEDURE — 99396 PREV VISIT EST AGE 40-64: CPT | Performed by: FAMILY MEDICINE

## 2021-04-13 PROCEDURE — 1036F TOBACCO NON-USER: CPT | Performed by: FAMILY MEDICINE

## 2021-04-13 PROCEDURE — 3725F SCREEN DEPRESSION PERFORMED: CPT | Performed by: FAMILY MEDICINE

## 2021-04-13 NOTE — PROGRESS NOTES
Chief Complaint   Patient presents with    Physical Exam        Patient ID: Michelle Caruso is a 44 y o  male  HPI  Pt is seeing for CPE -  Loosing weight with diet     The following portions of the patient's history were reviewed and updated as appropriate: allergies, current medications, past family history, past medical history, past social history, past surgical history and problem list     Review of Systems   Constitutional: Negative for activity change, appetite change, fatigue, fever and unexpected weight change  HENT: Negative for congestion, ear discharge, ear pain, hearing loss, rhinorrhea, sinus pressure, sore throat and trouble swallowing  Eyes: Negative  Respiratory: Positive for shortness of breath (with exertion, chronic, has COPD -  under pulmonologist care )  Negative for apnea, cough, chest tightness and wheezing  Cardiovascular: Negative  Negative for chest pain, palpitations and leg swelling  Under cardiologist care for CAD   Gastrointestinal: Negative  Endocrine: Negative  Genitourinary: Negative  Musculoskeletal: Negative  Skin: Negative  Neurological: Negative for dizziness, weakness, light-headedness and numbness  Hematological: Negative  Psychiatric/Behavioral: Negative  Current Outpatient Medications   Medication Sig Dispense Refill    albuterol (PROVENTIL HFA,VENTOLIN HFA) 90 mcg/act inhaler INHALE TWO PUFFS BY MOUTH EVERY 4 HOURS AS NEEDED FOR WHEEZING OR SHORTNESS OF BREATH 18 g 7    aspirin 325 mg tablet Take 1 tablet by mouth daily 100 tablet 0    roflumilast (DALIRESP) 500 mcg tablet Take 500 mcg by mouth daily      rosuvastatin (CRESTOR) 20 MG tablet TAKE ONE TABLET BY MOUTH EVERY DAY 90 tablet 0    tiotropium-olodaterol (STIOLTO RESPIMAT) 2 5-2 5 MCG/ACT inhaler Inhale 2 puffs daily       No current facility-administered medications for this visit          Objective:    /80 Comment: by MD  Pulse 86   Temp 98 5 °F (36 9 °C) (Temporal)   Resp 14   Ht 5' 4 5" (1 638 m)   Wt 78 7 kg (173 lb 9 6 oz)   SpO2 98%   BMI 29 34 kg/m²        Physical Exam  Constitutional:       General: He is not in acute distress  Appearance: He is well-developed  He is not ill-appearing  HENT:      Head: Normocephalic and atraumatic  Right Ear: Hearing, tympanic membrane, ear canal and external ear normal       Left Ear: Hearing, tympanic membrane, ear canal and external ear normal       Nose: No congestion or rhinorrhea  Mouth/Throat:      Pharynx: No posterior oropharyngeal erythema  Eyes:      Extraocular Movements: Extraocular movements intact  Conjunctiva/sclera: Conjunctivae normal    Neck:      Musculoskeletal: Neck supple  Thyroid: No thyroid mass or thyromegaly  Vascular: No JVD  Cardiovascular:      Rate and Rhythm: Normal rate and regular rhythm  Heart sounds: Heart sounds are distant  No murmur  No gallop  Pulmonary:      Effort: No respiratory distress  Breath sounds: Decreased air movement present  Decreased breath sounds present  No wheezing, rhonchi or rales  Abdominal:      General: Bowel sounds are normal       Palpations: Abdomen is soft  Tenderness: There is no abdominal tenderness  Musculoskeletal:      Right lower leg: No edema  Left lower leg: No edema  Lymphadenopathy:      Cervical: No cervical adenopathy  Neurological:      General: No focal deficit present  Mental Status: He is alert and oriented to person, place, and time  Cranial Nerves: No cranial nerve deficit  Psychiatric:         Mood and Affect: Mood normal          Behavior: Behavior normal          Thought Content: Thought content normal          Judgment: Judgment normal            Labs in chart were reviewed    Recent Results (from the past 672 hour(s))   Comprehensive metabolic panel    Collection Time: 04/06/21  9:33 AM   Result Value Ref Range    Glucose, Random 86 65 - 99 mg/dL    BUN 20 6 - 24 mg/dL    Creatinine 1 11 0 76 - 1 27 mg/dL    eGFR Non  76 >59 mL/min/1 73    eGFR  88 >59 mL/min/1 73    SL AMB BUN/CREATININE RATIO 18 9 - 20    Sodium 138 134 - 144 mmol/L    Potassium 4 8 3 5 - 5 2 mmol/L    Chloride 98 96 - 106 mmol/L    CO2 23 20 - 29 mmol/L    CALCIUM 9 8 8 7 - 10 2 mg/dL    Protein, Total 7 1 6 0 - 8 5 g/dL    Albumin 4 8 3 8 - 4 9 g/dL    Globulin, Total 2 3 1 5 - 4 5 g/dL    Albumin/Globulin Ratio 2 1 1 2 - 2 2    TOTAL BILIRUBIN 0 7 0 0 - 1 2 mg/dL    Alk Phos Isoenzymes 73 39 - 117 IU/L    AST 27 0 - 40 IU/L    ALT 28 0 - 44 IU/L   Lipid panel    Collection Time: 04/06/21  9:33 AM   Result Value Ref Range    Cholesterol, Total 107 100 - 199 mg/dL    Triglycerides 65 0 - 149 mg/dL    HDL 43 >39 mg/dL    VLDL Cholesterol Calculated 14 5 - 40 mg/dL    LDL Calculated 50 0 - 99 mg/dL    T  Chol/HDL Ratio 2 5 0 0 - 5 0 ratio   Cardiovascular Report    Collection Time: 04/06/21  9:33 AM   Result Value Ref Range    Interpretation Note        Assessment/Plan:         Diagnoses and all orders for this visit:    Routine medical exam            BMI Counseling: Body mass index is 29 34 kg/m²  Discussed the patient's BMI with him  The BMI is above normal  Nutrition recommendations include reducing portion sizes, decreasing overall calorie intake, 3-5 servings of fruits/vegetables daily, reducing fast food intake and consuming healthier snacks  Exercise recommendations include exercising 3-5 times per week           rto in 1 y         Bijan Gill MD

## 2021-04-19 ENCOUNTER — IMMUNIZATIONS (OUTPATIENT)
Dept: FAMILY MEDICINE CLINIC | Facility: HOSPITAL | Age: 51
End: 2021-04-19

## 2021-04-19 DIAGNOSIS — Z23 ENCOUNTER FOR IMMUNIZATION: Primary | ICD-10-CM

## 2021-04-19 PROCEDURE — 91300 SARS-COV-2 / COVID-19 MRNA VACCINE (PFIZER-BIONTECH) 30 MCG: CPT

## 2021-04-19 PROCEDURE — 0002A SARS-COV-2 / COVID-19 MRNA VACCINE (PFIZER-BIONTECH) 30 MCG: CPT

## 2021-06-21 ENCOUNTER — OFFICE VISIT (OUTPATIENT)
Dept: FAMILY MEDICINE CLINIC | Facility: CLINIC | Age: 51
End: 2021-06-21
Payer: COMMERCIAL

## 2021-06-21 VITALS
WEIGHT: 154 LBS | HEIGHT: 65 IN | RESPIRATION RATE: 18 BRPM | HEART RATE: 84 BPM | TEMPERATURE: 98.4 F | BODY MASS INDEX: 25.66 KG/M2 | SYSTOLIC BLOOD PRESSURE: 122 MMHG | DIASTOLIC BLOOD PRESSURE: 84 MMHG

## 2021-06-21 DIAGNOSIS — J06.9 URI, ACUTE: Primary | ICD-10-CM

## 2021-06-21 PROCEDURE — 99213 OFFICE O/P EST LOW 20 MIN: CPT | Performed by: FAMILY MEDICINE

## 2021-06-21 RX ORDER — AZITHROMYCIN 250 MG/1
TABLET, FILM COATED ORAL
Qty: 6 TABLET | Refills: 0 | Status: SHIPPED | OUTPATIENT
Start: 2021-06-21 | End: 2021-06-25

## 2021-06-21 NOTE — PROGRESS NOTES
Chief Complaint   Patient presents with    Cough    Nasal Congestion        Patient ID: Michelle Caruso is a 44 y o  male  HPI  Pt is seeing for nasal congestion and cough x 3-4 days -  Has COPD -  No Sob or wheezing  -  No fever     The following portions of the patient's history were reviewed and updated as appropriate: allergies, current medications, past family history, past medical history, past social history, past surgical history and problem list     Review of Systems   Constitutional: Negative  Negative for fatigue and fever  HENT: Positive for congestion and rhinorrhea  Negative for sinus pressure, sinus pain and sore throat  Respiratory: Positive for cough  Negative for chest tightness, shortness of breath and wheezing  Cardiovascular: Negative  Gastrointestinal: Negative  Current Outpatient Medications   Medication Sig Dispense Refill    albuterol (PROVENTIL HFA,VENTOLIN HFA) 90 mcg/act inhaler INHALE TWO PUFFS BY MOUTH EVERY 4 HOURS AS NEEDED FOR WHEEZING OR SHORTNESS OF BREATH 18 g 7    aspirin 325 mg tablet Take 1 tablet by mouth daily 100 tablet 0    roflumilast (DALIRESP) 500 mcg tablet Take 500 mcg by mouth daily      rosuvastatin (CRESTOR) 20 MG tablet TAKE ONE TABLET BY MOUTH EVERY DAY 90 tablet 0    tiotropium-olodaterol (STIOLTO RESPIMAT) 2 5-2 5 MCG/ACT inhaler Inhale 2 puffs daily       No current facility-administered medications for this visit  Objective:    /84   Pulse 84   Temp 98 4 °F (36 9 °C) (Tympanic)   Resp 18   Ht 5' 4 5" (1 638 m)   Wt 69 9 kg (154 lb)   BMI 26 03 kg/m²        Physical Exam  Constitutional:       Appearance: He is not ill-appearing  HENT:      Nose: Congestion present  No rhinorrhea  Cardiovascular:      Rate and Rhythm: Normal rate and regular rhythm  Pulmonary:      Effort: Pulmonary effort is normal  No respiratory distress  Breath sounds: Decreased air movement present  Decreased breath sounds present  No wheezing, rhonchi or rales  Neurological:      Mental Status: He is alert  Assessment/Plan:         Diagnoses and all orders for this visit:    URI, acute  -     azithromycin (ZITHROMAX) 250 mg tablet;  Take 2 tablets today then 1 tablet daily x 4 days            rto prn                 Lamin Sparks MD

## 2021-07-01 ENCOUNTER — OFFICE VISIT (OUTPATIENT)
Dept: CARDIOLOGY CLINIC | Facility: CLINIC | Age: 51
End: 2021-07-01
Payer: COMMERCIAL

## 2021-07-01 VITALS
TEMPERATURE: 98.9 F | SYSTOLIC BLOOD PRESSURE: 120 MMHG | DIASTOLIC BLOOD PRESSURE: 70 MMHG | BODY MASS INDEX: 25.16 KG/M2 | HEIGHT: 65 IN | OXYGEN SATURATION: 97 % | HEART RATE: 72 BPM | WEIGHT: 151 LBS

## 2021-07-01 DIAGNOSIS — J43.2 CENTRILOBULAR EMPHYSEMA (HCC): ICD-10-CM

## 2021-07-01 DIAGNOSIS — G47.33 OSA (OBSTRUCTIVE SLEEP APNEA): ICD-10-CM

## 2021-07-01 DIAGNOSIS — Z95.1 S/P CABG X 2: ICD-10-CM

## 2021-07-01 DIAGNOSIS — M54.16 LUMBAR RADICULOPATHY: ICD-10-CM

## 2021-07-01 DIAGNOSIS — I25.10 CAD, MULTIPLE VESSEL: ICD-10-CM

## 2021-07-01 DIAGNOSIS — E78.2 MIXED HYPERLIPIDEMIA: ICD-10-CM

## 2021-07-01 DIAGNOSIS — R06.00 DYSPNEA ON EXERTION: ICD-10-CM

## 2021-07-01 PROCEDURE — 93000 ELECTROCARDIOGRAM COMPLETE: CPT | Performed by: INTERNAL MEDICINE

## 2021-07-01 PROCEDURE — 1036F TOBACCO NON-USER: CPT | Performed by: INTERNAL MEDICINE

## 2021-07-01 PROCEDURE — 3008F BODY MASS INDEX DOCD: CPT | Performed by: INTERNAL MEDICINE

## 2021-07-01 PROCEDURE — 99214 OFFICE O/P EST MOD 30 MIN: CPT | Performed by: INTERNAL MEDICINE

## 2021-07-01 NOTE — PROGRESS NOTES
Progress Note - Cardiology Office  [facility]    Mars Carrington 44 y o  male MRN: [id number]  : 1970  Encounter: [id number]      Assessment:     1  CAD, multiple vessel    2  Dyspnea on exertion    3  Centrilobular emphysema ([address] )    4  LUCY (obstructive sleep apnea)    5  S/P CABG x 2    6  Mixed hyperlipidemia    7  Lumbar radiculopathy        Discussion Summary and Plan:  1  Coronary artery disease status post CABG x2 in 2017 with LIMA to LAD and SVG vein graft to circumflex  He has not been seen since 2019  He is very concerned about it  He will be scheduled for exercise stress test and will check echo Doppler for LV function  He has quit smoking and he is trying to be active  2   Dyspnea on exertion  Patient has distant exertion he had history of COPD  He also history of coronary artery disease  Status post CABG  Already scheduled for echo Doppler and exercise stress test    3  COPD  Patient used to be heavy smoker has history of COPD  He follows with pulmonary continue inhalers management as per them  4  Dyslipidemia  Continue high intensity statins  Patient's Crestor 20 mg cholesterol profile is excellent electrolytes are acceptable LFTs are acceptable  Continue same medication  5  History of tobacco abuse  Heavy smoker throughout her life since he was 13years old  Now quit smoking  Patient encouraged not to go back to smoking  6  Recent lumbar radiculopathy now improved  Now much better     follow-up 6 months after this test done  Counseling :  A description of the counseling  As above  He need to be regular in follow-up  Patient's ability to self care: Yes  Medication side effect reviewed with patient in detail and all their questions answered to their satisfaction  HPI :     Mars Carrington is a 44y o  year old male who came for follow up   Patient  was admitted to [facility] in 2017 with 10/10 severe chest pain and had a non ST elevation MI  He was short of breath even climbing 1-2 flights of stair  His cardiac catheterization shows he had a spontaneous dissection of left main as well as significant stenosis of his mid LAD and he underwent successful coronary artery bypass surgery x2 with LIMA to LAD and SVG vein graft to circumflex came for follow-up  Patient used to smoke heavy and has now quit smoking  He has history of I believe COPD but never seen a pulmonologist  He feels fatigue and tired otherwise he is getting better  Some dizziness after taking metoprolol  No fever no chills no other significant complaint       07/01/2021  Above reviewed  Patient came for follow-up  He has lost about 30 lb  He has quit smoking since his surgery  He is doing well has exertional shortness of breath which is not changed he had history of COPD  He had a blood test done which was reviewed in April 2021 in they were acceptable  No nausea no vomiting no fever no chills no PND no orthopnea  He does regular exercise he walks about mi a day  He is on cholesterol medication as well as his inhalers  No other cardiovascular issues at this time  He does have history of CAD status post CABG with 2 vessel bypass which included LIMA to LAD and vein graft to circumflex  Review of Systems   Constitutional: Negative for activity change, chills, diaphoresis, fever and unexpected weight change  HENT: Negative for congestion  Eyes: Negative for discharge and redness  Respiratory: Positive for shortness of breath  Negative for cough, chest tightness and wheezing  Exertional chronic not changed   Cardiovascular: Negative  Negative for chest pain, palpitations and leg swelling  Gastrointestinal: Negative for abdominal pain, diarrhea and nausea  Endocrine: Negative  Genitourinary: Negative for decreased urine volume and urgency  Musculoskeletal: Negative    Negative for arthralgias, back pain and gait problem  Skin: Negative for rash and wound  Allergic/Immunologic: Negative  Neurological: Negative for dizziness, seizures, syncope, weakness, light-headedness and headaches  Hematological: Negative  Psychiatric/Behavioral: Negative for agitation and confusion  The patient is not nervous/anxious  Historical Information   Past Medical History:   Diagnosis Date    Chronic sinus complaints     last assessed 12/12/17    Diverticulosis     GERD (gastroesophageal reflux disease)     off medicine for past few years    Heart attack [facility])     last assessed 12/12/17    HTN (hypertension)     Hyperlipidemia     Sciatica     Tobacco use     Vasovagal syncope     last assessed 5/1/13    Wears glasses      Past Surgical History:   Procedure Laterality Date    APPENDECTOMY  1974    COLONOSCOPY N/A 8/14/2017    Procedure: COLONOSCOPY;  Surgeon: Serena Quiñonez MD;  Location: [place] [address] GI LAB; Service: Gastroenterology    CORONARY ARTERY BYPASS GRAFT N/A 9/25/2017    Procedure: INTRA OP MATTHEW; CORONARY ARTERY BYPASS GRAFT X 2 WITH SVH TO OM1  AND LIMA TO LAD; LEFT LEG EVH;  Surgeon: Branden Capellan MD;  Location: [facility];  Service: Cardiac Surgery    THUMB AMPUTATION Left 1980     Social History     Substance and Sexual Activity   Alcohol Use No    Comment: rarely; denied hx of social drinker as per [employer]     Social History     Substance and Sexual Activity   Drug Use No     Social History     Tobacco Use   Smoking Status Former Smoker    Packs/day: 2 00    Years: 28 00    Pack years: 56 00    Types: Cigarettes    Quit date: 9/22/2017    Years since quitting: 3 7   Smokeless Tobacco Never Used   Tobacco Comment    denied hx of exposure to second hand smoke; former smoker, smoked 2ppd for 30 yrs   pt quit 3 mos ago as per [employer]     Family History:   Family History   Problem Relation Age of Onset    Heart disease Father         CABG    Testicular cancer Father adenocarcinoma in situ of the testis    Heart defect Father         cardiac disorder    Cancer Father     Diabetes type II Father     Heart disease Maternal Grandfather     Hypertension Mother         benign essential    Diabetes Sister     Arthritis Family     Arthritis Other     Heart defect Other         cardiac disorder    Diabetes Other     Cancer Other        Meds/Allergies     No Known Allergies    Current Outpatient Medications:     albuterol (PROVENTIL HFA,VENTOLIN HFA) 90 mcg/act inhaler, INHALE TWO PUFFS BY MOUTH EVERY 4 HOURS AS NEEDED FOR WHEEZING OR SHORTNESS OF BREATH, Disp: 18 g, Rfl: 7    aspirin 325 mg tablet, Take 1 tablet by mouth daily, Disp: 100 tablet, Rfl: 0    roflumilast (DALIRESP) 500 mcg tablet, Take 500 mcg by mouth daily, Disp: , Rfl:     rosuvastatin (CRESTOR) 20 MG tablet, TAKE ONE TABLET BY MOUTH EVERY DAY, Disp: 90 tablet, Rfl: 0    tiotropium-olodaterol (STIOLTO RESPIMAT) 2 5-2 5 MCG/ACT inhaler, Inhale 2 puffs daily, Disp: , Rfl:     Vitals: Blood pressure 120/70, pulse 72, temperature 98 9 °F (37 2 °C), height 5' 4 5" (1 638 m), weight 68 5 kg (151 lb), SpO2 97 %  Body mass index is 25 52 kg/m²  Vitals:    07/01/21 1539   Weight: 68 5 kg (151 lb)     BP Readings from Last 3 Encounters:   07/01/21 120/70   06/21/21 122/84   04/13/21 122/80       Physical Exam:  Physical Exam  Constitutional:       General: He is not in acute distress  Appearance: He is well-developed  He is not diaphoretic  HENT:      Head: Normocephalic and atraumatic  Eyes:      Pupils: Pupils are equal, round, and reactive to light  Neck:      Thyroid: No thyromegaly  Vascular: No JVD  Trachea: No tracheal deviation  Cardiovascular:      Rate and Rhythm: Normal rate and regular rhythm  Heart sounds: S1 normal and S2 normal  Heart sounds not distant  Murmur heard  Systolic (ejection) murmur is present with a grade of 2/6  No friction rub  No gallop   No S3 or S4 sounds  Comments: S1-S2 regular with 2/6 murmur  Pulmonary:      Effort: Pulmonary effort is normal  No respiratory distress  Breath sounds: No wheezing or rales  Comments: Bilateral decreased air entry no rhonchi no wheezing  Chest:      Chest wall: No tenderness  Abdominal:      General: Bowel sounds are normal  There is no distension  Palpations: Abdomen is soft  Tenderness: There is no abdominal tenderness  Musculoskeletal:         General: No deformity  Cervical back: Neck supple  Comments: No edema   Skin:     General: Skin is warm and dry  Coloration: Skin is not pale  Findings: No rash  Neurological:      Mental Status: He is alert and oriented to person, place, and time  Psychiatric:         Behavior: Behavior normal          Judgment: Judgment normal            Diagnostic Studies Review Cardio:     Stress Test: Echo show normal LV systolic function  Catheterization: Cardiac catheterization in September 2017 shows proximal LAD 80% stenosis, spontaneous dissection of the left main, preserved LV systolic function, nonobstructive disease of the circumflex which was medium to small size  Circumflex has 25 -30% stenosis  Patient status post 2 vessel bypass  ECG Report: 10/26/2017 12 lead EKG shows normal sinus rhythm heart rate 60 beats per minute  Nonspecific ST changes  No old EKG to compare     Twelve lead EKG done 01/10/2019 shows normal sinus rhythm heart rate 83 beats per minute  No significant ST changes  Twelve lead EKG 07/01/2019 shows normal sinus rhythm heart rate 88 beats per minute  No significant ST changes no change from old EKG  Twelve lead EKG 07/01/2021 shows normal sinus rhythm heart rate 72 beats per minute  Rare PVCs as compared to previous EKG heart rate is better        Cardiac testing:   Results for orders placed during the hospital encounter of 03/19/18   Echo complete with contrast if indicated    [address] [place] [address] 6  [phone]    Transthoracic Echocardiogram  2D, M-mode, Doppler, and Color Doppler    Study date:  19-Mar-2018    Patient: Lizeth Hayes  MR number: [id number]  Account number: [de-identified]  : 1970  Age: 50 years  Gender: Male  Status: Routine  Location: Echo lab  Height: 62 in  Weight: 161 7 lb  BP: 119/ 77 mmHg    Sonographer:  ELÍAS Moreira  Primary Physician:  Vidal Albarran MD  Referring Physician:  Torrie Beasley:  Ruben Carrillo [facility]  Interpreting Physician:  Salomon Zuleta DO    SUMMARY    LEFT VENTRICLE:  Systolic function was at the lower limits of normal by visual assessment  Ejection fraction was estimated to be 50 %  There were no regional wall motion abnormalities  There was mild concentric hypertrophy  Doppler parameters were consistent with abnormal left ventricular relaxation (grade 1 diastolic dysfunction)  LEFT ATRIUM:  The atrium was mildly dilated  RIGHT ATRIUM:  The atrium was mildly to moderately dilated  MITRAL VALVE:  There was trace regurgitation  TRICUSPID VALVE:  There was mild regurgitation  Pulmonary artery systolic pressure was within the normal range  Estimated peak PA pressure was 38 mmHg  PROCEDURE: The procedure was performed in the echo lab  This was a routine study  The transthoracic approach was used  The study included complete 2D imaging, M-mode, complete spectral Doppler, and color Doppler  The heart rate was 64 bpm,  at the start of the study  Images were obtained from the parasternal, apical, subcostal, and suprasternal notch acoustic windows  Image quality was adequate  LEFT VENTRICLE: Size was normal  Systolic function was at the lower limits of normal by visual assessment  Ejection fraction was estimated to be 50 %  There were no regional wall motion abnormalities  There was mild concentric hypertrophy    DOPPLER: Doppler parameters were consistent with abnormal left ventricular relaxation (grade 1 diastolic dysfunction)  There was no evidence of elevated ventricular filling pressure by Doppler parameters  RIGHT VENTRICLE: The size was normal  Systolic function was low normal  DOPPLER: Systolic pressure was within the normal range  LEFT ATRIUM: The atrium was mildly dilated  No thrombus was identified  RIGHT ATRIUM: The atrium was mildly to moderately dilated  MITRAL VALVE: Valve structure was normal  There was normal leaflet separation  No echocardiographic evidence for prolapse  DOPPLER: The transmitral velocity was within the normal range  There was no evidence for stenosis  There was trace  regurgitation  AORTIC VALVE: The valve was trileaflet  Leaflets exhibited normal thickness, normal cuspal separation, and sclerosis  DOPPLER: Transaortic velocity was within the normal range  There was no evidence for stenosis  There was no  regurgitation  TRICUSPID VALVE: The valve structure was normal  There was normal leaflet separation  DOPPLER: The transtricuspid velocity was within the normal range  There was mild regurgitation  Pulmonary artery systolic pressure was within the normal  range  Estimated peak PA pressure was 38 mmHg  PULMONIC VALVE: Leaflets exhibited normal thickness, no calcification, and normal cuspal separation  DOPPLER: The transpulmonic velocity was within the normal range  There was trace regurgitation  PERICARDIUM: There was no thickening  There was no pericardial effusion  AORTA: The root exhibited normal size      PULMONARY ARTERY: The size was normal  The morphology appeared normal     SYSTEM MEASUREMENT TABLES    2D mode  AoR Diam 2D: 3 6 cm  LA Diam (2D): 3 6 cm  LA/Ao (2D): 1  FS (2D Teich): 25 6 %  IVSd (2D): 1 01 cm  LVDEV: 108 cm³  LVEDV MOD BP: 131 cm³  LVESV: 53 7 cm³  LVIDd(2D): 4 81 cm  LVISd (2D): 3 58 cm  LVOT Area 2D: 3 14 cm squared  LVPWd (2D): 1 03 cm  SV (Teich): 54 3 cm³    Apical four chamber  LVEF A4C: 55 %    Apical two chamber  LA Area: 22 7 cm squared  LA Volume: 70 cm³  LVEF A2C: 59 %  LVLD A2C: 8 37 cm    Unspecified Scan Mode  NARDA Cont Eq (Peak Kenroy): 2 81 cm squared  LVOT Diam : 2 cm  LVOT Vmax: 1190 mm/s  LVOT Vmax; Mean: 1190 mm/s  Peak Grad ; Mean: 6 mm[Hg]  MV Peak A Kenroy: 642 mm/s  MV Peak E Kenroy  Mean: 632 mm/s  MVA (PHT): 4 15 cm squared  PHT: 53 ms  Max P mm[Hg]  V Max: 2760 mm/s  Vmax: 2410 mm/s  RA Area: 24 9 cm squared  RA Volume: 86 7 cm³  TAPSE: 1 7 cm    [employer] Accredited Echocardiography Laboratory    Prepared and electronically signed by    Javy Son DO  Signed 20-Mar-2018 08:35:00         Imaging:  Chest X-Ray:   Xr Chest Pa & Lateral    Result Date: 10/28/2017  Impression No active pulmonary disease  Hyperinflation  Workstation performed: [id number]       CT-scan of the chest:     Cta Dissection Protocol Chest And Abdomen    Result Date: 2017  Impression No evidence of aortic aneurysm or dissection  Mild to moderate COPD  Mildly thick-walled jejunal loop with slight hyperemia of the bowel, perhaps related to underdistention, cannot entirely exclude enteritis  No inflammatory changes in the adjacent mesentery   Workstation performed: [id number]     Lab Review   Lab Results   Component Value Date    WBC 6 24 2020    HGB 14 2 2020    HCT 44 4 2020    MCV 93 2020    RDW 11 8 2020     2020     BMP:  Lab Results   Component Value Date    K 4 8 2021    CL 98 2021    CO2 23 2021    BUN 20 2021    CREATININE 1 11 2021    GLUCOSE 159 (H) 2017    GLUF 95 2017    CALCIUM 8 4 2020    CORRECTEDCA 9 0 2020    EGFR 67 2020    MG 2 4 2017     LFT:  Lab Results   Component Value Date    AST 27 2021    ALT 28 2021    ALKPHOS 58 2020       Lab Results   Component Value Date    HGBA1C 5 3 2020     Lipid Profile:   Lab Results Component Value Date    HDL 43 04/06/2021    LDLCALC 50 04/06/2021    TRIG 65 04/06/2021     No results found for: CHOL  Lab Results   Component Value Date    CKTOTAL 68 11/09/2020    TROPONINI <0 02 02/01/2018     Lab Results   Component Value Date    NTBNP 170 (H) 02/01/2018        Dr Juan C Shirley MD [facility]      "This note has been constructed using a voice recognition system  Therefore there may be syntax, spelling, and/or grammatical errors   Please call if you have any questions  "

## 2021-08-17 ENCOUNTER — HOSPITAL ENCOUNTER (OUTPATIENT)
Dept: NON INVASIVE DIAGNOSTICS | Facility: HOSPITAL | Age: 51
Discharge: HOME | End: 2021-08-17
Attending: INTERNAL MEDICINE
Payer: COMMERCIAL

## 2021-08-17 DIAGNOSIS — Z95.1 S/P CABG X 2: ICD-10-CM

## 2021-08-17 DIAGNOSIS — I25.10 CAD, MULTIPLE VESSEL: ICD-10-CM

## 2021-08-17 DIAGNOSIS — E78.2 MIXED HYPERLIPIDEMIA: ICD-10-CM

## 2021-08-17 DIAGNOSIS — G47.33 OSA (OBSTRUCTIVE SLEEP APNEA): ICD-10-CM

## 2021-08-17 DIAGNOSIS — J43.2 CENTRILOBULAR EMPHYSEMA (HCC): ICD-10-CM

## 2021-08-17 DIAGNOSIS — M54.16 LUMBAR RADICULOPATHY: ICD-10-CM

## 2021-08-17 PROCEDURE — 93306 TTE W/DOPPLER COMPLETE: CPT

## 2021-08-17 PROCEDURE — 93017 CV STRESS TEST TRACING ONLY: CPT

## 2021-08-18 ENCOUNTER — TELEPHONE (OUTPATIENT)
Dept: CARDIOLOGY CLINIC | Facility: CLINIC | Age: 51
End: 2021-08-18

## 2021-08-18 DIAGNOSIS — I25.10 CAD, MULTIPLE VESSEL: ICD-10-CM

## 2021-08-18 LAB
CHEST PAIN STATEMENT: NORMAL
MAX DIASTOLIC BP: 80 MMHG
MAX HEART RATE: 136 BPM
MAX PREDICTED HEART RATE: 168 BPM
MAX. SYSTOLIC BP: 160 MMHG
PROTOCOL NAME: NORMAL
REASON FOR TERMINATION: NORMAL
TARGET HR FORMULA: NORMAL
TEST INDICATION: NORMAL
TIME IN EXERCISE PHASE: NORMAL

## 2021-08-18 PROCEDURE — 93018 CV STRESS TEST I&R ONLY: CPT | Performed by: INTERNAL MEDICINE

## 2021-08-18 PROCEDURE — 93016 CV STRESS TEST SUPVJ ONLY: CPT | Performed by: INTERNAL MEDICINE

## 2021-08-18 NOTE — TELEPHONE ENCOUNTER
----- Message from Tucker Pressley MD sent at 8/18/2021  2:51 PM EDT -----   Patient's stress test was normal up to the 81% maximum predicted heart rate which he achieved  He had a good exercise capacity  He can keep his appointment

## 2021-08-19 ENCOUNTER — HOSPITAL ENCOUNTER (OUTPATIENT)
Dept: RADIOLOGY | Facility: HOSPITAL | Age: 51
Discharge: HOME | End: 2021-08-19
Payer: COMMERCIAL

## 2021-08-19 ENCOUNTER — TELEPHONE (OUTPATIENT)
Dept: CARDIOLOGY CLINIC | Facility: CLINIC | Age: 51
End: 2021-08-19

## 2021-08-19 DIAGNOSIS — J44.9 CHRONIC OBSTRUCTIVE PULMONARY DISEASE, UNSPECIFIED (HCC): ICD-10-CM

## 2021-08-19 DIAGNOSIS — Z87.891 PERSONAL HISTORY OF NICOTINE DEPENDENCE: ICD-10-CM

## 2021-08-19 PROCEDURE — 71271 CT THORAX LUNG CANCER SCR C-: CPT

## 2021-08-19 PROCEDURE — 93306 TTE W/DOPPLER COMPLETE: CPT | Performed by: INTERNAL MEDICINE

## 2021-08-19 RX ORDER — ROSUVASTATIN CALCIUM 20 MG/1
TABLET, COATED ORAL
Qty: 90 TABLET | Refills: 0 | Status: SHIPPED | OUTPATIENT
Start: 2021-08-19 | End: 2021-08-20 | Stop reason: SDUPTHER

## 2021-08-19 NOTE — TELEPHONE ENCOUNTER
----- Message from Galina Treviño MD sent at 8/19/2021  1:20 PM EDT -----  Patient's echo shows normal LV systolic function  No significant valvular disease  Patient can keep an appointment  Please call patient about echo report

## 2021-08-20 DIAGNOSIS — I25.10 CAD, MULTIPLE VESSEL: ICD-10-CM

## 2021-08-20 RX ORDER — ROSUVASTATIN CALCIUM 20 MG/1
20 TABLET, COATED ORAL DAILY
Qty: 90 TABLET | Refills: 0 | Status: SHIPPED | OUTPATIENT
Start: 2021-08-20 | End: 2021-12-29

## 2021-09-27 ENCOUNTER — CLINICAL SUPPORT (OUTPATIENT)
Dept: FAMILY MEDICINE CLINIC | Facility: CLINIC | Age: 51
End: 2021-09-27
Payer: COMMERCIAL

## 2021-09-27 DIAGNOSIS — Z23 NEED FOR VACCINATION: Primary | ICD-10-CM

## 2021-09-27 PROCEDURE — 90471 IMMUNIZATION ADMIN: CPT

## 2021-09-27 PROCEDURE — 90682 RIV4 VACC RECOMBINANT DNA IM: CPT

## 2021-11-08 ENCOUNTER — OFFICE VISIT (OUTPATIENT)
Dept: FAMILY MEDICINE CLINIC | Facility: CLINIC | Age: 51
End: 2021-11-08
Payer: COMMERCIAL

## 2021-11-08 VITALS
WEIGHT: 158 LBS | TEMPERATURE: 97.7 F | SYSTOLIC BLOOD PRESSURE: 136 MMHG | HEIGHT: 65 IN | BODY MASS INDEX: 26.33 KG/M2 | DIASTOLIC BLOOD PRESSURE: 90 MMHG | HEART RATE: 86 BPM | RESPIRATION RATE: 18 BRPM

## 2021-11-08 DIAGNOSIS — R51.9 NONINTRACTABLE HEADACHE, UNSPECIFIED CHRONICITY PATTERN, UNSPECIFIED HEADACHE TYPE: Primary | ICD-10-CM

## 2021-11-08 DIAGNOSIS — R53.83 OTHER FATIGUE: ICD-10-CM

## 2021-11-08 PROCEDURE — 99213 OFFICE O/P EST LOW 20 MIN: CPT | Performed by: FAMILY MEDICINE

## 2021-11-08 PROCEDURE — 3008F BODY MASS INDEX DOCD: CPT | Performed by: FAMILY MEDICINE

## 2021-11-08 PROCEDURE — 1036F TOBACCO NON-USER: CPT | Performed by: FAMILY MEDICINE

## 2021-11-08 PROCEDURE — U0005 INFEC AGEN DETEC AMPLI PROBE: HCPCS | Performed by: FAMILY MEDICINE

## 2021-11-08 PROCEDURE — U0003 INFECTIOUS AGENT DETECTION BY NUCLEIC ACID (DNA OR RNA); SEVERE ACUTE RESPIRATORY SYNDROME CORONAVIRUS 2 (SARS-COV-2) (CORONAVIRUS DISEASE [COVID-19]), AMPLIFIED PROBE TECHNIQUE, MAKING USE OF HIGH THROUGHPUT TECHNOLOGIES AS DESCRIBED BY CMS-2020-01-R: HCPCS | Performed by: FAMILY MEDICINE

## 2021-11-09 LAB
ALBUMIN SERPL-MCNC: 4.5 G/DL (ref 3.8–4.9)
ALBUMIN/GLOB SERPL: 2 {RATIO} (ref 1.2–2.2)
ALP SERPL-CCNC: 59 IU/L (ref 44–121)
ALT SERPL-CCNC: 18 IU/L (ref 0–44)
AST SERPL-CCNC: 21 IU/L (ref 0–40)
B BURGDOR IGG+IGM SER-ACNC: <0.91 ISR (ref 0–0.9)
BILIRUB SERPL-MCNC: 0.4 MG/DL (ref 0–1.2)
BUN SERPL-MCNC: 20 MG/DL (ref 6–24)
BUN/CREAT SERPL: 18 (ref 9–20)
CALCIUM SERPL-MCNC: 9.8 MG/DL (ref 8.7–10.2)
CHLORIDE SERPL-SCNC: 102 MMOL/L (ref 96–106)
CO2 SERPL-SCNC: 25 MMOL/L (ref 20–29)
CREAT SERPL-MCNC: 1.09 MG/DL (ref 0.76–1.27)
ERYTHROCYTE [DISTWIDTH] IN BLOOD BY AUTOMATED COUNT: 11.9 % (ref 11.6–15.4)
GLOBULIN SER-MCNC: 2.3 G/DL (ref 1.5–4.5)
GLUCOSE SERPL-MCNC: 107 MG/DL (ref 65–99)
HCT VFR BLD AUTO: 45.5 % (ref 37.5–51)
HGB BLD-MCNC: 15.3 G/DL (ref 13–17.7)
MCH RBC QN AUTO: 30.4 PG (ref 26.6–33)
MCHC RBC AUTO-ENTMCNC: 33.6 G/DL (ref 31.5–35.7)
MCV RBC AUTO: 91 FL (ref 79–97)
PLATELET # BLD AUTO: 235 X10E3/UL (ref 150–450)
POTASSIUM SERPL-SCNC: 4.4 MMOL/L (ref 3.5–5.2)
PROT SERPL-MCNC: 6.8 G/DL (ref 6–8.5)
RBC # BLD AUTO: 5.03 X10E6/UL (ref 4.14–5.8)
SARS-COV-2 RNA RESP QL NAA+PROBE: NEGATIVE
SL AMB EGFR AFRICAN AMERICAN: 89 ML/MIN/1.73
SL AMB EGFR NON AFRICAN AMERICAN: 77 ML/MIN/1.73
SODIUM SERPL-SCNC: 141 MMOL/L (ref 134–144)
TSH SERPL DL<=0.005 MIU/L-ACNC: 1.75 UIU/ML (ref 0.45–4.5)
WBC # BLD AUTO: 5.4 X10E3/UL (ref 3.4–10.8)

## 2021-11-23 ENCOUNTER — HOSPITAL ENCOUNTER (OUTPATIENT)
Dept: SLEEP CENTER | Facility: CLINIC | Age: 51
Discharge: HOME | End: 2021-11-23
Payer: COMMERCIAL

## 2021-11-23 DIAGNOSIS — G47.33 OSA ON CPAP: ICD-10-CM

## 2021-11-23 DIAGNOSIS — Z99.89 OSA ON CPAP: ICD-10-CM

## 2021-11-23 PROCEDURE — G0399 HOME SLEEP TEST/TYPE 3 PORTA: HCPCS

## 2021-11-23 PROCEDURE — 95806 SLEEP STUDY UNATT&RESP EFFT: CPT

## 2021-11-29 ENCOUNTER — TELEPHONE (OUTPATIENT)
Dept: SLEEP CENTER | Facility: CLINIC | Age: 51
End: 2021-11-29

## 2022-01-09 ENCOUNTER — IMMUNIZATIONS (OUTPATIENT)
Dept: FAMILY MEDICINE CLINIC | Facility: HOSPITAL | Age: 52
End: 2022-01-09

## 2022-01-09 DIAGNOSIS — Z23 ENCOUNTER FOR IMMUNIZATION: Primary | ICD-10-CM

## 2022-01-09 PROCEDURE — 91300 COVID-19 PFIZER VACC 0.3 ML: CPT

## 2022-01-09 PROCEDURE — 0001A COVID-19 PFIZER VACC 0.3 ML: CPT

## 2022-01-28 ENCOUNTER — TELEPHONE (OUTPATIENT)
Dept: FAMILY MEDICINE CLINIC | Facility: CLINIC | Age: 52
End: 2022-01-28

## 2022-01-28 NOTE — TELEPHONE ENCOUNTER
Pt called refill line for fluticazone nasal spray renewal, nose is a little stuffy    I did not see it on med list

## 2022-04-21 ENCOUNTER — OFFICE VISIT (OUTPATIENT)
Dept: FAMILY MEDICINE CLINIC | Facility: CLINIC | Age: 52
End: 2022-04-21
Payer: COMMERCIAL

## 2022-04-21 VITALS
BODY MASS INDEX: 25.83 KG/M2 | DIASTOLIC BLOOD PRESSURE: 86 MMHG | RESPIRATION RATE: 18 BRPM | HEIGHT: 65 IN | HEART RATE: 88 BPM | TEMPERATURE: 99.7 F | OXYGEN SATURATION: 95 % | SYSTOLIC BLOOD PRESSURE: 142 MMHG | WEIGHT: 155 LBS

## 2022-04-21 DIAGNOSIS — R05.9 COUGH: Primary | ICD-10-CM

## 2022-04-21 DIAGNOSIS — R07.9 CHEST PAIN, UNSPECIFIED TYPE: ICD-10-CM

## 2022-04-21 PROCEDURE — 3008F BODY MASS INDEX DOCD: CPT | Performed by: FAMILY MEDICINE

## 2022-04-21 PROCEDURE — 1036F TOBACCO NON-USER: CPT | Performed by: FAMILY MEDICINE

## 2022-04-21 PROCEDURE — U0003 INFECTIOUS AGENT DETECTION BY NUCLEIC ACID (DNA OR RNA); SEVERE ACUTE RESPIRATORY SYNDROME CORONAVIRUS 2 (SARS-COV-2) (CORONAVIRUS DISEASE [COVID-19]), AMPLIFIED PROBE TECHNIQUE, MAKING USE OF HIGH THROUGHPUT TECHNOLOGIES AS DESCRIBED BY CMS-2020-01-R: HCPCS | Performed by: FAMILY MEDICINE

## 2022-04-21 PROCEDURE — 99214 OFFICE O/P EST MOD 30 MIN: CPT | Performed by: FAMILY MEDICINE

## 2022-04-21 PROCEDURE — U0005 INFEC AGEN DETEC AMPLI PROBE: HCPCS | Performed by: FAMILY MEDICINE

## 2022-04-21 RX ORDER — LEVOFLOXACIN 500 MG/1
500 TABLET, FILM COATED ORAL EVERY 24 HOURS
Qty: 7 TABLET | Refills: 0 | Status: SHIPPED | OUTPATIENT
Start: 2022-04-21 | End: 2022-04-28

## 2022-04-21 NOTE — PROGRESS NOTES
Chief Complaint   Patient presents with    Shortness of Breath    Cough        Patient ID: Kalen Powell is a 46 y o  male  HPI  Pt is seeing for cough, R upper chest pain, SOB, fatigue x 2 days - no direct sick contacts - has COPD -  Tried to use rescue inhaler and developed more chest discomfort     The following portions of the patient's history were reviewed and updated as appropriate: allergies, current medications, past family history, past medical history, past social history, past surgical history and problem list     Review of Systems   Constitutional: Positive for fatigue  Negative for chills and fever  HENT: Negative  Respiratory: Positive for cough and chest tightness  Negative for shortness of breath and wheezing  Cardiovascular: Negative  Gastrointestinal: Negative  Current Outpatient Medications   Medication Sig Dispense Refill    albuterol (PROVENTIL HFA,VENTOLIN HFA) 90 mcg/act inhaler INHALE TWO PUFFS BY MOUTH EVERY 4 HOURS AS NEEDED FOR WHEEZING OR SHORTNESS OF BREATH 18 g 7    aspirin 325 mg tablet Take 1 tablet by mouth daily 100 tablet 0    fluticasone (FLONASE) 50 mcg/act nasal spray 1 spray into each nostril daily 16 g 3    roflumilast (DALIRESP) 500 mcg tablet Take 500 mcg by mouth daily      rosuvastatin (CRESTOR) 20 MG tablet TAKE ONE TABLET BY MOUTH EVERY DAY 90 tablet 1    tiotropium-olodaterol (STIOLTO RESPIMAT) 2 5-2 5 MCG/ACT inhaler Inhale 2 puffs daily       No current facility-administered medications for this visit  Objective:    /86   Pulse 88   Temp 99 7 °F (37 6 °C)   Resp 18   Ht 5' 4 5" (1 638 m)   Wt 70 3 kg (155 lb)   SpO2 95%   BMI 26 19 kg/m²        Physical Exam  Constitutional:       Appearance: He is not ill-appearing  HENT:      Right Ear: Tympanic membrane normal       Left Ear: Tympanic membrane normal       Nose: Congestion present  No rhinorrhea        Mouth/Throat:      Pharynx: No oropharyngeal exudate or posterior oropharyngeal erythema  Cardiovascular:      Rate and Rhythm: Normal rate and regular rhythm  Heart sounds: No murmur heard  Pulmonary:      Effort: Pulmonary effort is normal  No respiratory distress  Breath sounds: Decreased air movement present  Decreased breath sounds present  No wheezing, rhonchi or rales  Neurological:      Mental Status: He is alert  Assessment/Plan:         Diagnoses and all orders for this visit:    Cough  -     COVID Only - Office Collect  -     levofloxacin (LEVAQUIN) 500 mg tablet;  Take 1 tablet (500 mg total) by mouth every 24 hours for 7 days  -     XR chest pa & lateral; Future    Chest pain, unspecified type  -     COVID Only - Office Collect  -     XR chest pa & lateral; Future          rto prn                 Brandt Zaidi MD

## 2022-04-22 ENCOUNTER — HOSPITAL ENCOUNTER (OUTPATIENT)
Dept: RADIOLOGY | Facility: HOSPITAL | Age: 52
Discharge: HOME | End: 2022-04-22
Attending: FAMILY MEDICINE
Payer: COMMERCIAL

## 2022-04-22 DIAGNOSIS — R07.9 CHEST PAIN, UNSPECIFIED TYPE: ICD-10-CM

## 2022-04-22 DIAGNOSIS — R05.9 COUGH: ICD-10-CM

## 2022-04-22 PROCEDURE — 71046 X-RAY EXAM CHEST 2 VIEWS: CPT

## 2022-04-23 LAB — SARS-COV-2 RNA RESP QL NAA+PROBE: NEGATIVE

## 2022-05-05 ENCOUNTER — OFFICE VISIT (OUTPATIENT)
Dept: FAMILY MEDICINE CLINIC | Facility: CLINIC | Age: 52
End: 2022-05-05
Payer: COMMERCIAL

## 2022-05-05 VITALS
BODY MASS INDEX: 26.16 KG/M2 | WEIGHT: 157 LBS | RESPIRATION RATE: 14 BRPM | HEIGHT: 65 IN | SYSTOLIC BLOOD PRESSURE: 130 MMHG | DIASTOLIC BLOOD PRESSURE: 70 MMHG | TEMPERATURE: 98.3 F | HEART RATE: 78 BPM

## 2022-05-05 DIAGNOSIS — Z23 IMMUNIZATION DUE: ICD-10-CM

## 2022-05-05 DIAGNOSIS — Z00.00 ANNUAL PHYSICAL EXAM: Primary | ICD-10-CM

## 2022-05-05 PROCEDURE — 3008F BODY MASS INDEX DOCD: CPT | Performed by: FAMILY MEDICINE

## 2022-05-05 PROCEDURE — 90471 IMMUNIZATION ADMIN: CPT | Performed by: FAMILY MEDICINE

## 2022-05-05 PROCEDURE — 3725F SCREEN DEPRESSION PERFORMED: CPT | Performed by: FAMILY MEDICINE

## 2022-05-05 PROCEDURE — 90677 PCV20 VACCINE IM: CPT | Performed by: FAMILY MEDICINE

## 2022-05-05 PROCEDURE — 1036F TOBACCO NON-USER: CPT | Performed by: FAMILY MEDICINE

## 2022-05-05 PROCEDURE — 99396 PREV VISIT EST AGE 40-64: CPT | Performed by: FAMILY MEDICINE

## 2022-05-05 NOTE — PROGRESS NOTES
Chief Complaint   Patient presents with    Physical Exam        Patient ID: Narendra Matson is a 46 y o  male  HPI  Pt is seeing for CPE    The following portions of the patient's history were reviewed and updated as appropriate: allergies, current medications, past family history, past medical history, past social history, past surgical history and problem list     Review of Systems   Constitutional: Negative for fatigue, fever and unexpected weight change  HENT: Negative for congestion, ear discharge, ear pain, hearing loss, rhinorrhea, sinus pressure, sore throat and trouble swallowing  Eyes: Negative  Respiratory: Negative  Cardiovascular: Negative  Gastrointestinal: Negative  Endocrine: Negative  Genitourinary: Negative  Musculoskeletal: Negative  Skin: Negative  Neurological: Negative for dizziness, weakness, light-headedness and numbness  Hematological: Negative  Psychiatric/Behavioral: Negative  Current Outpatient Medications   Medication Sig Dispense Refill    albuterol (PROVENTIL HFA,VENTOLIN HFA) 90 mcg/act inhaler INHALE TWO PUFFS BY MOUTH EVERY 4 HOURS AS NEEDED FOR WHEEZING OR SHORTNESS OF BREATH 18 g 7    aspirin 325 mg tablet Take 1 tablet by mouth daily 100 tablet 0    fluticasone (FLONASE) 50 mcg/act nasal spray 1 spray into each nostril daily 16 g 3    roflumilast (DALIRESP) 500 mcg tablet Take 500 mcg by mouth daily      rosuvastatin (CRESTOR) 20 MG tablet TAKE ONE TABLET BY MOUTH EVERY DAY 90 tablet 1    tiotropium-olodaterol (STIOLTO RESPIMAT) 2 5-2 5 MCG/ACT inhaler Inhale 2 puffs daily       No current facility-administered medications for this visit         Objective:    /70 (BP Location: Left arm, Patient Position: Sitting, Cuff Size: Standard)   Pulse 78   Temp 98 3 °F (36 8 °C) (Temporal)   Resp 14   Ht 5' 4 5" (1 638 m)   Wt 71 2 kg (157 lb)   BMI 26 53 kg/m²        Physical Exam  Constitutional:       General: He is not in acute distress  Appearance: He is well-developed  He is not ill-appearing  HENT:      Head: Normocephalic and atraumatic  Right Ear: Hearing, tympanic membrane, ear canal and external ear normal       Left Ear: Hearing, tympanic membrane, ear canal and external ear normal       Nose: No congestion or rhinorrhea  Eyes:      Extraocular Movements: Extraocular movements intact  Conjunctiva/sclera: Conjunctivae normal    Neck:      Thyroid: No thyroid mass or thyromegaly  Vascular: No JVD  Cardiovascular:      Rate and Rhythm: Normal rate and regular rhythm  Heart sounds: Normal heart sounds  No murmur heard  No gallop  Pulmonary:      Effort: No respiratory distress  Breath sounds: Normal breath sounds  Decreased air movement present  No wheezing, rhonchi or rales  Abdominal:      General: Bowel sounds are normal       Palpations: Abdomen is soft  Tenderness: There is no abdominal tenderness  Musculoskeletal:      Cervical back: Neck supple  Right lower leg: No edema  Left lower leg: No edema  Lymphadenopathy:      Cervical: No cervical adenopathy  Neurological:      General: No focal deficit present  Mental Status: He is alert and oriented to person, place, and time  Cranial Nerves: No cranial nerve deficit  Psychiatric:         Mood and Affect: Mood normal          Behavior: Behavior normal          Thought Content: Thought content normal          Judgment: Judgment normal            Labs in chart were reviewed  Assessment/Plan:         Diagnoses and all orders for this visit:    Annual physical exam  -     Comprehensive metabolic panel; Future  -     Hemoglobin A1C; Future  -     Lipid panel; Future  -     Comprehensive metabolic panel  -     Hemoglobin A1C  -     Lipid panel    Immunization due  -     Pneumococcal Conjugate Vaccine 20-valent (Pcv20)            BMI Counseling: Body mass index is 26 53 kg/m²   Discussed the patient's BMI with him  The BMI is above normal  Exercise recommendations include exercising 3-5 times per week           rto in 1 y         Yuri Anderson MD

## 2022-05-07 LAB
ALBUMIN SERPL-MCNC: 4.6 G/DL (ref 3.8–4.9)
ALBUMIN/GLOB SERPL: 2 {RATIO} (ref 1.2–2.2)
ALP SERPL-CCNC: 59 IU/L (ref 44–121)
ALT SERPL-CCNC: 18 IU/L (ref 0–44)
AST SERPL-CCNC: 22 IU/L (ref 0–40)
BILIRUB SERPL-MCNC: 0.4 MG/DL (ref 0–1.2)
BUN SERPL-MCNC: 18 MG/DL (ref 6–24)
BUN/CREAT SERPL: 14 (ref 9–20)
CALCIUM SERPL-MCNC: 9.9 MG/DL (ref 8.7–10.2)
CHLORIDE SERPL-SCNC: 103 MMOL/L (ref 96–106)
CHOLEST SERPL-MCNC: 150 MG/DL (ref 100–199)
CHOLEST/HDLC SERPL: 3 RATIO (ref 0–5)
CO2 SERPL-SCNC: 24 MMOL/L (ref 20–29)
CREAT SERPL-MCNC: 1.3 MG/DL (ref 0.76–1.27)
EGFR: 66 ML/MIN/1.73
EST. AVERAGE GLUCOSE BLD GHB EST-MCNC: 103 MG/DL
GLOBULIN SER-MCNC: 2.3 G/DL (ref 1.5–4.5)
GLUCOSE SERPL-MCNC: 97 MG/DL (ref 65–99)
HBA1C MFR BLD: 5.2 % (ref 4.8–5.6)
HDLC SERPL-MCNC: 50 MG/DL
LDLC SERPL CALC-MCNC: 87 MG/DL (ref 0–99)
MICRODELETION SYND BLD/T FISH: NORMAL
POTASSIUM SERPL-SCNC: 5.3 MMOL/L (ref 3.5–5.2)
PROT SERPL-MCNC: 6.9 G/DL (ref 6–8.5)
SL AMB VLDL CHOLESTEROL CALC: 13 MG/DL (ref 5–40)
SODIUM SERPL-SCNC: 142 MMOL/L (ref 134–144)
TRIGL SERPL-MCNC: 67 MG/DL (ref 0–149)

## 2022-08-05 ENCOUNTER — OFFICE VISIT (OUTPATIENT)
Dept: FAMILY MEDICINE CLINIC | Facility: CLINIC | Age: 52
End: 2022-08-05
Payer: MEDICARE

## 2022-08-05 VITALS
WEIGHT: 163 LBS | RESPIRATION RATE: 14 BRPM | HEIGHT: 65 IN | TEMPERATURE: 98 F | DIASTOLIC BLOOD PRESSURE: 70 MMHG | SYSTOLIC BLOOD PRESSURE: 122 MMHG | BODY MASS INDEX: 27.16 KG/M2 | HEART RATE: 72 BPM

## 2022-08-05 DIAGNOSIS — J06.9 UPPER RESPIRATORY TRACT INFECTION, UNSPECIFIED TYPE: Primary | ICD-10-CM

## 2022-08-05 DIAGNOSIS — E78.2 MIXED HYPERLIPIDEMIA: ICD-10-CM

## 2022-08-05 DIAGNOSIS — I25.10 CAD, MULTIPLE VESSEL: ICD-10-CM

## 2022-08-05 DIAGNOSIS — J44.9 CHRONIC OBSTRUCTIVE PULMONARY DISEASE, UNSPECIFIED COPD TYPE (HCC): ICD-10-CM

## 2022-08-05 PROCEDURE — 99214 OFFICE O/P EST MOD 30 MIN: CPT | Performed by: STUDENT IN AN ORGANIZED HEALTH CARE EDUCATION/TRAINING PROGRAM

## 2022-08-05 RX ORDER — DOXYCYCLINE HYCLATE 100 MG/1
100 CAPSULE ORAL EVERY 12 HOURS SCHEDULED
Qty: 14 CAPSULE | Refills: 0 | Status: SHIPPED | OUTPATIENT
Start: 2022-08-05 | End: 2022-08-12

## 2022-08-05 NOTE — PROGRESS NOTES
Clinic Visit Note  Sammy Gupta 46 y o  male   MRN: [id number]    Assessment and Plan      Diagnoses and all orders for this visit:    Upper respiratory tract infection, unspecified type  Continue Flonase, OTC cough medication discussed  Patient will benefit from antibiotic therapy given underlying comorbidities  Doxycycline reasonable choice given history of COPD mild exacerbation   -     doxycycline hyclate (VIBRAMYCIN) 100 mg capsule; Take 1 capsule (100 mg total) by mouth every 12 (twelve) hours for 7 days    Chronic obstructive pulmonary disease, unspecified COPD type ([facility] [address] )  Continue albuterol p r n , Flonase, Stiolto, Daliresp, no shortness of breath on exam today    CAD, multiple vessel  S/p CABG, denies chest pain, follow-up cardiology regular scheduled visit    Mixed hyperlipidemia  Continue high-intensity statin therapy    My impressions and treatment recommendations were discussed in detail with the patient who verbalized understanding and had no further questions  Discharge instructions were provided  Subjective     Chief Complaint:  Same-day visit    History of Present Illness:    Patient is a pleasant 75-year-old male coming in as a same-day visit secondary to upper respiratory tract infection, sinusitis symptoms  Patient has positive sore throat, sinus congestion, positive postnasal drip  Denies fever/chills  This has been going on for a few days now  Responded well to antibiotics in the past, underlying history of COPD and CAD s/p CABG  The following portions of the patient's history were reviewed and updated as appropriate: allergies, current medications, past family history, past medical history, past social history, past surgical history and problem list     REVIEW OF SYSTEMS:  A complete 12-point review of systems is negative other than that noted in the HPI  Review of Systems   Constitutional: Negative for chills, fatigue and fever     HENT: Positive for congestion, sinus pressure, sinus pain and sore throat  Respiratory: Negative for cough, shortness of breath and wheezing  Cardiovascular: Negative for chest pain, palpitations and leg swelling  Neurological: Negative for dizziness and headaches  Current Outpatient Medications:     albuterol (PROVENTIL HFA,VENTOLIN HFA) 90 mcg/act inhaler, INHALE TWO PUFFS BY MOUTH EVERY 4 HOURS AS NEEDED FOR WHEEZING OR SHORTNESS OF BREATH, Disp: 18 g, Rfl: 7    aspirin 325 mg tablet, Take 1 tablet by mouth daily, Disp: 100 tablet, Rfl: 0    doxycycline hyclate (VIBRAMYCIN) 100 mg capsule, Take 1 capsule (100 mg total) by mouth every 12 (twelve) hours for 7 days, Disp: 14 capsule, Rfl: 0    fluticasone (FLONASE) 50 mcg/act nasal spray, 1 spray into each nostril daily, Disp: 16 g, Rfl: 3    roflumilast (DALIRESP) 500 mcg tablet, Take 500 mcg by mouth daily, Disp: , Rfl:     rosuvastatin (CRESTOR) 20 MG tablet, TAKE ONE TABLET BY MOUTH EVERY DAY, Disp: 90 tablet, Rfl: 3    tiotropium-olodaterol (STIOLTO RESPIMAT) 2 5-2 5 MCG/ACT inhaler, Inhale 2 puffs daily, Disp: , Rfl:   Past Medical History:   Diagnosis Date    Chronic sinus complaints     last assessed 12/12/17    Diverticulosis     GERD (gastroesophageal reflux disease)     off medicine for past few years    Heart attack [facility])     last assessed 12/12/17    HTN (hypertension)     Hyperlipidemia     Sciatica     Tobacco use     Vasovagal syncope     last assessed 5/1/13    Wears glasses      Past Surgical History:   Procedure Laterality Date    APPENDECTOMY  1974    COLONOSCOPY N/A 8/14/2017    Procedure: COLONOSCOPY;  Surgeon: Nathaniel Levin MD;  Location: [facility] GI LAB;   Service: Gastroenterology    CORONARY ARTERY BYPASS GRAFT N/A 9/25/2017    Procedure: INTRA OP MATTHEW; CORONARY ARTERY BYPASS GRAFT X 2 WITH [address] TO OM1  AND [place] TO LAD; LEFT LEG EVH;  Surgeon: Galileo Diez MD;  Location:  MAIN OR;  Service: Cardiac Surgery    THUMB AMPUTATION Left 1980 Social History     Socioeconomic History    Marital status:      Spouse name: Not on file    Number of children: Not on file    Years of education: Not on file    Highest education level: Not on file   Occupational History    Occupation:      Comment: [employer]   Tobacco Use    Smoking status: Former Smoker     Packs/day: 2 00     Years: 28 00     Pack years: 56 00     Types: Cigarettes     Quit date: 2017     Years since quittin 8    Smokeless tobacco: Never Used    Tobacco comment: denied hx of exposure to second hand smoke; former smoker, smoked 2ppd for 30 yrs   pt quit 3 mos ago as per [employer]   Vaping Use    Vaping Use: Never used   Substance and Sexual Activity    Alcohol use: No     Comment: rarely; denied hx of social drinker as per [employer]    Drug use: No    Sexual activity: Not on file   Other Topics Concern    Not on file   Social History Narrative    Feels safe at home    Pets - cat, dog    Denied hx of travel hx     Social Determinants of Health     Financial Resource Strain: Not on file   Food Insecurity: Not on file   Transportation Needs: Not on file   Physical Activity: Not on file   Stress: Not on file   Social Connections: Not on file   Intimate Partner Violence: Not on file   Housing Stability: Not on file     Family History   Problem Relation Age of Onset    Heart disease Father         CABG    Testicular cancer Father         adenocarcinoma in situ of the testis    Heart defect Father         cardiac disorder    Cancer Father     Diabetes type II Father     Heart disease Maternal Grandfather     Hypertension Mother         benign essential    Diabetes Sister     Arthritis Family     Arthritis Other     Heart defect Other         cardiac disorder    Diabetes Other     Cancer Other      No Known Allergies    Objective     Vitals:    22 0829   BP: 122/70   BP Location: Left arm   Patient Position: Sitting   Cuff Size: Standard   Pulse: 72   Resp: 14   Temp: 98 °F (36 7 °C)   TempSrc: Temporal   Weight: 73 9 kg (163 lb)   Height: 5' 4 5" (1 638 m)       Physical Exam:     GENERAL: NAD, pleasant   HEENT:  NC/AT, PERRL, EOMI, no scleral icterus, boggy turbinates, positive erythema with tonsil swelling pharynx region  CARDIAC:  RRR, +S1/S2, no S3/S4 appreciated, no m/g/r  PULMONARY:  CTA B/L, no wheezing/rales/rhonci, non-labored breathing  ABDOMEN:  Soft, NT/ND, no rebound/guarding/rigidity  Extremities:  No edema, cyanosis, or clubbing  Musculoskeletal:  Full range of motion intact in all extremities   NEUROLOGIC: Grossly intact, no focal deficits  SKIN:  No rashes or erythema noted on exposed skin  Psych: Normal affect, mood stable    ==  PLEASE NOTE:  This encounter was completed utilizing the [employer] Direct Speech Voice Recognition Software  Grammatical errors, random word insertions, pronoun errors and incomplete sentences are occasional consequences of the system due to software limitations, ambient noise and hardware issues  These may be missed by proof reading prior to affixing electronic signature  Any questions or concerns about the content, text or information contained within the body of this dictation should be directly addressed to the physician for clarification  Please do not hesitate to call me directly if you have any any questions or concerns      DO Israel Valdes [address] Internal Medicine   [facility]

## 2022-10-27 ENCOUNTER — HOSPITAL ENCOUNTER (OUTPATIENT)
Dept: PULMONOLOGY | Facility: HOSPITAL | Age: 52
End: 2022-10-27
Payer: MEDICARE

## 2022-10-27 DIAGNOSIS — J33.1 POLYPOID SINUS DEGENERATION: ICD-10-CM

## 2022-10-27 PROCEDURE — 94760 N-INVAS EAR/PLS OXIMETRY 1: CPT

## 2022-10-27 PROCEDURE — 94729 DIFFUSING CAPACITY: CPT

## 2022-10-27 PROCEDURE — 94060 EVALUATION OF WHEEZING: CPT

## 2022-10-27 PROCEDURE — 94726 PLETHYSMOGRAPHY LUNG VOLUMES: CPT

## 2022-10-27 RX ORDER — ALBUTEROL SULFATE 2.5 MG/3ML
2.5 SOLUTION RESPIRATORY (INHALATION) ONCE
Status: COMPLETED | OUTPATIENT
Start: 2022-10-27 | End: 2022-10-27

## 2022-10-27 RX ADMIN — ALBUTEROL SULFATE 2.5 MG: 2.5 SOLUTION RESPIRATORY (INHALATION) at 10:02

## 2022-11-10 ENCOUNTER — CLINICAL SUPPORT (OUTPATIENT)
Dept: FAMILY MEDICINE CLINIC | Facility: CLINIC | Age: 52
End: 2022-11-10

## 2022-11-10 DIAGNOSIS — Z23 NEED FOR INFLUENZA VACCINATION: Primary | ICD-10-CM

## 2023-01-04 NOTE — PROCEDURES
Procedures by Chau Velazquez MD  at 9/25/2017  8:41 AM      Author:  Chau Velazquez MD Service:  Cardiology Author Type:  Physician    Filed:  9/25/2017  8:49 AM Date of Service:  9/25/2017  8:41 AM Status:  Signed    :  Chau Velazquez MD (Physician)        Procedures:       1  CARDIAC CATHETERIZATION [CATH01 (Custom)]                   Cardiac Catheterization Operative Report    April Templeton  [id number]  9/25/2017  Priscilla Boyle MD    Procedure performed:    Right coronary angiography  Left coronary angiography  LV gram   Fluoroscopy    Indication:     Interventionist Cardiologist : Dr Chau Velazquez MD [facility]    Brief history:  Patient is 42-year-old male with a past medical history significant for tobacco abuse, strong family history of coronary artery disease was admitted  for the chest pain on Friday ruled in for non ST elevation MI  Admission EKG shows no significant ST changes a repeat EKG on Saturday was also normal and an EKG done on Sunday shows deep T-wave inversions in precordial leads  In view of that patient  was scheduled for cardiac catheterization  Procedure Details: The risks, benefits, complications, including risk of stroke, heart attack, bleeding and even death but not limited to it discussed with the patient  Other treatment options, alternatives and expected outcomes were discussed with the patient and family  The patient and/or family concurred with the proposed plan, giving informed consent  Patient was brought to the cath lab after IV hydration was begun and oral premedication was given  Patient was further sedated with midazolam and fentanyl  Patient was  prepped and draped in the usual manner  Using the modified Seldinger access technique, a 5 Lebanese sheath was placed in the right radial artery without any complications  Patient was given intra-arterial nitroglycerin and IV verapamil  IV Heparin was  administered   A left heart catheterization was performed  Left and right coronary angiograms were  done  End of the procedure patient was transferred to holding area without any complications  Patient tolerated the procedure well  After the procedure was completed, sedation was stopped and the sheaths and catheters were all removed  Hemostasis was achieved with vasc band as per protocol  Equipment used:     1  JR4 for right coronary angiography  2  JL 3 5 for left coronary angiography  3  LV gram  with JR4    Findings:  1  Dominance: Right dominant coronary system    2  Left main Coronary artery: Left main is a normal-size vessel  It bifurcates into large LAD and a nondominant circumflex system  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 left main which is not flow limiting  We never fully engaged the left main as it was noted on the 1st shot  3  Left anterior descending artery: LAD is a large-size vessel and it has a long smooth 75% to 80% proximal to mid stenosis  Distal LAD has mild luminal arteries  4  Circumflex Coronary artery: Circumflex is a nondominant artery with some mild luminal irregularities causing 25 30% stenosis    5  Right coronary artery: RCA is a medium-sized nondominant artery with mild luminal regularities no focal stenosis seen    6  Left ventriculogram: LV gram done in QUESADA view shows low normal LV systolic function with EF 50-55%  Normal LVEDP  No gradient across aortic valve  Echo shows patient has anterior apical as well as apical septal hypokinesis    Estimated Blood Loss:  Minimal         Complications:  None; patient tolerated the procedure well  Impression: Cardiac catheterization shows 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  We did not engage the left main  There was also 80 percent prox mid LAD stenosis  Recommendation: Continue medical therapy  Continue risk factor modification and since patient had spontaneous dissection plaque noted    It is non flow limiting however it is at  a critical point and patient has significant single-vessel stenosis  Patient will probably best benefit from  bypass surgery  Disposition: Patient transferred to holding area/monitoring in hemodynamically stable condition  Patient to be transferred to [place]  Spoke to CT surgery and Cardiology and patient's family  Condition: Stable      Dr Itz Fang MD [facility] [place]        This note has been constructed using a voice recognition system  Therefore there may be syntax, spelling, and/or grammatical errors  Please call if you have any questions  Lavonne EATON    Sep 25 2017  8:50AM [place] Standard Time Additional Notes (Optional): Recommend wearing a hat or using colorscience or bare minerals powder foundation as a way to protect part-line in the future Detail Level: Simple Hide Additional Notes?: No

## 2023-02-24 ENCOUNTER — HOSPITAL ENCOUNTER (OUTPATIENT)
Dept: RADIOLOGY | Facility: HOSPITAL | Age: 53
Discharge: HOME | End: 2023-02-24

## 2023-02-24 DIAGNOSIS — Z87.891 PERSONAL HISTORY OF NICOTINE DEPENDENCE: ICD-10-CM

## 2023-02-24 DIAGNOSIS — J44.9 CHRONIC OBSTRUCTIVE PULMONARY DISEASE, UNSPECIFIED (HCC): ICD-10-CM

## 2023-02-27 ENCOUNTER — CLINICAL SUPPORT (OUTPATIENT)
Dept: PULMONOLOGY | Facility: CLINIC | Age: 53
End: 2023-02-27

## 2023-02-27 DIAGNOSIS — J44.9 CHRONIC OBSTRUCTIVE PULMONARY DISEASE, UNSPECIFIED COPD TYPE (HCC): ICD-10-CM

## 2023-02-27 DIAGNOSIS — J44.9 CHRONIC OBSTRUCTIVE PULMONARY DISEASE, UNSPECIFIED (HCC): ICD-10-CM

## 2023-02-27 NOTE — PROGRESS NOTES
Pulmonary Rehabilitation Plan of Care   Initial Care Plan      Today's date: 2023   # of Exercise Sessions Completed: initial eval - 1  Patient name: Jigna Sanchez      : 1970  Age: 45 y o  MRN: [id number]  Referring Physician: vEan Cordero MD  Pulmonologist: Evan Cordero MD  Provider: Jacky Gonsalez  Clinician: Nohemi England MS, CEP     Dx:   Encounter Diagnoses   Name Primary? • Chronic obstructive pulmonary disease, unspecified ([facility] )    • Personal history of nicotine dependence      Date of onset: 2023      SUMMARY OF PROGRESS:  Today is Tollie All initial evaluation to begin Pulmonary Rehab for the diagnosis of COPD  Patient does have a PFT on file, revealing an FEV1/FVC ratio of 47% and an FEV1 of 52  This is suggestive of moderate-severe obstruction  Since their diagnosis, the patient has been experiencing increased dyspnea, increased sitting time, increased fatigue and weakness  They report dyspnea and fatigue when completing ADLs   The patient currently does not follow a formal exercise program at home  Pt reports the following physical limitations: patient states the recumbent bike was uncomfortable for him when he completed Cardiac rehabilitation in 2017  Depression screening using the PHQ-9 interprets the patient's score of 4 1-4 = Minimal Depression  Anxiety screening using the SAVANNAH-7 interprets the patients score of 2  0-4  = Not anxious  When addressed, the patient denies having depression/anxiety  Patient reports excellent social/emotional support  The patient is a former smoker (quit 6 years ago)  Patient admits to 100% medication compliance  At rest, the patient rated dyspnea 0/10 with SpO2 94% on room air  They completed an initial 6MWT, walking 1670 ft on room air and will titrate O2 to maintain SpO2 >90%  The patient’s rating of perceived dyspnea during the 6MWT was 6/10 with SpO2 90%  Telemetry revealed NSR with rare PVCs     Resting  /68 with appropriate hemodynamic response to exercise reaching 148/72  Patient will exercise on room air and will titrate O2 to maintain SpO2 >90%  Education on smoking cessation, oxygen use, breathing techniques, pulmonary anatomy, exercise for the pulmonary patient, healthy eating, stress, and relaxation will be provided  Patient goals include: return to walking on treadmill, improve shortness of breath, weight loss, consistent blood pressure, improve RYP score, and improve fatigue and weakness  Wyatt Jasso will attend 24 exercise sessions, 2x/wk for 12-18 weeks beginning   Will progress patient as tolerated over the next 30 days        Medication compliance: Yes   Comments: Pt reports to be compliant with medications  Fall Risk: Low   Comments: Ambulates with a steady gait with no assist device    Smoking: Former user    RPD at Rest: 0/10  RPD with Exercise:  6/10    Assessment of progression of lung disease and functional status:  CAT:   Shortness of breath questionnaire: 51/120      EXERCISE ASSESSMENT and PLAN    Current Exercise Program in Rehab:       Frequency: 3 days/week   Supplement with home exercise 2+ days/wk as tolerated        Minutes: 35-40         METS: 2 5-4 0              SpO2: > 90%              RPD: 0-6                      HR: 90-96   RPE: 4-5         Modalities: Treadmill, Airdyne bike, UBE, Lifecycle and NuStep      Exercise Progression 30 Day Goals :    Frequency: 3 days/week    Supplement with home exercise 2+ days/wk as tolerated       Minutes: 35-45         METS: 2 5-4 0              SpO2: >90%              RPD: 0-4                      HR:    RPE: 4-5        Modalities: Treadmill, Airdyne bike, UBE, Lifecycle, Elliptical and NuStep     Strength trainin-3 days / week  12-15 repetitions  1-2 sets per modality   Will be added following 2-3 weeks of monitored exercise sessions   Modalities: Lateral Raise, Arm Curl, Upright Rows, Front Raises and Shoulder Shrugs    Oxygen Needs: on room air at rest and on room air with exercise  Oxygen Goal: Maintain SpO2>90% during exercise    Home Exercise: none  Education: pursed lipped breathing, home exercise, benefits of exercise for pulmonary disease, RPE scale, RPD scale and O2 saturation monitoring    Goals: reduced score on  USCD Shortness of Breath Questionnaire, reduced dyspnea during exercise (0-3/10), improved exercise tolerance (max METs tolerated in pulmonary rehab), SpO2 >90% during exercise, improved DUKE activity score, reduced score on CAT, attend pulmonary rehab regularly, decrease sitting time at home and start a walking program  Progressing:  Reviewed Pt goals and determined plan of care, Patient will start home exercise program, decrease sitting time in the next 30 days    Plan: Titrate supplemental oxygen as needed to maintain SpO2>90% with exercise and reduce time sitting at home    Readiness to change: Preparation:  (Getting ready to change)       NUTRITION ASSESSMENT AND PLAN    Weight control:    Starting weight: 183   Current weight:       Diabetes: N/A    Goals:Improved Rate Your Plate score  >71, 2 2-4%  wt loss, eliminate processed meats, reduce portion sizes of meat to 3oz or less, increase intake of fish, shellfish, increase intake of meatless meals, reduce cheese intake or use reduced-fat, Eat 4-5 cups of fruits and vegetables daily, choose low sodium processed foods, choose healthy snacks: light popcorn, plain pretzels, Increase intake of nuts and seeds, seldom eat or choose low fat ice-cream, fruit juice bars or frozen yogurt  and eliminate or choose low-fat sweets  Education: heart healthy eating  low sodium diet  wt  loss    Progressing:Reviewed Pt goals and determined plan of care, Patient will decrease sweets consumption in the next 30 days  Plan: Education class: Reading Food Labels, Education Class: Heart Healthy Eating, switch to low fat cheeses, replace unhealthy snacks with fruits & vegs, reduce red meat 1x/wk, eat fewer desserts and sweets, avoid processed foods, will replace sugar sweetened cereals with whole grain or oatmeal and keep added daily sugar <25g/day  Readiness to change: Contemplation:  (Acknowledging that there is a problem but not yet ready or sure of wanting to make a change)      PSYCHOSOCIAL ASSESSMENT AND PLAN    Emotional:  Depression assessment:  PHQ-9 = 4 1-4 = Minimal Depression            Anxiety measure:  SAVANNAH-7 = 2 0-4  = Not anxious  Self-reported stress level: 3/4   Social support: Very Good    Goals:  Physical Fitness in [facility] Score < 3 and increased energy  Education: benefits of a positive support system and stress management techniques    Progressing:Reviewed Pt goals and determined plan of care  Plan: Class: Stress and Your Health, Class: Relaxation, Enjoy a hobby and Keep a positive mindset  Readiness to change: Maintenance: (Maintaining the behavior change)      OTHER CORE COMPONENTS     Tobacco:   Social History     Tobacco Use   Smoking Status Former   • Packs/day: 2 00   • Years: 28 00   • Pack years: 56 00   • Types: Cigarettes   • Quit date: 2017   • Years since quittin 4   Smokeless Tobacco Never   Tobacco Comments    denied hx of exposure to second hand smoke; former smoker, smoked 2ppd for 30 yrs   pt quit 3 mos ago as per [facility]\""       Tobacco Use Intervention: Referral to tobacco expert:   Pt quit 2017   and has abstained    Blood pressure:    Restin/78   Exercise: 148/72    Goals: consistent BP < 130/80, reduced dietary sodium <2300mg, moderate intensity exercise >150 mins/wk and medication compliance  Education:  pathophysiology of pulmonary disease and inspiratory muscle training  Progressing:Reviewed Pt goals and determined plan of care  Plan: Class: Understanding Heart Disease, Class: Common Heart Medications, Avoid Processed foods and engage in regular exercise  Readiness to change: Preparation:  (Getting ready to change)

## 2023-03-01 ENCOUNTER — CLINICAL SUPPORT (OUTPATIENT)
Dept: PULMONOLOGY | Facility: CLINIC | Age: 53
End: 2023-03-01

## 2023-03-01 DIAGNOSIS — J44.9 CHRONIC OBSTRUCTIVE PULMONARY DISEASE, UNSPECIFIED COPD TYPE (HCC): Primary | ICD-10-CM

## 2023-03-03 ENCOUNTER — CLINICAL SUPPORT (OUTPATIENT)
Dept: PULMONOLOGY | Facility: CLINIC | Age: 53
End: 2023-03-03

## 2023-03-03 DIAGNOSIS — J44.9 CHRONIC OBSTRUCTIVE PULMONARY DISEASE, UNSPECIFIED COPD TYPE (HCC): Primary | ICD-10-CM

## 2023-03-06 ENCOUNTER — CLINICAL SUPPORT (OUTPATIENT)
Dept: PULMONOLOGY | Facility: CLINIC | Age: 53
End: 2023-03-06

## 2023-03-06 DIAGNOSIS — J44.9 CHRONIC OBSTRUCTIVE PULMONARY DISEASE, UNSPECIFIED COPD TYPE (HCC): Primary | ICD-10-CM

## 2023-03-08 ENCOUNTER — CLINICAL SUPPORT (OUTPATIENT)
Dept: PULMONOLOGY | Facility: CLINIC | Age: 53
End: 2023-03-08

## 2023-03-08 DIAGNOSIS — J44.9 CHRONIC OBSTRUCTIVE PULMONARY DISEASE, UNSPECIFIED COPD TYPE (HCC): Primary | ICD-10-CM

## 2023-03-10 ENCOUNTER — CLINICAL SUPPORT (OUTPATIENT)
Dept: PULMONOLOGY | Facility: CLINIC | Age: 53
End: 2023-03-10

## 2023-03-10 DIAGNOSIS — J44.9 CHRONIC OBSTRUCTIVE PULMONARY DISEASE, UNSPECIFIED COPD TYPE (HCC): Primary | ICD-10-CM

## 2023-03-13 ENCOUNTER — CLINICAL SUPPORT (OUTPATIENT)
Dept: PULMONOLOGY | Facility: CLINIC | Age: 53
End: 2023-03-13

## 2023-03-13 DIAGNOSIS — J44.9 CHRONIC OBSTRUCTIVE PULMONARY DISEASE, UNSPECIFIED COPD TYPE (HCC): Primary | ICD-10-CM

## 2023-03-15 ENCOUNTER — CLINICAL SUPPORT (OUTPATIENT)
Dept: PULMONOLOGY | Facility: CLINIC | Age: 53
End: 2023-03-15

## 2023-03-15 DIAGNOSIS — J44.9 CHRONIC OBSTRUCTIVE PULMONARY DISEASE, UNSPECIFIED COPD TYPE (HCC): Primary | ICD-10-CM

## 2023-03-17 ENCOUNTER — CLINICAL SUPPORT (OUTPATIENT)
Dept: PULMONOLOGY | Facility: CLINIC | Age: 53
End: 2023-03-17

## 2023-03-17 DIAGNOSIS — J44.9 CHRONIC OBSTRUCTIVE PULMONARY DISEASE, UNSPECIFIED COPD TYPE (HCC): Primary | ICD-10-CM

## 2023-03-20 ENCOUNTER — CLINICAL SUPPORT (OUTPATIENT)
Dept: PULMONOLOGY | Facility: CLINIC | Age: 53
End: 2023-03-20

## 2023-03-20 DIAGNOSIS — J44.9 CHRONIC OBSTRUCTIVE PULMONARY DISEASE, UNSPECIFIED COPD TYPE (HCC): Primary | ICD-10-CM

## 2023-03-22 ENCOUNTER — TELEPHONE (OUTPATIENT)
Dept: CARDIOLOGY CLINIC | Facility: CLINIC | Age: 53
End: 2023-03-22

## 2023-03-22 ENCOUNTER — CLINICAL SUPPORT (OUTPATIENT)
Dept: PULMONOLOGY | Facility: CLINIC | Age: 53
End: 2023-03-22

## 2023-03-22 DIAGNOSIS — J44.9 CHRONIC OBSTRUCTIVE PULMONARY DISEASE, UNSPECIFIED COPD TYPE (HCC): Primary | ICD-10-CM

## 2023-03-22 DIAGNOSIS — I25.10 CAD, MULTIPLE VESSEL: Primary | ICD-10-CM

## 2023-03-22 DIAGNOSIS — E78.2 MIXED HYPERLIPIDEMIA: ICD-10-CM

## 2023-03-22 DIAGNOSIS — Z95.1 S/P CABG X 2: ICD-10-CM

## 2023-03-22 NOTE — TELEPHONE ENCOUNTER
Pt would like to know if you want to order any blood work prior to his appointment in May  Please advise

## 2023-03-24 ENCOUNTER — CLINICAL SUPPORT (OUTPATIENT)
Dept: PULMONOLOGY | Facility: CLINIC | Age: 53
End: 2023-03-24

## 2023-03-24 DIAGNOSIS — J44.9 CHRONIC OBSTRUCTIVE PULMONARY DISEASE, UNSPECIFIED COPD TYPE (HCC): Primary | ICD-10-CM

## 2023-03-24 NOTE — PROGRESS NOTES
Pulmonary Rehabilitation Plan of Care   30 Day Reassessment      Today's date: 3/24/2023   # of Exercise Sessions Completed: 12  Patient name: Kylee Argueta      : 1970  Age: 45 y o  MRN: [id number]  Referring Physician: Sabiha Martin MD  Pulmonologist: Sabiha Martin MD  Provider: Katlyn Watson  Clinician: Itz Ruiz MS, CEP     Dx:   Encounter Diagnosis   Name Primary? • Chronic obstructive pulmonary disease, unspecified COPD type ([facility] ) Yes     Date of onset: 2023      SUMMARY OF PROGRESS:  3/24/2023 30 day ITP for Praveen Harris in Pulmonary rehabilitation for the diagnosis of COPD  He has completed 12 exercise sessions on RA, where he exercises for 35-40 minutes and averages 3 4-4 0 METs  Resting SpO2 97-94% with maintained O2 saturation during exercise 100-94%  Resting BP  114-148/64-78 with appropriate response to exercise reaching 148/72  RHR 73-92,  ExHR 112-127  Patients states an improvement in energy, dyspnea, lower body strength, and fatigue  Rating of perceived dyspnea with exercise 0-6/10 at max METs  He has begun a regular exercise program at home, including walking on his home treadmill 4-5 days/week  When addressed, the patient denies feelings of depression/anxiety/stress  Patient reports excellent social/emotional support  Patient attends group educational classes on pulmonary disease  Pursed lipped breathing continues to be demonstrated, practiced, and reinforced with the patient  Patient has lost 9 lbs since starting rehab  He has made dietary changes which include less salt and sugar, increased lean protein, portion control, and eating sweets less frequently  His exercise program will be progressed as tolerated  The patient has the following personal goals he hopes to achieve by discharge: return to walking on treadmill at home, improve SOB and fatigue, increase strength, weight loss, consistent BP, and decrease sweets consumption   Tolerating exercise well and will continue to monitor  Today is Venecia Black initial evaluation to begin Pulmonary Rehab for the diagnosis of COPD  Patient does have a PFT on file, revealing an FEV1/FVC ratio of 47% and an FEV1 of 52  This is suggestive of moderate-severe obstruction  Since their diagnosis, the patient has been experiencing increased dyspnea, increased sitting time, increased fatigue and weakness  They report dyspnea and fatigue when completing ADLs   The patient currently does not follow a formal exercise program at home  Pt reports the following physical limitations: patient states the recumbent bike was uncomfortable for him when he completed Cardiac rehabilitation in 2017  Depression screening using the PHQ-9 interprets the patient's score of 4 1-4 = Minimal Depression  Anxiety screening using the SAVANNAH-7 interprets the patients score of 2  0-4  = Not anxious  When addressed, the patient denies having depression/anxiety  Patient reports excellent social/emotional support  The patient is a former smoker (quit 6 years ago)  Patient admits to 100% medication compliance  At rest, the patient rated dyspnea 0/10 with SpO2 94% on room air  They completed an initial 6MWT, walking 1670 ft on room air and will titrate O2 to maintain SpO2 >90%  The patient’s rating of perceived dyspnea during the 6MWT was 6/10 with SpO2 90%  Telemetry revealed NSR with rare PVCs  Resting  /68 with appropriate hemodynamic response to exercise reaching 148/72  Patient will exercise on room air and will titrate O2 to maintain SpO2 >90%  Education on smoking cessation, oxygen use, breathing techniques, pulmonary anatomy, exercise for the pulmonary patient, healthy eating, stress, and relaxation will be provided  Patient goals include: return to walking on treadmill, improve shortness of breath, weight loss, consistent blood pressure, improve RYP score, and improve fatigue and weakness    Jailenemaira Tere will attend 24 exercise sessions, 2x/wk for 12-18 weeks beginning   Will progress patient as tolerated over the next 30 days        Medication compliance: Yes   Comments: Pt reports to be compliant with medications  Fall Risk: Low   Comments: Ambulates with a steady gait with no assist device    Smoking: Former user    RPD at Rest: 0/10  RPD with Exercise:  0-6/10    Assessment of progression of lung disease and functional status:  CAT:   Shortness of breath questionnaire: 51/120      EXERCISE ASSESSMENT and PLAN    Current Exercise Program in Rehab:       Frequency: 3 days/week   Supplement with home exercise 2+ days/wk as tolerated        Minutes: 35-40         METS: 3 4-4 0              SpO2: > 90%              RPD: 0-6                      HR: 112-127   RPE: 3-4         Modalities: Treadmill, UBE, Lifecycle and NuStep      Exercise Progression 30 Day Goals :    Frequency: 3 days/week    Supplement with home exercise 2+ days/wk as tolerated       Minutes: 40+        METS: 4 0-5 0              SpO2: >90%              RPD: 0-4                      HR: 108-130   RPE: 4-5        Modalities: Treadmill, UBE, Lifecycle, Elliptical and NuStep     Strength trainin-3 days / week  12-15 repetitions  1-2 sets per modality   Will be added following 2-3 weeks of monitored exercise sessions   Modalities: Lateral Raise, Arm Curl, Upright Rows, Front Raises and Shoulder Shrugs    Oxygen Needs: on room air at rest and on room air with exercise  Oxygen Goal: Maintain SpO2>90% during exercise    Home Exercise: Type: walking on treadmill at home, Frequency: 4-5 days/week  Education: pursed lipped breathing, home exercise, benefits of exercise for pulmonary disease, RPE scale, RPD scale and O2 saturation monitoring    Goals: reduced score on  USCD Shortness of Breath Questionnaire, reduced dyspnea during exercise (0-3/10), improved exercise tolerance (max METs tolerated in pulmonary rehab), SpO2 >90% during exercise, improved DUKE activity score, reduced score on CAT, attend pulmonary rehab regularly, decrease sitting time at home and start a walking program  Progressing:  Reviewed Pt goals and determined plan of care, Pt is progressing and showing improvement  toward the following goals:  reduced dyspnea during exercise, improved exercise tolerance   , Will continue to educate and progress as tolerated , Goals met: SpO2 >90%, decrease sitting time, start home exercise program     Plan: Titrate supplemental oxygen as needed to maintain SpO2>90% with exercise and reduce time sitting at home    Readiness to change: Preparation:  (Getting ready to change)       NUTRITION ASSESSMENT AND PLAN    Weight control:    Starting weight: 183   Current weight:   174    Diabetes: N/A    Goals:Improved Rate Your Plate score  >88, 7 5-6%  wt loss, eliminate processed meats, reduce portion sizes of meat to 3oz or less, increase intake of fish, shellfish, increase intake of meatless meals, reduce cheese intake or use reduced-fat, Eat 4-5 cups of fruits and vegetables daily, choose low sodium processed foods, choose healthy snacks: light popcorn, plain pretzels, Increase intake of nuts and seeds, seldom eat or choose low fat ice-cream, fruit juice bars or frozen yogurt  and eliminate or choose low-fat sweets  Education: heart healthy eating  low sodium diet  wt  loss    Progressing:Reviewed Pt goals and determined plan of care, Pt is progressing and showing improvement  toward the following goals:  decreasing salt and sugar, increasing lean proteins, portion control, decrease sweets consumption    , Will continue to educate and progress as tolerated , Pt has lost 9 lbs since starting rehab  Plan: Education class: Reading Food Labels, Education Class: Heart Healthy Eating, switch to low fat cheeses, replace unhealthy snacks with fruits & vegs, reduce red meat 1x/wk, eat fewer desserts and sweets, avoid processed foods, will replace sugar sweetened cereals with whole grain or oatmeal and keep added daily sugar <25g/day  Readiness to change: Preparation:  (Getting ready to change)       PSYCHOSOCIAL ASSESSMENT AND PLAN    Emotional:  Depression assessment:  PHQ-9 = 4 1-4 = Minimal Depression            Anxiety measure:  SAVANNAH-7 = 2 0-4  = Not anxious  Self-reported stress level: 3/   Social support: Very Good    Goals:  Physical Fitness in [facility] Score < 3 and increased energy  Education: benefits of a positive support system and stress management techniques    Progressing:Reviewed Pt goals and determined plan of care, Pt is progressing and showing improvement  toward the following goals:  increased energy, sleeping better  Plan: Class: Stress and Your Health, Class: Relaxation, Enjoy a hobby and Keep a positive mindset  Readiness to change: Maintenance: (Maintaining the behavior change)      OTHER CORE COMPONENTS     Tobacco:   Social History     Tobacco Use   Smoking Status Former   • Packs/day: 2 00   • Years: 28    • Pack years: 56 00   • Types: Cigarettes   • Quit date: 2017   • Years since quittin 5   Smokeless Tobacco Never   Tobacco Comments    denied hx of exposure to second hand smoke; former smoker, smoked 2ppd for 30 yrs  pt quit 3 mos ago as per [facility]       Tobacco Use Intervention: Referral to tobacco expert:   Pt quit    and has abstained    Blood pressure:    Restin-148/64-78   Exercise: 148/72    Goals: consistent BP < 130/80, reduced dietary sodium <2300mg, moderate intensity exercise >150 mins/wk and medication compliance  Education:  pathophysiology of pulmonary disease and inspiratory muscle training  Progressing:Reviewed Pt goals and determined plan of care, Pt is progressing and showing improvement  toward the following goals:  slightly elevated BP, reducing dietary sodium  , Will continue to educate and progress as tolerated , Goals met: > 150 mins/wk of moderate intensity exercise and medication compliance    Plan: Class: Understanding Heart Disease, Class: Common Heart Medications, Avoid Processed foods and engage in regular exercise  Readiness to change: Preparation:  (Getting ready to change)

## 2023-03-27 ENCOUNTER — CLINICAL SUPPORT (OUTPATIENT)
Dept: PULMONOLOGY | Facility: CLINIC | Age: 53
End: 2023-03-27

## 2023-03-27 DIAGNOSIS — J44.9 CHRONIC OBSTRUCTIVE PULMONARY DISEASE, UNSPECIFIED COPD TYPE (HCC): Primary | ICD-10-CM

## 2023-03-29 ENCOUNTER — CLINICAL SUPPORT (OUTPATIENT)
Dept: PULMONOLOGY | Facility: CLINIC | Age: 53
End: 2023-03-29

## 2023-03-29 DIAGNOSIS — J44.9 CHRONIC OBSTRUCTIVE PULMONARY DISEASE, UNSPECIFIED COPD TYPE (HCC): Primary | ICD-10-CM

## 2023-03-31 ENCOUNTER — CLINICAL SUPPORT (OUTPATIENT)
Dept: PULMONOLOGY | Facility: CLINIC | Age: 53
End: 2023-03-31

## 2023-03-31 DIAGNOSIS — J44.9 CHRONIC OBSTRUCTIVE PULMONARY DISEASE, UNSPECIFIED COPD TYPE (HCC): Primary | ICD-10-CM

## 2023-04-03 ENCOUNTER — TELEPHONE (OUTPATIENT)
Dept: GASTROENTEROLOGY | Facility: CLINIC | Age: 53
End: 2023-04-03

## 2023-04-03 ENCOUNTER — APPOINTMENT (OUTPATIENT)
Dept: PULMONOLOGY | Facility: CLINIC | Age: 53
End: 2023-04-03

## 2023-04-03 ENCOUNTER — OFFICE VISIT (OUTPATIENT)
Dept: FAMILY MEDICINE CLINIC | Facility: CLINIC | Age: 53
End: 2023-04-03

## 2023-04-03 ENCOUNTER — PREP FOR PROCEDURE (OUTPATIENT)
Dept: GASTROENTEROLOGY | Facility: CLINIC | Age: 53
End: 2023-04-03

## 2023-04-03 VITALS
BODY MASS INDEX: 29.79 KG/M2 | RESPIRATION RATE: 16 BRPM | HEART RATE: 86 BPM | TEMPERATURE: 98 F | SYSTOLIC BLOOD PRESSURE: 132 MMHG | WEIGHT: 178.8 LBS | HEIGHT: 65 IN | DIASTOLIC BLOOD PRESSURE: 82 MMHG | OXYGEN SATURATION: 98 %

## 2023-04-03 DIAGNOSIS — U07.1 COVID-19: Primary | ICD-10-CM

## 2023-04-03 DIAGNOSIS — L98.9 SKIN LESION: Primary | ICD-10-CM

## 2023-04-03 DIAGNOSIS — Z86.010 HISTORY OF COLON POLYPS: Primary | ICD-10-CM

## 2023-04-03 DIAGNOSIS — Z12.11 SCREENING FOR COLON CANCER: ICD-10-CM

## 2023-04-03 LAB
SARS-COV-2 AG UPPER RESP QL IA: POSITIVE
VALID CONTROL: ABNORMAL

## 2023-04-03 RX ORDER — FLUTICASONE FUROATE, UMECLIDINIUM BROMIDE AND VILANTEROL TRIFENATATE 100; 62.5; 25 UG/1; UG/1; UG/1
POWDER RESPIRATORY (INHALATION)
COMMUNITY
Start: 2023-03-31

## 2023-04-03 RX ORDER — NIRMATRELVIR AND RITONAVIR 300-100 MG
3 KIT ORAL 2 TIMES DAILY
Qty: 30 TABLET | Refills: 0 | Status: SHIPPED | OUTPATIENT
Start: 2023-04-03 | End: 2023-04-08

## 2023-04-03 NOTE — PROGRESS NOTES
"Chief Complaint   Patient presents with   • Cough   • Headache   • Sinus Problem        Patient ID: Arley Saint is a 45 y o  male  HPI      The following portions of the patient's history were reviewed and updated as appropriate: allergies, current medications, past family history, past medical history, past social history, past surgical history and problem list     Review of Systems    Current Outpatient Medications   Medication Sig Dispense Refill   • albuterol (PROVENTIL HFA,VENTOLIN HFA) 90 mcg/act inhaler INHALE TWO PUFFS BY MOUTH EVERY 4 HOURS AS NEEDED FOR WHEEZING OR SHORTNESS OF BREATH 18 g 7   • aspirin 325 mg tablet Take 1 tablet by mouth daily 100 tablet 0   • Multiple Vitamins-Minerals (CENTRUM SILVER PO) Take by mouth     • roflumilast (DALIRESP) 500 mcg tablet Take 500 mcg by mouth daily     • rosuvastatin (CRESTOR) 20 MG tablet TAKE ONE TABLET BY MOUTH EVERY DAY 90 tablet 3   • Trelegy Ellipta 100-62 5-25 MCG/ACT inhaler      • fluticasone (FLONASE) 50 mcg/act nasal spray 1 spray into each nostril daily (Patient not taking: Reported on 4/3/2023) 16 g 3     No current facility-administered medications for this visit  Objective:    /82 (BP Location: Left arm, Patient Position: Sitting, Cuff Size: Standard)   Pulse 86   Temp 98 °F (36 7 °C)   Resp 16   Ht 5' 4 5\" (1 638 m)   Wt 81 1 kg (178 lb 12 8 oz)   SpO2 98%   BMI 30 22 kg/m²        Physical Exam            Assessment/Plan:         Diagnoses and all orders for this visit:    Upper respiratory tract infection, unspecified type    Screening for colon cancer  -     Ambulatory referral for colonoscopy; Future    Other orders  -     Trelegy Ellipta 100-62 5-25 MCG/ACT inhaler  -     Multiple Vitamins-Minerals (CENTRUM SILVER PO);  Take by mouth                          Gabrielle Aldridge MD        "

## 2023-04-03 NOTE — PROGRESS NOTES
COVID-19 Outpatient Progress Note    Assessment/Plan:    Problem List Items Addressed This Visit    None  Visit Diagnoses     COVID-19    -  Primary    Relevant Medications    nirmatrelvir & ritonavir (Paxlovid, 300/100,) tablet therapy pack    nirmatrelvir & ritonavir (Paxlovid, 300/100,) tablet therapy pack    Other Relevant Orders    POCT Rapid Covid Ag (Completed)    Screening for colon cancer        Relevant Orders    Ambulatory referral for colonoscopy         Disposition:     Patient with moderate or severe illness or has a weakened immune system  They should isolate from others through at least day 10  Isolation can be ended if symptoms are improving and they are fever free for the past 24 hours  If they still have fever or other symptoms have not improved, continue to isolate until they improve  Regardless of when you isolation is ended, avoid being around people who are more likely to get very sick from COVID-19 until at least day 11  Discussed risks, benefits, and side effects of Remdesivir with patient and they agree to treatment  Clinical trials have shown a significant reduction in hospitalization when Remdesivir was given for 3 days in mild to moderate COVID-19 patients within seven days of symptom onset  Recommended dosing is 200 mg IV once on day 1, followed by 100mg IV on days 2-3  Most common adverse reactions can include: nausea, vomiting, diarrhea, headaches, rash, infusion reactions  Other less common reactions can include: bradycardia, seizure, hypersensitivity reactions, anaphylaxis/angioedema, elevated LFTs  I have spent a total time of 10 minutes on the day of the encounter for this patient including discussing prognosis and risks and benefits of treatment options         Encounter provider: Geraldine Spencer MD     Provider located at: [address] [place] [id number]     Recent Visits  No visits were found meeting these conditions  Showing recent visits within past 7 days and meeting all other requirements  Today's Visits  Date Type Provider Dept   04/03/23 Office Visit Janice Seals  [facility] today's visits and meeting all other requirements  Future Appointments  No visits were found meeting these conditions  Showing future appointments within next 150 days and meeting all other requirements     Subjective:   Praveen Coronado is a 45 y o  male who is concerned about COVID-19  Patient's symptoms include fatigue, malaise, nasal congestion and cough  Patient denies fever, chills, rhinorrhea, sore throat, anosmia, loss of taste, shortness of breath, chest tightness, abdominal pain, nausea, vomiting, diarrhea, myalgias and headaches  - Date of symptom onset: 4/1/2023  - Date of exposure: 3/30/2023      COVID-19 vaccination status: Fully vaccinated with booster    Exposure:   Contact with a person who is under investigation (PUI) for or who is positive for COVID-19 within the last 14 days?: No    Hospitalized recently for fever and/or lower respiratory symptoms?: No      Currently a healthcare worker that is involved in direct patient care?: No      Works in a special setting where the risk of COVID-19 transmission may be high? (this may include long-term care, correctional and halfway facilities; homeless shelters; assisted-living facilities and group homes ): No      Resident in a special setting where the risk of COVID-19 transmission may be high? (this may include long-term care, correctional and halfway facilities; homeless shelters; assisted-living facilities and group homes ): No      Lab Results   Component Value Date    SARSCOV2 Negative 04/21/2022    Gypsy Golds Not Detected 07/10/2020    [address] [place] Positive (A) 04/03/2023       Review of Systems   Constitutional: Positive for fatigue  Negative for chills and fever     HENT: Positive for congestion, postnasal drip, sinus pressure and "sinus pain  Negative for rhinorrhea, sore throat and tinnitus  Respiratory: Positive for cough  Negative for apnea, chest tightness, shortness of breath and wheezing  Cardiovascular: Negative  Gastrointestinal: Negative  Negative for abdominal pain, diarrhea, nausea and vomiting  Musculoskeletal: Negative for myalgias  Neurological: Negative for headaches  Current Outpatient Medications on File Prior to Visit   Medication Sig   • albuterol (PROVENTIL HFA,VENTOLIN HFA) 90 mcg/act inhaler INHALE TWO PUFFS BY MOUTH EVERY 4 HOURS AS NEEDED FOR WHEEZING OR SHORTNESS OF BREATH   • aspirin 325 mg tablet Take 1 tablet by mouth daily   • Multiple Vitamins-Minerals (CENTRUM SILVER PO) Take by mouth   • roflumilast (DALIRESP) 500 mcg tablet Take 500 mcg by mouth daily   • rosuvastatin (CRESTOR) 20 MG tablet TAKE ONE TABLET BY MOUTH EVERY DAY   • Trelegy Ellipta 100-62 5-25 MCG/ACT inhaler    • fluticasone (FLONASE) 50 mcg/act nasal spray 1 spray into each nostril daily (Patient not taking: Reported on 4/3/2023)   • [DISCONTINUED] tiotropium-olodaterol (STIOLTO RESPIMAT) 2 5-2 5 MCG/ACT inhaler Inhale 2 puffs daily       Objective:    /82 (BP Location: Left arm, Patient Position: Sitting, Cuff Size: Standard)   Pulse 86   Temp 98 °F (36 7 °C)   Resp 16   Ht 5' 4 5\" (1 638 m)   Wt 81 1 kg (178 lb 12 8 oz)   SpO2 98%   BMI 30 22 kg/m²      Physical Exam  Constitutional:       Appearance: He is not ill-appearing  HENT:      Nose: No congestion or rhinorrhea  Mouth/Throat:      Pharynx: No oropharyngeal exudate or posterior oropharyngeal erythema  Cardiovascular:      Rate and Rhythm: Normal rate  Pulmonary:      Effort: Pulmonary effort is normal  No respiratory distress  Breath sounds: Decreased air movement present  No wheezing, rhonchi or rales  Neurological:      Mental Status: He is alert         Jeffrey Rebolledo MD  "

## 2023-04-03 NOTE — TELEPHONE ENCOUNTER
Scheduled date of colonoscopy (as of today):  05/15/2023  Physician performing colonoscopy:  Dr Stuart  Location of colonoscopy:  [facility]  Bowel prep reviewed with patient:  Instructions reviewed with patient by:  Clearances:

## 2023-04-04 ENCOUNTER — TELEPHONE (OUTPATIENT)
Dept: FAMILY MEDICINE CLINIC | Facility: CLINIC | Age: 53
End: 2023-04-04

## 2023-04-04 NOTE — TELEPHONE ENCOUNTER
Pt stated the pharmacy silverio him  the paxlovid that was ordered for the pt covid will interact with pts other medications so the pt didn't pick it up

## 2023-04-04 NOTE — TELEPHONE ENCOUNTER
Pl, call the [address] to dispense -  pl, call pt to make sure he will be holding Crestor for 5 days

## 2023-04-05 ENCOUNTER — APPOINTMENT (OUTPATIENT)
Dept: PULMONOLOGY | Facility: CLINIC | Age: 53
End: 2023-04-05

## 2023-04-07 ENCOUNTER — APPOINTMENT (OUTPATIENT)
Dept: PULMONOLOGY | Facility: CLINIC | Age: 53
End: 2023-04-07

## 2023-04-10 ENCOUNTER — APPOINTMENT (OUTPATIENT)
Dept: PULMONOLOGY | Facility: CLINIC | Age: 53
End: 2023-04-10

## 2023-04-24 ENCOUNTER — CLINICAL SUPPORT (OUTPATIENT)
Dept: PULMONOLOGY | Facility: CLINIC | Age: 53
End: 2023-04-24

## 2023-04-24 ENCOUNTER — OFFICE VISIT (OUTPATIENT)
Dept: FAMILY MEDICINE CLINIC | Facility: CLINIC | Age: 53
End: 2023-04-24

## 2023-04-24 ENCOUNTER — TELEPHONE (OUTPATIENT)
Dept: CARDIOLOGY CLINIC | Facility: CLINIC | Age: 53
End: 2023-04-24

## 2023-04-24 VITALS
WEIGHT: 178 LBS | RESPIRATION RATE: 14 BRPM | HEART RATE: 90 BPM | SYSTOLIC BLOOD PRESSURE: 116 MMHG | TEMPERATURE: 98.3 F | DIASTOLIC BLOOD PRESSURE: 80 MMHG | BODY MASS INDEX: 29.66 KG/M2 | HEIGHT: 65 IN

## 2023-04-24 DIAGNOSIS — Z00.00 MEDICARE ANNUAL WELLNESS VISIT, SUBSEQUENT: Primary | ICD-10-CM

## 2023-04-24 DIAGNOSIS — J44.9 CHRONIC OBSTRUCTIVE PULMONARY DISEASE, UNSPECIFIED COPD TYPE (HCC): Primary | ICD-10-CM

## 2023-04-24 NOTE — TELEPHONE ENCOUNTER
----- Message from Karla Madsen MD sent at 4/24/2023  8:21 AM EDT -----  Patient blood test reviewed  They are acceptable  Will continue same medication  Patient should keep his appointment for follow-up

## 2023-04-24 NOTE — PATIENT INSTRUCTIONS
Medicare Preventive Visit Patient Instructions  Thank you for completing your Welcome to Medicare Visit or Medicare Annual Wellness Visit today  Your next wellness visit will be due in one year (4/24/2024)  The screening/preventive services that you may require over the next 5-10 years are detailed below  Some tests may not apply to you based off risk factors and/or age  Screening tests ordered at today's visit but not completed yet may show as past due  Also, please note that scanned in results may not display below  Preventive Screenings:  Service Recommendations Previous Testing/Comments   Colorectal Cancer Screening  · Colonoscopy    · Fecal Occult Blood Test (FOBT)/Fecal Immunochemical Test (FIT)  · Fecal DNA/Cologuard Test  · Flexible Sigmoidoscopy Age: 39-70 years old   Colonoscopy: every 10 years (May be performed more frequently if at higher risk)  OR  FOBT/FIT: every 1 year  OR  Cologuard: every 3 years  OR  Sigmoidoscopy: every 5 years  Screening may be recommended earlier than age 39 if at higher risk for colorectal cancer  Also, an individualized decision between you and your healthcare provider will decide whether screening between the ages of 74-80 would be appropriate   Colonoscopy: 08/14/2017  FOBT/FIT: Not on file  Cologuard: Not on file  Sigmoidoscopy: Not on file          Prostate Cancer Screening Individualized decision between patient and health care provider in men between ages of 53-78   Medicare will cover every 12 months beginning on the day after your 50th birthday PSA: No results in last 5 years           Hepatitis C Screening Once for adults born between [birth year] and 1965  More frequently in patients at high risk for Hepatitis C Hep C Antibody: Not on file        Diabetes Screening 1-2 times per year if you're at risk for diabetes or have pre-diabetes Fasting glucose: No results in last 5 years (No results in last 5 years)  A1C: 5 2 % (5/6/2022)      Cholesterol Screening Once every 5 years if you don't have a lipid disorder  May order more often based on risk factors  Lipid panel: 04/21/2023         Other Preventive Screenings Covered by Medicare:  1  Abdominal Aortic Aneurysm (AAA) Screening: covered once if your at risk  You're considered to be at risk if you have a family history of AAA or a male between the age of 73-68 who smoking at least 100 cigarettes in your lifetime  2  Lung Cancer Screening: covers low dose CT scan once per year if you meet all of the following conditions: (1) Age 50-69; (2) No signs or symptoms of lung cancer; (3) Current smoker or have quit smoking within the last 15 years; (4) You have a tobacco smoking history of at least 20 pack years (packs per day x number of years you smoked); (5) You get a written order from a healthcare provider  3  Glaucoma Screening: covered annually if you're considered high risk: (1) You have diabetes OR (2) Family history of glaucoma OR (3)  aged 48 and older OR (3)  American aged 72 and older  3  Osteoporosis Screening: covered every 2 years if you meet one of the following conditions: (1) Have a vertebral abnormality; (2) On glucocorticoid therapy for more than 3 months; (3) Have primary hyperparathyroidism; (4) On osteoporosis medications and need to assess response to drug therapy  5  HIV Screening: covered annually if you're between the age of 12-76  Also covered annually if you are younger than 13 and older than 72 with risk factors for HIV infection  For pregnant patients, it is covered up to 3 times per pregnancy      Immunizations:  Immunization Recommendations   Influenza Vaccine Annual influenza vaccination during flu season is recommended for all persons aged >= 6 months who do not have contraindications   Pneumococcal Vaccine   * Pneumococcal conjugate vaccine = PCV13 (Prevnar 13), PCV15 (Vaxneuvance), PCV20 (Prevnar 20)  * Pneumococcal polysaccharide vaccine = PPSV23 (Pneumovax) Adults 19-64 years old: 1-3 doses may be recommended based on certain risk factors  Adults 72 years old: 1-2 doses may be recommended based off what pneumonia vaccine you previously received   Hepatitis B Vaccine 3 dose series if at intermediate or high risk (ex: diabetes, end stage renal disease, liver disease)   Tetanus (Td) Vaccine - COST NOT COVERED BY MEDICARE PART B Following completion of primary series, a booster dose should be given every 10 years to maintain immunity against tetanus  Td may also be given as tetanus wound prophylaxis  Tdap Vaccine - COST NOT COVERED BY MEDICARE PART B Recommended at least once for all adults  For pregnant patients, recommended with each pregnancy  Shingles Vaccine (Shingrix) - COST NOT COVERED BY MEDICARE PART B  2 shot series recommended in those aged 48 and above     Health Maintenance Due:      Topic Date Due   • Hepatitis C Screening  Never done   • HIV Screening  Never done   • Colorectal Cancer Screening  08/14/2022   • Lung Cancer Screening  02/24/2024     Immunizations Due:      Topic Date Due   • COVID-19 Vaccine (4 - Booster for [place] series) 03/06/2022     Advance Directives   What are advance directives? Advance directives are legal documents that state your wishes and plans for medical care  These plans are made ahead of time in case you lose your ability to make decisions for yourself  Advance directives can apply to any medical decision, such as the treatments you want, and if you want to donate organs  What are the types of advance directives? There are many types of advance directives, and each state has rules about how to use them  You may choose a combination of any of the following:  · Living will: This is a written record of the treatment you want  You can also choose which treatments you do not want, which to limit, and which to stop at a certain time  This includes surgery, medicine, IV fluid, and tube feedings     · Durable power of  for healthcare [facility]): This is a written record that states who you want to make healthcare choices for you when you are unable to make them for yourself  This person, called a proxy, is usually a family member or a friend  You may choose more than 1 proxy  · Do not resuscitate (DNR) order:  A DNR order is used in case your heart stops beating or you stop breathing  It is a request not to have certain forms of treatment, such as CPR  A DNR order may be included in other types of advance directives  · Medical directive: This covers the care that you want if you are in a coma, near death, or unable to make decisions for yourself  You can list the treatments you want for each condition  Treatment may include pain medicine, surgery, blood transfusions, dialysis, IV or tube feedings, and a ventilator (breathing machine)  · Values history: This document has questions about your views, beliefs, and how you feel and think about life  This information can help others choose the care that you would choose  Why are advance directives important? An advance directive helps you control your care  Although spoken wishes may be used, it is better to have your wishes written down  Spoken wishes can be misunderstood, or not followed  Treatments may be given even if you do not want them  An advance directive may make it easier for your family to make difficult choices about your care  Weight Management   Why it is important to manage your weight:  Being overweight increases your risk of health conditions such as heart disease, high blood pressure, type 2 diabetes, and certain types of cancer  It can also increase your risk for osteoarthritis, sleep apnea, and other respiratory problems  Aim for a slow, steady weight loss  Even a small amount of weight loss can lower your risk of health problems  How to lose weight safely:  A safe and healthy way to lose weight is to eat fewer calories and get regular exercise   You can lose up about 1 pound a week by decreasing the number of calories you eat by 500 calories each day  Healthy meal plan for weight management:  A healthy meal plan includes a variety of foods, contains fewer calories, and helps you stay healthy  A healthy meal plan includes the following:  · Eat whole-grain foods more often  A healthy meal plan should contain fiber  Fiber is the part of grains, fruits, and vegetables that is not broken down by your body  Whole-grain foods are healthy and provide extra fiber in your diet  Some examples of whole-grain foods are whole-wheat breads and pastas, oatmeal, brown rice, and bulgur  · Eat a variety of vegetables every day  Include dark, leafy greens such as spinach, kale, lisandra greens, and mustard greens  Eat yellow and orange vegetables such as carrots, sweet potatoes, and winter squash  · Eat a variety of fruits every day  Choose fresh or canned fruit (canned in its own juice or light syrup) instead of juice  Fruit juice has very little or no fiber  · Eat low-fat dairy foods  Drink fat-free (skim) milk or 1% milk  Eat fat-free yogurt and low-fat cottage cheese  Try low-fat cheeses such as mozzarella and other reduced-fat cheeses  · Choose meat and other protein foods that are low in fat  Choose beans or other legumes such as split peas or lentils  Choose fish, skinless poultry (chicken or turkey), or lean cuts of red meat (beef or pork)  Before you cook meat or poultry, cut off any visible fat  · Use less fat and oil  Try baking foods instead of frying them  Add less fat, such as margarine, sour cream, regular salad dressing and mayonnaise to foods  Eat fewer high-fat foods  Some examples of high-fat foods include french fries, doughnuts, ice cream, and cakes  · Eat fewer sweets  Limit foods and drinks that are high in sugar  This includes candy, cookies, regular soda, and sweetened drinks  Exercise:  Exercise at least 30 minutes per day on most days of the week   Some examples of exercise include walking, biking, dancing, and swimming  You can also fit in more physical activity by taking the stairs instead of the elevator or parking farther away from stores  Ask your healthcare provider about the best exercise plan for you  © Copyright [employer] 2018 Information is for End User's use only and may not be sold, redistributed or otherwise used for commercial purposes   All illustrations and images included in CareNotes® are the copyrighted property of A D A M , Inc  or [address] [place] [address]

## 2023-04-24 NOTE — PROGRESS NOTES
Assessment and Plan:     Problem List Items Addressed This Visit    None  Visit Diagnoses     Medicare annual wellness visit, subsequent    -  Primary        BMI Counseling: Body mass index is 30 08 kg/m²  The BMI is above normal  Nutrition recommendations include decreasing portion sizes, encouraging healthy choices of fruits and vegetables, consuming healthier snacks and moderation in carbohydrate intake  Rationale for BMI follow-up plan is due to patient being overweight or obese  Preventive health issues were discussed with patient, and age appropriate screening tests were ordered as noted in patient's After Visit Summary  Personalized health advice and appropriate referrals for health education or preventive services given if needed, as noted in patient's After Visit Summary       History of Present Illness:     Patient presents for a Medicare Wellness Visit    HPI   Patient Care Team:  Adal Anna MD as PCP - Marisol Ludwig MD as PCP - [address] [place] [address] (RTE)  [facility]  Murl Rutherford, MD Driscilla Kite, MD Terald Favre, MD Brunilda Ogles, MD Terald Favre, MD as Endoscopist     Review of Systems:     Review of Systems     Problem List:     Patient Active Problem List   Diagnosis   • CAD, multiple vessel   • S/P CABG x 2   • Chronic obstructive pulmonary disease ([facility] [address] )   • Hyperlipidemia   • Centrilobular emphysema ([facility] [address] )   • Lumbar radiculopathy   • LUCY (obstructive sleep apnea)   • Dyspnea on exertion      Past Medical and Surgical History:     Past Medical History:   Diagnosis Date   • Chronic sinus complaints     last assessed 12/12/17   • Diverticulosis    • GERD (gastroesophageal reflux disease)     off medicine for past few years   • Heart attack [facility])     last assessed 12/12/17   • HTN (hypertension)    • Hyperlipidemia    • Sciatica    • Tobacco use    • Vasovagal syncope     last assessed 5/1/13   • Wears glasses      Past Surgical History:   Procedure Laterality Date   • APPENDECTOMY     • COLONOSCOPY N/A 2017    Procedure: COLONOSCOPY;  Surgeon: Saniya Castro MD;  Location: [facility] GI LAB; Service: Gastroenterology   • CORONARY ARTERY BYPASS GRAFT N/A 2017    Procedure: INTRA OP MATTHEW; CORONARY ARTERY BYPASS GRAFT X 2 WITH SVH TO OM1  AND LIMA TO LAD; LEFT LEG EVH;  Surgeon: Charu Elliott MD;  Location: [facility];  Service: Cardiac Surgery   • THUMB AMPUTATION Left       Family History:     Family History   Problem Relation Age of Onset   • Heart disease Father         CABG   • Testicular cancer Father         adenocarcinoma in situ of the testis   • Heart defect Father         cardiac disorder   • Cancer Father    • Diabetes type II Father    • Heart disease Maternal Grandfather    • Hypertension Mother         benign essential   • Diabetes Sister    • Arthritis Family    • Arthritis Other    • Heart defect Other         cardiac disorder   • Diabetes Other    • Cancer Other       Social History:     Social History     Socioeconomic History   • Marital status:      Spouse name: None   • Number of children: None   • Years of education: None   • Highest education level: None   Occupational History   • Occupation:      Comment: [employer]   Tobacco Use   • Smoking status: Former     Packs/day: 2 00     Years: 28 00     Pack years: 56 00     Types: Cigarettes     Quit date: 2017     Years since quittin 5   • Smokeless tobacco: Never   • Tobacco comments:     denied hx of exposure to second hand smoke; former smoker, smoked 2ppd for 30 yrs   pt quit 3 mos ago as per [employer]   Vaping Use   • Vaping Use: Never used   Substance and Sexual Activity   • Alcohol use: No     Comment: rarely; denied hx of social drinker as per [employer]   • Drug use: No   • Sexual activity: None   Other Topics Concern   • None   Social History Narrative    Feels safe at home    Pets - cat, dog    Denied hx of travel hx     Social Determinants of Health     Financial Resource Strain: Low Risk    • Difficulty of Paying Living Expenses: Not hard at all   Food Insecurity: Not on file   Transportation Needs: No Transportation Needs   • Lack of Transportation (Medical): No   • Lack of Transportation (Non-Medical): No   Physical Activity: Not on file   Stress: Not on file   Social Connections: Not on file   Intimate Partner Violence: Not on file   Housing Stability: Not on file      Medications and Allergies:     Current Outpatient Medications   Medication Sig Dispense Refill   • albuterol (PROVENTIL HFA,VENTOLIN HFA) 90 mcg/act inhaler INHALE TWO PUFFS BY MOUTH EVERY 4 HOURS AS NEEDED FOR WHEEZING OR SHORTNESS OF BREATH 18 g 7   • aspirin 325 mg tablet Take 1 tablet by mouth daily 100 tablet 0   • Multiple Vitamins-Minerals (CENTRUM SILVER PO) Take by mouth     • roflumilast (DALIRESP) 500 mcg tablet Take 500 mcg by mouth daily     • rosuvastatin (CRESTOR) 20 MG tablet TAKE ONE TABLET BY MOUTH EVERY DAY 90 tablet 3   • Trelegy Ellipta 100-62 5-25 MCG/ACT inhaler      • fluticasone (FLONASE) 50 mcg/act nasal spray 1 spray into each nostril daily (Patient not taking: Reported on 4/3/2023) 16 g 3     No current facility-administered medications for this visit       No Known Allergies   Immunizations:     Immunization History   Administered Date(s) Administered   • COVID-19 PFIZER VACCINE 0 3 ML IM 03/28/2021, 04/19/2021, 01/09/2022   • INFLUENZA 09/21/2020, 09/21/2020   • Influenza, injectable, quadrivalent, preservative free 0 5 mL 08/29/2018   • Influenza, recombinant, quadrivalent,injectable, preservative free 09/23/2019, 09/27/2021, 11/10/2022   • Pneumococcal Conjugate Vaccine 20-valent (Pcv20), Polysace 05/05/2022      Health Maintenance:         Topic Date Due   • Hepatitis C Screening  Never done   • HIV Screening  Never done   • Colorectal Cancer Screening  08/14/2022   • Lung Cancer Screening 02/24/2024         Topic Date Due   • COVID-19 Vaccine (4 - Booster for Pfizer series) 03/06/2022      Medicare Screening Tests and Risk Assessments:     Yobany Mcghee is here for his Subsequent Wellness visit  Health Risk Assessment:   Patient rates overall health as good  Patient feels that their physical health rating is same  Patient is very satisfied with their life  Eyesight was rated as same  Hearing was rated as same  Patient feels that their emotional and mental health rating is slightly better  Patients states they are never, rarely angry  Patient states they are sometimes unusually tired/fatigued  Pain experienced in the last 7 days has been none  Patient states that he has experienced no weight loss or gain in last 6 months  Fall Risk Screening: In the past year, patient has experienced: no history of falling in past year      Home Safety:  Patient has trouble with stairs inside or outside of their home  Patient has working smoke alarms and has working carbon monoxide detector  Home safety hazards include: none  Has Emphysema     Nutrition:   Current diet is Low Cholesterol, No Added Salt, Low Carb and Low Saturated Fat  Medications:   Patient is currently taking over-the-counter supplements  OTC medications include: see medication list  Patient is able to manage medications  Activities of Daily Living (ADLs)/Instrumental Activities of Daily Living (IADLs):   Walk and transfer into and out of bed and chair?: Yes  Dress and groom yourself?: Yes    Bathe or shower yourself?: Yes    Feed yourself?  Yes  Do your laundry/housekeeping?: Yes  Manage your money, pay your bills and track your expenses?: Yes  Make your own meals?: Yes    Do your own shopping?: Yes    Previous Hospitalizations:   Any hospitalizations or ED visits within the last 12 months?: No      Advance Care Planning:   Living will: No    Durable POA for healthcare: No    Advanced directive: No      Cognitive Screening:   Provider or "family/friend/caregiver concerned regarding cognition?: No    PREVENTIVE SCREENINGS      Cardiovascular Screening:    General: Screening Not Indicated and History Lipid Disorder      Diabetes Screening:     General: Screening Current      Colorectal Cancer Screening:     General: Screening Current      Prostate Cancer Screening:    General: Screening Not Indicated      Osteoporosis Screening:    General: Screening Not Indicated      Abdominal Aortic Aneurysm (AAA) Screening:    Risk factors include: tobacco use        General: Screening Not Indicated      Lung Cancer Screening:     General: Screening Current      Hepatitis C Screening:    General: Screening Not Indicated    Screening, Brief Intervention, and Referral to Treatment (SBIRT)    Screening  Typical number of drinks in a day: 0  Typical number of drinks in a week: 0  Interpretation: Low risk drinking behavior  Single Item Drug Screening:  How often have you used an illegal drug (including marijuana) or a prescription medication for non-medical reasons in the past year? never    Single Item Drug Screen Score: 0  Interpretation: Negative screen for possible drug use disorder    No results found       Physical Exam:     /80 (BP Location: Left arm, Patient Position: Sitting, Cuff Size: Large)   Pulse 90   Temp 98 3 °F (36 8 °C)   Resp 14   Ht 5' 4 5\" (1 638 m)   Wt 80 7 kg (178 lb)   BMI 30 08 kg/m²     Physical Exam     Adal Anna MD  "

## 2023-04-26 ENCOUNTER — CLINICAL SUPPORT (OUTPATIENT)
Dept: PULMONOLOGY | Facility: CLINIC | Age: 53
End: 2023-04-26

## 2023-04-26 DIAGNOSIS — J44.9 CHRONIC OBSTRUCTIVE PULMONARY DISEASE, UNSPECIFIED COPD TYPE (HCC): Primary | ICD-10-CM

## 2023-04-28 ENCOUNTER — CLINICAL SUPPORT (OUTPATIENT)
Dept: PULMONOLOGY | Facility: CLINIC | Age: 53
End: 2023-04-28

## 2023-04-28 DIAGNOSIS — J44.9 CHRONIC OBSTRUCTIVE PULMONARY DISEASE, UNSPECIFIED COPD TYPE (HCC): Primary | ICD-10-CM

## 2023-05-01 ENCOUNTER — CLINICAL SUPPORT (OUTPATIENT)
Dept: PULMONOLOGY | Facility: CLINIC | Age: 53
End: 2023-05-01

## 2023-05-01 DIAGNOSIS — J44.9 CHRONIC OBSTRUCTIVE PULMONARY DISEASE, UNSPECIFIED COPD TYPE (HCC): Primary | ICD-10-CM

## 2023-05-01 NOTE — PROGRESS NOTES
Pulmonary Rehabilitation Plan of Care   Discharge      Today's date: 2023   # of Exercise Sessions Completed: 24  Patient name: Felecia Williamson      : 1970  Age: 45 y o  MRN: [id number]  Referring Physician: Mor Sargent MD  Pulmonologist: Mor Sargent MD  Provider: Jermaine Hi  Clinician: Jane Francis MS, CEP     Dx:   Encounter Diagnosis   Name Primary?  Chronic obstructive pulmonary disease, unspecified COPD type ([facility] ) Yes     Date of onset: 2023      SUMMARY OF PROGRESS: 2023 Discharge note for Tomer Arambula in Pulmonary rehabilitation for the diagnosis of COPD  He had 0 6% improvement in functional capacity increasing 6 MW distance from 1670 to 1680 feet  on room air at rest   Resting SpO2 96-93% with maintained O2 saturation during exercise 97-93% room air  RPE 4/10 and dyspnea  0-5/10  His max METs in pulmonary rehab increased from 2 9 to 3 9  His [facility] QOL improved by 20%  PHQ-9 score decreased from 4 to 2  SAVANNAH-7 stayed the same from initial to discharge with a score of 1  CAT score decreased from 17 to 12/40  SOBQ score decreased  from 51 to 39  His weight decreased by 6 pounds  Leatha Gila has been supplementing MN sessions with home exercise which includes walking on his home treadmill 4-5 days/week  Wandra Ban He reports increased stamina, strength and reduced SOB with ADLs  He tolerates 38-50 mins at 2 9-3 9  METs plus wt training  RHR 75-85, ExHR 107-118  Resting -134/68-70 with appropriate response to exercise reaching 132/78  All group education classes on pulmonary risk factor modification were attended by the patient  The patient had the following personal goals He hoped to achieve by discharge: return to walking on the treadmill, improve SOB and fatigue, increase strength, weight loss, consistent BP, and decrease sweets consumption  Discharge plans include continuing to walk on the treadmill 3-4 days/week and continue with heart healthy diet   Encouraged Pt to continue exercise  Frequency: 4-6 days/wk, Intensity: RPE 4-5, Time: 40-50 mins daily, 150-200 mins/wk  Pt was encouraged to remain compliant with medications and f/u with pulmonologist with any pulmonary symptoms and medication management  4/21/2023 60 day ITP for Josefa Rizvi in Pulmonary rehabilitation for the diagnosis of COPD  He has completed 20 exercise sessions on RA, where he exercises for 39-42 minutes and averages 3 3-3 9 METs  Weight training will be added into patient's exercise program starting next week  Resting SpO2 97-93% with maintained O2 saturation during exercise 96-93%  Resting -144/62-78 with appropriate response to exercise reaching 148/72  RHR 77-94, ExHR 106-116  Patients states an improvement in energy, dyspnea, lower body strength, and fatigue  Rating of perceived dyspnea with exercise 3-6/10 at max METs  He has begun a regular exercise program at home, including walking on his home treadmill 4-5 days/week  He walks for 30 minutes each day  When addressed, the patient denies feelings of depression/anxiety/stress  Patient reports excellent social/emotional support  Patient attends group educational classes on pulmonary disease  Pursed lipped breathing continues to be demonstrated, practiced, and reinforced with the patient  Patient has lost 10 lbs since starting rehab  He has made dietary changes which include less salt and sugar, increased lean protein, portion control, and eating sweets less frequently  His exercise program will be progressed as tolerated  The patient has the following personal goals he hopes to achieve by discharge: return to walking on treadmill at home, improve SOB and fatigue, increase strength, weight loss, consistent BP, and decrease sweets consumption  Tolerating exercise well and will continue to monitor  3/24/2023 30 day ITP for Josefa Rizvi in Pulmonary rehabilitation for the diagnosis of COPD   He has completed 12 exercise sessions on RA, where he exercises for 35-40 minutes and averages 3 4-4 0 METs  Resting SpO2 97-94% with maintained O2 saturation during exercise 100-94%  Resting BP  114-148/64-78 with appropriate response to exercise reaching 148/72  RHR 73-92,  ExHR 112-127  Patients states an improvement in energy, dyspnea, lower body strength, and fatigue  Rating of perceived dyspnea with exercise 0-6/10 at max METs  He has begun a regular exercise program at home, including walking on his home treadmill 4-5 days/week  When addressed, the patient denies feelings of depression/anxiety/stress  Patient reports excellent social/emotional support  Patient attends group educational classes on pulmonary disease  Pursed lipped breathing continues to be demonstrated, practiced, and reinforced with the patient  Patient has lost 9 lbs since starting rehab  He has made dietary changes which include less salt and sugar, increased lean protein, portion control, and eating sweets less frequently  His exercise program will be progressed as tolerated  The patient has the following personal goals he hopes to achieve by discharge: return to walking on treadmill at home, improve SOB and fatigue, increase strength, weight loss, consistent BP, and decrease sweets consumption  Tolerating exercise well and will continue to monitor  Today is Nanette Jackson initial evaluation to begin Pulmonary Rehab for the diagnosis of COPD  Patient does have a PFT on file, revealing an FEV1/FVC ratio of 47% and an FEV1 of 52  This is suggestive of moderate-severe obstruction  Since their diagnosis, the patient has been experiencing increased dyspnea, increased sitting time, increased fatigue and weakness  They report dyspnea and fatigue when completing ADLs   The patient currently does not follow a formal exercise program at home  Pt reports the following physical limitations: patient states the recumbent bike was uncomfortable for him when he completed Cardiac rehabilitation in 2017  Depression screening using the PHQ-9 interprets the patient's score of 4 1-4 = Minimal Depression  Anxiety screening using the SAVANNAH-7 interprets the patients score of 2  0-4  = Not anxious  When addressed, the patient denies having depression/anxiety  Patient reports excellent social/emotional support  The patient is a former smoker (quit 6 years ago)  Patient admits to 100% medication compliance  At rest, the patient rated dyspnea 0/10 with SpO2 94% on room air  They completed an initial 6MWT, walking 1670 ft on room air and will titrate O2 to maintain SpO2 >90%  The patients rating of perceived dyspnea during the 6MWT was 6/10 with SpO2 90%  Telemetry revealed NSR with rare PVCs  Resting  /68 with appropriate hemodynamic response to exercise reaching 148/72  Patient will exercise on room air and will titrate O2 to maintain SpO2 >90%  Education on smoking cessation, oxygen use, breathing techniques, pulmonary anatomy, exercise for the pulmonary patient, healthy eating, stress, and relaxation will be provided  Patient goals include: return to walking on treadmill, improve shortness of breath, weight loss, consistent blood pressure, improve RYP score, and improve fatigue and weakness  Jessica Case will attend 24 exercise sessions, 2x/wk for 12-18 weeks beginning 2/29/2023  Will progress patient as tolerated over the next 30 days        Medication compliance: Yes   Comments: Pt reports to be compliant with medications  Fall Risk: Low   Comments: Ambulates with a steady gait with no assist device    Smoking: Former user    RPD at Rest: 0/10  RPD with Exercise:  0-5/10    Assessment of progression of lung disease and functional status:  CAT: 12/40  Shortness of breath questionnaire: 38/120      EXERCISE ASSESSMENT and PLAN    Current Exercise Program in Rehab:       Frequency: 3 days/week   Supplement with home exercise 2+ days/wk as tolerated        Minutes: 38-50        METS: 2 9-3 9              SpO2: 97-93%              RPD: 0-5                      HR: 107-118   RPE: 4         Modalities: Treadmill, UBE, Lifecycle and NuStep      Exercise Progression after Discharge:    Frequency: 3-5 days of gym or home exercise   Minutes: 30-50  >150 mins/wk of moderate intensity exercise   METS: 3 0-5 0   HR: 30 beats above resting    RPE: 4-6   Modalities: Treadmill and outdoor walking    Strength trainin-3 days / week  12-15 repetitions  1-2 sets per modality    Modalities: Lateral Raise, Arm Curl, Upright Rows, Front Raises and Shoulder Shrugs    Oxygen Needs: on room air at rest and on room air with exercise  Oxygen Goal: Maintain SpO2>90% during exercise    Home Exercise: Type: walking on treadmill at home, Frequency: 4-5 days/week, Duration: 30 mins  Education: pursed lipped breathing, home exercise, benefits of exercise for pulmonary disease, RPE scale, RPD scale, O2 saturation monitoring and appropriate O2 response to exercise    Goals: reduced score on  USCD Shortness of Breath Questionnaire, reduced dyspnea during exercise (0-3/10), improved exercise tolerance (max METs tolerated in pulmonary rehab), SpO2 >90% during exercise, improved DUKE activity score, reduced score on CAT, attend pulmonary rehab regularly, decrease sitting time at home and start a walking program  Progressing:  Goals met: reduced score on SOBQ, reduced dyspnea during exercise, improved exercise tolerance, SpO2 >90% during exercise, improved DUKE score, reduced score on CAT, attend UT regularly, decrease sitting time at home, start walking program , Patient will be encouraged to focus on lifestyle modifications following discharge      Plan: Titrate supplemental oxygen as needed to maintain SpO2>90% with exercise and reduce time sitting at home    Readiness to change: Action:  (Changing behavior)      NUTRITION ASSESSMENT AND PLAN    Weight control:    Starting weight: 183   Current weight:   177    Diabetes: N/A    Goals:Improved Rate Your Plate score  >80, 4 7-8%  wt loss, eliminate processed meats, reduce portion sizes of meat to 3oz or less, increase intake of fish, shellfish, increase intake of meatless meals, reduce cheese intake or use reduced-fat, Eat 4-5 cups of fruits and vegetables daily, choose low sodium processed foods, choose healthy snacks: light popcorn, plain pretzels, Increase intake of nuts and seeds, seldom eat or choose low fat ice-cream, fruit juice bars or frozen yogurt  and eliminate or choose low-fat sweets  Education: heart healthy eating  low sodium diet  wt  loss   education class:  Label Reading   Progressing:Pt is progressing and showing improvement  toward the following goals:  decreasing sweets consumption  , Goals met: patient continued to eat heart healthy diet, patient is down 6 lbs since starting NE , Patient will be encouraged to focus on lifestyle modifications following discharge  Plan: Education class: Reading Food Labels, Education Class: Heart Healthy Eating, switch to low fat cheeses, replace unhealthy snacks with fruits & vegs, reduce red meat 1x/wk, eat fewer desserts and sweets, avoid processed foods, will replace sugar sweetened cereals with whole grain or oatmeal and keep added daily sugar <25g/day  Readiness to change: Preparation:  (Getting ready to change)       PSYCHOSOCIAL ASSESSMENT AND PLAN    Emotional:  Depression assessment:  PHQ-9 = 2 1-4 = Minimal Depression            Anxiety measure:  SAVANNAH-7 = 1 0-4  = Not anxious  Self-reported stress level: 3/4   Social support: Very Good    Goals:  Physical Fitness in [facility] Score < 3 and increased energy  Education: benefits of a positive support system and stress management techniques    Progressing:Pt is progressing and showing improvement  toward the following goals:  increased energy, sleeping better  , Patient will be encouraged to focus on lifestyle modifications following discharge    Plan: Class: Stress and Your Health, Class: Relaxation, Enjoy a hobby and Keep a positive mindset  Readiness to change: Maintenance: (Maintaining the behavior change)      OTHER CORE COMPONENTS     Tobacco:   Social History     Tobacco Use   Smoking Status Former    Packs/day: 2 00    Years: 28 00    Pack years: 56 00    Types: Cigarettes    Quit date: 2017    Years since quittin 6   Smokeless Tobacco Never   Tobacco Comments    denied hx of exposure to second hand smoke; former smoker, smoked 2ppd for 30 yrs  pt quit [facility] mos ago as [facility]       Tobacco Use Intervention: Referral to tobacco expert:   Pt quit    and has abstained    Blood pressure:    Restin-134/68-78   Exercise: 132/78    Goals: consistent BP < 130/80, reduced dietary sodium <2300mg, moderate intensity exercise >150 mins/wk and medication compliance  Education:  pathophysiology of pulmonary disease and inspiratory muscle training  Progressing:Pt is progressing and showing improvement  toward the following goals:  slightly elevated BP   , Goals met: > 150 mins/wk of moderate intensity exercise, medication compliance, reducing dietary sodium  , Patient will be encouraged to focus on lifestyle modifications following discharge    Plan: Class: Understanding Heart Disease, Class: Common Heart Medications, Avoid Processed foods and engage in regular exercise  Readiness to change: Preparation:  (Getting ready to change)

## 2023-05-14 RX ORDER — LIDOCAINE HYDROCHLORIDE 10 MG/ML
0.5 INJECTION, SOLUTION EPIDURAL; INFILTRATION; INTRACAUDAL; PERINEURAL ONCE AS NEEDED
Status: CANCELLED | OUTPATIENT
Start: 2023-05-14

## 2023-05-14 RX ORDER — SODIUM CHLORIDE, SODIUM LACTATE, POTASSIUM CHLORIDE, CALCIUM CHLORIDE 600; 310; 30; 20 MG/100ML; MG/100ML; MG/100ML; MG/100ML
125 INJECTION, SOLUTION INTRAVENOUS CONTINUOUS
Status: CANCELLED | OUTPATIENT
Start: 2023-05-14

## 2023-05-15 ENCOUNTER — ANESTHESIA EVENT (OUTPATIENT)
Dept: GASTROENTEROLOGY | Facility: AMBULARY SURGERY CENTER | Age: 53
End: 2023-05-15

## 2023-05-15 ENCOUNTER — ANESTHESIA (OUTPATIENT)
Dept: GASTROENTEROLOGY | Facility: AMBULARY SURGERY CENTER | Age: 53
End: 2023-05-15

## 2023-05-15 ENCOUNTER — HOSPITAL ENCOUNTER (OUTPATIENT)
Dept: GASTROENTEROLOGY | Facility: AMBULARY SURGERY CENTER | Age: 53
Setting detail: OUTPATIENT SURGERY
Discharge: HOME | End: 2023-05-15
Attending: INTERNAL MEDICINE

## 2023-05-15 VITALS
SYSTOLIC BLOOD PRESSURE: 120 MMHG | HEART RATE: 80 BPM | RESPIRATION RATE: 18 BRPM | TEMPERATURE: 97 F | OXYGEN SATURATION: 96 % | DIASTOLIC BLOOD PRESSURE: 78 MMHG

## 2023-05-15 DIAGNOSIS — Z86.010 HISTORY OF COLON POLYPS: ICD-10-CM

## 2023-05-15 RX ORDER — PROPOFOL 10 MG/ML
INJECTION, EMULSION INTRAVENOUS AS NEEDED
Status: DISCONTINUED | OUTPATIENT
Start: 2023-05-15 | End: 2023-05-15

## 2023-05-15 RX ORDER — SODIUM CHLORIDE, SODIUM LACTATE, POTASSIUM CHLORIDE, CALCIUM CHLORIDE 600; 310; 30; 20 MG/100ML; MG/100ML; MG/100ML; MG/100ML
125 INJECTION, SOLUTION INTRAVENOUS CONTINUOUS
Status: DISCONTINUED | OUTPATIENT
Start: 2023-05-15 | End: 2023-05-19 | Stop reason: HOSPADM

## 2023-05-15 RX ORDER — SODIUM CHLORIDE, SODIUM LACTATE, POTASSIUM CHLORIDE, CALCIUM CHLORIDE 600; 310; 30; 20 MG/100ML; MG/100ML; MG/100ML; MG/100ML
INJECTION, SOLUTION INTRAVENOUS CONTINUOUS PRN
Status: DISCONTINUED | OUTPATIENT
Start: 2023-05-15 | End: 2023-05-15

## 2023-05-15 RX ORDER — PROPOFOL 10 MG/ML
INJECTION, EMULSION INTRAVENOUS CONTINUOUS PRN
Status: DISCONTINUED | OUTPATIENT
Start: 2023-05-15 | End: 2023-05-15

## 2023-05-15 RX ORDER — LIDOCAINE HYDROCHLORIDE 10 MG/ML
INJECTION, SOLUTION EPIDURAL; INFILTRATION; INTRACAUDAL; PERINEURAL AS NEEDED
Status: DISCONTINUED | OUTPATIENT
Start: 2023-05-15 | End: 2023-05-15

## 2023-05-15 RX ORDER — LIDOCAINE HYDROCHLORIDE 10 MG/ML
0.5 INJECTION, SOLUTION EPIDURAL; INFILTRATION; INTRACAUDAL; PERINEURAL ONCE AS NEEDED
Status: DISCONTINUED | OUTPATIENT
Start: 2023-05-15 | End: 2023-05-19 | Stop reason: HOSPADM

## 2023-05-15 RX ADMIN — PROPOFOL 100 MCG/KG/MIN: 10 INJECTION, EMULSION INTRAVENOUS at 10:29

## 2023-05-15 RX ADMIN — SODIUM CHLORIDE, SODIUM LACTATE, POTASSIUM CHLORIDE, AND CALCIUM CHLORIDE 125 ML/HR: .6; .31; .03; .02 INJECTION, SOLUTION INTRAVENOUS at 10:13

## 2023-05-15 RX ADMIN — LIDOCAINE HYDROCHLORIDE 50 MG: 10 INJECTION, SOLUTION EPIDURAL; INFILTRATION; INTRACAUDAL; PERINEURAL at 10:29

## 2023-05-15 RX ADMIN — PROPOFOL 100 MG: 10 INJECTION, EMULSION INTRAVENOUS at 10:29

## 2023-05-15 RX ADMIN — SODIUM CHLORIDE, SODIUM LACTATE, POTASSIUM CHLORIDE, AND CALCIUM CHLORIDE: .6; .31; .03; .02 INJECTION, SOLUTION INTRAVENOUS at 10:23

## 2023-05-15 NOTE — ANESTHESIA PREPROCEDURE EVALUATION
Procedure:  COLONOSCOPY    Relevant Problems   ANESTHESIA (within normal limits)      CARDIO   (+) CAD, multiple vessel   (+) Dyspnea on exertion   (+) Hyperlipidemia   (+) S/P CABG x 2      ENDO (within normal limits)      PULMONARY   (+) Centrilobular emphysema (HCC)   (+) Chronic obstructive pulmonary disease (HCC)   (+) Dyspnea on exertion   (+) LUCY (obstructive sleep apnea)      Nervous and Auditory   (+) Lumbar radiculopathy        Physical Exam    Airway    Mallampati score: II  TM Distance: >3 FB  Neck ROM: full     Dental       Cardiovascular      Pulmonary      Other Findings  Multiple missing teeth      Anesthesia Plan  ASA Score- 3     Anesthesia Type- IV sedation with anesthesia with ASA Monitors  Additional Monitors:   Airway Plan:           Plan Factors-Exercise tolerance (METS): >4 METS  Chart reviewed  EKG reviewed  Existing labs reviewed  Patient summary reviewed  Patient is not a current smoker  Induction-     Postoperative Plan-     Informed Consent- Anesthetic plan and risks discussed with patient  I personally reviewed this patient with the CRNA  Discussed and agreed on the Anesthesia Plan with the CRNA  Rafa Albarran

## 2023-05-15 NOTE — ANESTHESIA POSTPROCEDURE EVALUATION
Post-Op Assessment Note    CV Status:  Stable  Pain Score: 0    Pain management: adequate     Mental Status:  Awake   Hydration Status:  Stable   PONV Controlled:  None   Airway Patency:  Patent      Post Op Vitals Reviewed: Yes      Staff: CRNA         No notable events documented      BP   126/   Temp     Pulse 70   Resp   14   SpO2   99

## 2023-05-15 NOTE — H&P
History and Physical - [facility]  Brittney Altman 45 y o  male MRN: [id number]    HPI: Brittney Altman is a 45y o  year old male who presents with hx of colon polyps      Review of Systems    Historical Information   Past Medical History:   Diagnosis Date   • Chronic sinus complaints     last assessed 17   • Diverticulosis    • GERD (gastroesophageal reflux disease)     off medicine for past few years   • Heart attack [facility])     last assessed 17   • HTN (hypertension)    • Hyperlipidemia    • Sciatica    • Tobacco use    • Vasovagal syncope     last assessed 13   • Wears glasses      Past Surgical History:   Procedure Laterality Date   • APPENDECTOMY     • COLONOSCOPY N/A 2017    Procedure: COLONOSCOPY;  Surgeon: Sincere Bob MD;  Location: [facility]; Service: Gastroenterology   • CORONARY ARTERY BYPASS GRAFT N/A 2017    Procedure: INTRA OP MATTHEW; CORONARY ARTERY BYPASS GRAFT X 2 WITH SVH TO OM1  AND LIMA TO LAD; LEFT LEG EVH;  Surgeon: Grace Spears MD;  Location: [facility];  Service: Cardiac Surgery   • THUMB AMPUTATION Left      Social History   Social History     Substance and Sexual Activity   Alcohol Use No    Comment: rarely; denied hx of social drinker as per [facility]     Social History     Substance and Sexual Activity   Drug Use No     Social History     Tobacco Use   Smoking Status Former   • Packs/day: 2 00   • Years: 28 00   • Pack years: 56 00   • Types: Cigarettes   • Quit date: 2017   • Years since quittin 6   Smokeless Tobacco Never   Tobacco Comments    denied hx of exposure to second hand smoke; former smoker, smoked 2ppd for 30 yrs   pt quit 3 mos ago as per [employer]     Family History   Problem Relation Age of Onset   • Heart disease Father         CABG   • Testicular cancer Father         adenocarcinoma in situ of the testis   • Heart defect Father         cardiac disorder   • Cancer Father    • Diabetes type II Father    • Heart disease Maternal Grandfather    • Hypertension Mother         benign essential   • Diabetes Sister    • Arthritis Family    • Arthritis Other    • Heart defect Other         cardiac disorder   • Diabetes Other    • Cancer Other        Meds/Allergies     (Not in a hospital admission)      No Known Allergies    Objective     /81   Pulse 90   Temp (!) 97 °F (36 1 °C) (Temporal)   Resp 18   SpO2 96%       PHYSICAL EXAM    Gen: NAD  CV: RRR  CHEST: Clear  ABD: soft, NT/ND  EXT: no edema  Neuro: AAO      ASSESSMENT/PLAN:  This is a 45y o  year old male here for hx of colon polyps      PLAN:   Procedure: colonoscopy

## 2023-05-16 ENCOUNTER — OFFICE VISIT (OUTPATIENT)
Dept: CARDIOLOGY CLINIC | Facility: CLINIC | Age: 53
End: 2023-05-16

## 2023-05-16 VITALS
HEART RATE: 81 BPM | SYSTOLIC BLOOD PRESSURE: 130 MMHG | OXYGEN SATURATION: 93 % | HEIGHT: 65 IN | DIASTOLIC BLOOD PRESSURE: 80 MMHG | WEIGHT: 176 LBS | BODY MASS INDEX: 29.32 KG/M2

## 2023-05-16 DIAGNOSIS — I25.10 CAD, MULTIPLE VESSEL: ICD-10-CM

## 2023-05-16 DIAGNOSIS — Z95.1 S/P CABG X 2: ICD-10-CM

## 2023-05-16 DIAGNOSIS — G47.33 OSA (OBSTRUCTIVE SLEEP APNEA): ICD-10-CM

## 2023-05-16 DIAGNOSIS — E78.2 MIXED HYPERLIPIDEMIA: ICD-10-CM

## 2023-05-16 DIAGNOSIS — R06.09 DYSPNEA ON EXERTION: ICD-10-CM

## 2023-05-16 DIAGNOSIS — J43.2 CENTRILOBULAR EMPHYSEMA (HCC): ICD-10-CM

## 2023-05-16 NOTE — PROGRESS NOTES
Progress Note - Cardiology Office  [facility]    Chevy Eli 45 y o  male MRN: [id number]  : 1970  Encounter: [id number]      Assessment:     1  Dyspnea on exertion    2  CAD, multiple vessel    3  S/P CABG x 2    4  Mixed hyperlipidemia    5  Centrilobular emphysema ([address] )    6  LUCY (obstructive sleep apnea)        Discussion Summary and Plan:  1  Coronary artery disease status post CABG x2 in 2017 with LIMA to LAD and SVG vein graft to circumflex  His stress test and echo Doppler from  reviewed they were acceptable continue same Rx     2   Dyspnea on exertion  Patient has distant exertion he had history of COPD  He also history of coronary artery disease  Status post CABG  EF remained stable  Around 50 to 55%  3  COPD  Patient used to be heavy smoker has history of COPD  He follows with pulmonary continue inhalers management as per them  He is pretty compliant with his inhalers  Does have decreased air entry but he is stable from pulmonary point of view  4  Dyslipidemia  Continue high intensity statins  Patient's Crestor 20 mg cholesterol profile is excellent electrolytes are acceptable LFTs are acceptable  Labs from 2022 reviewed  5  History of tobacco abuse  Heavy smoker throughout her life since he was 13years old  Now quit smoking  Patient encouraged not to go back to smoking  6  Recent lumbar radiculopathy now improved  Now much better     follow-up 6-9 months after this test done  Counseling :  A description of the counseling  As above  He need to be regular in follow-up  Patient's ability to self care: Yes  Medication side effect reviewed with patient in detail and all their questions answered to their satisfaction  HPI :     Chevy Eli is a 45y o  year old male who came for follow up   Patient  was admitted to [facility] in 2017 with 10/10 severe chest pain and had a non ST elevation MI  He was short of breath even climbing 1-2 flights of stair  His cardiac catheterization shows he had a spontaneous dissection of left main as well as significant stenosis of his mid LAD and he underwent successful coronary artery bypass surgery x2 with LIMA to LAD and SVG vein graft to circumflex came for follow-up  Patient used to smoke heavy and has now quit smoking  He has history of I believe COPD but never seen a pulmonologist  He feels fatigue and tired otherwise he is getting better  Some dizziness after taking metoprolol  No fever no chills no other significant complaint       07/01/2021  Above reviewed  Patient came for follow-up  He has lost about 30 lb  He has quit smoking since his surgery  He is doing well has exertional shortness of breath which is not changed he had history of COPD  He had a blood test done which was reviewed in April 2021 in they were acceptable  No nausea no vomiting no fever no chills no PND no orthopnea  He does regular exercise he walks about mi a day  He is on cholesterol medication as well as his inhalers  No other cardiovascular issues at this time  He does have history of CAD status post CABG with 2 vessel bypass which included LIMA to LAD and vein graft to circumflex  5/16/2023  Above reviewed  Patient came for follow-up  He has gained back about 25 pounds  He has quit smoking since his surgery  He does have history of coronary artery disease s/p CABG with LIMA to LAD and vein graft to circumflex, dyslipidemia, history of COPD, on inhalers, CPAP on sleep apnea pretty compliant with it  He regularly follows with pulmonary and has been doing well lately he noted some shortness of breath and his doses were adjusted and he follows pulmonary rehab  From cardiac point of view he did exercise stress test 21 which was acceptable and his echo shows low normal LV systolic function without any valvular disease    His blood test in April 2023 shows electrolyte and Cresta profile stable no other cardiovascular issues  Review of Systems   Constitutional: Negative for activity change, chills, diaphoresis, fever and unexpected weight change  HENT: Negative for congestion  Eyes: Negative for discharge and redness  Respiratory: Positive for shortness of breath  Negative for cough, chest tightness and wheezing  Chronic history of COPD  Cardiovascular: Negative  Negative for chest pain, palpitations and leg swelling  Gastrointestinal: Negative for abdominal pain, diarrhea and nausea  Endocrine: Negative  Genitourinary: Negative for decreased urine volume and urgency  Musculoskeletal: Negative  Negative for arthralgias, back pain and gait problem  Skin: Negative for rash and wound  Allergic/Immunologic: Negative  Neurological: Negative for dizziness, seizures, syncope, weakness, light-headedness and headaches  Hematological: Negative  Psychiatric/Behavioral: Positive for sleep disturbance  Negative for agitation and confusion  The patient is not nervous/anxious  Historical Information   Past Medical History:   Diagnosis Date   • Chronic sinus complaints     last assessed 12/12/17   • Diverticulosis    • GERD (gastroesophageal reflux disease)     off medicine for past few years   • Heart attack [facility])     last assessed 12/12/17   • HTN (hypertension)    • Hyperlipidemia    • Sciatica    • Tobacco use    • Vasovagal syncope     last assessed 5/1/13   • Wears glasses      Past Surgical History:   Procedure Laterality Date   • APPENDECTOMY  1974   • COLONOSCOPY N/A 8/14/2017    Procedure: COLONOSCOPY;  Surgeon: Azeb Vicente MD;  Location: [place] [address] GI LAB;   Service: Gastroenterology   • CORONARY ARTERY BYPASS GRAFT N/A 9/25/2017    Procedure: INTRA OP MATTHEW; CORONARY ARTERY BYPASS GRAFT X 2 WITH SVH TO OM1  AND LIMA TO LAD; LEFT LEG EVH;  Surgeon: Tess Judd MD;  Location:  MAIN OR;  Service: Cardiac Surgery   • THUMB AMPUTATION Left      Social History     Substance and Sexual Activity   Alcohol Use No    Comment: rarely; denied hx of social drinker as per [employer]     Social History     Substance and Sexual Activity   Drug Use No     Social History     Tobacco Use   Smoking Status Former   • Packs/day: 2 00   • Years: 28 00   • Pack years: 56 00   • Types: Cigarettes   • Quit date: 2017   • Years since quittin 6   Smokeless Tobacco Never   Tobacco Comments    denied hx of exposure to second hand smoke; former smoker, smoked 2ppd for 30 yrs  pt quit 3 mos ago as per [employer]     Family History:   Family History   Problem Relation Age of Onset   • Heart disease Father         CABG   • Testicular cancer Father         adenocarcinoma in situ of the testis   • Heart defect Father         cardiac disorder   • Cancer Father    • Diabetes type II Father    • Heart disease Maternal Grandfather    • Hypertension Mother         benign essential   • Diabetes Sister    • Arthritis Family    • Arthritis Other    • Heart defect Other         cardiac disorder   • Diabetes Other    • Cancer Other        Meds/Allergies     No Known Allergies    Current Outpatient Medications:   •  albuterol (PROVENTIL HFA,VENTOLIN HFA) 90 mcg/act inhaler, INHALE TWO PUFFS BY MOUTH EVERY 4 HOURS AS NEEDED FOR WHEEZING OR SHORTNESS OF BREATH, Disp: 18 g, Rfl: 7  •  aspirin 325 mg tablet, Take 1 tablet by mouth daily, Disp: 100 tablet, Rfl: 0  •  fluticasone (FLONASE) 50 mcg/act nasal spray, 1 spray into each nostril daily, Disp: 16 g, Rfl: 3  •  Multiple Vitamins-Minerals (CENTRUM SILVER PO), Take by mouth, Disp: , Rfl:   •  Roflumilast (DALIRESP PO), Take by mouth, Disp: , Rfl:   •  rosuvastatin (CRESTOR) 20 MG tablet, TAKE ONE TABLET BY MOUTH EVERY DAY, Disp: 90 tablet, Rfl: 3  •  Trelegy Ellipta 100-62 5-25 MCG/ACT inhaler, , Disp: , Rfl:   No current facility-administered medications for this visit      Facility-Administered Medications "Ordered in Other Visits:   •  lactated ringers infusion, 125 mL/hr, Intravenous, Continuous, Morena Lerma MD, Last Rate: 125 mL/hr at 05/15/23 1013, 125 mL/hr at 05/15/23 1013  •  lidocaine (PF) (XYLOCAINE-MPF) 1 % injection 0 5 mL, 0 5 mL, Infiltration, Once PRN, Morena Lerma MD    Vitals: Blood pressure 130/80, pulse 81, height 5' 4 5\" (1 638 m), weight 79 8 kg (176 lb), SpO2 93 %  ?  Body mass index is 29 74 kg/m²  Vitals:    05/16/23 1415   Weight: 79 8 kg (176 lb)     BP Readings from Last 3 Encounters:   05/16/23 130/80   05/15/23 120/78   04/24/23 116/80       Physical Exam:  Physical Exam  Constitutional:       General: He is not in acute distress  Appearance: He is well-developed  He is not diaphoretic  Neck:      Thyroid: No thyromegaly  Vascular: No JVD  Trachea: No tracheal deviation  Cardiovascular:      Rate and Rhythm: Normal rate and regular rhythm  Heart sounds: S1 normal and S2 normal  Heart sounds not distant  Murmur heard  Systolic (ejection) murmur is present with a grade of 2/6  No friction rub  No gallop  No S3 or S4 sounds  Pulmonary:      Effort: Pulmonary effort is normal  No respiratory distress  Breath sounds: No wheezing or rales  Comments: Bilateral decreased air entry but no rhonchi no wheezing  Chest:      Chest wall: No tenderness  Abdominal:      General: Bowel sounds are normal  There is no distension  Palpations: Abdomen is soft  Tenderness: There is no abdominal tenderness  Musculoskeletal:         General: No deformity  Cervical back: Neck supple  Comments: No lower extremity edema   Skin:     General: Skin is warm and dry  Coloration: Skin is not pale  Findings: No rash  Neurological:      Mental Status: He is alert and oriented to person, place, and time     Psychiatric:         Behavior: Behavior normal          Judgment: Judgment normal            Diagnostic Studies Review Cardio:     Stress " Test: Echo show normal LV systolic function  Catheterization: Cardiac catheterization in 2017 shows proximal LAD 80% stenosis, spontaneous dissection of the left main, preserved LV systolic function, nonobstructive disease of the circumflex which was medium to small size  Circumflex has 25 -30% stenosis  Patient status post 2 vessel bypass  ECG Report: 10/26/2017 12 lead EKG shows normal sinus rhythm heart rate 60 beats per minute  Nonspecific ST changes  No old EKG to compare     Twelve lead EKG done 01/10/2019 shows normal sinus rhythm heart rate 83 beats per minute  No significant ST changes  Twelve lead EKG 2019 shows normal sinus rhythm heart rate 88 beats per minute  No significant ST changes no change from old EKG  Twelve lead EKG 2021 shows normal sinus rhythm heart rate 72 beats per minute  Rare PVCs as compared to previous EKG heart rate is better  Twelve-lead EKG 2023 shows normal sinus rhythm heart rate 81 bpm   Small Q waves inferior leads no change from previous EKG  Cardiac testing:   Results for orders placed during the hospital encounter of 18   Echo complete with contrast if indicated    Narrative [address]  [address] [facility] [address]  [phone]    Transthoracic Echocardiogram  2D, M-mode, Doppler, and Color Doppler    Study date:  19-Mar-2018    Patient: Theo Isaac  MR number: [id number]  Account number: [de-identified]  : 1970  Age: 50 years  Gender: Male  Status: Routine  Location: Echo lab  Height: 62 in  Weight: 161 7 lb  BP: 119/ 77 mmHg    Sonographer:  ELÍAS Torre  Primary Physician:  Yas Madsen MD  Referring Physician:  Citlaly Pastor:  [facility]  Interpreting Physician:  Terrell Pacheco DO    SUMMARY    LEFT VENTRICLE:  Systolic function was at the lower limits of normal by visual assessment   Ejection fraction was estimated to be 50 %   There were no regional wall motion abnormalities  There was mild concentric hypertrophy  Doppler parameters were consistent with abnormal left ventricular relaxation (grade 1 diastolic dysfunction)  LEFT ATRIUM:  The atrium was mildly dilated  RIGHT ATRIUM:  The atrium was mildly to moderately dilated  MITRAL VALVE:  There was trace regurgitation  TRICUSPID VALVE:  There was mild regurgitation  Pulmonary artery systolic pressure was within the normal range  Estimated peak PA pressure was 38 mmHg  PROCEDURE: The procedure was performed in the echo lab  This was a routine study  The transthoracic approach was used  The study included complete 2D imaging, M-mode, complete spectral Doppler, and color Doppler  The heart rate was 64 bpm,  at the start of the study  Images were obtained from the parasternal, apical, subcostal, and suprasternal notch acoustic windows  Image quality was adequate  LEFT VENTRICLE: Size was normal  Systolic function was at the lower limits of normal by visual assessment  Ejection fraction was estimated to be 50 %  There were no regional wall motion abnormalities  There was mild concentric hypertrophy  DOPPLER: Doppler parameters were consistent with abnormal left ventricular relaxation (grade 1 diastolic dysfunction)  There was no evidence of elevated ventricular filling pressure by Doppler parameters  RIGHT VENTRICLE: The size was normal  Systolic function was low normal  DOPPLER: Systolic pressure was within the normal range  LEFT ATRIUM: The atrium was mildly dilated  No thrombus was identified  RIGHT ATRIUM: The atrium was mildly to moderately dilated  MITRAL VALVE: Valve structure was normal  There was normal leaflet separation  No echocardiographic evidence for prolapse  DOPPLER: The transmitral velocity was within the normal range  There was no evidence for stenosis  There was trace  regurgitation  AORTIC VALVE: The valve was trileaflet   Leaflets exhibited normal thickness, normal cuspal separation, and sclerosis  DOPPLER: Transaortic velocity was within the normal range  There was no evidence for stenosis  There was no  regurgitation  TRICUSPID VALVE: The valve structure was normal  There was normal leaflet separation  DOPPLER: The transtricuspid velocity was within the normal range  There was mild regurgitation  Pulmonary artery systolic pressure was within the normal  range  Estimated peak PA pressure was 38 mmHg  PULMONIC VALVE: Leaflets exhibited normal thickness, no calcification, and normal cuspal separation  DOPPLER: The transpulmonic velocity was within the normal range  There was trace regurgitation  PERICARDIUM: There was no thickening  There was no pericardial effusion  AORTA: The root exhibited normal size  PULMONARY ARTERY: The size was normal  The morphology appeared normal     SYSTEM MEASUREMENT TABLES    2D mode  AoR Diam 2D: 3 6 cm  LA Diam (2D): 3 6 cm  LA/Ao (2D): 1  FS (2D Teich): 25 6 %  IVSd (2D): 1 01 cm  LVDEV: 108 cm³  LVEDV MOD BP: 131 cm³  LVESV: 53 7 cm³  LVIDd(2D): 4 81 cm  LVISd (2D): 3 58 cm  LVOT Area 2D: 3 14 cm squared  LVPWd (2D): 1 03 cm  SV (Teich): 54 3 cm³    Apical four chamber  LVEF A4C: 55 %    Apical two chamber  LA Area: 22 7 cm squared  LA Volume: 70 cm³  LVEF A2C: 59 %  LVLD A2C: 8 37 cm    Unspecified Scan Mode  NARDA Cont Eq (Peak Kenroy): 2 81 cm squared  LVOT Diam : 2 cm  LVOT Vmax: 1190 mm/s  LVOT Vmax; Mean: 1190 mm/s  Peak Grad ; Mean: 6 mm[Hg]  MV Peak A Kenroy: 642 mm/s  MV Peak E Kenroy   Mean: 632 mm/s  MVA (PHT): 4 15 cm squared  PHT: 53 ms  Max P mm[Hg]  V Max: 2760 mm/s  Vmax: 2410 mm/s  RA Area: 24 9 cm squared  RA Volume: 86 7 cm³  TAPSE: 1 7 cm    [employer] Accredited Echocardiography Laboratory    Prepared and electronically signed by    [place] Marina DO Angelina  Signed 20-Mar-2018 08:35:00         Imaging:  Chest X-Ray:   Xr Chest Pa & Lateral    Result Date: "10/28/2017  Impression No active pulmonary disease  Hyperinflation  Workstation performed: [id number]       CT-scan of the chest:     Cta Dissection Protocol Chest And Abdomen    Result Date: 9/22/2017  Impression No evidence of aortic aneurysm or dissection  Mild to moderate COPD  Mildly thick-walled jejunal loop with slight hyperemia of the bowel, perhaps related to underdistention, cannot entirely exclude enteritis  No inflammatory changes in the adjacent mesentery  Workstation performed: [id number]     Lab Review   Lab Results   Component Value Date    WBC 4 7 04/21/2023    HGB 15 8 04/21/2023    HCT 47 7 04/21/2023    MCV 90 04/21/2023    RDW 12 0 04/21/2023     04/21/2023     BMP:  Lab Results   Component Value Date    K 5 1 04/21/2023     04/21/2023    CO2 24 04/21/2023    BUN 23 04/21/2023    CREATININE 1 18 04/21/2023    GLUCOSE 159 (H) 09/25/2017    GLUF 95 09/23/2017    CALCIUM 8 4 11/09/2020    CORRECTEDCA 9 0 11/09/2020    EGFR 73 04/21/2023    MG 2 4 09/27/2017     LFT:  Lab Results   Component Value Date    AST 20 04/21/2023    ALT 23 04/21/2023    ALKPHOS 58 11/09/2020       Lab Results   Component Value Date    HGBA1C 5 2 05/06/2022     Lipid Profile:   Lab Results   Component Value Date    HDL 40 04/21/2023    LDLCALC 84 04/21/2023    TRIG 103 04/21/2023     No results found for: CHOL  Lab Results   Component Value Date    CKTOTAL 68 11/09/2020    TROPONINI <0 02 02/01/2018     Lab Results   Component Value Date    NTBNP 170 (H) 02/01/2018        Dr Eren Salguero MD [facility]      \"This note has been constructed using a voice recognition system  Therefore there may be syntax, spelling, and/or grammatical errors  Please call if you have any questions   \"  "

## 2023-06-28 DIAGNOSIS — I25.10 CAD, MULTIPLE VESSEL: ICD-10-CM

## 2023-06-28 RX ORDER — ROSUVASTATIN CALCIUM 20 MG/1
TABLET, COATED ORAL
Qty: 90 TABLET | Refills: 0 | Status: SHIPPED | OUTPATIENT
Start: 2023-06-28

## 2023-08-15 ENCOUNTER — APPOINTMENT (OUTPATIENT)
Dept: RADIOLOGY | Facility: CLINIC | Age: 53
End: 2023-08-15
Payer: MEDICARE

## 2023-08-15 ENCOUNTER — OFFICE VISIT (OUTPATIENT)
Dept: FAMILY MEDICINE CLINIC | Facility: CLINIC | Age: 53
End: 2023-08-15
Payer: MEDICARE

## 2023-08-15 VITALS
WEIGHT: 181.4 LBS | RESPIRATION RATE: 18 BRPM | DIASTOLIC BLOOD PRESSURE: 90 MMHG | HEART RATE: 76 BPM | TEMPERATURE: 98.2 F | SYSTOLIC BLOOD PRESSURE: 140 MMHG | HEIGHT: 65 IN | BODY MASS INDEX: 30.22 KG/M2

## 2023-08-15 DIAGNOSIS — M25.511 ACUTE PAIN OF RIGHT SHOULDER: ICD-10-CM

## 2023-08-15 DIAGNOSIS — M25.511 ACUTE PAIN OF RIGHT SHOULDER: Primary | ICD-10-CM

## 2023-08-15 PROCEDURE — 73030 X-RAY EXAM OF SHOULDER: CPT

## 2023-08-15 PROCEDURE — 99213 OFFICE O/P EST LOW 20 MIN: CPT | Performed by: FAMILY MEDICINE

## 2023-08-15 RX ORDER — NAPROXEN 500 MG/1
500 TABLET ORAL 2 TIMES DAILY WITH MEALS
Qty: 20 TABLET | Refills: 0 | Status: SHIPPED | OUTPATIENT
Start: 2023-08-15 | End: 2023-08-25

## 2023-08-15 NOTE — PROGRESS NOTES
Chief Complaint   Patient presents with   • Shoulder Problem     Patient complains of right shoulder issues it has subsided         Patient ID: Vaishali Peres is a 45 y.o. male. Shoulder Pain   The pain is present in the right shoulder. This is a new problem. The current episode started in the past 7 days. There has been no history of extremity trauma. The problem occurs intermittently. The problem has been unchanged. The quality of the pain is described as aching. The pain is at a severity of 4/10. The pain is mild. Associated symptoms include a limited range of motion. Pertinent negatives include no joint locking, joint swelling, numbness, stiffness or tingling. The symptoms are aggravated by activity. He has tried heat for the symptoms. The treatment provided no relief. Family history does not include gout or rheumatoid arthritis. There is no history of diabetes, gout, osteoarthritis or rheumatoid arthritis. The following portions of the patient's history were reviewed and updated as appropriate: allergies, current medications, past family history, past medical history, past social history, past surgical history and problem list.    Review of Systems   Constitutional: Negative. Respiratory: Negative for cough. Musculoskeletal: Positive for arthralgias. Negative for gait problem, gout and stiffness. Skin: Negative for rash. Neurological: Negative for tingling and numbness.        Current Outpatient Medications   Medication Sig Dispense Refill   • albuterol (PROVENTIL HFA,VENTOLIN HFA) 90 mcg/act inhaler INHALE TWO PUFFS BY MOUTH EVERY 4 HOURS AS NEEDED FOR WHEEZING OR SHORTNESS OF BREATH 18 g 7   • aspirin 325 mg tablet Take 1 tablet by mouth daily 100 tablet 0   • fluticasone (FLONASE) 50 mcg/act nasal spray 1 spray into each nostril daily 16 g 3   • Multiple Vitamins-Minerals (CENTRUM SILVER PO) Take by mouth     • Roflumilast (DALIRESP PO) Take by mouth     • rosuvastatin (CRESTOR) 20 MG tablet TAKE ONE TABLET BY MOUTH EVERY DAY 90 tablet 0   • Trelegy Ellipta 100-62.5-25 MCG/ACT inhaler        No current facility-administered medications for this visit. Objective:    /90 (BP Location: Left arm, Patient Position: Sitting, Cuff Size: Standard)   Pulse 76   Temp 98.2 °F (36.8 °C)   Resp 18   Ht 5' 4.5" (1.638 m)   Wt 82.3 kg (181 lb 6.4 oz)   BMI 30.66 kg/m²        Physical Exam  Constitutional:       Appearance: He is not ill-appearing. Musculoskeletal:      Right shoulder: No swelling, deformity, laceration or bony tenderness. Decreased range of motion (limited extension ). Normal strength. Skin:     Findings: No rash. Neurological:      Mental Status: He is alert. Assessment/Plan:         Diagnoses and all orders for this visit:    Acute pain of right shoulder  -     XR shoulder 2+ vw right; Future  -     naproxen (Naprosyn) 500 mg tablet;  Take 1 tablet (500 mg total) by mouth 2 (two) times a day with meals for 10 days      will consider PT if no improvement after naproxen     rto prn                       Julissa Lowery MD

## 2023-08-28 ENCOUNTER — CONSULT (OUTPATIENT)
Dept: PULMONOLOGY | Facility: CLINIC | Age: 53
End: 2023-08-28
Payer: MEDICARE

## 2023-08-28 VITALS
SYSTOLIC BLOOD PRESSURE: 140 MMHG | BODY MASS INDEX: 30.32 KG/M2 | OXYGEN SATURATION: 95 % | WEIGHT: 182 LBS | TEMPERATURE: 98.3 F | HEART RATE: 82 BPM | DIASTOLIC BLOOD PRESSURE: 90 MMHG | HEIGHT: 65 IN

## 2023-08-28 DIAGNOSIS — G47.33 OSA (OBSTRUCTIVE SLEEP APNEA): Primary | ICD-10-CM

## 2023-08-28 DIAGNOSIS — F17.211 NICOTINE DEPENDENCE, CIGARETTES, IN REMISSION: ICD-10-CM

## 2023-08-28 DIAGNOSIS — J43.2 CENTRILOBULAR EMPHYSEMA (HCC): ICD-10-CM

## 2023-08-28 PROCEDURE — 99215 OFFICE O/P EST HI 40 MIN: CPT | Performed by: INTERNAL MEDICINE

## 2023-08-28 RX ORDER — FLUTICASONE FUROATE, UMECLIDINIUM BROMIDE AND VILANTEROL TRIFENATATE 100; 62.5; 25 UG/1; UG/1; UG/1
1 POWDER RESPIRATORY (INHALATION) DAILY
Qty: 60 BLISTER | Refills: 5 | Status: SHIPPED | OUTPATIENT
Start: 2023-08-28

## 2023-08-28 NOTE — ASSESSMENT & PLAN NOTE
Maddy Gama and I reviewed his pulmonary function test.  He certainly has hyperinflation and emphysema on his CAT scan. I think he be a good candidate for endobronchial valve placement. I did give him information about this. In the meantime he will maintain on triple therapy Trelegy once a day and continue his exercise regimen he is walking 30 to 40 minutes a day. He is due for lung cancer screening annually which I will order for him prior to his next visit.

## 2023-08-28 NOTE — PROGRESS NOTES
Assessment/Plan:    Chronic obstructive pulmonary disease ([address])  Maicol Elder and I reviewed his pulmonary function test.  He certainly has hyperinflation and emphysema on his CAT scan. I think he be a good candidate for endobronchial valve placement. I did give him information about this. In the meantime he will maintain on triple therapy Trelegy once a day and continue his exercise regimen he is walking 30 to 40 minutes a day. He is due for lung cancer screening annually which I will order for him prior to his next visit. LUCY (obstructive sleep apnea)  I reviewed Neal's compliance data and he is using his CPAP all night every night. He seems to be doing well and he has a prescription for mask and tubing. Diagnoses and all orders for this visit:    LUCY (obstructive sleep apnea)    Centrilobular emphysema ([address])  -     Trelegy Ellipta 100-62.5-25 MCG/ACT inhaler; Inhale 1 puff daily  -     CT lung screening program; Future    Nicotine dependence, cigarettes, in remission  -     CT lung screening program; Future          Subjective:      Patient ID: Kevin Hutton is a 45 y.o. male. Maicol Elder is here for follow-up of his obstructive sleep apnea and COPD. He feels his breathing is good although he definitely has limitations. He does walk 30 to 40 minutes/day on an incline but feels his quality of life is not where he wants it to be. He has exacerbations about once a year and maintains on Trelegy. He rarely uses his rescue inhaler. He did have open heart surgery about 5 years ago at which time he quit smoking. The following portions of the patient's history were reviewed and updated as appropriate: allergies, current medications, past family history, past medical history, past social history, past surgical history and problem list.    Review of Systems   Constitutional: Negative. HENT: Negative. Eyes: Negative. Respiratory: Negative for shortness of breath. Cardiovascular: Negative. Gastrointestinal: Negative. Endocrine: Negative. Genitourinary: Negative. Allergic/Immunologic: Negative. Neurological: Negative. Hematological: Negative. Psychiatric/Behavioral: Negative. Objective:      /90   Pulse 82   Temp 98.3 °F (36.8 °C)   Ht 5' 4.5" (1.638 m)   Wt 82.6 kg (182 lb)   SpO2 95%   BMI 30.76 kg/m²          Physical Exam  Constitutional:       Appearance: He is well-developed. HENT:      Head: Normocephalic. Eyes:      Pupils: Pupils are equal, round, and reactive to light. Cardiovascular:      Rate and Rhythm: Normal rate. Heart sounds: No murmur heard. Pulmonary:      Effort: Pulmonary effort is normal. No respiratory distress. Breath sounds: Normal breath sounds. No wheezing or rales. Abdominal:      Palpations: Abdomen is soft. Musculoskeletal:         General: Normal range of motion. Cervical back: Normal range of motion and neck supple. Skin:     General: Skin is warm and dry. Neurological:      Mental Status: He is alert and oriented to person, place, and time.

## 2023-08-28 NOTE — ASSESSMENT & PLAN NOTE
I reviewed Neal's compliance data and he is using his CPAP all night every night. He seems to be doing well and he has a prescription for mask and tubing.

## 2023-09-07 ENCOUNTER — HOSPITAL ENCOUNTER (OUTPATIENT)
Dept: PULMONOLOGY | Facility: HOSPITAL | Age: 53
End: 2023-09-07
Payer: MEDICARE

## 2023-09-07 DIAGNOSIS — J43.2 CENTRILOBULAR EMPHYSEMA (HCC): ICD-10-CM

## 2023-09-07 PROCEDURE — 94761 N-INVAS EAR/PLS OXIMETRY MLT: CPT

## 2023-09-07 PROCEDURE — 94729 DIFFUSING CAPACITY: CPT

## 2023-09-07 PROCEDURE — 94726 PLETHYSMOGRAPHY LUNG VOLUMES: CPT

## 2023-09-07 PROCEDURE — 94729 DIFFUSING CAPACITY: CPT | Performed by: INTERNAL MEDICINE

## 2023-09-07 PROCEDURE — 94618 PULMONARY STRESS TESTING: CPT | Performed by: INTERNAL MEDICINE

## 2023-09-07 PROCEDURE — 94060 EVALUATION OF WHEEZING: CPT | Performed by: INTERNAL MEDICINE

## 2023-09-07 PROCEDURE — 94060 EVALUATION OF WHEEZING: CPT

## 2023-09-07 PROCEDURE — 94726 PLETHYSMOGRAPHY LUNG VOLUMES: CPT | Performed by: INTERNAL MEDICINE

## 2023-09-07 PROCEDURE — 94760 N-INVAS EAR/PLS OXIMETRY 1: CPT

## 2023-09-07 RX ORDER — ALBUTEROL SULFATE 2.5 MG/3ML
2.5 SOLUTION RESPIRATORY (INHALATION) ONCE
Status: COMPLETED | OUTPATIENT
Start: 2023-09-07 | End: 2023-09-07

## 2023-09-07 RX ADMIN — ALBUTEROL SULFATE 2.5 MG: 2.5 SOLUTION RESPIRATORY (INHALATION) at 17:46

## 2023-10-06 ENCOUNTER — CLINICAL SUPPORT (OUTPATIENT)
Dept: FAMILY MEDICINE CLINIC | Facility: CLINIC | Age: 53
End: 2023-10-06
Payer: MEDICARE

## 2023-10-06 DIAGNOSIS — Z23 NEED FOR INFLUENZA VACCINATION: Primary | ICD-10-CM

## 2023-10-06 PROCEDURE — G0008 ADMIN INFLUENZA VIRUS VAC: HCPCS

## 2023-10-06 PROCEDURE — 90686 IIV4 VACC NO PRSV 0.5 ML IM: CPT

## 2023-10-25 ENCOUNTER — OFFICE VISIT (OUTPATIENT)
Dept: FAMILY MEDICINE CLINIC | Facility: CLINIC | Age: 53
End: 2023-10-25
Payer: MEDICARE

## 2023-10-25 VITALS
TEMPERATURE: 98.2 F | DIASTOLIC BLOOD PRESSURE: 82 MMHG | HEART RATE: 64 BPM | SYSTOLIC BLOOD PRESSURE: 130 MMHG | HEIGHT: 65 IN | BODY MASS INDEX: 31.16 KG/M2 | WEIGHT: 187 LBS | RESPIRATION RATE: 16 BRPM

## 2023-10-25 DIAGNOSIS — I25.10 CAD, MULTIPLE VESSEL: ICD-10-CM

## 2023-10-25 DIAGNOSIS — J43.2 CENTRILOBULAR EMPHYSEMA (HCC): Primary | ICD-10-CM

## 2023-10-25 PROCEDURE — 99213 OFFICE O/P EST LOW 20 MIN: CPT | Performed by: FAMILY MEDICINE

## 2023-10-25 RX ORDER — ROFLUMILAST 500 UG/1
TABLET ORAL
COMMUNITY
Start: 2023-10-04

## 2023-10-25 NOTE — PROGRESS NOTES
Chief Complaint   Patient presents with   • Follow-up     6 month check         Patient ID: Dony Ruth is a 45 y.o. male. HPI  Pt is seeing for f/u COPD and CAD -  stable -  under pulmonologist and cardiologist care     The following portions of the patient's history were reviewed and updated as appropriate: allergies, current medications, past family history, past medical history, past social history, past surgical history and problem list.    Review of Systems   Constitutional: Negative. Respiratory: Negative. Cardiovascular: Negative. Gastrointestinal: Negative. Genitourinary: Negative. Musculoskeletal: Negative. Skin: Negative. Neurological: Negative. Current Outpatient Medications   Medication Sig Dispense Refill   • albuterol (PROVENTIL HFA,VENTOLIN HFA) 90 mcg/act inhaler INHALE TWO PUFFS BY MOUTH EVERY 4 HOURS AS NEEDED FOR WHEEZING OR SHORTNESS OF BREATH 18 g 7   • aspirin 325 mg tablet Take 1 tablet by mouth daily 100 tablet 0   • fluticasone (FLONASE) 50 mcg/act nasal spray 1 spray into each nostril daily 16 g 3   • Multiple Vitamins-Minerals (CENTRUM SILVER PO) Take by mouth     • Roflumilast (DALIRESP PO) Take by mouth     • roflumilast (DALIRESP) 500 mcg tablet      • rosuvastatin (CRESTOR) 20 MG tablet TAKE ONE TABLET BY MOUTH EVERY DAY 90 tablet 0   • Trelegy Ellipta 100-62.5-25 MCG/ACT inhaler Inhale 1 puff daily 60 blister 5     No current facility-administered medications for this visit. Objective:    /82 (BP Location: Left arm, Patient Position: Sitting, Cuff Size: Large)   Pulse 64   Temp 98.2 °F (36.8 °C)   Resp 16   Ht 5' 4.5" (1.638 m)   Wt 84.8 kg (187 lb)   BMI 31.60 kg/m²        Physical Exam  Constitutional:       General: He is not in acute distress. Appearance: He is not ill-appearing. Cardiovascular:      Rate and Rhythm: Normal rate and regular rhythm. Heart sounds: Heart sounds are distant.    Pulmonary:      Effort: Pulmonary effort is normal. No respiratory distress. Breath sounds: Decreased air movement present. No wheezing, rhonchi or rales. Musculoskeletal:      Right lower leg: No edema. Left lower leg: No edema. Neurological:      Mental Status: He is alert. Labs in chart were reviewed.       Assessment/Plan:         Diagnoses and all orders for this visit:    Centrilobular emphysema ([address])    CAD, multiple vessel    All are stable   Cont meds        Rto in 6 m for Jeevan Cantu MD

## 2023-11-06 DIAGNOSIS — I25.10 CAD, MULTIPLE VESSEL: ICD-10-CM

## 2023-11-06 RX ORDER — ROSUVASTATIN CALCIUM 20 MG/1
TABLET, COATED ORAL
Qty: 90 TABLET | Refills: 0 | Status: SHIPPED | OUTPATIENT
Start: 2023-11-06

## 2024-01-15 DIAGNOSIS — I25.10 CAD, MULTIPLE VESSEL: ICD-10-CM

## 2024-01-15 DIAGNOSIS — Z95.1 S/P CABG X 2: Primary | ICD-10-CM

## 2024-01-15 DIAGNOSIS — E78.2 MIXED HYPERLIPIDEMIA: ICD-10-CM

## 2024-01-24 ENCOUNTER — TELEPHONE (OUTPATIENT)
Dept: FAMILY MEDICINE CLINIC | Facility: CLINIC | Age: 54
End: 2024-01-24

## 2024-01-24 NOTE — TELEPHONE ENCOUNTER
Patient called stating that he tested positive for COVID on 1/19. Started feeling better over the weekend, but as of today he is developing cough sxs.  Spoke with PCP and advised patient to go to UC for further evaluation.

## 2024-02-23 ENCOUNTER — HOSPITAL ENCOUNTER (OUTPATIENT)
Dept: RADIOLOGY | Facility: HOSPITAL | Age: 54
Discharge: HOME | End: 2024-02-23
Attending: INTERNAL MEDICINE
Payer: MEDICARE

## 2024-02-23 DIAGNOSIS — F17.211 NICOTINE DEPENDENCE, CIGARETTES, IN REMISSION: ICD-10-CM

## 2024-02-23 DIAGNOSIS — J43.2 CENTRILOBULAR EMPHYSEMA (HCC): ICD-10-CM

## 2024-02-23 PROCEDURE — 71271 CT THORAX LUNG CANCER SCR C-: CPT

## 2024-03-12 DIAGNOSIS — I25.10 CAD, MULTIPLE VESSEL: ICD-10-CM

## 2024-03-12 RX ORDER — ROSUVASTATIN CALCIUM 20 MG/1
TABLET, COATED ORAL
Qty: 90 TABLET | Refills: 0 | Status: SHIPPED | OUTPATIENT
Start: 2024-03-12

## 2024-03-18 DIAGNOSIS — J43.2 CENTRILOBULAR EMPHYSEMA (HCC): ICD-10-CM

## 2024-03-18 NOTE — TELEPHONE ENCOUNTER
Reason for call:   [x] Refill   [] Prior Auth  [] Other:     Office:   [] PCP/Provider -   [x] Specialty/Provider -     Medication: Trelegy Ellipta     Dose/Frequency: 100-62.5-25 mcg / 1 puff daily    Quantity: 60 blister    Pharmacy: [id number] - [place], [place] - [address]     Does the patient have enough for 3 days?   [] Yes   [x] No - Send as HP to POD

## 2024-03-21 DIAGNOSIS — J43.2 CENTRILOBULAR EMPHYSEMA (HCC): ICD-10-CM

## 2024-03-26 ENCOUNTER — OFFICE VISIT (OUTPATIENT)
Dept: PULMONOLOGY | Facility: CLINIC | Age: 54
End: 2024-03-26
Payer: MEDICARE

## 2024-03-26 VITALS
DIASTOLIC BLOOD PRESSURE: 82 MMHG | OXYGEN SATURATION: 94 % | WEIGHT: 197 LBS | TEMPERATURE: 94 F | BODY MASS INDEX: 32.82 KG/M2 | RESPIRATION RATE: 16 BRPM | HEART RATE: 76 BPM | HEIGHT: 65 IN | SYSTOLIC BLOOD PRESSURE: 148 MMHG

## 2024-03-26 DIAGNOSIS — J43.2 CENTRILOBULAR EMPHYSEMA (HCC): ICD-10-CM

## 2024-03-26 DIAGNOSIS — G47.33 OSA (OBSTRUCTIVE SLEEP APNEA): Primary | ICD-10-CM

## 2024-03-26 DIAGNOSIS — J42 CHRONIC BRONCHITIS, UNSPECIFIED CHRONIC BRONCHITIS TYPE (HCC): ICD-10-CM

## 2024-03-26 PROCEDURE — 99215 OFFICE O/P EST HI 40 MIN: CPT | Performed by: INTERNAL MEDICINE

## 2024-03-26 RX ORDER — FLUTICASONE FUROATE, UMECLIDINIUM BROMIDE AND VILANTEROL TRIFENATATE 100; 62.5; 25 UG/1; UG/1; UG/1
1 POWDER RESPIRATORY (INHALATION) DAILY
Qty: 60 BLISTER | Refills: 5 | Status: SHIPPED | OUTPATIENT
Start: 2024-03-26

## 2024-03-26 RX ORDER — FLUTICASONE FUROATE, UMECLIDINIUM BROMIDE AND VILANTEROL TRIFENATATE 100; 62.5; 25 UG/1; UG/1; UG/1
1 POWDER RESPIRATORY (INHALATION) DAILY
Qty: 60 EACH | Refills: 5 | Status: SHIPPED | OUTPATIENT
Start: 2024-03-26

## 2024-03-26 RX ORDER — ALBUTEROL SULFATE 90 UG/1
2 AEROSOL, METERED RESPIRATORY (INHALATION) EVERY 6 HOURS PRN
Qty: 18 G | Refills: 7 | Status: SHIPPED | OUTPATIENT
Start: 2024-03-26

## 2024-03-26 NOTE — ASSESSMENT & PLAN NOTE
Neal ran out of his Trelegy 8 days ago and is felt that his breathing is worse since that time.  He does feel the Trelegy helps maintain a normal quality of life therefore he will restart Trelegy which I refilled today.  He will also maintain on Daliresp without any symptoms.  I gave him a new albuterol to use as needed he is up-to-date on his vaccines.  I reviewed his most recent lung cancer screening CT from February which looked normal.  He does not qualify for valves at this time we can reconsider at a later date

## 2024-03-26 NOTE — TELEPHONE ENCOUNTER
Ok to fill?  Last filled 8/28/23   Last seen 8/28/23   Upcoming appointment scheduled 3/26/23    24-Sep-2020

## 2024-03-26 NOTE — PROGRESS NOTES
Assessment/Plan:    Chronic obstructive pulmonary disease (HCC)  Neal ran out of his Trelegy 8 days ago and is felt that his breathing is worse since that time.  He does feel the Trelegy helps maintain a normal quality of life therefore he will restart Trelegy which I refilled today.  He will also maintain on Daliresp without any symptoms.  I gave him a new albuterol to use as needed he is up-to-date on his vaccines.  I reviewed his most recent lung cancer screening CT from February which looked normal.  He does not qualify for valves at this time we can reconsider at a later date    LUCY (obstructive sleep apnea)  Neal is wearing his CPAP all night every night.  I refilled his mask and tubing for the year.     Diagnoses and all orders for this visit:    LUCY (obstructive sleep apnea)  -     PAP DME Resupply/Reorder    Centrilobular emphysema (HCC)  -     Trelegy Ellipta 100-62.5-25 MCG/ACT inhaler; Inhale 1 puff daily  -     albuterol (PROVENTIL HFA,VENTOLIN HFA) 90 mcg/act inhaler; Inhale 2 puffs every 6 (six) hours as needed for wheezing    Chronic bronchitis, unspecified chronic bronchitis type (HCC)          Subjective:      Patient ID: Neal Weston is a 53 y.o. male.    Close been doing well with regards to his breathing.  When he takes his Trelegy every day he really needs a rescue inhaler.  Since running out of it 8 days ago he is needed his rescue inhaler more frequently.  He is going on walks and denies any specific wheezing or cough.        The following portions of the patient's history were reviewed and updated as appropriate: allergies, current medications, past family history, past medical history, past social history, past surgical history, and problem list.    Review of Systems   Constitutional: Negative.    HENT: Negative.     Eyes: Negative.    Respiratory:  Negative for shortness of breath.    Cardiovascular: Negative.    Gastrointestinal: Negative.    Endocrine: Negative.    Genitourinary:  "Negative.    Allergic/Immunologic: Negative.    Neurological: Negative.    Hematological: Negative.    Psychiatric/Behavioral: Negative.           Objective:      /82 (BP Location: Left arm, Patient Position: Sitting, Cuff Size: Large)   Pulse 76   Temp (!) 94 °F (34.4 °C) (Tympanic)   Resp 16   Ht 5' 4.5\" (1.638 m)   Wt 89.4 kg (197 lb)   SpO2 94%   BMI 33.29 kg/m²          Physical Exam  Constitutional:       Appearance: He is well-developed.   HENT:      Head: Normocephalic.   Eyes:      Pupils: Pupils are equal, round, and reactive to light.   Cardiovascular:      Rate and Rhythm: Normal rate.   Pulmonary:      Effort: Pulmonary effort is normal. No respiratory distress.      Breath sounds: No wheezing or rales.   Abdominal:      Palpations: Abdomen is soft.   Musculoskeletal:         General: Normal range of motion.      Cervical back: Neck supple.   Skin:     General: Skin is warm and dry.   Neurological:      Mental Status: He is alert and oriented to person, place, and time.           "

## 2024-04-05 LAB

## 2024-04-12 ENCOUNTER — TELEPHONE (OUTPATIENT)
Age: 54
End: 2024-04-12

## 2024-04-12 DIAGNOSIS — E78.2 MIXED HYPERLIPIDEMIA: Primary | ICD-10-CM

## 2024-04-12 DIAGNOSIS — R73.01 IMPAIRED FASTING BLOOD SUGAR: ICD-10-CM

## 2024-04-12 NOTE — TELEPHONE ENCOUNTER
Patient requests routine lab order placed in chart prior to AWV on 4/16.    Will  in office next week.

## 2024-04-19 ENCOUNTER — RA CDI HCC (OUTPATIENT)
Dept: OTHER | Facility: HOSPITAL | Age: 54
End: 2024-04-19

## 2024-04-20 LAB
ALBUMIN SERPL-MCNC: 4.6 G/DL (ref 3.8–4.9)
ALBUMIN/GLOB SERPL: 2.1 {RATIO} (ref 1.2–2.2)
ALP SERPL-CCNC: 75 IU/L (ref 44–121)
ALT SERPL-CCNC: 20 IU/L (ref 0–44)
AST SERPL-CCNC: 20 IU/L (ref 0–40)
BILIRUB SERPL-MCNC: 0.4 MG/DL (ref 0–1.2)
BUN SERPL-MCNC: 17 MG/DL (ref 6–24)
BUN/CREAT SERPL: 14 (ref 9–20)
CALCIUM SERPL-MCNC: 10 MG/DL (ref 8.7–10.2)
CHLORIDE SERPL-SCNC: 101 MMOL/L (ref 96–106)
CHOLEST SERPL-MCNC: 138 MG/DL (ref 100–199)
CO2 SERPL-SCNC: 23 MMOL/L (ref 20–29)
CREAT SERPL-MCNC: 1.22 MG/DL (ref 0.76–1.27)
EGFR: 70 ML/MIN/1.73
GLOBULIN SER-MCNC: 2.2 G/DL (ref 1.5–4.5)
GLUCOSE SERPL-MCNC: 104 MG/DL (ref 70–99)
HBA1C MFR BLD: 5.5 % (ref 4.8–5.6)
HDLC SERPL-MCNC: 41 MG/DL
LDLC SERPL CALC-MCNC: 77 MG/DL (ref 0–99)
MICRODELETION SYND BLD/T FISH: NORMAL
POTASSIUM SERPL-SCNC: 5 MMOL/L (ref 3.5–5.2)
PROT SERPL-MCNC: 6.8 G/DL (ref 6–8.5)
SL AMB VLDL CHOLESTEROL CALC: 20 MG/DL (ref 5–40)
SODIUM SERPL-SCNC: 140 MMOL/L (ref 134–144)
TRIGL SERPL-MCNC: 110 MG/DL (ref 0–149)

## 2024-04-26 ENCOUNTER — OFFICE VISIT (OUTPATIENT)
Dept: FAMILY MEDICINE CLINIC | Facility: CLINIC | Age: 54
End: 2024-04-26
Payer: MEDICARE

## 2024-04-26 VITALS
RESPIRATION RATE: 20 BRPM | BODY MASS INDEX: 32.82 KG/M2 | WEIGHT: 197 LBS | DIASTOLIC BLOOD PRESSURE: 80 MMHG | HEART RATE: 90 BPM | TEMPERATURE: 98.4 F | SYSTOLIC BLOOD PRESSURE: 140 MMHG | OXYGEN SATURATION: 94 % | HEIGHT: 65 IN

## 2024-04-26 DIAGNOSIS — R11.0 NAUSEA: Primary | ICD-10-CM

## 2024-04-26 DIAGNOSIS — J43.2 CENTRILOBULAR EMPHYSEMA (HCC): Primary | ICD-10-CM

## 2024-04-26 DIAGNOSIS — R09.81 NASAL CONGESTION: ICD-10-CM

## 2024-04-26 DIAGNOSIS — Z00.00 MEDICARE ANNUAL WELLNESS VISIT, SUBSEQUENT: ICD-10-CM

## 2024-04-26 PROCEDURE — G0438 PPPS, INITIAL VISIT: HCPCS | Performed by: FAMILY MEDICINE

## 2024-04-26 PROCEDURE — 99213 OFFICE O/P EST LOW 20 MIN: CPT | Performed by: FAMILY MEDICINE

## 2024-04-26 RX ORDER — FLUTICASONE FUROATE, UMECLIDINIUM BROMIDE AND VILANTEROL TRIFENATATE 100; 62.5; 25 UG/1; UG/1; UG/1
1 POWDER RESPIRATORY (INHALATION) DAILY
Qty: 60 EACH | Refills: 0 | Status: SHIPPED | OUTPATIENT
Start: 2024-04-26

## 2024-04-26 RX ORDER — FLUTICASONE PROPIONATE 50 MCG
1 SPRAY, SUSPENSION (ML) NASAL DAILY
Qty: 16 G | Refills: 3 | Status: SHIPPED | OUTPATIENT
Start: 2024-04-26

## 2024-04-26 NOTE — PROGRESS NOTES
Assessment and Plan:     Problem List Items Addressed This Visit    None  Visit Diagnoses     Medicare annual wellness visit, subsequent    -  Primary    Nasal congestion        Relevant Medications    fluticasone (FLONASE) 50 mcg/act nasal spray           Preventive health issues were discussed with patient, and age appropriate screening tests were ordered as noted in patient's After Visit Summary.  Personalized health advice and appropriate referrals for health education or preventive services given if needed, as noted in patient's After Visit Summary.     History of Present Illness:     Patient presents for a Medicare Wellness Visit    HPI   Patient Care Team:  Juana Rojas MD as PCP - General  Waleska Osei MD as PCP - PCP-[facility] ([facility])  Waleska Osei MD as PCP - [facility]  MD Cuong Gibbs MD Sinan Kutty, MD Michael Nekoranik, DO Narpinder Singh, MD Sinan Kutty, MD as Endoscopist     Review of Systems:     Review of Systems     Problem List:     Patient Active Problem List   Diagnosis   • CAD, multiple vessel   • S/P CABG x 2   • Chronic obstructive pulmonary disease (HCC)   • Hyperlipidemia   • Centrilobular emphysema (HCC)   • Lumbar radiculopathy   • LUCY (obstructive sleep apnea)   • Dyspnea on exertion      Past Medical and Surgical History:     Past Medical History:   Diagnosis Date   • Chronic sinus complaints     last assessed 12/12/17   • COPD (chronic obstructive pulmonary disease) (HCC) 9/22/17   • Coronary artery disease    • Diverticulitis of colon    • Diverticulosis    • GERD (gastroesophageal reflux disease)     off medicine for past few years   • Heart attack (HCC)     last assessed 12/12/17   • HTN (hypertension)    • Hyperlipidemia    • Hypertension    • Myocardial infarction (HCC)    • Sciatica    • Sinusitis    • Sleep apnea, obstructive ?   • Tobacco use    • Vasovagal syncope     last assessed 5/1/13   • Wears glasses       Past Surgical History:   Procedure Laterality Date   • APPENDECTOMY     • COLONOSCOPY N/A 2017    Procedure: COLONOSCOPY;  Surgeon: Chemo Stuart MD;  Location: [facility];  Service: Gastroenterology   • CORONARY ARTERY BYPASS GRAFT N/A 2017    Procedure: INTRA OP MATTHEW; CORONARY ARTERY BYPASS GRAFT X 2 WITH SVH TO OM1  AND LIMA TO LAD; LEFT LEG EVH;  Surgeon: Cuong Loo MD;  Location: [facility];  Service: Cardiac Surgery   • THUMB AMPUTATION Left       Family History:     Family History   Problem Relation Age of Onset   • Heart disease Father         CABG   • Testicular cancer Father         adenocarcinoma in situ of the testis   • Heart defect Father         cardiac disorder   • Cancer Father    • Diabetes type II Father    • Heart disease Maternal Grandfather    • Hypertension Mother         benign essential   • Diabetes Sister    • COPD Sister    • Arthritis Family    • Arthritis Other    • Heart defect Other         cardiac disorder   • Diabetes Other    • Cancer Other       Social History:     Social History     Socioeconomic History   • Marital status:      Spouse name: None   • Number of children: None   • Years of education: None   • Highest education level: None   Occupational History   • Occupation:      Comment: [employer]   Tobacco Use   • Smoking status: Former     Current packs/day: 0.00     Average packs/day: 2.0 packs/day for 28.0 years (56.0 ttl pk-yrs)     Types: Cigarettes     Start date: 1989     Quit date: 2017     Years since quittin.5   • Smokeless tobacco: Never   • Tobacco comments:     denied hx of exposure to second hand smoke; former smoker, smoked 2ppd for 30 yrs. pt quit 3 mos ago as per [employer]   Vaping Use   • Vaping status: Never Used   Substance and Sexual Activity   • Alcohol use: No     Comment: rarely; denied hx of social drinker as per [employer]   • Drug use: No   • Sexual activity: Yes     Partners: Female      Birth control/protection: Female Sterilization   Other Topics Concern   • None   Social History Narrative    Feels safe at home    Pets - cat, dog    Denied hx of travel hx     Social Determinants of Health     Financial Resource Strain: Low Risk  (4/24/2023)    Overall Financial Resource Strain (CARDIA)    • Difficulty of Paying Living Expenses: Not hard at all   Food Insecurity: No Food Insecurity (4/26/2024)    Hunger Vital Sign    • Worried About Running Out of Food in the Last Year: Never true    • Ran Out of Food in the Last Year: Never true   Transportation Needs: No Transportation Needs (4/26/2024)    PRAPARE - Transportation    • Lack of Transportation (Medical): No    • Lack of Transportation (Non-Medical): No   Physical Activity: Not on file   Stress: Not on file   Social Connections: Not on file   Intimate Partner Violence: Not on file   Housing Stability: Low Risk  (4/26/2024)    Housing Stability Vital Sign    • Unable to Pay for Housing in the Last Year: No    • Number of Places Lived in the Last Year: 1    • Unstable Housing in the Last Year: No      Medications and Allergies:     Current Outpatient Medications   Medication Sig Dispense Refill   • albuterol (PROVENTIL HFA,VENTOLIN HFA) 90 mcg/act inhaler Inhale 2 puffs every 6 (six) hours as needed for wheezing 18 g 7   • aspirin 325 mg tablet Take 1 tablet by mouth daily 100 tablet 0   • fluticasone (FLONASE) 50 mcg/act nasal spray 1 spray into each nostril daily 16 g 3   • Multiple Vitamins-Minerals (CENTRUM SILVER PO) Take by mouth     • roflumilast (DALIRESP) 500 mcg tablet      • rosuvastatin (CRESTOR) 20 MG tablet TAKE ONE TABLET BY MOUTH EVERY DAY 90 tablet 0   • Trelegy Ellipta 100-62.5-25 MCG/ACT inhaler Inhale 1 puff daily 60 blister 5   • Trelegy Ellipta 100-62.5-25 MCG/ACT inhaler INHALE ONE PUFF BY MOUTH EVERY DAY 60 each 5   • Trelegy Ellipta 100-62.5-25 MCG/ACT inhaler Inhale 1 puff daily 60 blister 5     No current  facility-administered medications for this visit.     No Known Allergies   Immunizations:     Immunization History   Administered Date(s) Administered   • COVID-19 PFIZER VACCINE 0.3 ML IM 03/28/2021, 04/19/2021, 01/09/2022   • INFLUENZA 09/21/2020, 09/21/2020   • Influenza, injectable, quadrivalent, preservative free 0.5 mL 08/29/2018, 10/06/2023   • Influenza, recombinant, quadrivalent,injectable, preservative free 09/23/2019, 09/27/2021, 11/10/2022   • Pneumococcal Conjugate Vaccine 20-valent (Pcv20), Polysace 05/05/2022      Health Maintenance:         Topic Date Due   • Hepatitis C Screening  Never done   • HIV Screening  Never done   • Lung Cancer Screening  02/23/2025   • Colorectal Cancer Screening  05/12/2033         Topic Date Due   • Hepatitis A Vaccine (1 of 2 - Risk 2-dose series) Never done   • COVID-19 Vaccine (4 - 2023-24 season) 09/01/2023      Medicare Screening Tests and Risk Assessments:     Neal is here for his Subsequent Wellness visit.     Health Risk Assessment:   Patient rates overall health as fair. Patient feels that their physical health rating is same. Patient is very satisfied with their life. Eyesight was rated as same. Hearing was rated as same. Patient feels that their emotional and mental health rating is same. Patients states they are never, rarely angry. Patient states they are often unusually tired/fatigued. Pain experienced in the last 7 days has been some. Patient's pain rating has been 5/10. Patient states that he has experienced no weight loss or gain in last 6 months.     Depression Screening:   PHQ-2 Score: 0      Fall Risk Screening:   In the past year, patient has experienced: no history of falling in past year      Home Safety:  Patient does not have trouble with stairs inside or outside of their home. Patient has working smoke alarms and has no working carbon monoxide detector. Home safety hazards include: none.     Nutrition:   Current diet is Limited junk food.      Medications:   Patient is not currently taking any over-the-counter supplements. Patient is able to manage medications.     Activities of Daily Living (ADLs)/Instrumental Activities of Daily Living (IADLs):   Walk and transfer into and out of bed and chair?: Yes  Dress and groom yourself?: Yes    Bathe or shower yourself?: Yes    Feed yourself? Yes  Do your laundry/housekeeping?: Yes  Manage your money, pay your bills and track your expenses?: Yes  Make your own meals?: Yes    Do your own shopping?: Yes    Previous Hospitalizations:   Any hospitalizations or ED visits within the last 12 months?: No      Advance Care Planning:   Living will: No    Durable POA for healthcare: No    Advanced directive: No      Cognitive Screening:   Provider or family/friend/caregiver concerned regarding cognition?: No    PREVENTIVE SCREENINGS      Cardiovascular Screening:    General: Screening Not Indicated and History Lipid Disorder      Diabetes Screening:     General: Screening Current      Colorectal Cancer Screening:     General: Screening Current      Prostate Cancer Screening:    General: Screening Not Indicated      Osteoporosis Screening:    General: Screening Not Indicated      Abdominal Aortic Aneurysm (AAA) Screening:    Risk factors include: tobacco use        General: Screening Not Indicated      Lung Cancer Screening:     General: Screening Current      Hepatitis C Screening:    General: Screening Not Indicated    Screening, Brief Intervention, and Referral to Treatment (SBIRT)    Screening  Typical number of drinks in a day: 0  Typical number of drinks in a week: 0  Interpretation: Low risk drinking behavior.    AUDIT-C Screenin) How often did you have a drink containing alcohol in the past year? never  2) How many drinks did you have on a typical day when you were drinking in the past year? 0  3) How often did you have 6 or more drinks on one occasion in the past year? never    AUDIT-C Score:  "0  Interpretation: Score 0-3 (male): Negative screen for alcohol misuse    Single Item Drug Screening:  How often have you used an illegal drug (including marijuana) or a prescription medication for non-medical reasons in the past year? never    Single Item Drug Screen Score: 0  Interpretation: Negative screen for possible drug use disorder    No results found.     Physical Exam:     /80 (BP Location: Left arm, Patient Position: Sitting, Cuff Size: Large)   Pulse 90   Temp 98.4 °F (36.9 °C) (Temporal)   Resp 20   Ht 5' 4.5\" (1.638 m)   Wt 89.4 kg (197 lb)   SpO2 94%   BMI 33.29 kg/m²     Physical Exam     Juana Rojas MD  "

## 2024-04-26 NOTE — TELEPHONE ENCOUNTER
I lvm for pt advising that Dr. Parr escribed medication on 3/26/24 w/ 5 refills. Can you please deny request? Thank you

## 2024-04-26 NOTE — PATIENT INSTRUCTIONS
Medicare Preventive Visit Patient Instructions  Thank you for completing your Welcome to Medicare Visit or Medicare Annual Wellness Visit today. Your next wellness visit will be due in one year (4/27/2025).  The screening/preventive services that you may require over the next 5-10 years are detailed below. Some tests may not apply to you based off risk factors and/or age. Screening tests ordered at today's visit but not completed yet may show as past due. Also, please note that scanned in results may not display below.  Preventive Screenings:  Service Recommendations Previous Testing/Comments   Colorectal Cancer Screening  Colonoscopy    Fecal Occult Blood Test (FOBT)/Fecal Immunochemical Test (FIT)  Fecal DNA/Cologuard Test  Flexible Sigmoidoscopy Age: 45-75 years old   Colonoscopy: every 10 years (May be performed more frequently if at higher risk)  OR  FOBT/FIT: every 1 year  OR  Cologuard: every 3 years  OR  Sigmoidoscopy: every 5 years  Screening may be recommended earlier than age 45 if at higher risk for colorectal cancer. Also, an individualized decision between you and your healthcare provider will decide whether screening between the ages of 76-85 would be appropriate. Colonoscopy: 05/15/2023  FOBT/FIT: Not on file  Cologuard: Not on file  Sigmoidoscopy: Not on file    Screening Current     Prostate Cancer Screening Individualized decision between patient and health care provider in men between ages of 55-69   Medicare will cover every 12 months beginning on the day after your 50th birthday PSA: No results in last 5 years           Hepatitis C Screening Once for adults born between 1945 and 1965  More frequently in patients at high risk for Hepatitis C Hep C Antibody: Not on file        Diabetes Screening 1-2 times per year if you're at risk for diabetes or have pre-diabetes Fasting glucose: No results in last 5 years (No results in last 5 years)  A1C: 5.5 % (4/19/2024)  Screening Current   Cholesterol  Screening Once every 5 years if you don't have a lipid disorder. May order more often based on risk factors. Lipid panel: 04/19/2024  Screening Not Indicated  History Lipid Disorder      Other Preventive Screenings Covered by Medicare:  Abdominal Aortic Aneurysm (AAA) Screening: covered once if your at risk. You're considered to be at risk if you have a family history of AAA or a male between the age of 65-75 who smoking at least 100 cigarettes in your lifetime.  Lung Cancer Screening: covers low dose CT scan once per year if you meet all of the following conditions: (1) Age 55-77; (2) No signs or symptoms of lung cancer; (3) Current smoker or have quit smoking within the last 15 years; (4) You have a tobacco smoking history of at least 20 pack years (packs per day x number of years you smoked); (5) You get a written order from a healthcare provider.  Glaucoma Screening: covered annually if you're considered high risk: (1) You have diabetes OR (2) Family history of glaucoma OR (3)  aged 50 and older OR (4)  American aged 65 and older  Osteoporosis Screening: covered every 2 years if you meet one of the following conditions: (1) Have a vertebral abnormality; (2) On glucocorticoid therapy for more than 3 months; (3) Have primary hyperparathyroidism; (4) On osteoporosis medications and need to assess response to drug therapy.  HIV Screening: covered annually if you're between the age of 15-65. Also covered annually if you are younger than 15 and older than 65 with risk factors for HIV infection. For pregnant patients, it is covered up to 3 times per pregnancy.    Immunizations:  Immunization Recommendations   Influenza Vaccine Annual influenza vaccination during flu season is recommended for all persons aged >= 6 months who do not have contraindications   Pneumococcal Vaccine   * Pneumococcal conjugate vaccine = PCV13 (Prevnar 13), PCV15 (Vaxneuvance), PCV20 (Prevnar 20)  * Pneumococcal  polysaccharide vaccine = PPSV23 (Pneumovax) Adults 19-65 yo with certain risk factors or if 65+ yo  If never received any pneumonia vaccine: recommend Prevnar 20 (PCV20)  Give PCV20 if previously received 1 dose of PCV13 or PPSV23   Hepatitis B Vaccine 3 dose series if at intermediate or high risk (ex: diabetes, end stage renal disease, liver disease)   Respiratory syncytial virus (RSV) Vaccine - COVERED BY MEDICARE PART D  * RSVPreF3 (Arexvy) CDC recommends that adults 60 years of age and older may receive a single dose of RSV vaccine using shared clinical decision-making (SCDM)   Tetanus (Td) Vaccine - COST NOT COVERED BY MEDICARE PART B Following completion of primary series, a booster dose should be given every 10 years to maintain immunity against tetanus. Td may also be given as tetanus wound prophylaxis.   Tdap Vaccine - COST NOT COVERED BY MEDICARE PART B Recommended at least once for all adults. For pregnant patients, recommended with each pregnancy.   Shingles Vaccine (Shingrix) - COST NOT COVERED BY MEDICARE PART B  2 shot series recommended in those 19 years and older who have or will have weakened immune systems or those 50 years and older     Health Maintenance Due:      Topic Date Due   • Hepatitis C Screening  Never done   • HIV Screening  Never done   • Lung Cancer Screening  02/23/2025   • Colorectal Cancer Screening  05/12/2033     Immunizations Due:      Topic Date Due   • Hepatitis A Vaccine (1 of 2 - Risk 2-dose series) Never done   • COVID-19 Vaccine (4 - 2023-24 season) 09/01/2023     Advance Directives   What are advance directives?  Advance directives are legal documents that state your wishes and plans for medical care. These plans are made ahead of time in case you lose your ability to make decisions for yourself. Advance directives can apply to any medical decision, such as the treatments you want, and if you want to donate organs.   What are the types of advance directives?  There are  many types of advance directives, and each state has rules about how to use them. You may choose a combination of any of the following:  Living will:  This is a written record of the treatment you want. You can also choose which treatments you do not want, which to limit, and which to stop at a certain time. This includes surgery, medicine, IV fluid, and tube feedings.   Durable power of  for healthcare (DPAHC):  This is a written record that states who you want to make healthcare choices for you when you are unable to make them for yourself. This person, called a proxy, is usually a family member or a friend. You may choose more than 1 proxy.  Do not resuscitate (DNR) order:  A DNR order is used in case your heart stops beating or you stop breathing. It is a request not to have certain forms of treatment, such as CPR. A DNR order may be included in other types of advance directives.  Medical directive:  This covers the care that you want if you are in a coma, near death, or unable to make decisions for yourself. You can list the treatments you want for each condition. Treatment may include pain medicine, surgery, blood transfusions, dialysis, IV or tube feedings, and a ventilator (breathing machine).  Values history:  This document has questions about your views, beliefs, and how you feel and think about life. This information can help others choose the care that you would choose.  Why are advance directives important?  An advance directive helps you control your care. Although spoken wishes may be used, it is better to have your wishes written down. Spoken wishes can be misunderstood, or not followed. Treatments may be given even if you do not want them. An advance directive may make it easier for your family to make difficult choices about your care.   Weight Management   Why it is important to manage your weight:  Being overweight increases your risk of health conditions such as heart disease, high blood  pressure, type 2 diabetes, and certain types of cancer. It can also increase your risk for osteoarthritis, sleep apnea, and other respiratory problems. Aim for a slow, steady weight loss. Even a small amount of weight loss can lower your risk of health problems.  How to lose weight safely:  A safe and healthy way to lose weight is to eat fewer calories and get regular exercise. You can lose up about 1 pound a week by decreasing the number of calories you eat by 500 calories each day.   Healthy meal plan for weight management:  A healthy meal plan includes a variety of foods, contains fewer calories, and helps you stay healthy. A healthy meal plan includes the following:  Eat whole-grain foods more often.  A healthy meal plan should contain fiber. Fiber is the part of grains, fruits, and vegetables that is not broken down by your body. Whole-grain foods are healthy and provide extra fiber in your diet. Some examples of whole-grain foods are whole-wheat breads and pastas, oatmeal, brown rice, and bulgur.  Eat a variety of vegetables every day.  Include dark, leafy greens such as spinach, kale, lisandra greens, and mustard greens. Eat yellow and orange vegetables such as carrots, sweet potatoes, and winter squash.   Eat a variety of fruits every day.  Choose fresh or canned fruit (canned in its own juice or light syrup) instead of juice. Fruit juice has very little or no fiber.  Eat low-fat dairy foods.  Drink fat-free (skim) milk or 1% milk. Eat fat-free yogurt and low-fat cottage cheese. Try low-fat cheeses such as mozzarella and other reduced-fat cheeses.  Choose meat and other protein foods that are low in fat.  Choose beans or other legumes such as split peas or lentils. Choose fish, skinless poultry (chicken or turkey), or lean cuts of red meat (beef or pork). Before you cook meat or poultry, cut off any visible fat.   Use less fat and oil.  Try baking foods instead of frying them. Add less fat, such as margarine,  sour cream, regular salad dressing and mayonnaise to foods. Eat fewer high-fat foods. Some examples of high-fat foods include french fries, doughnuts, ice cream, and cakes.  Eat fewer sweets.  Limit foods and drinks that are high in sugar. This includes candy, cookies, regular soda, and sweetened drinks.  Exercise:  Exercise at least 30 minutes per day on most days of the week. Some examples of exercise include walking, biking, dancing, and swimming. You can also fit in more physical activity by taking the stairs instead of the elevator or parking farther away from stores. Ask your healthcare provider about the best exercise plan for you.      © Copyright [employer] 2018 Information is for End User's use only and may not be sold, redistributed or otherwise used for commercial purposes. All illustrations and images included in CareNotes® are the copyrighted property of [employer] or [employer]    Medicare Preventive Visit Patient Instructions  Thank you for completing your Welcome to Medicare Visit or Medicare Annual Wellness Visit today. Your next wellness visit will be due in one year (4/27/2025).  The screening/preventive services that you may require over the next 5-10 years are detailed below. Some tests may not apply to you based off risk factors and/or age. Screening tests ordered at today's visit but not completed yet may show as past due. Also, please note that scanned in results may not display below.  Preventive Screenings:  Service Recommendations Previous Testing/Comments   Colorectal Cancer Screening  Colonoscopy    Fecal Occult Blood Test (FOBT)/Fecal Immunochemical Test (FIT)  Fecal DNA/Cologuard Test  Flexible Sigmoidoscopy Age: 45-75 years old   Colonoscopy: every 10 years (May be performed more frequently if at higher risk)  OR  FOBT/FIT: every 1 year  OR  Cologuard: every 3 years  OR  Sigmoidoscopy: every 5 years  Screening may be recommended earlier than age 45 if at higher risk  for colorectal cancer. Also, an individualized decision between you and your healthcare provider will decide whether screening between the ages of 76-85 would be appropriate. Colonoscopy: 05/15/2023  FOBT/FIT: Not on file  Cologuard: Not on file  Sigmoidoscopy: Not on file    Screening Current     Prostate Cancer Screening Individualized decision between patient and health care provider in men between ages of 55-69   Medicare will cover every 12 months beginning on the day after your 50th birthday PSA: No results in last 5 years           Hepatitis C Screening Once for adults born between 1945 and 1965  More frequently in patients at high risk for Hepatitis C Hep C Antibody: Not on file        Diabetes Screening 1-2 times per year if you're at risk for diabetes or have pre-diabetes Fasting glucose: No results in last 5 years (No results in last 5 years)  A1C: 5.5 % (4/19/2024)  Screening Current   Cholesterol Screening Once every 5 years if you don't have a lipid disorder. May order more often based on risk factors. Lipid panel: 04/19/2024  Screening Not Indicated  History Lipid Disorder      Other Preventive Screenings Covered by Medicare:  Abdominal Aortic Aneurysm (AAA) Screening: covered once if your at risk. You're considered to be at risk if you have a family history of AAA or a male between the age of 65-75 who smoking at least 100 cigarettes in your lifetime.  Lung Cancer Screening: covers low dose CT scan once per year if you meet all of the following conditions: (1) Age 55-77; (2) No signs or symptoms of lung cancer; (3) Current smoker or have quit smoking within the last 15 years; (4) You have a tobacco smoking history of at least 20 pack years (packs per day x number of years you smoked); (5) You get a written order from a healthcare provider.  Glaucoma Screening: covered annually if you're considered high risk: (1) You have diabetes OR (2) Family history of glaucoma OR (3)  aged 50 and  older OR (4)  American aged 65 and older  Osteoporosis Screening: covered every 2 years if you meet one of the following conditions: (1) Have a vertebral abnormality; (2) On glucocorticoid therapy for more than 3 months; (3) Have primary hyperparathyroidism; (4) On osteoporosis medications and need to assess response to drug therapy.  HIV Screening: covered annually if you're between the age of 15-65. Also covered annually if you are younger than 15 and older than 65 with risk factors for HIV infection. For pregnant patients, it is covered up to 3 times per pregnancy.    Immunizations:  Immunization Recommendations   Influenza Vaccine Annual influenza vaccination during flu season is recommended for all persons aged >= 6 months who do not have contraindications   Pneumococcal Vaccine   * Pneumococcal conjugate vaccine = PCV13 (Prevnar 13), PCV15 (Vaxneuvance), PCV20 (Prevnar 20)  * Pneumococcal polysaccharide vaccine = PPSV23 (Pneumovax) Adults 19-63 yo with certain risk factors or if 65+ yo  If never received any pneumonia vaccine: recommend Prevnar 20 (PCV20)  Give PCV20 if previously received 1 dose of PCV13 or PPSV23   Hepatitis B Vaccine 3 dose series if at intermediate or high risk (ex: diabetes, end stage renal disease, liver disease)   Respiratory syncytial virus (RSV) Vaccine - COVERED BY MEDICARE PART D  * RSVPreF3 (Arexvy) CDC recommends that adults 60 years of age and older may receive a single dose of RSV vaccine using shared clinical decision-making (SCDM)   Tetanus (Td) Vaccine - COST NOT COVERED BY MEDICARE PART B Following completion of primary series, a booster dose should be given every 10 years to maintain immunity against tetanus. Td may also be given as tetanus wound prophylaxis.   Tdap Vaccine - COST NOT COVERED BY MEDICARE PART B Recommended at least once for all adults. For pregnant patients, recommended with each pregnancy.   Shingles Vaccine (Shingrix) - COST NOT COVERED BY  MEDICARE PART B  2 shot series recommended in those 19 years and older who have or will have weakened immune systems or those 50 years and older     Health Maintenance Due:      Topic Date Due   • Hepatitis C Screening  Never done   • HIV Screening  Never done   • Lung Cancer Screening  02/23/2025   • Colorectal Cancer Screening  05/12/2033     Immunizations Due:      Topic Date Due   • Hepatitis A Vaccine (1 of 2 - Risk 2-dose series) Never done   • COVID-19 Vaccine (4 - 2023-24 season) 09/01/2023     Advance Directives   What are advance directives?  Advance directives are legal documents that state your wishes and plans for medical care. These plans are made ahead of time in case you lose your ability to make decisions for yourself. Advance directives can apply to any medical decision, such as the treatments you want, and if you want to donate organs.   What are the types of advance directives?  There are many types of advance directives, and each state has rules about how to use them. You may choose a combination of any of the following:  Living will:  This is a written record of the treatment you want. You can also choose which treatments you do not want, which to limit, and which to stop at a certain time. This includes surgery, medicine, IV fluid, and tube feedings.   Durable power of  for healthcare (DPAHC):  This is a written record that states who you want to make healthcare choices for you when you are unable to make them for yourself. This person, called a proxy, is usually a family member or a friend. You may choose more than 1 proxy.  Do not resuscitate (DNR) order:  A DNR order is used in case your heart stops beating or you stop breathing. It is a request not to have certain forms of treatment, such as CPR. A DNR order may be included in other types of advance directives.  Medical directive:  This covers the care that you want if you are in a coma, near death, or unable to make decisions for  yourself. You can list the treatments you want for each condition. Treatment may include pain medicine, surgery, blood transfusions, dialysis, IV or tube feedings, and a ventilator (breathing machine).  Values history:  This document has questions about your views, beliefs, and how you feel and think about life. This information can help others choose the care that you would choose.  Why are advance directives important?  An advance directive helps you control your care. Although spoken wishes may be used, it is better to have your wishes written down. Spoken wishes can be misunderstood, or not followed. Treatments may be given even if you do not want them. An advance directive may make it easier for your family to make difficult choices about your care.   Weight Management   Why it is important to manage your weight:  Being overweight increases your risk of health conditions such as heart disease, high blood pressure, type 2 diabetes, and certain types of cancer. It can also increase your risk for osteoarthritis, sleep apnea, and other respiratory problems. Aim for a slow, steady weight loss. Even a small amount of weight loss can lower your risk of health problems.  How to lose weight safely:  A safe and healthy way to lose weight is to eat fewer calories and get regular exercise. You can lose up about 1 pound a week by decreasing the number of calories you eat by 500 calories each day.   Healthy meal plan for weight management:  A healthy meal plan includes a variety of foods, contains fewer calories, and helps you stay healthy. A healthy meal plan includes the following:  Eat whole-grain foods more often.  A healthy meal plan should contain fiber. Fiber is the part of grains, fruits, and vegetables that is not broken down by your body. Whole-grain foods are healthy and provide extra fiber in your diet. Some examples of whole-grain foods are whole-wheat breads and pastas, oatmeal, brown rice, and bulgur.  Eat a  variety of vegetables every day.  Include dark, leafy greens such as spinach, kale, lisandra greens, and mustard greens. Eat yellow and orange vegetables such as carrots, sweet potatoes, and winter squash.   Eat a variety of fruits every day.  Choose fresh or canned fruit (canned in its own juice or light syrup) instead of juice. Fruit juice has very little or no fiber.  Eat low-fat dairy foods.  Drink fat-free (skim) milk or 1% milk. Eat fat-free yogurt and low-fat cottage cheese. Try low-fat cheeses such as mozzarella and other reduced-fat cheeses.  Choose meat and other protein foods that are low in fat.  Choose beans or other legumes such as split peas or lentils. Choose fish, skinless poultry (chicken or turkey), or lean cuts of red meat (beef or pork). Before you cook meat or poultry, cut off any visible fat.   Use less fat and oil.  Try baking foods instead of frying them. Add less fat, such as margarine, sour cream, regular salad dressing and mayonnaise to foods. Eat fewer high-fat foods. Some examples of high-fat foods include french fries, doughnuts, ice cream, and cakes.  Eat fewer sweets.  Limit foods and drinks that are high in sugar. This includes candy, cookies, regular soda, and sweetened drinks.  Exercise:  Exercise at least 30 minutes per day on most days of the week. Some examples of exercise include walking, biking, dancing, and swimming. You can also fit in more physical activity by taking the stairs instead of the elevator or parking farther away from stores. Ask your healthcare provider about the best exercise plan for you.      © Copyright [employer] 2018 Information is for End User's use only and may not be sold, redistributed or otherwise used for commercial purposes. All illustrations and images included in CareNotes® are the copyrighted property of [employer] or [employer]

## 2024-04-26 NOTE — PROGRESS NOTES
Assessment and Plan:     Problem List Items Addressed This Visit    None  Visit Diagnoses       Nausea    -  Primary   Will consider GI w/u after cardiologist f/u     Nasal congestion        Relevant Medications    fluticasone (FLONASE) 50 mcg/act nasal spray    Medicare annual wellness visit, subsequent               Rto in 6 m   Preventive health issues were discussed with patient, and age appropriate screening tests were ordered as noted in patient's After Visit Summary.  Personalized health advice and appropriate referrals for health education or preventive services given if needed, as noted in patient's After Visit Summary.     History of Present Illness:     Patient presents for a Medicare Wellness Visit    Pt reports 1-2 times per month of nausea with lightheadedness episodes prior to or after dinner -  started few m ago - resolving after rest  -  no other symptoms        Patient Care Team:  Juana Rojas MD as PCP - General  Waleska Osei MD as PCP - PCP-[facility] ([facility])  Waleska Osei MD as PCP - PCP-[facility] ([facility])  MD Cuong Gibbs MD Sinan Kutty, MD Michael Nekoranik, DO Narpinder Singh, MD Sinan Kutty, MD as Endoscopist     Review of Systems:     Review of Systems   Constitutional: Negative.    Respiratory: Negative.     Cardiovascular: Negative.    Gastrointestinal:  Positive for nausea. Negative for abdominal pain.   Genitourinary: Negative.    Musculoskeletal: Negative.    Skin: Negative.    Neurological:  Positive for light-headedness. Negative for dizziness.        Problem List:     Patient Active Problem List   Diagnosis   • CAD, multiple vessel   • S/P CABG x 2   • Chronic obstructive pulmonary disease (HCC)   • Hyperlipidemia   • Centrilobular emphysema (HCC)   • Lumbar radiculopathy   • LUCY (obstructive sleep apnea)   • Dyspnea on exertion      Past Medical and Surgical History:     Past Medical History:   Diagnosis Date   • Chronic sinus  complaints     last assessed 12/12/17   • COPD (chronic obstructive pulmonary disease) (HCC) 9/22/17   • Coronary artery disease    • Diverticulitis of colon    • Diverticulosis    • GERD (gastroesophageal reflux disease)     off medicine for past few years   • Heart attack (HCC)     last assessed 12/12/17   • HTN (hypertension)    • Hyperlipidemia    • Hypertension    • Myocardial infarction (HCC)    • Sciatica    • Sinusitis    • Sleep apnea, obstructive ?   • Tobacco use    • Vasovagal syncope     last assessed 5/1/13   • Wears glasses      Past Surgical History:   Procedure Laterality Date   • APPENDECTOMY  1974   • COLONOSCOPY N/A 8/14/2017    Procedure: COLONOSCOPY;  Surgeon: Chemo Stuart MD;  Location: [facility];  Service: Gastroenterology   • CORONARY ARTERY BYPASS GRAFT N/A 9/25/2017    Procedure: INTRA OP MATTHEW; CORONARY ARTERY BYPASS GRAFT X 2 WITH SVH TO OM1  AND LIMA TO LAD; LEFT LEG EVH;  Surgeon: Cuong Loo MD;  Location: [facility];  Service: Cardiac Surgery   • THUMB AMPUTATION Left 1980      Family History:     Family History   Problem Relation Age of Onset   • Heart disease Father         CABG   • Testicular cancer Father         adenocarcinoma in situ of the testis   • Heart defect Father         cardiac disorder   • Cancer Father    • Diabetes type II Father    • Heart disease Maternal Grandfather    • Hypertension Mother         benign essential   • Diabetes Sister    • COPD Sister    • Arthritis Family    • Arthritis Other    • Heart defect Other         cardiac disorder   • Diabetes Other    • Cancer Other       Social History:     Social History     Socioeconomic History   • Marital status:      Spouse name: None   • Number of children: None   • Years of education: None   • Highest education level: None   Occupational History   • Occupation:      Comment: [employer]   Tobacco Use   • Smoking status: Former     Current packs/day: 0.00     Average packs/day: 2.0  packs/day for 28.0 years (56.0 ttl pk-yrs)     Types: Cigarettes     Start date: 1989     Quit date: 2017     Years since quittin.5   • Smokeless tobacco: Never   • Tobacco comments:     denied hx of exposure to second hand smoke; former smoker, smoked 2ppd for 30 yrs. pt quit 3 mos ago as per [employer]   Vaping Use   • Vaping status: Never Used   Substance and Sexual Activity   • Alcohol use: No     Comment: rarely; denied hx of social drinker as per [employer]   • Drug use: No   • Sexual activity: Yes     Partners: Female     Birth control/protection: Female Sterilization   Other Topics Concern   • None   Social History Narrative    Feels safe at home    Pets - cat, dog    Denied hx of travel hx     Social Determinants of Health     Financial Resource Strain: Low Risk  (2023)    Overall Financial Resource Strain (CARDIA)    • Difficulty of Paying Living Expenses: Not hard at all   Food Insecurity: No Food Insecurity (2024)    Hunger Vital Sign    • Worried About Running Out of Food in the Last Year: Never true    • Ran Out of Food in the Last Year: Never true   Transportation Needs: No Transportation Needs (2024)    PRAPARE - Transportation    • Lack of Transportation (Medical): No    • Lack of Transportation (Non-Medical): No   Physical Activity: Not on file   Stress: Not on file   Social Connections: Not on file   Intimate Partner Violence: Not on file   Housing Stability: Low Risk  (2024)    Housing Stability Vital Sign    • Unable to Pay for Housing in the Last Year: No    • Number of Places Lived in the Last Year: 1    • Unstable Housing in the Last Year: No      Medications and Allergies:     Current Outpatient Medications   Medication Sig Dispense Refill   • albuterol (PROVENTIL HFA,VENTOLIN HFA) 90 mcg/act inhaler Inhale 2 puffs every 6 (six) hours as needed for wheezing 18 g 7   • aspirin 325 mg tablet Take 1 tablet by mouth daily 100 tablet 0   • fluticasone (FLONASE)  50 mcg/act nasal spray 1 spray into each nostril daily 16 g 3   • Multiple Vitamins-Minerals (CENTRUM SILVER PO) Take by mouth     • roflumilast (DALIRESP) 500 mcg tablet      • rosuvastatin (CRESTOR) 20 MG tablet TAKE ONE TABLET BY MOUTH EVERY DAY 90 tablet 0   • Trelegy Ellipta 100-62.5-25 MCG/ACT inhaler Inhale 1 puff daily 60 blister 5   • Trelegy Ellipta 100-62.5-25 MCG/ACT inhaler INHALE ONE PUFF BY MOUTH EVERY DAY 60 each 5   • Trelegy Ellipta 100-62.5-25 MCG/ACT inhaler Inhale 1 puff daily 60 blister 5     No current facility-administered medications for this visit.     No Known Allergies   Immunizations:     Immunization History   Administered Date(s) Administered   • COVID-19 PFIZER VACCINE 0.3 ML IM 03/28/2021, 04/19/2021, 01/09/2022   • INFLUENZA 09/21/2020, 09/21/2020   • Influenza, injectable, quadrivalent, preservative free 0.5 mL 08/29/2018, 10/06/2023   • Influenza, recombinant, quadrivalent,injectable, preservative free 09/23/2019, 09/27/2021, 11/10/2022   • Pneumococcal Conjugate Vaccine 20-valent (Pcv20), Polysace 05/05/2022      Health Maintenance:         Topic Date Due   • Hepatitis C Screening  Never done   • HIV Screening  Never done   • Lung Cancer Screening  02/23/2025   • Colorectal Cancer Screening  05/12/2033         Topic Date Due   • Hepatitis A Vaccine (1 of 2 - Risk 2-dose series) Never done   • COVID-19 Vaccine (4 - 2023-24 season) 09/01/2023      Medicare Screening Tests and Risk Assessments:     Neal is here for his Subsequent Wellness visit.     Health Risk Assessment:   Patient rates overall health as fair. Patient feels that their physical health rating is same. Patient is very satisfied with their life. Eyesight was rated as same. Hearing was rated as same. Patient feels that their emotional and mental health rating is same. Patients states they are never, rarely angry. Patient states they are often unusually tired/fatigued. Pain experienced in the last 7 days has been  some. Patient's pain rating has been 5/10. Patient states that he has experienced no weight loss or gain in last 6 months.     Depression Screening:   PHQ-2 Score: 0      Fall Risk Screening:   In the past year, patient has experienced: no history of falling in past year      Home Safety:  Patient does not have trouble with stairs inside or outside of their home. Patient has working smoke alarms and has no working carbon monoxide detector. Home safety hazards include: none.     Nutrition:   Current diet is Limited junk food.     Medications:   Patient is not currently taking any over-the-counter supplements. Patient is able to manage medications.     Activities of Daily Living (ADLs)/Instrumental Activities of Daily Living (IADLs):   Walk and transfer into and out of bed and chair?: Yes  Dress and groom yourself?: Yes    Bathe or shower yourself?: Yes    Feed yourself? Yes  Do your laundry/housekeeping?: Yes  Manage your money, pay your bills and track your expenses?: Yes  Make your own meals?: Yes    Do your own shopping?: Yes    Previous Hospitalizations:   Any hospitalizations or ED visits within the last 12 months?: No      Advance Care Planning:   Living will: No    Durable POA for healthcare: No    Advanced directive: No      PREVENTIVE SCREENINGS      Cardiovascular Screening:    General: Screening Not Indicated and History Lipid Disorder      Diabetes Screening:     General: Screening Current      Colorectal Cancer Screening:     General: Screening Current      Abdominal Aortic Aneurysm (AAA) Screening:    Risk factors include: tobacco use        Lung Cancer Screening:     General: Screening Current    Screening, Brief Intervention, and Referral to Treatment (SBIRT)    Screening  Typical number of drinks in a day: 0  Typical number of drinks in a week: 0  Interpretation: Low risk drinking behavior.    AUDIT-C Screenin) How often did you have a drink containing alcohol in the past year? never  2) How  "many drinks did you have on a typical day when you were drinking in the past year? 0  3) How often did you have 6 or more drinks on one occasion in the past year? never    AUDIT-C Score: 0  Interpretation: Score 0-3 (male): Negative screen for alcohol misuse    Single Item Drug Screening:  How often have you used an illegal drug (including marijuana) or a prescription medication for non-medical reasons in the past year? never    Single Item Drug Screen Score: 0  Interpretation: Negative screen for possible drug use disorder    No results found.     Physical Exam:     /80 (BP Location: Left arm, Patient Position: Sitting, Cuff Size: Large)   Pulse 90   Temp 98.4 °F (36.9 °C) (Temporal)   Resp 20   Ht 5' 4.5\" (1.638 m)   Wt 89.4 kg (197 lb)   SpO2 94%   BMI 33.29 kg/m²     Physical Exam  Constitutional:       General: He is not in acute distress.     Appearance: He is obese. He is not ill-appearing.   Cardiovascular:      Rate and Rhythm: Normal rate and regular rhythm.      Heart sounds: Heart sounds are distant. No murmur heard.     No gallop.   Pulmonary:      Effort: No respiratory distress.      Breath sounds: Decreased air movement present. No wheezing, rhonchi or rales.   Abdominal:      Palpations: Abdomen is soft.      Tenderness: There is no abdominal tenderness.   Musculoskeletal:      Right lower leg: No edema.      Left lower leg: No edema.   Neurological:      General: No focal deficit present.      Mental Status: He is alert and oriented to person, place, and time.   Psychiatric:         Mood and Affect: Mood normal.         Thought Content: Thought content normal.         Judgment: Judgment normal.          Juana Rojas MD  "

## 2024-06-19 ENCOUNTER — OFFICE VISIT (OUTPATIENT)
Dept: FAMILY MEDICINE CLINIC | Facility: CLINIC | Age: 54
End: 2024-06-19
Payer: MEDICARE

## 2024-06-19 VITALS
WEIGHT: 195.2 LBS | SYSTOLIC BLOOD PRESSURE: 136 MMHG | OXYGEN SATURATION: 95 % | HEART RATE: 92 BPM | TEMPERATURE: 98 F | BODY MASS INDEX: 32.52 KG/M2 | DIASTOLIC BLOOD PRESSURE: 78 MMHG | RESPIRATION RATE: 18 BRPM | HEIGHT: 65 IN

## 2024-06-19 DIAGNOSIS — R05.1 ACUTE COUGH: Primary | ICD-10-CM

## 2024-06-19 DIAGNOSIS — J43.2 CENTRILOBULAR EMPHYSEMA (HCC): ICD-10-CM

## 2024-06-19 LAB
SARS-COV-2 AG UPPER RESP QL IA: NEGATIVE
VALID CONTROL: NORMAL

## 2024-06-19 PROCEDURE — 99214 OFFICE O/P EST MOD 30 MIN: CPT | Performed by: FAMILY MEDICINE

## 2024-06-19 PROCEDURE — 87811 SARS-COV-2 COVID19 W/OPTIC: CPT | Performed by: FAMILY MEDICINE

## 2024-06-19 PROCEDURE — G2211 COMPLEX E/M VISIT ADD ON: HCPCS | Performed by: FAMILY MEDICINE

## 2024-06-19 RX ORDER — PREDNISONE 20 MG/1
40 TABLET ORAL DAILY
Qty: 14 TABLET | Refills: 0 | Status: SHIPPED | OUTPATIENT
Start: 2024-06-19 | End: 2024-06-26

## 2024-06-19 RX ORDER — DOXYCYCLINE HYCLATE 100 MG/1
100 CAPSULE ORAL EVERY 12 HOURS SCHEDULED
Qty: 14 CAPSULE | Refills: 0 | Status: SHIPPED | OUTPATIENT
Start: 2024-06-19 | End: 2024-06-26

## 2024-06-19 NOTE — PROGRESS NOTES
Chief Complaint   Patient presents with   • COPD     Cough , sinus congestion started end of last week . Patient also has hemorrage in right eye.he is following with eye doctor         Patient ID: Neal Weston is a 53 y.o. male.    Sinus Problem  This is a new problem. The current episode started in the past 7 days. The problem is unchanged. There has been no fever. His pain is at a severity of 4/10. The pain is moderate. Associated symptoms include congestion, coughing, sinus pressure and a sore throat. Pertinent negatives include no chills, diaphoresis, ear pain, headaches, hoarse voice, neck pain, shortness of breath, sneezing or swollen glands. Past treatments include nothing. The treatment provided no relief.   Has COPD       The following portions of the patient's history were reviewed and updated as appropriate: allergies, current medications, past family history, past medical history, past social history, past surgical history and problem list.    Review of Systems   Constitutional:  Negative for chills and diaphoresis.   HENT:  Positive for congestion, sinus pressure and sore throat. Negative for ear pain, hoarse voice and sneezing.    Respiratory:  Positive for cough. Negative for shortness of breath.    Gastrointestinal: Negative.    Musculoskeletal:  Negative for neck pain.   Neurological:  Negative for headaches.       Current Outpatient Medications   Medication Sig Dispense Refill   • albuterol (PROVENTIL HFA,VENTOLIN HFA) 90 mcg/act inhaler Inhale 2 puffs every 6 (six) hours as needed for wheezing 18 g 7   • aspirin 325 mg tablet Take 1 tablet by mouth daily 100 tablet 0   • fluticasone (FLONASE) 50 mcg/act nasal spray 1 spray into each nostril daily 16 g 3   • Multiple Vitamins-Minerals (CENTRUM SILVER PO) Take by mouth     • roflumilast (DALIRESP) 500 mcg tablet      • rosuvastatin (CRESTOR) 20 MG tablet TAKE ONE TABLET BY MOUTH EVERY DAY 90 tablet 0   • Trelegy Ellipta 100-62.5-25 MCG/ACT inhaler  "Inhale 1 puff daily 60 blister 5     No current facility-administered medications for this visit.       Objective:    /78 (BP Location: Left arm, Patient Position: Sitting, Cuff Size: Standard)   Pulse 92   Temp 98 °F (36.7 °C)   Resp 18   Ht 5' 4.5\" (1.638 m)   Wt 88.5 kg (195 lb 3.2 oz)   SpO2 95%   BMI 32.99 kg/m²        Physical Exam  Constitutional:       General: He is not in acute distress.     Appearance: He is obese. He is not ill-appearing.   HENT:      Right Ear: Tympanic membrane normal.      Left Ear: Tympanic membrane normal.      Nose: No congestion or rhinorrhea.      Right Sinus: Maxillary sinus tenderness and frontal sinus tenderness present.      Left Sinus: Maxillary sinus tenderness and frontal sinus tenderness present.      Mouth/Throat:      Pharynx: No oropharyngeal exudate or posterior oropharyngeal erythema.   Cardiovascular:      Rate and Rhythm: Normal rate and regular rhythm.   Pulmonary:      Effort: Pulmonary effort is normal. No respiratory distress.      Breath sounds: No wheezing, rhonchi or rales.   Neurological:      Mental Status: He is alert.                 Assessment/Plan:         Diagnoses and all orders for this visit:    Acute cough  -     POCT Rapid Covid Ag  -     doxycycline hyclate (VIBRAMYCIN) 100 mg capsule; Take 1 capsule (100 mg total) by mouth every 12 (twelve) hours for 7 days    Centrilobular emphysema (HCC)  -     doxycycline hyclate (VIBRAMYCIN) 100 mg capsule; Take 1 capsule (100 mg total) by mouth every 12 (twelve) hours for 7 days  -     predniSONE 20 mg tablet; Take 2 tablets (40 mg total) by mouth daily for 7 days        Rto prn                     Juana Rojas MD      "

## 2024-07-02 ENCOUNTER — OFFICE VISIT (OUTPATIENT)
Dept: CARDIOLOGY CLINIC | Facility: CLINIC | Age: 54
End: 2024-07-02
Payer: MEDICARE

## 2024-07-02 VITALS
BODY MASS INDEX: 31.49 KG/M2 | HEIGHT: 65 IN | SYSTOLIC BLOOD PRESSURE: 120 MMHG | OXYGEN SATURATION: 94 % | WEIGHT: 189 LBS | HEART RATE: 93 BPM | DIASTOLIC BLOOD PRESSURE: 72 MMHG

## 2024-07-02 DIAGNOSIS — R06.09 DYSPNEA ON EXERTION: ICD-10-CM

## 2024-07-02 DIAGNOSIS — Z95.1 S/P CABG X 2: ICD-10-CM

## 2024-07-02 DIAGNOSIS — G47.33 OSA (OBSTRUCTIVE SLEEP APNEA): ICD-10-CM

## 2024-07-02 DIAGNOSIS — J43.2 CENTRILOBULAR EMPHYSEMA (HCC): ICD-10-CM

## 2024-07-02 DIAGNOSIS — I25.10 CAD, MULTIPLE VESSEL: ICD-10-CM

## 2024-07-02 DIAGNOSIS — E78.2 MIXED HYPERLIPIDEMIA: ICD-10-CM

## 2024-07-02 PROCEDURE — 93000 ELECTROCARDIOGRAM COMPLETE: CPT | Performed by: INTERNAL MEDICINE

## 2024-07-02 PROCEDURE — 99214 OFFICE O/P EST MOD 30 MIN: CPT | Performed by: INTERNAL MEDICINE

## 2024-07-02 NOTE — PROGRESS NOTES
Progress Note - Cardiology Office  [facility]    Neal Weston 53 y.o. male MRN: [id number]  : 1970  Encounter: [id number]      Assessment:     1. Dyspnea on exertion    2. CAD, multiple vessel    3. S/P CABG x 2    4. Mixed hyperlipidemia    5. Centrilobular emphysema (HCC)    6. LUCY (obstructive sleep apnea)        Discussion Summary and Plan:  1. Coronary artery disease status post CABG x2 in 2017 with LIMA to LAD and SVG vein graft to circumflex.  His stress test and echo Doppler from  reviewed they were acceptable continue same Rx.  No change in symptoms.    2.  Dyspnea on exertion.  Patient has distant exertion he had history of COPD.  He also history of coronary artery disease.  Status post CABG. EF remained stable.  Around 50 to 55%.  His COPD has been under control.    3. COPD.   Patient used to be heavy smoker has history of COPD.  He follows with pulmonary continue inhalers management as per them.  He is pretty compliant with his inhalers.  Does have decreased air entry but he is stable from pulmonary point of view.  Continue current Rx.      4. Dyslipidemia.   Continue high intensity statins.  Patient is on Crestor Cresta profile acceptable lab in  reviewed.    5. History of tobacco abuse. Heavy smoker throughout her life since he was 10 years old. Now quit smoking. Patient encouraged not to go back to smoking.    6. Recent lumbar radiculopathy now improved.  He has issues with his eyes he is working with his ophthalmologist.     follow-up 6-9 months after this test done.    Counseling :  A description of the counseling.  As above.  He need to be regular in follow-up.    Patient's ability to self care: Yes  Medication side effect reviewed with patient in detail and all their questions answered to their satisfaction.    HPI :     Neal Weston is a 53 y.o. year old male who came for follow up. Patient  was admitted to [facility] in  September 22, 2017 with 10/10 severe chest pain and had a non ST elevation MI. He was short of breath even climbing 1-2 flights of stair. His cardiac catheterization shows he had a spontaneous dissection of left main as well as significant stenosis of his mid LAD and he underwent successful coronary artery bypass surgery x2 with LIMA to LAD and SVG vein graft to circumflex came for follow-up. Patient used to smoke heavy and has now quit smoking. He has history of I believe COPD but never seen a pulmonologist. He feels fatigue and tired otherwise he is getting better. Some dizziness after taking metoprolol. No fever no chills no other significant complaint       07/01/2021.    Above reviewed.  Patient came for follow-up.  He has lost about 30 lb.  He has quit smoking since his surgery.  He is doing well has exertional shortness of breath which is not changed he had history of COPD.  He had a blood test done which was reviewed in April 2021 in they were acceptable.  No nausea no vomiting no fever no chills no PND no orthopnea.  He does regular exercise he walks about mi a day.  He is on cholesterol medication as well as his inhalers.  No other cardiovascular issues at this time.  He does have history of CAD status post CABG with 2 vessel bypass which included LIMA to LAD and vein graft to circumflex.    5/16/2023.    Above reviewed.  Patient came for follow-up.  He has gained back about 25 pounds.  He has quit smoking since his surgery.  He does have history of coronary artery disease s/p CABG with LIMA to LAD and vein graft to circumflex, dyslipidemia, history of COPD, on inhalers, CPAP on sleep apnea pretty compliant with it.  He regularly follows with pulmonary and has been doing well lately he noted some shortness of breath and his doses were adjusted and he follows pulmonary rehab.  From cardiac point of view he did exercise stress test 21 which was acceptable and his echo shows low normal LV systolic function  without any valvular disease.  His blood test in April 2023 shows electrolyte and Cresta profile stable no other cardiovascular issues.    7/2/2024.  Above reviewed.  Patient came for follow-up.  He has medical history significant for CAD s/p CABG with LIMA to LAD, vein graft to circumflex, dyslipidemia, history of COPD on inhalers, CPAP for sleep apnea who came for follow-up.  His main complaint is vision issues at this time.  He has possible bleed or questionable retinal detachment follows with ophthalmology no cardiovascular issues.  He does not smoke anymore now.  EKG shows sinus rhythm with heart rate 93 bpm no nausea no vomiting chronic shortness of breath exist.  He is not able to lose his weight.    Review of Systems   Constitutional:  Negative for activity change, chills, diaphoresis, fever and unexpected weight change.   HENT:  Negative for congestion.    Eyes:  Positive for visual disturbance. Negative for discharge and redness.   Respiratory:  Positive for shortness of breath. Negative for cough, chest tightness and wheezing.    Cardiovascular: Negative.  Negative for chest pain, palpitations and leg swelling.   Gastrointestinal:  Negative for abdominal pain, diarrhea and nausea.   Endocrine: Negative.    Genitourinary:  Negative for decreased urine volume and urgency.   Musculoskeletal: Negative.  Negative for arthralgias, back pain and gait problem.   Skin:  Negative for rash and wound.   Allergic/Immunologic: Negative.    Neurological:  Negative for dizziness, seizures, syncope, weakness, light-headedness and headaches.   Hematological: Negative.    Psychiatric/Behavioral:  Negative for agitation and confusion. The patient is not nervous/anxious.        Historical Information   Past Medical History:   Diagnosis Date   • Chronic sinus complaints     last assessed 12/12/17   • COPD (chronic obstructive pulmonary disease) ([facility]) 9/22/17   • Coronary artery disease    • Diverticulitis of colon    •  Diverticulosis    • GERD (gastroesophageal reflux disease)     off medicine for past few years   • Heart attack (HCC)     last assessed 17   • HTN (hypertension)    • Hyperlipidemia    • Hypertension    • Myocardial infarction (HCC)    • Sciatica    • Sinusitis    • Sleep apnea, obstructive ?   • Tobacco use    • Vasovagal syncope     last assessed 13   • Wears glasses      Past Surgical History:   Procedure Laterality Date   • APPENDECTOMY     • COLONOSCOPY N/A 2017    Procedure: COLONOSCOPY;  Surgeon: Chemo Stuart MD;  Location: [facility] GI LAB;  Service: Gastroenterology   • CORONARY ARTERY BYPASS GRAFT N/A 2017    Procedure: INTRA OP MATTHEW; CORONARY ARTERY BYPASS GRAFT X 2 WITH SVH TO OM1  AND LIMA TO LAD; LEFT LEG EVH;  Surgeon: Cuong Loo MD;  Location: [facility];  Service: Cardiac Surgery   • THUMB AMPUTATION Left      Social History     Substance and Sexual Activity   Alcohol Use No    Comment: rarely; denied hx of social drinker as per [employer]     Social History     Substance and Sexual Activity   Drug Use No     Social History     Tobacco Use   Smoking Status Former   • Current packs/day: 0.00   • Average packs/day: 2.0 packs/day for 28.0 years (56.0 ttl pk-yrs)   • Types: Cigarettes   • Start date: 1989   • Quit date: 2017   • Years since quittin.7   Smokeless Tobacco Never   Tobacco Comments    denied hx of exposure to second hand smoke; former smoker, smoked 2ppd for 30 yrs. pt quit 3 mos ago as per [employer]     Family History:   Family History   Problem Relation Age of Onset   • Heart disease Father         CABG   • Testicular cancer Father         adenocarcinoma in situ of the testis   • Heart defect Father         cardiac disorder   • Cancer Father    • Diabetes type II Father    • Heart disease Maternal Grandfather    • Hypertension Mother         benign essential   • Diabetes Sister    • COPD Sister    • Arthritis Family    • Arthritis Other    •  "Heart defect Other         cardiac disorder   • Diabetes Other    • Cancer Other    • Heart disease Paternal Grandfather        Meds/Allergies     No Known Allergies    Current Outpatient Medications:   •  albuterol (PROVENTIL HFA,VENTOLIN HFA) 90 mcg/act inhaler, Inhale 2 puffs every 6 (six) hours as needed for wheezing, Disp: 18 g, Rfl: 7  •  aspirin 325 mg tablet, Take 1 tablet by mouth daily, Disp: 100 tablet, Rfl: 0  •  fluticasone (FLONASE) 50 mcg/act nasal spray, 1 spray into each nostril daily, Disp: 16 g, Rfl: 3  •  Multiple Vitamins-Minerals (CENTRUM SILVER PO), Take by mouth, Disp: , Rfl:   •  roflumilast (DALIRESP) 500 mcg tablet, , Disp: , Rfl:   •  rosuvastatin (CRESTOR) 20 MG tablet, TAKE ONE TABLET BY MOUTH EVERY DAY, Disp: 90 tablet, Rfl: 0  •  Trelegy Ellipta 100-62.5-25 MCG/ACT inhaler, Inhale 1 puff daily, Disp: 60 blister, Rfl: 5    Vitals: Blood pressure 120/72, pulse 93, height 5' 4.5\" (1.638 m), weight 85.7 kg (189 lb), SpO2 94%.    Body mass index is 31.94 kg/m².  Vitals:    07/02/24 1613   Weight: 85.7 kg (189 lb)       BP Readings from Last 3 Encounters:   07/02/24 120/72   06/19/24 136/78   04/26/24 140/80       Physical Exam:  Physical Exam  Constitutional:       General: He is not in acute distress.     Appearance: He is well-developed. He is not diaphoretic.   Neck:      Thyroid: No thyromegaly.      Vascular: No JVD.      Trachea: No tracheal deviation.   Cardiovascular:      Rate and Rhythm: Normal rate and regular rhythm.      Heart sounds: S1 normal and S2 normal. Heart sounds not distant. Murmur heard.      Systolic (ejection) murmur is present with a grade of 2/6.      No friction rub. No gallop. No S3 or S4 sounds.   Pulmonary:      Effort: Pulmonary effort is normal. No respiratory distress.      Breath sounds: No wheezing or rales.      Comments: Bilateral air entry coarse breath sound no rhonchi no wheezing so there is  Chest:      Chest wall: No tenderness.   Abdominal:      " General: Bowel sounds are normal. There is no distension.      Palpations: Abdomen is soft.      Tenderness: There is no abdominal tenderness.   Musculoskeletal:         General: No deformity.      Cervical back: Neck supple.   Skin:     General: Skin is warm and dry.      Coloration: Skin is not pale.      Findings: No rash.   Neurological:      Mental Status: He is alert and oriented to person, place, and time.   Psychiatric:         Behavior: Behavior normal.         Judgment: Judgment normal.           Diagnostic Studies Review Cardio:     Stress Test: Echo show normal LV systolic function.        Catheterization: Cardiac catheterization in September 2017 shows proximal LAD 80% stenosis, spontaneous dissection of the left main, preserved LV systolic function, nonobstructive disease of the circumflex which was medium to small size. Circumflex has 25 -30% stenosis.  Patient status post 2 vessel bypass.       ECG Report: 10/26/2017 12 lead EKG shows normal sinus rhythm heart rate 60 beats per minute. Nonspecific ST changes. No old EKG to compare     Twelve lead EKG done 01/10/2019 shows normal sinus rhythm heart rate 83 beats per minute.  No significant ST changes.    Twelve lead EKG 07/01/2019 shows normal sinus rhythm heart rate 88 beats per minute.  No significant ST changes no change from old EKG.    Twelve lead EKG 07/01/2021 shows normal sinus rhythm heart rate 72 beats per minute.  Rare PVCs as compared to previous EKG heart rate is better.    Twelve-lead EKG 5/16/2023 shows normal sinus rhythm heart rate 81 bpm.  Small Q waves inferior leads no change from previous EKG.    Twelve-lead EKG done on 7/2/2024 normal sinus some heart rate 93 bpm not much change from previous EKG.  Small Q waves noted in inferior leads.      Cardiac testing:   Results for orders placed during the hospital encounter of 03/19/18   Echo complete with contrast if indicated    Narrative [address] [place] [address]  [phone]    Transthoracic Echocardiogram  2D, M-mode, Doppler, and Color Doppler    Study date:  19-Mar-2018    Patient: JEFF SCOTT  MR number: [id number]  Account number: [id number]  : 1970  Age: 47 years  Gender: Male  Status: Routine  Location: Echo lab  Height: 62 in  Weight: 161.7 lb  BP: 119/ 77 mmHg    Sonographer:  ELÍAS Srivastava  Primary Physician:  Juana Rojas MD  Referring Physician:  MARCO ANTONIO MCGHEE  Group:  [facility]  Interpreting Physician:  Lorna Moore DO    SUMMARY    LEFT VENTRICLE:  Systolic function was at the lower limits of normal by visual assessment. Ejection fraction was estimated to be 50 %.  There were no regional wall motion abnormalities.  There was mild concentric hypertrophy.  Doppler parameters were consistent with abnormal left ventricular relaxation (grade 1 diastolic dysfunction).    LEFT ATRIUM:  The atrium was mildly dilated.    RIGHT ATRIUM:  The atrium was mildly to moderately dilated.    MITRAL VALVE:  There was trace regurgitation.    TRICUSPID VALVE:  There was mild regurgitation.  Pulmonary artery systolic pressure was within the normal range.  Estimated peak PA pressure was 38 mmHg.    PROCEDURE: The procedure was performed in the echo lab. This was a routine study. The transthoracic approach was used. The study included complete 2D imaging, M-mode, complete spectral Doppler, and color Doppler. The heart rate was 64 bpm,  at the start of the study. Images were obtained from the parasternal, apical, subcostal, and suprasternal notch acoustic windows. Image quality was adequate.    LEFT VENTRICLE: Size was normal. Systolic function was at the lower limits of normal by visual assessment. Ejection fraction was estimated to be 50 %. There were no regional wall motion abnormalities. There was mild concentric hypertrophy.  DOPPLER: Doppler parameters were consistent with abnormal left  ventricular relaxation (grade 1 diastolic dysfunction). There was no evidence of elevated ventricular filling pressure by Doppler parameters.    RIGHT VENTRICLE: The size was normal. Systolic function was low normal. DOPPLER: Systolic pressure was within the normal range.    LEFT ATRIUM: The atrium was mildly dilated. No thrombus was identified.    RIGHT ATRIUM: The atrium was mildly to moderately dilated.    MITRAL VALVE: Valve structure was normal. There was normal leaflet separation. No echocardiographic evidence for prolapse. DOPPLER: The transmitral velocity was within the normal range. There was no evidence for stenosis. There was trace  regurgitation.    AORTIC VALVE: The valve was trileaflet. Leaflets exhibited normal thickness, normal cuspal separation, and sclerosis. DOPPLER: Transaortic velocity was within the normal range. There was no evidence for stenosis. There was no  regurgitation.    TRICUSPID VALVE: The valve structure was normal. There was normal leaflet separation. DOPPLER: The transtricuspid velocity was within the normal range. There was mild regurgitation. Pulmonary artery systolic pressure was within the normal  range. Estimated peak PA pressure was 38 mmHg.    PULMONIC VALVE: Leaflets exhibited normal thickness, no calcification, and normal cuspal separation. DOPPLER: The transpulmonic velocity was within the normal range. There was trace regurgitation.    PERICARDIUM: There was no thickening. There was no pericardial effusion.    AORTA: The root exhibited normal size.    PULMONARY ARTERY: The size was normal. The morphology appeared normal.    SYSTEM MEASUREMENT TABLES    2D mode  AoR Diam 2D: 3.6 cm  LA Diam (2D): 3.6 cm  LA/Ao (2D): 1  FS (2D Teich): 25.6 %  IVSd (2D): 1.01 cm  LVDEV: 108 cm³  LVEDV MOD BP: 131 cm³  LVESV: 53.7 cm³  LVIDd(2D): 4.81 cm  LVISd (2D): 3.58 cm  LVOT Area 2D: 3.14 cm squared  LVPWd (2D): 1.03 cm  SV (Teich): 54.3 cm³    Apical four chamber  LVEF A4C: 55  %    Apical two chamber  LA Area: 22.7 cm squared  LA Volume: 70 cm³  LVEF A2C: 59 %  LVLD A2C: 8.37 cm    Unspecified Scan Mode  NARDA Cont Eq (Peak Kenroy): 2.81 cm squared  LVOT Diam.: 2 cm  LVOT Vmax: 1190 mm/s  LVOT Vmax; Mean: 1190 mm/s  Peak Grad.; Mean: 6 mm[Hg]  MV Peak A Kenroy: 642 mm/s  MV Peak E Kenroy. Mean: 632 mm/s  MVA (PHT): 4.15 cm squared  PHT: 53 ms  Max P mm[Hg]  V Max: 2760 mm/s  Vmax: 2410 mm/s  RA Area: 24.9 cm squared  RA Volume: 86.7 cm³  TAPSE: 1.7 cm    [facility] Accredited Echocardiography Laboratory    Prepared and electronically signed by    Lorna Moore DO  Signed 20-Mar-2018 08:35:00         Imaging:  Chest X-Ray:   Xr Chest Pa & Lateral    Result Date: 10/28/2017  Impression No active pulmonary disease.  Hyperinflation. Workstation performed: [id number]       CT-scan of the chest:     Cta Dissection Protocol Chest And Abdomen    Result Date: 2017  Impression No evidence of aortic aneurysm or dissection. Mild to moderate COPD. Mildly thick-walled jejunal loop with slight hyperemia of the bowel, perhaps related to underdistention, cannot entirely exclude enteritis. No inflammatory changes in the adjacent mesentery. Workstation performed: [id number]     Lab Review   Lab Results   Component Value Date    WBC 4.7 2023    HGB 15.8 2023    HCT 47.7 2023    MCV 90 2023    RDW 12.0 2023     2023     BMP:  Lab Results   Component Value Date    K 5.0 2024     2024    CO2 23 2024    BUN 17 2024    CREATININE 1.22 2024    GLUCOSE 159 (H) 2017    GLUF 95 2017    CALCIUM 8.4 2020    CORRECTEDCA 9.0 2020    EGFR 70 2024    MG 2.4 2017     LFT:  Lab Results   Component Value Date    AST 20 2024    ALT 20 2024    ALKPHOS 58 2020    TP 6.8 2024       Lab Results   Component Value Date    HGBA1C 5.5 2024     Lipid Profile:   Lab Results  "  Component Value Date    HDL 41 04/19/2024    LDLCALC 77 04/19/2024    TRIG 110 04/19/2024     No results found for: \"CHOL\"  Lab Results   Component Value Date    CKTOTAL 68 11/09/2020    TROPONINI <0.02 02/01/2018     Lab Results   Component Value Date    NTBNP 170 (H) 02/01/2018        Dr. Ben Sharif MD [facility]      \"This note has been constructed using a voice recognition system.Therefore there may be syntax, spelling, and/or grammatical errors. Please call if you have any questions. \"  "

## 2024-07-10 DIAGNOSIS — I25.10 CAD, MULTIPLE VESSEL: ICD-10-CM

## 2024-07-11 RX ORDER — ROSUVASTATIN CALCIUM 20 MG/1
TABLET, COATED ORAL
Qty: 90 TABLET | Refills: 1 | Status: SHIPPED | OUTPATIENT
Start: 2024-07-11

## 2024-07-23 ENCOUNTER — OFFICE VISIT (OUTPATIENT)
Dept: PULMONOLOGY | Facility: CLINIC | Age: 54
End: 2024-07-23
Payer: MEDICARE

## 2024-07-23 VITALS
SYSTOLIC BLOOD PRESSURE: 120 MMHG | TEMPERATURE: 97.3 F | HEART RATE: 76 BPM | BODY MASS INDEX: 31.33 KG/M2 | DIASTOLIC BLOOD PRESSURE: 70 MMHG | OXYGEN SATURATION: 95 % | WEIGHT: 185.4 LBS

## 2024-07-23 DIAGNOSIS — J42 CHRONIC BRONCHITIS, UNSPECIFIED CHRONIC BRONCHITIS TYPE (HCC): ICD-10-CM

## 2024-07-23 DIAGNOSIS — F17.221 CHEWING TOBACCO NICOTINE DEPENDENCE IN REMISSION: Primary | ICD-10-CM

## 2024-07-23 DIAGNOSIS — G47.33 OSA (OBSTRUCTIVE SLEEP APNEA): ICD-10-CM

## 2024-07-23 DIAGNOSIS — F17.211 NICOTINE DEPENDENCE, CIGARETTES, IN REMISSION: ICD-10-CM

## 2024-07-23 PROCEDURE — G2211 COMPLEX E/M VISIT ADD ON: HCPCS | Performed by: INTERNAL MEDICINE

## 2024-07-23 PROCEDURE — 99214 OFFICE O/P EST MOD 30 MIN: CPT | Performed by: INTERNAL MEDICINE

## 2024-07-23 NOTE — ASSESSMENT & PLAN NOTE
I reviewed Neal's compliance data and he is wearing his CPAP all night every night with an AHI of 1.2.

## 2024-07-23 NOTE — ASSESSMENT & PLAN NOTE
Neal is doing quite well on his current regiment.  He maintains on Trelegy 1 puff daily and has not needed his rescue inhaler.  He takes Daliresp every day which has helped him maintain stability of his COPD and exacerbations.  He is due for lung cancer screening CT in February 2025 which I scheduled on today's visit.  He is up-to-date on all his vaccines and I will see him back after his CAT scan.

## 2024-07-23 NOTE — PROGRESS NOTES
Ambulatory Visit  Name: Neal Weston      : 1970      MRN: [id number]  Encounter Provider: Tamara Parr DO  Encounter Date: 2024   Encounter department: [facility] PULMONARY [facility]    Assessment & Plan   1. Chewing tobacco nicotine dependence in remission  -     CT lung screening program; Future; Expected date: 2025  2. Nicotine dependence, cigarettes, in remission  -     CT lung screening program; Future; Expected date: 2025  3. Chronic bronchitis, unspecified chronic bronchitis type (HCC)  Assessment & Plan:  Neal is doing quite well on his current regiment.  He maintains on Trelegy 1 puff daily and has not needed his rescue inhaler.  He takes Daliresp every day which has helped him maintain stability of his COPD and exacerbations.  He is due for lung cancer screening CT in 2025 which I scheduled on today's visit.  He is up-to-date on all his vaccines and I will see him back after his CAT scan.  4. LUCY (obstructive sleep apnea)  Assessment & Plan:  I reviewed Neal's compliance data and he is wearing his CPAP all night every night with an AHI of 1.2.      History of Present Illness     Neal Weston is a 53 y.o. male who presents for follow-up of his COPD.  He mostly has some dyspnea with inclines otherwise he is not limited by his breathing and has not needed to use his rescue inhaler.  He takes his Trelegy and Daliresp regularly and wears his CPAP every night.    Review of Systems   Constitutional:  Negative for appetite change and fever.   HENT:  Negative for ear pain, postnasal drip, rhinorrhea, sneezing, sore throat and trouble swallowing.    Cardiovascular:  Negative for chest pain.   Musculoskeletal:  Negative for myalgias.   Neurological:  Negative for headaches.     Medical History Reviewed by provider this encounter:       Current Outpatient Medications on File Prior to Visit   Medication Sig Dispense Refill    albuterol (PROVENTIL HFA,VENTOLIN HFA)  90 mcg/act inhaler Inhale 2 puffs every 6 (six) hours as needed for wheezing 18 g 7    aspirin 325 mg tablet Take 1 tablet by mouth daily 100 tablet 0    fluticasone (FLONASE) 50 mcg/act nasal spray 1 spray into each nostril daily 16 g 3    Multiple Vitamins-Minerals (CENTRUM SILVER PO) Take by mouth      roflumilast (DALIRESP) 500 mcg tablet       rosuvastatin (CRESTOR) 20 MG tablet TAKE ONE TABLET BY MOUTH EVERY DAY 90 tablet 1    Trelegy Ellipta 100-62.5-25 MCG/ACT inhaler Inhale 1 puff daily 60 blister 5     No current facility-administered medications on file prior to visit.      Social History     Tobacco Use    Smoking status: Former     Current packs/day: 0.00     Average packs/day: 2.0 packs/day for 28.0 years (56.0 ttl pk-yrs)     Types: Cigarettes     Start date: 1989     Quit date: 2017     Years since quittin.8    Smokeless tobacco: Never    Tobacco comments:     denied hx of exposure to second hand smoke; former smoker, smoked 2ppd for 30 yrs. pt quit 3 mos ago as per [employer]   Vaping Use    Vaping status: Never Used   Substance and Sexual Activity    Alcohol use: No     Comment: rarely; denied hx of social drinker as per [employer]    Drug use: No    Sexual activity: Yes     Partners: Female     Birth control/protection: Female Sterilization     Objective     /70 (BP Location: Left arm, Patient Position: Sitting, Cuff Size: Standard)   Pulse 76   Temp (!) 97.3 °F (36.3 °C) (Temporal)   Wt 84.1 kg (185 lb 6.4 oz)   SpO2 95%   BMI 31.33 kg/m²     Physical Exam  Constitutional:       Appearance: He is well-developed.   HENT:      Head: Normocephalic and atraumatic.   Eyes:      Pupils: Pupils are equal, round, and reactive to light.   Cardiovascular:      Rate and Rhythm: Normal rate and regular rhythm.      Heart sounds: No murmur heard.  Pulmonary:      Effort: Pulmonary effort is normal. No respiratory distress.      Breath sounds: Normal breath sounds. No wheezing or  rales.   Abdominal:      Palpations: Abdomen is soft.   Musculoskeletal:      Cervical back: Normal range of motion and neck supple.   Skin:     General: Skin is warm and dry.   Neurological:      Mental Status: He is alert and oriented to person, place, and time.       Administrative Statements           Answers submitted by the patient for this visit:  Pulmonology Questionnaire (Submitted on 7/16/2024)  Chief Complaint: Primary symptoms  Do you have shortness of breath that occurs with effort or exertion?: No  Do you have ear congestion?: No  Do you have heartburn?: No  Do you have fatigue?: No  Do you have nasal congestion?: No  Do you have shortness of breath when lying flat?: No  Do you have shortness of breath when you wake up?: No  Do you have sweats?: No  Have you experienced weight loss?: No  Which of the following makes your symptoms worse?: change in weather, emotional stress, exposure to smoke, strenuous activity  Which of the following makes your symptoms better?: steroid inhaler  Risk factors for lung disease: animal exposure

## 2024-09-24 ENCOUNTER — TELEPHONE (OUTPATIENT)
Age: 54
End: 2024-09-24

## 2024-09-24 NOTE — TELEPHONE ENCOUNTER
PT is requesting to be scheduled for the flu vaccine possibly on 10/14, when he brings his aunt Trinity for her appt that day at 10:30.  I was unable to book PT on nurse's schedule that day.    Please call PT to schedule.    Thank You

## 2024-10-14 ENCOUNTER — IMMUNIZATIONS (OUTPATIENT)
Dept: FAMILY MEDICINE CLINIC | Facility: CLINIC | Age: 54
End: 2024-10-14
Payer: MEDICARE

## 2024-10-14 DIAGNOSIS — Z23 NEED FOR INFLUENZA VACCINATION: Primary | ICD-10-CM

## 2024-10-14 PROCEDURE — G0008 ADMIN INFLUENZA VIRUS VAC: HCPCS

## 2024-10-14 PROCEDURE — 90673 RIV3 VACCINE NO PRESERV IM: CPT

## 2024-10-30 ENCOUNTER — OFFICE VISIT (OUTPATIENT)
Dept: FAMILY MEDICINE CLINIC | Facility: CLINIC | Age: 54
End: 2024-10-30
Payer: MEDICARE

## 2024-10-30 VITALS
DIASTOLIC BLOOD PRESSURE: 70 MMHG | BODY MASS INDEX: 32.1 KG/M2 | TEMPERATURE: 98.1 F | RESPIRATION RATE: 12 BRPM | SYSTOLIC BLOOD PRESSURE: 138 MMHG | OXYGEN SATURATION: 97 % | WEIGHT: 188 LBS | HEART RATE: 74 BPM | HEIGHT: 64 IN

## 2024-10-30 DIAGNOSIS — I25.10 CAD, MULTIPLE VESSEL: Primary | ICD-10-CM

## 2024-10-30 DIAGNOSIS — J43.2 CENTRILOBULAR EMPHYSEMA (HCC): ICD-10-CM

## 2024-10-30 DIAGNOSIS — E78.2 MIXED HYPERLIPIDEMIA: ICD-10-CM

## 2024-10-30 PROCEDURE — 99214 OFFICE O/P EST MOD 30 MIN: CPT | Performed by: FAMILY MEDICINE

## 2024-10-30 PROCEDURE — G2211 COMPLEX E/M VISIT ADD ON: HCPCS | Performed by: FAMILY MEDICINE

## 2024-10-30 RX ORDER — FLUTICASONE FUROATE, UMECLIDINIUM BROMIDE AND VILANTEROL TRIFENATATE 100; 62.5; 25 UG/1; UG/1; UG/1
1 POWDER RESPIRATORY (INHALATION) DAILY
Qty: 60 BLISTER | Refills: 0 | Status: SHIPPED | OUTPATIENT
Start: 2024-10-30

## 2024-10-30 RX ORDER — ALBUTEROL SULFATE 90 UG/1
2 INHALANT RESPIRATORY (INHALATION) EVERY 6 HOURS PRN
Qty: 18 G | Refills: 5 | Status: SHIPPED | OUTPATIENT
Start: 2024-10-30

## 2024-10-30 NOTE — PROGRESS NOTES
"Chief Complaint   Patient presents with    Follow-up     6 mo        Patient ID: Neal Weston is a 53 y.o. male.    HPI  Pt is seen for f/u CAD, Emphysema, HLD -  all stable -  under pulmonologist are as well     The following portions of the patient's history were reviewed and updated as appropriate: allergies, current medications, past family history, past medical history, past social history, past surgical history and problem list.    Review of Systems   Constitutional: Negative.    Respiratory: Negative.     Cardiovascular: Negative.    Gastrointestinal: Negative.    Genitourinary: Negative.    Musculoskeletal: Negative.    Skin: Negative.    Neurological: Negative.        Current Outpatient Medications   Medication Sig Dispense Refill    albuterol (PROVENTIL HFA,VENTOLIN HFA) 90 mcg/act inhaler Inhale 2 puffs every 6 (six) hours as needed for wheezing 18 g 7    aspirin 325 mg tablet Take 1 tablet by mouth daily 100 tablet 0    fluticasone (FLONASE) 50 mcg/act nasal spray 1 spray into each nostril daily 16 g 3    Multiple Vitamins-Minerals (CENTRUM SILVER PO) Take by mouth      roflumilast (DALIRESP) 500 mcg tablet       rosuvastatin (CRESTOR) 20 MG tablet TAKE ONE TABLET BY MOUTH EVERY DAY 90 tablet 1    Trelegy Ellipta 100-62.5-25 MCG/ACT inhaler Inhale 1 puff daily 60 blister 5     No current facility-administered medications for this visit.       Objective:    /70 (BP Location: Left arm, Patient Position: Sitting, Cuff Size: Large)   Pulse 74   Temp 98.1 °F (36.7 °C) (Temporal)   Resp 12   Ht 5' 4\" (1.626 m)   Wt 85.3 kg (188 lb)   SpO2 97%   BMI 32.27 kg/m²        Physical Exam  Constitutional:       General: He is not in acute distress.     Appearance: He is not ill-appearing.   Cardiovascular:      Rate and Rhythm: Normal rate and regular rhythm.      Heart sounds: Heart sounds are distant.   Pulmonary:      Effort: Pulmonary effort is normal. No respiratory distress.      Breath sounds: " Decreased air movement present. No wheezing, rhonchi or rales.   Musculoskeletal:      Right lower leg: No edema.      Left lower leg: No edema.   Neurological:      Mental Status: He is alert and oriented to person, place, and time.      Cranial Nerves: No cranial nerve deficit.      Motor: No weakness.      Gait: Gait normal.           Labs in chart were reviewed.      Assessment/Plan:         Diagnoses and all orders for this visit:    CAD, multiple vessel    Mixed hyperlipidemia    Centrilobular emphysema (HCC)      All stable  Cont meds    Rto in 6 m                       Juana Rojas MD

## 2024-10-31 DIAGNOSIS — Z00.6 ENCOUNTER FOR EXAMINATION FOR NORMAL COMPARISON OR CONTROL IN CLINICAL RESEARCH PROGRAM: ICD-10-CM

## 2024-11-06 ENCOUNTER — APPOINTMENT (OUTPATIENT)
Dept: LAB | Facility: CLINIC | Age: 54
End: 2024-11-06

## 2024-11-06 DIAGNOSIS — Z00.6 ENCOUNTER FOR EXAMINATION FOR NORMAL COMPARISON OR CONTROL IN CLINICAL RESEARCH PROGRAM: ICD-10-CM

## 2024-11-06 PROCEDURE — 36415 COLL VENOUS BLD VENIPUNCTURE: CPT

## 2024-11-07 ENCOUNTER — HOSPITAL ENCOUNTER (EMERGENCY)
Facility: HOSPITAL | Age: 54
Discharge: HOME | End: 2024-11-07
Attending: EMERGENCY MEDICINE
Payer: COMMERCIAL

## 2024-11-07 ENCOUNTER — APPOINTMENT (EMERGENCY)
Dept: RADIOLOGY | Facility: HOSPITAL | Age: 54
End: 2024-11-07
Payer: COMMERCIAL

## 2024-11-07 VITALS
DIASTOLIC BLOOD PRESSURE: 109 MMHG | HEART RATE: 93 BPM | BODY MASS INDEX: 31.24 KG/M2 | SYSTOLIC BLOOD PRESSURE: 195 MMHG | RESPIRATION RATE: 16 BRPM | WEIGHT: 182 LBS | OXYGEN SATURATION: 98 % | TEMPERATURE: 98.3 F

## 2024-11-07 DIAGNOSIS — S50.819A FOREARM ABRASION: ICD-10-CM

## 2024-11-07 DIAGNOSIS — S29.012A UPPER BACK STRAIN, INITIAL ENCOUNTER: ICD-10-CM

## 2024-11-07 DIAGNOSIS — R04.0 EPISTAXIS DUE TO TRAUMA: ICD-10-CM

## 2024-11-07 DIAGNOSIS — S09.90XA CLOSED HEAD INJURY, INITIAL ENCOUNTER: Primary | ICD-10-CM

## 2024-11-07 PROCEDURE — 72072 X-RAY EXAM THORAC SPINE 3VWS: CPT

## 2024-11-07 PROCEDURE — 99284 EMERGENCY DEPT VISIT MOD MDM: CPT

## 2024-11-07 PROCEDURE — 70450 CT HEAD/BRAIN W/O DYE: CPT

## 2024-11-07 PROCEDURE — 99284 EMERGENCY DEPT VISIT MOD MDM: CPT | Performed by: EMERGENCY MEDICINE

## 2024-11-07 RX ORDER — ACETAMINOPHEN 325 MG/1
650 TABLET ORAL ONCE
Status: COMPLETED | OUTPATIENT
Start: 2024-11-07 | End: 2024-11-07

## 2024-11-07 RX ADMIN — ACETAMINOPHEN 650 MG: 325 TABLET ORAL at 19:39

## 2024-11-07 NOTE — Clinical Note
Neal Weston was seen and treated in our emergency department on 11/7/2024.                Diagnosis:     Neal  may return to work on return date.    He may return on this date: 11/11/2024         If you have any questions or concerns, please don't hesitate to call.      Hiwot Sigala, DO    ______________________________           _______________          _______________  Hospital Representative                              Date                                Time

## 2024-11-08 NOTE — DISCHARGE INSTRUCTIONS
Return to the ER for further concerns or worsening symptoms  Follow up with your primary care physician in 1-2 days  Continue tylenol and ibuprofen as needed for pain relief
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
## 2024-11-13 ENCOUNTER — OFFICE VISIT (OUTPATIENT)
Dept: FAMILY MEDICINE CLINIC | Facility: CLINIC | Age: 54
End: 2024-11-13
Payer: COMMERCIAL

## 2024-11-13 VITALS
TEMPERATURE: 98.1 F | RESPIRATION RATE: 12 BRPM | DIASTOLIC BLOOD PRESSURE: 92 MMHG | WEIGHT: 192 LBS | OXYGEN SATURATION: 96 % | BODY MASS INDEX: 32.78 KG/M2 | HEIGHT: 64 IN | SYSTOLIC BLOOD PRESSURE: 170 MMHG | HEART RATE: 78 BPM

## 2024-11-13 DIAGNOSIS — R07.89 CHEST TIGHTNESS: ICD-10-CM

## 2024-11-13 DIAGNOSIS — R03.0 ELEVATED BP WITHOUT DIAGNOSIS OF HYPERTENSION: Primary | ICD-10-CM

## 2024-11-13 PROCEDURE — 99214 OFFICE O/P EST MOD 30 MIN: CPT | Performed by: FAMILY MEDICINE

## 2024-11-13 RX ORDER — PREDNISONE 20 MG/1
40 TABLET ORAL DAILY
Qty: 10 TABLET | Refills: 0 | Status: SHIPPED | OUTPATIENT
Start: 2024-11-13 | End: 2024-11-18

## 2024-11-13 RX ORDER — AMLODIPINE BESYLATE 5 MG/1
5 TABLET ORAL DAILY
Qty: 30 TABLET | Refills: 5 | Status: SHIPPED | OUTPATIENT
Start: 2024-11-13
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
## 2024-11-19 LAB
APOB+LDLR+PCSK9 GENE MUT ANL BLD/T: NOT DETECTED
BRCA1+BRCA2 DEL+DUP + FULL MUT ANL BLD/T: NOT DETECTED
MLH1+MSH2+MSH6+PMS2 GN DEL+DUP+FUL M: NOT DETECTED

## 2024-11-25 ENCOUNTER — OFFICE VISIT (OUTPATIENT)
Dept: FAMILY MEDICINE CLINIC | Facility: CLINIC | Age: 54
End: 2024-11-25
Payer: COMMERCIAL

## 2024-11-25 VITALS
DIASTOLIC BLOOD PRESSURE: 72 MMHG | WEIGHT: 192 LBS | BODY MASS INDEX: 32.78 KG/M2 | HEART RATE: 98 BPM | RESPIRATION RATE: 12 BRPM | SYSTOLIC BLOOD PRESSURE: 140 MMHG | OXYGEN SATURATION: 83 % | TEMPERATURE: 98.4 F | HEIGHT: 64 IN

## 2024-11-25 DIAGNOSIS — R07.9 CHEST PAIN AT REST: ICD-10-CM

## 2024-11-25 DIAGNOSIS — M54.9 MID BACK PAIN ON RIGHT SIDE: Primary | ICD-10-CM

## 2024-11-25 PROCEDURE — 99214 OFFICE O/P EST MOD 30 MIN: CPT | Performed by: FAMILY MEDICINE

## 2024-11-25 RX ORDER — BACLOFEN 10 MG/1
10 TABLET ORAL 3 TIMES DAILY
Qty: 30 TABLET | Refills: 0 | Status: SHIPPED | OUTPATIENT
Start: 2024-11-25 | End: 2024-12-05
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
## 2024-12-09 ENCOUNTER — OFFICE VISIT (OUTPATIENT)
Dept: FAMILY MEDICINE CLINIC | Facility: CLINIC | Age: 54
End: 2024-12-09
Payer: COMMERCIAL

## 2024-12-09 VITALS
DIASTOLIC BLOOD PRESSURE: 90 MMHG | RESPIRATION RATE: 12 BRPM | HEART RATE: 68 BPM | OXYGEN SATURATION: 98 % | HEIGHT: 64 IN | TEMPERATURE: 98.2 F | SYSTOLIC BLOOD PRESSURE: 164 MMHG | WEIGHT: 194 LBS | BODY MASS INDEX: 33.12 KG/M2

## 2024-12-09 DIAGNOSIS — I10 PRIMARY HYPERTENSION: Primary | ICD-10-CM

## 2024-12-09 PROCEDURE — 99213 OFFICE O/P EST LOW 20 MIN: CPT | Performed by: FAMILY MEDICINE

## 2024-12-09 RX ORDER — LOSARTAN POTASSIUM 50 MG/1
50 TABLET ORAL DAILY
Qty: 30 TABLET | Refills: 1 | Status: SHIPPED | OUTPATIENT
Start: 2024-12-09
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
## 2024-12-10 DIAGNOSIS — J43.2 CENTRILOBULAR EMPHYSEMA (HCC): Primary | ICD-10-CM

## 2024-12-10 RX ORDER — ROFLUMILAST 500 UG/1
500 TABLET ORAL DAILY
Qty: 30 TABLET | Refills: 5 | Status: SHIPPED | OUTPATIENT
Start: 2024-12-10

## 2024-12-10 NOTE — TELEPHONE ENCOUNTER
Reason for call:   [x] Refill   [] Prior Auth  [] Other:     Office:   [] PCP/Provider -   [x] Specialty/Provider - Pulm/Bratis    Medication: roflumilast (DALIRESP) 500 mcg tablet     Dose/Frequency: 1 tablet daily    Quantity: 30    Pharmacy: [facility]    Does the patient have enough for 3 days?   [] Yes   [x] No - Send as HP to POD

## 2025-01-02 ENCOUNTER — RA CDI HCC (OUTPATIENT)
Dept: OTHER | Facility: HOSPITAL | Age: 55
End: 2025-01-02

## 2025-01-06 ENCOUNTER — TELEPHONE (OUTPATIENT)
Age: 55
End: 2025-01-06

## 2025-01-06 DIAGNOSIS — J43.2 CENTRILOBULAR EMPHYSEMA (HCC): ICD-10-CM

## 2025-01-06 RX ORDER — FLUTICASONE FUROATE, UMECLIDINIUM BROMIDE AND VILANTEROL TRIFENATATE 100; 62.5; 25 UG/1; UG/1; UG/1
1 POWDER RESPIRATORY (INHALATION) DAILY
Qty: 60 BLISTER | Refills: 0 | Status: SHIPPED | OUTPATIENT
Start: 2025-01-06

## 2025-01-06 RX ORDER — FLUTICASONE FUROATE, UMECLIDINIUM BROMIDE AND VILANTEROL TRIFENATATE 100; 62.5; 25 UG/1; UG/1; UG/1
1 POWDER RESPIRATORY (INHALATION) DAILY
Qty: 60 BLISTER | Refills: 0 | Status: SHIPPED | OUTPATIENT
Start: 2025-01-06 | End: 2025-01-06 | Stop reason: SDUPTHER

## 2025-01-09 ENCOUNTER — OFFICE VISIT (OUTPATIENT)
Dept: FAMILY MEDICINE CLINIC | Facility: CLINIC | Age: 55
End: 2025-01-09
Payer: MEDICARE

## 2025-01-09 VITALS
BODY MASS INDEX: 32.27 KG/M2 | TEMPERATURE: 98.3 F | RESPIRATION RATE: 12 BRPM | WEIGHT: 189 LBS | OXYGEN SATURATION: 95 % | SYSTOLIC BLOOD PRESSURE: 130 MMHG | HEIGHT: 64 IN | HEART RATE: 91 BPM | DIASTOLIC BLOOD PRESSURE: 70 MMHG

## 2025-01-09 DIAGNOSIS — I10 PRIMARY HYPERTENSION: Primary | ICD-10-CM

## 2025-01-09 DIAGNOSIS — I25.10 CAD, MULTIPLE VESSEL: ICD-10-CM

## 2025-01-09 DIAGNOSIS — J43.2 CENTRILOBULAR EMPHYSEMA (HCC): ICD-10-CM

## 2025-01-09 LAB
ALBUMIN SERPL-MCNC: 4.8 G/DL (ref 3.8–4.9)
ALP SERPL-CCNC: 73 IU/L (ref 44–121)
ALT SERPL-CCNC: 20 IU/L (ref 0–44)
AST SERPL-CCNC: 22 IU/L (ref 0–40)
BILIRUB SERPL-MCNC: 0.6 MG/DL (ref 0–1.2)
BUN SERPL-MCNC: 22 MG/DL (ref 6–24)
BUN/CREAT SERPL: 17 (ref 9–20)
CALCIUM SERPL-MCNC: 10.1 MG/DL (ref 8.7–10.2)
CHLORIDE SERPL-SCNC: 101 MMOL/L (ref 96–106)
CHOLEST SERPL-MCNC: 156 MG/DL (ref 100–199)
CHOLEST/HDLC SERPL: 3.8 RATIO (ref 0–5)
CO2 SERPL-SCNC: 21 MMOL/L (ref 20–29)
CREAT SERPL-MCNC: 1.27 MG/DL (ref 0.76–1.27)
EGFR: 66 ML/MIN/1.73
GLOBULIN SER-MCNC: 2.3 G/DL (ref 1.5–4.5)
GLUCOSE SERPL-MCNC: 111 MG/DL (ref 70–99)
HDLC SERPL-MCNC: 41 MG/DL
LDLC SERPL CALC-MCNC: 92 MG/DL (ref 0–99)
MICRODELETION SYND BLD/T FISH: NORMAL
POTASSIUM SERPL-SCNC: 5.1 MMOL/L (ref 3.5–5.2)
PROT SERPL-MCNC: 7.1 G/DL (ref 6–8.5)
SL AMB VLDL CHOLESTEROL CALC: 23 MG/DL (ref 5–40)
SODIUM SERPL-SCNC: 140 MMOL/L (ref 134–144)
TRIGL SERPL-MCNC: 127 MG/DL (ref 0–149)

## 2025-01-09 PROCEDURE — 99213 OFFICE O/P EST LOW 20 MIN: CPT | Performed by: FAMILY MEDICINE

## 2025-01-09 RX ORDER — ROSUVASTATIN CALCIUM 20 MG/1
20 TABLET, COATED ORAL DAILY
Qty: 90 TABLET | Refills: 1 | Status: SHIPPED | OUTPATIENT
Start: 2025-01-09

## 2025-01-09 RX ORDER — LOSARTAN POTASSIUM 50 MG/1
50 TABLET ORAL DAILY
Qty: 90 TABLET | Refills: 1 | Status: SHIPPED | OUTPATIENT
Start: 2025-01-09

## 2025-01-09 NOTE — PROGRESS NOTES
Name: Neal Weston      : 1970      MRN: [id number]  Encounter Provider: Juana Rojas MD  Encounter Date: 2025   Encounter department: [facility]    Assessment & Plan  Primary hypertension  Improved  Cont med  Orders:  •  losartan (COZAAR) 50 mg tablet; Take 1 tablet (50 mg total) by mouth daily    CAD, multiple vessel    Orders:  •  rosuvastatin (CRESTOR) 20 MG tablet; Take 1 tablet (20 mg total) by mouth daily    Centrilobular emphysema (HCC)  F/u with pulmonologist             History of Present Illness     Pt is seeing for f/u HTN  -  BP improved on Losartan 50 mg  -  no side effects       Review of Systems   Constitutional: Negative.    Respiratory: Negative.     Cardiovascular: Negative.    Gastrointestinal: Negative.    Genitourinary: Negative.    Musculoskeletal: Negative.    Skin: Negative.    Neurological: Negative.      Past Medical History:   Diagnosis Date   • Chronic sinus complaints     last assessed 17   • COPD (chronic obstructive pulmonary disease) (HCC) 17   • Coronary artery disease    • Diverticulitis of colon    • Diverticulosis    • GERD (gastroesophageal reflux disease)     off medicine for past few years   • Heart attack (HCC)     last assessed 17   • HTN (hypertension)    • Hyperlipidemia    • Hypertension    • Myocardial infarction (HCC)    • Sciatica    • Sinusitis    • Sleep apnea, obstructive ?   • Tobacco use    • Vasovagal syncope     last assessed 13   • Wears glasses      Past Surgical History:   Procedure Laterality Date   • APPENDECTOMY     • COLONOSCOPY N/A 2017    Procedure: COLONOSCOPY;  Surgeon: Chemo Stuart MD;  Location: [facility] GI [facility];  Service: Gastroenterology   • CORONARY ARTERY BYPASS GRAFT N/A 2017    Procedure: INTRA OP MATTHEW; CORONARY ARTERY BYPASS GRAFT X 2 WITH SVH TO OM1  AND LIMA TO LAD; LEFT LEG EVH;  Surgeon: Cuong Loo MD;  Location:  MAIN OR;  Service: Cardiac Surgery   • THUMB  AMPUTATION Left      Family History   Problem Relation Age of Onset   • Heart disease Father         CABG   • Testicular cancer Father         adenocarcinoma in situ of the testis   • Heart defect Father         cardiac disorder   • Cancer Father    • Diabetes type II Father    • Heart disease Maternal Grandfather    • Hypertension Mother         benign essential   • Diabetes Sister    • COPD Sister    • Arthritis Family    • Arthritis Other    • Heart defect Other         cardiac disorder   • Diabetes Other    • Cancer Other    • Heart disease Paternal Grandfather      Social History     Tobacco Use   • Smoking status: Former     Current packs/day: 0.00     Average packs/day: 2.0 packs/day for 28.0 years (56.0 ttl pk-yrs)     Types: Cigarettes     Start date: 1989     Quit date: 2017     Years since quittin.3   • Smokeless tobacco: Never   • Tobacco comments:     denied hx of exposure to second hand smoke; former smoker, smoked 2ppd for 30 yrs. pt quit 3 mos ago as per [employer]   Vaping Use   • Vaping status: Never Used   Substance and Sexual Activity   • Alcohol use: No     Comment: rarely; denied hx of social drinker as per [employer]   • Drug use: No   • Sexual activity: Yes     Partners: Female     Birth control/protection: Female Sterilization     Current Outpatient Medications on File Prior to Visit   Medication Sig   • albuterol (PROVENTIL HFA,VENTOLIN HFA) 90 mcg/act inhaler Inhale 2 puffs every 6 (six) hours as needed for wheezing   • aspirin 325 mg tablet Take 1 tablet by mouth daily   • fluticasone (FLONASE) 50 mcg/act nasal spray 1 spray into each nostril daily   • Multiple Vitamins-Minerals (CENTRUM SILVER PO) Take by mouth   • roflumilast (DALIRESP) 500 mcg tablet Take 1 tablet (500 mcg total) by mouth daily   • Trelegy Ellipta 100-62.5-25 MCG/ACT inhaler Inhale 1 puff daily   • [DISCONTINUED] losartan (COZAAR) 50 mg tablet Take 1 tablet (50 mg total) by mouth daily   •  "[DISCONTINUED] rosuvastatin (CRESTOR) 20 MG tablet TAKE ONE TABLET BY MOUTH EVERY DAY   • [DISCONTINUED] baclofen 10 mg tablet Take 1 tablet (10 mg total) by mouth 3 (three) times a day for 30 doses (Patient not taking: Reported on 12/9/2024)     No Known Allergies  Immunization History   Administered Date(s) Administered   • COVID-19 PFIZER VACCINE 0.3 ML IM 03/28/2021, 04/19/2021, 01/09/2022   • INFLUENZA 09/21/2020, 09/21/2020   • Influenza Recombinant Preservative Free Im 10/14/2024   • Influenza, injectable, quadrivalent, preservative free 0.5 mL 08/29/2018, 10/06/2023   • Influenza, recombinant, quadrivalent,injectable, preservative free 09/23/2019, 09/27/2021, 11/10/2022   • Pneumococcal Conjugate Vaccine 20-valent (Pcv20), Polysace 05/05/2022     Objective   /70 (BP Location: Left arm, Patient Position: Sitting, Cuff Size: Large)   Pulse 91   Temp 98.3 °F (36.8 °C) (Temporal)   Resp 12   Ht 5' 4\" (1.626 m)   Wt 85.7 kg (189 lb)   SpO2 95%   BMI 32.44 kg/m²     Physical Exam  Constitutional:       General: He is not in acute distress.     Appearance: He is obese. He is not ill-appearing.   Cardiovascular:      Rate and Rhythm: Normal rate and regular rhythm.      Heart sounds: Heart sounds are distant. No murmur heard.     No gallop.   Pulmonary:      Effort: No respiratory distress.      Breath sounds: Decreased air movement present. No wheezing, rhonchi or rales.   Musculoskeletal:      Right lower leg: No edema.      Left lower leg: No edema.   Neurological:      General: No focal deficit present.      Mental Status: He is alert and oriented to person, place, and time.      Cranial Nerves: No cranial nerve deficit.   Psychiatric:         Mood and Affect: Mood normal.         Thought Content: Thought content normal.         Judgment: Judgment normal.         "

## 2025-01-09 NOTE — ASSESSMENT & PLAN NOTE
Improved  Cont med  Orders:  •  losartan (COZAAR) 50 mg tablet; Take 1 tablet (50 mg total) by mouth daily

## 2025-02-04 ENCOUNTER — HOSPITAL ENCOUNTER (OUTPATIENT)
Dept: CT IMAGING | Facility: HOSPITAL | Age: 55
Discharge: HOME | End: 2025-02-04
Attending: INTERNAL MEDICINE

## 2025-02-04 DIAGNOSIS — F17.221 CHEWING TOBACCO NICOTINE DEPENDENCE IN REMISSION: ICD-10-CM

## 2025-02-04 DIAGNOSIS — F17.211 NICOTINE DEPENDENCE, CIGARETTES, IN REMISSION: ICD-10-CM

## 2025-02-13 ENCOUNTER — OFFICE VISIT (OUTPATIENT)
Dept: FAMILY MEDICINE CLINIC | Facility: CLINIC | Age: 55
End: 2025-02-13
Payer: MEDICARE

## 2025-02-13 VITALS
HEIGHT: 64 IN | WEIGHT: 186 LBS | SYSTOLIC BLOOD PRESSURE: 130 MMHG | BODY MASS INDEX: 31.76 KG/M2 | OXYGEN SATURATION: 98 % | HEART RATE: 78 BPM | TEMPERATURE: 98.2 F | DIASTOLIC BLOOD PRESSURE: 80 MMHG | RESPIRATION RATE: 14 BRPM

## 2025-02-13 DIAGNOSIS — R05.1 ACUTE COUGH: Primary | ICD-10-CM

## 2025-02-13 DIAGNOSIS — R10.11 RUQ PAIN: ICD-10-CM

## 2025-02-13 PROCEDURE — 99214 OFFICE O/P EST MOD 30 MIN: CPT | Performed by: FAMILY MEDICINE

## 2025-02-13 PROCEDURE — G2211 COMPLEX E/M VISIT ADD ON: HCPCS | Performed by: FAMILY MEDICINE

## 2025-02-13 RX ORDER — DOXYCYCLINE 100 MG/1
100 CAPSULE ORAL EVERY 12 HOURS SCHEDULED
Qty: 14 CAPSULE | Refills: 0 | Status: SHIPPED | OUTPATIENT
Start: 2025-02-13 | End: 2025-02-20

## 2025-02-13 NOTE — PROGRESS NOTES
Name: Neal Weston      : 1970      MRN: [id number]  Encounter Provider: Juana Rojas MD  Encounter Date: 2025   Encounter department: [facility]    Assessment & Plan  RUQ pain    Orders:  •  US abdomen complete; Future    Acute cough    Orders:  •  doxycycline hyclate (VIBRAMYCIN) 100 mg capsule; Take 1 capsule (100 mg total) by mouth every 12 (twelve) hours for 7 days         History of Present Illness     Cough  This is a new problem. The current episode started in the past 7 days. The problem has been unchanged. The problem occurs every few hours. The cough is Non-productive. Pertinent negatives include no chest pain, chills, ear congestion, ear pain, fever, headaches, heartburn, hemoptysis, myalgias, nasal congestion, postnasal drip, rash, rhinorrhea, sore throat, shortness of breath, sweats, weight loss or wheezing. Nothing aggravates the symptoms. He has tried OTC cough suppressant for the symptoms. The treatment provided no relief. His past medical history is significant for emphysema. There is no history of bronchitis.     Review of Systems   Constitutional:  Negative for chills, fever and weight loss.   HENT:  Negative for ear pain, postnasal drip, rhinorrhea and sore throat.    Respiratory:  Positive for cough. Negative for hemoptysis, shortness of breath and wheezing.    Cardiovascular:  Negative for chest pain.   Gastrointestinal:  Positive for abdominal pain (RUQ x 2 days). Negative for constipation, diarrhea, heartburn, nausea and vomiting.   Musculoskeletal:  Negative for myalgias.   Skin:  Negative for rash.   Neurological:  Negative for headaches.     Past Medical History:   Diagnosis Date   • Chronic sinus complaints     last assessed 17   • COPD (chronic obstructive pulmonary disease) ([facility]) 17   • Coronary artery disease    • Diverticulitis of colon    • Diverticulosis    • GERD (gastroesophageal reflux disease)     off medicine for past few  years   • Heart attack (HCC)     last assessed 17   • HTN (hypertension)    • Hyperlipidemia    • Hypertension    • Myocardial infarction (HCC)    • Sciatica    • Sinusitis    • Sleep apnea, obstructive ?   • Tobacco use    • Vasovagal syncope     last assessed 13   • Wears glasses      Past Surgical History:   Procedure Laterality Date   • APPENDECTOMY     • COLONOSCOPY N/A 2017    Procedure: COLONOSCOPY;  Surgeon: Chemo Stuart MD;  Location: [facility] GI LAB;  Service: Gastroenterology   • CORONARY ARTERY BYPASS GRAFT N/A 2017    Procedure: INTRA OP MATTHEW; CORONARY ARTERY BYPASS GRAFT X 2 WITH SVH TO OM1  AND LIMA TO LAD; LEFT LEG EVH;  Surgeon: Cuong Loo MD;  Location:  MAIN OR;  Service: Cardiac Surgery   • THUMB AMPUTATION Left 1980     Family History   Problem Relation Age of Onset   • Heart disease Father         CABG   • Testicular cancer Father         adenocarcinoma in situ of the testis   • Heart defect Father         cardiac disorder   • Cancer Father    • Diabetes type II Father    • Heart disease Maternal Grandfather    • Hypertension Mother         benign essential   • Diabetes Sister    • COPD Sister    • Arthritis Family    • Arthritis Other    • Heart defect Other         cardiac disorder   • Diabetes Other    • Cancer Other    • Heart disease Paternal Grandfather      Social History     Tobacco Use   • Smoking status: Former     Current packs/day: 0.00     Average packs/day: 2.0 packs/day for 28.0 years (56.0 ttl pk-yrs)     Types: Cigarettes     Start date: 1989     Quit date: 2017     Years since quittin.4   • Smokeless tobacco: Never   • Tobacco comments:     denied hx of exposure to second hand smoke; former smoker, smoked 2ppd for 30 yrs. pt quit 3 mos ago as per [employer]   Vaping Use   • Vaping status: Never Used   Substance and Sexual Activity   • Alcohol use: No     Comment: rarely; denied hx of social drinker as per [employer]   • Drug use: No  "  • Sexual activity: Yes     Partners: Female     Birth control/protection: Female Sterilization     Current Outpatient Medications on File Prior to Visit   Medication Sig   • albuterol (PROVENTIL HFA,VENTOLIN HFA) 90 mcg/act inhaler Inhale 2 puffs every 6 (six) hours as needed for wheezing   • aspirin 325 mg tablet Take 1 tablet by mouth daily   • fluticasone (FLONASE) 50 mcg/act nasal spray 1 spray into each nostril daily   • losartan (COZAAR) 50 mg tablet Take 1 tablet (50 mg total) by mouth daily   • Multiple Vitamins-Minerals (CENTRUM SILVER PO) Take by mouth   • roflumilast (DALIRESP) 500 mcg tablet Take 1 tablet (500 mcg total) by mouth daily   • rosuvastatin (CRESTOR) 20 MG tablet Take 1 tablet (20 mg total) by mouth daily   • Trelegy Ellipta 100-62.5-25 MCG/ACT inhaler Inhale 1 puff daily     No Known Allergies  Immunization History   Administered Date(s) Administered   • COVID-19 PFIZER VACCINE 0.3 ML IM 03/28/2021, 04/19/2021, 01/09/2022   • INFLUENZA 09/21/2020, 09/21/2020   • Influenza Recombinant Preservative Free Im 10/14/2024   • Influenza, injectable, quadrivalent, preservative free 0.5 mL 08/29/2018, 10/06/2023   • Influenza, recombinant, quadrivalent,injectable, preservative free 09/23/2019, 09/27/2021, 11/10/2022   • Pneumococcal Conjugate Vaccine 20-valent (Pcv20), Polysace 05/05/2022     Objective   /80 (BP Location: Left arm, Patient Position: Sitting, Cuff Size: Standard)   Pulse 78   Temp 98.2 °F (36.8 °C) (Temporal)   Resp 14   Ht 5' 4\" (1.626 m)   Wt 84.4 kg (186 lb)   SpO2 98%   BMI 31.93 kg/m²     Physical Exam  Constitutional:       General: He is not in acute distress.     Appearance: He is not ill-appearing.   Cardiovascular:      Rate and Rhythm: Normal rate.   Pulmonary:      Effort: Pulmonary effort is normal. No respiratory distress.      Breath sounds: Decreased air movement present. No wheezing, rhonchi or rales.   Abdominal:      Palpations: Abdomen is soft.      " Tenderness: There is no abdominal tenderness. There is no guarding or rebound.   Neurological:      Mental Status: He is alert.

## 2025-02-17 ENCOUNTER — HOSPITAL ENCOUNTER (OUTPATIENT)
Dept: RADIOLOGY | Facility: HOSPITAL | Age: 55
Discharge: HOME | End: 2025-02-17
Payer: MEDICARE

## 2025-02-17 DIAGNOSIS — R10.11 RUQ PAIN: ICD-10-CM

## 2025-02-17 PROCEDURE — 76705 ECHO EXAM OF ABDOMEN: CPT

## 2025-02-28 ENCOUNTER — RESULTS FOLLOW-UP (OUTPATIENT)
Dept: FAMILY MEDICINE CLINIC | Facility: CLINIC | Age: 55
End: 2025-02-28

## 2025-02-28 NOTE — TELEPHONE ENCOUNTER
----- Message from Juana Rojas MD sent at 2/28/2025  2:53 PM EST -----  Pl, advise pt -  US showed no gallstones -  stable liver and kidney cyst ( was present for years )

## 2025-03-13 ENCOUNTER — TELEPHONE (OUTPATIENT)
Age: 55
End: 2025-03-13

## 2025-03-14 ENCOUNTER — TELEPHONE (OUTPATIENT)
Dept: PULMONOLOGY | Facility: MEDICAL CENTER | Age: 55
End: 2025-03-14

## 2025-03-14 NOTE — TELEPHONE ENCOUNTER
PA for DALIRESP 550 SUBMITTED to [employer]     via    []CMM-KEY:   [x]Surescripts-Case ID # [id number]   []Availity-Auth ID # NDC #   []Faxed to plan   []Other website   []Phone call Case ID #     [x]PA sent as URGENT    All office notes, labs and other pertaining documents and studies sent. Clinical questions answered. Awaiting determination from insurance company.     Turnaround time for your insurance to make a decision on your Prior Authorization can take 7-21 business days.                
PA for DALIRESP APPROVED     Date(s) approved  February 11, 2025 to March 13, 2026     Case #    Patient advised by          [x]Vidlyhart Message  []Phone call   [x]LMOM  []L/M to call office as no active Communication consent on file  []Unable to leave detailed message as VM not approved on Communication consent       Pharmacy advised by    [x]Fax  []Phone call  []Secure Chat    Specialty Pharmacy    []     Approval letter scanned into Media No PENDING FAX     
Reason for call:   [] Refill   [x] Prior Auth  [] Other:     Office:   [] PCP/Provider -   [x] Specialty/Provider -  Tamara Parr DO / [facility]    Medication: roflumilast (DALIRESP) 500 mcg tablet / Take 1 tablet (500 mcg total) by mouth daily     Pharmacy: [id number] - [place], [place]     Local Pharmacy   Does the patient have enough for 3 days?   [] Yes   [x] No - Send as HP to POD    
,

## 2025-03-17 NOTE — TELEPHONE ENCOUNTER
Duplicate encounter created, please see telephone encounter from 3/13/25 regarding DALIRESP PA status. Please review patient's chart to see if there is already an encounter regarding the medication in question and to document anything regarding this medication in regards to anything regarding the authorization process etc before creating another encounter Thank You.

## 2025-03-26 ENCOUNTER — OFFICE VISIT (OUTPATIENT)
Dept: PULMONOLOGY | Facility: CLINIC | Age: 55
End: 2025-03-26
Payer: MEDICARE

## 2025-03-26 VITALS
HEART RATE: 95 BPM | BODY MASS INDEX: 32.61 KG/M2 | TEMPERATURE: 97.6 F | WEIGHT: 190 LBS | DIASTOLIC BLOOD PRESSURE: 80 MMHG | SYSTOLIC BLOOD PRESSURE: 150 MMHG | OXYGEN SATURATION: 94 %

## 2025-03-26 DIAGNOSIS — J43.9 PULMONARY EMPHYSEMA, UNSPECIFIED EMPHYSEMA TYPE (HCC): ICD-10-CM

## 2025-03-26 DIAGNOSIS — G47.33 OSA (OBSTRUCTIVE SLEEP APNEA): Primary | ICD-10-CM

## 2025-03-26 PROCEDURE — 99214 OFFICE O/P EST MOD 30 MIN: CPT | Performed by: INTERNAL MEDICINE

## 2025-03-26 PROCEDURE — G2211 COMPLEX E/M VISIT ADD ON: HCPCS | Performed by: INTERNAL MEDICINE

## 2025-03-26 NOTE — ASSESSMENT & PLAN NOTE
Neal is doing well on his current inhaled regimen.  He is using Trelegy 1 puff daily and Daliresp 500 mcg daily.  On this he has not had exacerbations and is able to do most of his activities of daily living.  He does wish she could have improved breathing particularly bending over and with exertion and is interested in moving forward with valves if he is a candidate.  He needs lung cancer screening CT next 1 in February.  Reviewed his most recent 1 which was negative for any suspicious nodules.

## 2025-03-26 NOTE — PROGRESS NOTES
Name: Neal Weston      : 1970      MRN: [id number]  Encounter Provider: Tamara Parr DO  Encounter Date: 3/26/2025   Encounter department: [facility] PULMONARY [facility]  :  Assessment & Plan  LUCY (obstructive sleep apnea)  I reviewed Neal's compliance data he is doing quite well on his CPAP machine with an AHI of less than 1.         Pulmonary emphysema, unspecified emphysema type (HCC)  Nael is doing well on his current inhaled regimen.  He is using Trelegy 1 puff daily and Daliresp 500 mcg daily.  On this he has not had exacerbations and is able to do most of his activities of daily living.  He does wish she could have improved breathing particularly bending over and with exertion and is interested in moving forward with valves if he is a candidate.  He needs lung cancer screening CT next 1 in February.  Reviewed his most recent 1 which was negative for any suspicious nodules.             History of Present Illness   primary symptoms  Associated symptoms include myalgias. Pertinent negatives include no chest pain, fever, headaches or sore throat.     Neal Weston is a 54 y.o. male who presents for follow-up of his COPD and sleep apnea.  His breathing stable denies any new symptoms.  He is using Trelegy and Daliresp and still some dyspnea with heavy exertion.  History obtained from: patient    Review of Systems   Constitutional: Negative.  Negative for appetite change and fever.   HENT: Negative.  Negative for ear pain, postnasal drip, rhinorrhea, sneezing, sore throat and trouble swallowing.    Eyes: Negative.    Respiratory:  Negative for shortness of breath.    Cardiovascular: Negative.  Negative for chest pain.   Gastrointestinal: Negative.    Endocrine: Negative.    Genitourinary: Negative.    Musculoskeletal:  Positive for myalgias.   Allergic/Immunologic: Negative.    Neurological: Negative.  Negative for headaches.   Hematological: Negative.    Psychiatric/Behavioral: Negative.        Medical History Reviewed by provider this encounter:     .     Objective   /80 (BP Location: Left arm, Patient Position: Standing, Cuff Size: Standard)   Pulse 95   Temp 97.6 °F (36.4 °C) (Temporal)   Wt 86.2 kg (190 lb)   SpO2 94%   BMI 32.61 kg/m²      Physical Exam  Constitutional:       Appearance: He is well-developed.   HENT:      Head: Normocephalic and atraumatic.   Eyes:      Pupils: Pupils are equal, round, and reactive to light.   Cardiovascular:      Rate and Rhythm: Normal rate and regular rhythm.      Heart sounds: No murmur heard.  Pulmonary:      Effort: Pulmonary effort is normal. No respiratory distress.      Breath sounds: Normal breath sounds. No wheezing or rales.   Abdominal:      Palpations: Abdomen is soft.   Musculoskeletal:      Cervical back: Normal range of motion and neck supple.   Skin:     General: Skin is warm and dry.   Neurological:      Mental Status: He is alert and oriented to person, place, and time.         Administrative Statements   I have spent a total time of 30 minutes in caring for this patient on the day of the visit/encounter including Diagnostic results, Prognosis, Counseling / Coordination of care, Documenting in the medical record, Reviewing/placing orders in the medical record (including tests, medications, and/or procedures), and Obtaining or reviewing history  .

## 2025-03-26 NOTE — ASSESSMENT & PLAN NOTE
I reviewed Neal's compliance data he is doing quite well on his CPAP machine with an AHI of less than 1.

## 2025-03-28 ENCOUNTER — TELEPHONE (OUTPATIENT)
Dept: PULMONOLOGY | Facility: CLINIC | Age: 55
End: 2025-03-28

## 2025-03-28 DIAGNOSIS — J43.9 PULMONARY EMPHYSEMA, UNSPECIFIED EMPHYSEMA TYPE (HCC): Primary | ICD-10-CM

## 2025-04-03 ENCOUNTER — HOSPITAL ENCOUNTER (OUTPATIENT)
Dept: PULMONOLOGY | Facility: HOSPITAL | Age: 55
End: 2025-04-03
Attending: INTERNAL MEDICINE
Payer: MEDICARE

## 2025-04-03 DIAGNOSIS — J43.9 PULMONARY EMPHYSEMA, UNSPECIFIED EMPHYSEMA TYPE (HCC): ICD-10-CM

## 2025-04-03 LAB
BASE EXCESS BLDA CALC-SCNC: 0 MMOL/L (ref -2–3)
CA-I BLD-SCNC: 1.26 MMOL/L (ref 1.12–1.32)
FIO2 GAS DIL.REBREATH: 21 L
GLUCOSE SERPL-MCNC: 85 MG/DL (ref 65–140)
HCO3 BLDA-SCNC: 24.4 MMOL/L (ref 22–28)
HCT VFR BLD CALC: 45 % (ref 36.5–49.3)
HGB BLDA-MCNC: 15.3 G/DL (ref 12–17)
PCO2 BLD: 26 MMOL/L (ref 21–32)
PCO2 BLD: 37.6 MM HG (ref 36–44)
PH BLD: 7.42 [PH] (ref 7.35–7.45)
PO2 BLD: 68 MM HG (ref 75–129)
POTASSIUM BLD-SCNC: 4 MMOL/L (ref 3.5–5.3)
SAO2 % BLD FROM PO2: 94 % (ref 60–85)
SODIUM BLD-SCNC: 139 MMOL/L (ref 136–145)
SPECIMEN SOURCE: ABNORMAL

## 2025-04-03 PROCEDURE — 94726 PLETHYSMOGRAPHY LUNG VOLUMES: CPT

## 2025-04-03 PROCEDURE — 94729 DIFFUSING CAPACITY: CPT | Performed by: INTERNAL MEDICINE

## 2025-04-03 PROCEDURE — 85014 HEMATOCRIT: CPT

## 2025-04-03 PROCEDURE — 82330 ASSAY OF CALCIUM: CPT

## 2025-04-03 PROCEDURE — 94726 PLETHYSMOGRAPHY LUNG VOLUMES: CPT | Performed by: INTERNAL MEDICINE

## 2025-04-03 PROCEDURE — 82947 ASSAY GLUCOSE BLOOD QUANT: CPT

## 2025-04-03 PROCEDURE — 84295 ASSAY OF SERUM SODIUM: CPT

## 2025-04-03 PROCEDURE — 94060 EVALUATION OF WHEEZING: CPT | Performed by: INTERNAL MEDICINE

## 2025-04-03 PROCEDURE — 84132 ASSAY OF SERUM POTASSIUM: CPT

## 2025-04-03 PROCEDURE — 94060 EVALUATION OF WHEEZING: CPT

## 2025-04-03 PROCEDURE — 82803 BLOOD GASES ANY COMBINATION: CPT

## 2025-04-03 PROCEDURE — 94729 DIFFUSING CAPACITY: CPT

## 2025-04-03 PROCEDURE — 94761 N-INVAS EAR/PLS OXIMETRY MLT: CPT

## 2025-04-03 RX ORDER — ALBUTEROL SULFATE 0.83 MG/ML
2.5 SOLUTION RESPIRATORY (INHALATION) ONCE
Status: COMPLETED | OUTPATIENT
Start: 2025-04-03 | End: 2025-04-03

## 2025-04-03 RX ADMIN — ALBUTEROL SULFATE 2.5 MG: 2.5 SOLUTION RESPIRATORY (INHALATION) at 17:56

## 2025-04-16 DIAGNOSIS — J43.2 CENTRILOBULAR EMPHYSEMA (HCC): ICD-10-CM

## 2025-04-16 RX ORDER — FLUTICASONE FUROATE, UMECLIDINIUM BROMIDE AND VILANTEROL TRIFENATATE 100; 62.5; 25 UG/1; UG/1; UG/1
1 POWDER RESPIRATORY (INHALATION) DAILY
Qty: 60 BLISTER | Refills: 0 | Status: SHIPPED | OUTPATIENT
Start: 2025-04-16

## 2025-04-30 ENCOUNTER — OFFICE VISIT (OUTPATIENT)
Dept: FAMILY MEDICINE CLINIC | Facility: CLINIC | Age: 55
End: 2025-04-30
Payer: MEDICARE

## 2025-04-30 VITALS
SYSTOLIC BLOOD PRESSURE: 140 MMHG | RESPIRATION RATE: 14 BRPM | HEIGHT: 64 IN | BODY MASS INDEX: 32.44 KG/M2 | HEART RATE: 78 BPM | DIASTOLIC BLOOD PRESSURE: 82 MMHG | OXYGEN SATURATION: 97 % | TEMPERATURE: 97.9 F | WEIGHT: 190 LBS

## 2025-04-30 DIAGNOSIS — I10 PRIMARY HYPERTENSION: Primary | ICD-10-CM

## 2025-04-30 DIAGNOSIS — J43.2 CENTRILOBULAR EMPHYSEMA (HCC): ICD-10-CM

## 2025-04-30 DIAGNOSIS — Z00.00 MEDICARE ANNUAL WELLNESS VISIT, SUBSEQUENT: ICD-10-CM

## 2025-04-30 DIAGNOSIS — E78.2 MIXED HYPERLIPIDEMIA: ICD-10-CM

## 2025-04-30 DIAGNOSIS — Z95.1 S/P CABG X 2: ICD-10-CM

## 2025-04-30 PROCEDURE — 99214 OFFICE O/P EST MOD 30 MIN: CPT | Performed by: FAMILY MEDICINE

## 2025-04-30 PROCEDURE — G0439 PPPS, SUBSEQ VISIT: HCPCS | Performed by: FAMILY MEDICINE

## 2025-04-30 PROCEDURE — G2211 COMPLEX E/M VISIT ADD ON: HCPCS | Performed by: FAMILY MEDICINE

## 2025-04-30 RX ORDER — LOSARTAN POTASSIUM 100 MG/1
100 TABLET ORAL DAILY
Qty: 90 TABLET | Refills: 1 | Status: SHIPPED | OUTPATIENT
Start: 2025-04-30

## 2025-04-30 NOTE — PROGRESS NOTES
Name: Neal Weston      : 1970      MRN: [id number]  Encounter Provider: Juana Rojas MD  Encounter Date: 2025   Encounter department: [facility]  :  Assessment & Plan  Medicare annual wellness visit, subsequent         Primary hypertension  Repeat /90  Will increase med to 100 mg a day   Orders:  •  losartan (COZAAR) 100 MG tablet; Take 1 tablet (100 mg total) by mouth daily    S/P CABG x 2  Needs to have better LDL control  Pt was advised to follow low cholesterol diet   F/u with cardiologist        Mixed hyperlipidemia    Orders:  •  Lipid panel; Future  •  Comprehensive metabolic panel; Future    Centrilobular emphysema (HCC)  F/u with pulmonologist             Preventive health issues were discussed with patient, and age appropriate screening tests were ordered as noted in patient's After Visit Summary. Personalized health advice and appropriate referrals for health education or preventive services given if needed, as noted in patient's After Visit Summary.    History of Present Illness     Pt is seeing for f/u HLD, HTN, Emphysema, CAD -  all stable  - BP at home in 140/80s often        Patient Care Team:  Juana Rojas MD as PCP - General  Waleska Osei MD as PCP - PCP-[facility] ([facility])  Waleska Osei MD as PCP - PCP-[facility] ([facility])  MD Cuong Gibbs MD Sinan Kutty, MD Michael Nekoranik, DO Narpinder Singh, MD Sinan Kutty, MD as Endoscopist    Review of Systems   Constitutional: Negative.    Respiratory: Negative.     Cardiovascular: Negative.    Gastrointestinal: Negative.    Genitourinary: Negative.    Musculoskeletal: Negative.    Skin: Negative.    Neurological: Negative.    Psychiatric/Behavioral: Negative.       Medical History Reviewed by provider this encounter:  Tobacco  Allergies  Meds  Problems  Med Hx  Surg Hx  Fam Hx       Annual Wellness Visit Questionnaire   Neal is here for his  Subsequent Wellness visit.     Health Risk Assessment:   Patient rates overall health as fair. Patient feels that their physical health rating is same. Patient is very satisfied with their life. Eyesight was rated as same. Hearing was rated as same. Patient feels that their emotional and mental health rating is same. Patients states they are never, rarely angry. Patient states they are often unusually tired/fatigued. Pain experienced in the last 7 days has been none. Patient states that he has experienced no weight loss or gain in last 6 months.     Fall Risk Screening:   In the past year, patient has experienced: no history of falling in past year      Home Safety:  Patient does not have trouble with stairs inside or outside of their home. Patient has working smoke alarms and has no working carbon monoxide detector. Home safety hazards include: none.     Nutrition:   Current diet is Low Cholesterol, Low Saturated Fat and No Added Salt.     Medications:   Patient is not currently taking any over-the-counter supplements. Patient is able to manage medications.     Activities of Daily Living (ADLs)/Instrumental Activities of Daily Living (IADLs):   Walk and transfer into and out of bed and chair?: Yes  Dress and groom yourself?: Yes    Bathe or shower yourself?: Yes    Feed yourself? Yes  Do your laundry/housekeeping?: Yes  Manage your money, pay your bills and track your expenses?: Yes  Make your own meals?: Yes    Do your own shopping?: Yes    Durable Medical Equipment Suppliers  [employer]    Previous Hospitalizations:   Any hospitalizations or ED visits within the last 12 months?: No      Advance Care Planning:   Living will: No    Durable POA for healthcare: No    Advanced directive: No      Cognitive Screening:   Provider or family/friend/caregiver concerned regarding cognition?: No    Preventive Screenings      Cardiovascular Screening:    General: Screening Not Indicated and History Lipid Disorder       Diabetes Screening:     General: Screening Current      Colorectal Cancer Screening:     General: Screening Current      Prostate Cancer Screening:    General: Screening Not Indicated      Osteoporosis Screening:    General: Screening Not Indicated      Abdominal Aortic Aneurysm (AAA) Screening:    Risk factors include: tobacco use        General: Screening Not Indicated      Lung Cancer Screening:     General: Screening Current      Hepatitis C Screening:    General: Screening Not Indicated    Immunizations:  - Immunizations due: Zoster (Shingrix)    Screening, Brief Intervention, and Referral to Treatment (SBIRT)     Screening  Typical number of drinks in a day: 0  Typical number of drinks in a week: 0  Interpretation: Low risk drinking behavior.    AUDIT-C Screenin) How often did you have a drink containing alcohol in the past year? never  2) How many drinks did you have on a typical day when you were drinking in the past year? 0  3) How often did you have 6 or more drinks on one occasion in the past year? never    AUDIT-C Score: 0  Interpretation: Score 0-3 (male): Negative screen for alcohol misuse    Single Item Drug Screening:  How often have you used an illegal drug (including marijuana) or a prescription medication for non-medical reasons in the past year? never    Single Item Drug Screen Score: 0  Interpretation: Negative screen for possible drug use disorder    Social Drivers of Health     Financial Resource Strain: Low Risk  (2023)    Overall Financial Resource Strain (CARDIA)    • Difficulty of Paying Living Expenses: Not hard at all   Food Insecurity: No Food Insecurity (2025)    Hunger Vital Sign    • Worried About Running Out of Food in the Last Year: Never true    • Ran Out of Food in the Last Year: Never true   Transportation Needs: No Transportation Needs (2025)    PRAPARE - Transportation    • Lack of Transportation (Medical): No    • Lack of Transportation (Non-Medical): No  "  Housing Stability: Unknown (4/25/2025)    Housing Stability Vital Sign    • Unable to Pay for Housing in the Last Year: No    • Homeless in the Last Year: No   Utilities: Not At Risk (4/25/2025)    [facility] Utilities    • Threatened with loss of utilities: No     No results found.    Objective   /82 (BP Location: Left arm, Patient Position: Sitting, Cuff Size: Standard)   Pulse 78   Temp 97.9 °F (36.6 °C) (Temporal)   Resp 14   Ht 5' 4\" (1.626 m)   Wt 86.2 kg (190 lb)   SpO2 97%   BMI 32.61 kg/m²     Physical Exam  Constitutional:       General: He is not in acute distress.     Appearance: He is not ill-appearing.   Cardiovascular:      Rate and Rhythm: Normal rate and regular rhythm.      Heart sounds: Heart sounds are distant. No murmur heard.  Pulmonary:      Effort: Pulmonary effort is normal. No respiratory distress.      Breath sounds: Decreased air movement present. No wheezing, rhonchi or rales.   Musculoskeletal:      Right lower leg: No edema.      Left lower leg: No edema.   Neurological:      Mental Status: He is alert and oriented to person, place, and time.      Cranial Nerves: No cranial nerve deficit.      Motor: No weakness.      Gait: Gait normal.   Psychiatric:         Mood and Affect: Mood normal.         Thought Content: Thought content normal.         Judgment: Judgment normal.         "

## 2025-04-30 NOTE — ASSESSMENT & PLAN NOTE
Needs to have better LDL control  Pt was advised to follow low cholesterol diet   F/u with cardiologist

## 2025-04-30 NOTE — ASSESSMENT & PLAN NOTE
Repeat /90  Will increase med to 100 mg a day   Orders:  •  losartan (COZAAR) 100 MG tablet; Take 1 tablet (100 mg total) by mouth daily

## 2025-04-30 NOTE — PATIENT INSTRUCTIONS
Medicare Preventive Visit Patient Instructions  Thank you for completing your Welcome to Medicare Visit or Medicare Annual Wellness Visit today. Your next wellness visit will be due in one year (5/1/2026).  The screening/preventive services that you may require over the next 5-10 years are detailed below. Some tests may not apply to you based off risk factors and/or age. Screening tests ordered at today's visit but not completed yet may show as past due. Also, please note that scanned in results may not display below.  Preventive Screenings:  Service Recommendations Previous Testing/Comments   Colorectal Cancer Screening  Colonoscopy    Fecal Occult Blood Test (FOBT)/Fecal Immunochemical Test (FIT)  Fecal DNA/Cologuard Test  Flexible Sigmoidoscopy Age: 45-75 years old   Colonoscopy: every 10 years (May be performed more frequently if at higher risk)  OR  FOBT/FIT: every 1 year  OR  Cologuard: every 3 years  OR  Sigmoidoscopy: every 5 years  Screening may be recommended earlier than age 45 if at higher risk for colorectal cancer. Also, an individualized decision between you and your healthcare provider will decide whether screening between the ages of 76-85 would be appropriate. Colonoscopy: 05/15/2023  FOBT/FIT: Not on file  Cologuard: Not on file  Sigmoidoscopy: Not on file          Prostate Cancer Screening Individualized decision between patient and health care provider in men between ages of 55-69   Medicare will cover every 12 months beginning on the day after your 50th birthday PSA: No results in last 5 years           Hepatitis C Screening Once for adults born between 1945 and 1965  More frequently in patients at high risk for Hepatitis C Hep C Antibody: Not on file        Diabetes Screening 1-2 times per year if you're at risk for diabetes or have pre-diabetes Fasting glucose: No results in last 5 years (No results in last 5 years)  A1C: 5.5 % (4/19/2024)      Cholesterol Screening Once every 5 years if you  don't have a lipid disorder. May order more often based on risk factors. Lipid panel: 01/08/2025         Other Preventive Screenings Covered by Medicare:  Abdominal Aortic Aneurysm (AAA) Screening: covered once if your at risk. You're considered to be at risk if you have a family history of AAA or a male between the age of 65-75 who smoking at least 100 cigarettes in your lifetime.  Lung Cancer Screening: covers low dose CT scan once per year if you meet all of the following conditions: (1) Age 55-77; (2) No signs or symptoms of lung cancer; (3) Current smoker or have quit smoking within the last 15 years; (4) You have a tobacco smoking history of at least 20 pack years (packs per day x number of years you smoked); (5) You get a written order from a healthcare provider.  Glaucoma Screening: covered annually if you're considered high risk: (1) You have diabetes OR (2) Family history of glaucoma OR (3)  aged 50 and older OR (4)  American aged 65 and older  Osteoporosis Screening: covered every 2 years if you meet one of the following conditions: (1) Have a vertebral abnormality; (2) On glucocorticoid therapy for more than 3 months; (3) Have primary hyperparathyroidism; (4) On osteoporosis medications and need to assess response to drug therapy.  HIV Screening: covered annually if you're between the age of 15-65. Also covered annually if you are younger than 15 and older than 65 with risk factors for HIV infection. For pregnant patients, it is covered up to 3 times per pregnancy.    Immunizations:  Immunization Recommendations   Influenza Vaccine Annual influenza vaccination during flu season is recommended for all persons aged >= 6 months who do not have contraindications   Pneumococcal Vaccine   * Pneumococcal conjugate vaccine = PCV13 (Prevnar 13), PCV15 (Vaxneuvance), PCV20 (Prevnar 20)  * Pneumococcal polysaccharide vaccine = PPSV23 (Pneumovax) Adults 19-65 yo with certain risk factors or  if 65+ yo  If never received any pneumonia vaccine: recommend Prevnar 20 (PCV20)  Give PCV20 if previously received 1 dose of PCV13 or PPSV23   Hepatitis B Vaccine 3 dose series if at intermediate or high risk (ex: diabetes, end stage renal disease, liver disease)   Respiratory syncytial virus (RSV) Vaccine - COVERED BY MEDICARE PART D  * RSVPreF3 (Arexvy) CDC recommends that adults 60 years of age and older may receive a single dose of RSV vaccine using shared clinical decision-making (SCDM)   Tetanus (Td) Vaccine - COST NOT COVERED BY MEDICARE PART B Following completion of primary series, a booster dose should be given every 10 years to maintain immunity against tetanus. Td may also be given as tetanus wound prophylaxis.   Tdap Vaccine - COST NOT COVERED BY MEDICARE PART B Recommended at least once for all adults. For pregnant patients, recommended with each pregnancy.   Shingles Vaccine (Shingrix) - COST NOT COVERED BY MEDICARE PART B  2 shot series recommended in those 19 years and older who have or will have weakened immune systems or those 50 years and older     Health Maintenance Due:      Topic Date Due   • Hepatitis C Screening  Never done   • HIV Screening  Never done   • Lung Cancer Screening  02/04/2026   • Colorectal Cancer Screening  05/12/2033     Immunizations Due:      Topic Date Due   • COVID-19 Vaccine (4 - 2024-25 season) 09/01/2024     Advance Directives   What are advance directives?  Advance directives are legal documents that state your wishes and plans for medical care. These plans are made ahead of time in case you lose your ability to make decisions for yourself. Advance directives can apply to any medical decision, such as the treatments you want, and if you want to donate organs.   What are the types of advance directives?  There are many types of advance directives, and each state has rules about how to use them. You may choose a combination of any of the following:  Living will:  This  is a written record of the treatment you want. You can also choose which treatments you do not want, which to limit, and which to stop at a certain time. This includes surgery, medicine, IV fluid, and tube feedings.   Durable power of  for healthcare (DPAHC):  This is a written record that states who you want to make healthcare choices for you when you are unable to make them for yourself. This person, called a proxy, is usually a family member or a friend. You may choose more than 1 proxy.  Do not resuscitate (DNR) order:  A DNR order is used in case your heart stops beating or you stop breathing. It is a request not to have certain forms of treatment, such as CPR. A DNR order may be included in other types of advance directives.  Medical directive:  This covers the care that you want if you are in a coma, near death, or unable to make decisions for yourself. You can list the treatments you want for each condition. Treatment may include pain medicine, surgery, blood transfusions, dialysis, IV or tube feedings, and a ventilator (breathing machine).  Values history:  This document has questions about your views, beliefs, and how you feel and think about life. This information can help others choose the care that you would choose.  Why are advance directives important?  An advance directive helps you control your care. Although spoken wishes may be used, it is better to have your wishes written down. Spoken wishes can be misunderstood, or not followed. Treatments may be given even if you do not want them. An advance directive may make it easier for your family to make difficult choices about your care.   Weight Management   Why it is important to manage your weight:  Being overweight increases your risk of health conditions such as heart disease, high blood pressure, type 2 diabetes, and certain types of cancer. It can also increase your risk for osteoarthritis, sleep apnea, and other respiratory problems. Aim  for a slow, steady weight loss. Even a small amount of weight loss can lower your risk of health problems.  How to lose weight safely:  A safe and healthy way to lose weight is to eat fewer calories and get regular exercise. You can lose up about 1 pound a week by decreasing the number of calories you eat by 500 calories each day.   Healthy meal plan for weight management:  A healthy meal plan includes a variety of foods, contains fewer calories, and helps you stay healthy. A healthy meal plan includes the following:  Eat whole-grain foods more often.  A healthy meal plan should contain fiber. Fiber is the part of grains, fruits, and vegetables that is not broken down by your body. Whole-grain foods are healthy and provide extra fiber in your diet. Some examples of whole-grain foods are whole-wheat breads and pastas, oatmeal, brown rice, and bulgur.  Eat a variety of vegetables every day.  Include dark, leafy greens such as spinach, kale, lisandra greens, and mustard greens. Eat yellow and orange vegetables such as carrots, sweet potatoes, and winter squash.   Eat a variety of fruits every day.  Choose fresh or canned fruit (canned in its own juice or light syrup) instead of juice. Fruit juice has very little or no fiber.  Eat low-fat dairy foods.  Drink fat-free (skim) milk or 1% milk. Eat fat-free yogurt and low-fat cottage cheese. Try low-fat cheeses such as mozzarella and other reduced-fat cheeses.  Choose meat and other protein foods that are low in fat.  Choose beans or other legumes such as split peas or lentils. Choose fish, skinless poultry (chicken or turkey), or lean cuts of red meat (beef or pork). Before you cook meat or poultry, cut off any visible fat.   Use less fat and oil.  Try baking foods instead of frying them. Add less fat, such as margarine, sour cream, regular salad dressing and mayonnaise to foods. Eat fewer high-fat foods. Some examples of high-fat foods include french fries, doughnuts, ice  cream, and cakes.  Eat fewer sweets.  Limit foods and drinks that are high in sugar. This includes candy, cookies, regular soda, and sweetened drinks.  Exercise:  Exercise at least 30 minutes per day on most days of the week. Some examples of exercise include walking, biking, dancing, and swimming. You can also fit in more physical activity by taking the stairs instead of the elevator or parking farther away from stores. Ask your healthcare provider about the best exercise plan for you.      © Copyright [employer] 2018 Information is for End User's use only and may not be sold, redistributed or otherwise used for commercial purposes. All illustrations and images included in CareNotes® are the copyrighted property of [employer] or [employer] [place]

## 2025-05-18 DIAGNOSIS — J43.2 CENTRILOBULAR EMPHYSEMA (HCC): ICD-10-CM

## 2025-05-19 RX ORDER — FLUTICASONE FUROATE, UMECLIDINIUM BROMIDE AND VILANTEROL TRIFENATATE 100; 62.5; 25 UG/1; UG/1; UG/1
1 POWDER RESPIRATORY (INHALATION) DAILY
Qty: 60 BLISTER | Refills: 0 | Status: SHIPPED | OUTPATIENT
Start: 2025-05-19

## 2025-05-19 RX ORDER — ROFLUMILAST 500 UG/1
500 TABLET ORAL DAILY
Qty: 30 TABLET | Refills: 5 | Status: SHIPPED | OUTPATIENT
Start: 2025-05-19

## 2025-06-10 ENCOUNTER — TELEPHONE (OUTPATIENT)
Age: 55
End: 2025-06-10

## 2025-06-16 DIAGNOSIS — J43.2 CENTRILOBULAR EMPHYSEMA (HCC): ICD-10-CM

## 2025-06-16 RX ORDER — FLUTICASONE FUROATE, UMECLIDINIUM BROMIDE AND VILANTEROL TRIFENATATE 100; 62.5; 25 UG/1; UG/1; UG/1
1 POWDER RESPIRATORY (INHALATION) DAILY
Qty: 60 BLISTER | Refills: 5 | Status: SHIPPED | OUTPATIENT
Start: 2025-06-16

## 2025-07-03 ENCOUNTER — OFFICE VISIT (OUTPATIENT)
Dept: PULMONOLOGY | Facility: CLINIC | Age: 55
End: 2025-07-03
Payer: MEDICARE

## 2025-07-03 VITALS
SYSTOLIC BLOOD PRESSURE: 120 MMHG | DIASTOLIC BLOOD PRESSURE: 80 MMHG | OXYGEN SATURATION: 96 % | TEMPERATURE: 97.6 F | HEART RATE: 76 BPM | BODY MASS INDEX: 29.94 KG/M2 | WEIGHT: 174.4 LBS

## 2025-07-03 DIAGNOSIS — F17.211 CIGARETTE NICOTINE DEPENDENCE IN REMISSION: Primary | ICD-10-CM

## 2025-07-03 DIAGNOSIS — J44.0 CHRONIC OBSTRUCTIVE PULMONARY DISEASE WITH ACUTE LOWER RESPIRATORY INFECTION (HCC): ICD-10-CM

## 2025-07-03 PROCEDURE — G2211 COMPLEX E/M VISIT ADD ON: HCPCS | Performed by: INTERNAL MEDICINE

## 2025-07-03 PROCEDURE — 99214 OFFICE O/P EST MOD 30 MIN: CPT | Performed by: INTERNAL MEDICINE

## 2025-07-03 NOTE — ASSESSMENT & PLAN NOTE
Neal is doing well with regards to his COPD.  His FEV1 does not fall between 15 and 45 in fact its too good to qualify for valves at this time.  He will maintain his exercise program triple therapy with Trelegy and as needed albuterol.

## 2025-07-03 NOTE — PROGRESS NOTES
Name: Neal Weston      : 1970      MRN: [id number]  Encounter Provider: Tamara Parr DO  Encounter Date: 7/3/2025   [facility]  :  Assessment & Plan  Cigarette nicotine dependence in remission    Orders:    CT Lung Screening Program; Future    Chronic obstructive pulmonary disease with acute lower respiratory infection (HCC)  Neal is doing well with regards to his COPD.  His FEV1 does not fall between 15 and 45 in fact its too good to qualify for valves at this time.  He will maintain his exercise program triple therapy with Trelegy and as needed albuterol.             History of Present Illness   HPI  Neal Weston is a 54 y.o. male who presents for follow-up of his COPD.  He denies any new complaints uses his rescue inhaler once or twice a month stable on Trelegy.  History obtained from: patient    Review of Systems   Constitutional: Negative.    HENT: Negative.     Eyes: Negative.    Respiratory:  Negative for shortness of breath.    Cardiovascular: Negative.    Gastrointestinal: Negative.    Endocrine: Negative.    Genitourinary: Negative.    Allergic/Immunologic: Negative.    Neurological: Negative.    Hematological: Negative.    Psychiatric/Behavioral: Negative.       Medical History Reviewed by provider this encounter:     .     Objective   /80 (BP Location: Left arm, Patient Position: Sitting, Cuff Size: Standard)   Pulse 76   Temp 97.6 °F (36.4 °C)   Wt 79.1 kg (174 lb 6.4 oz)   SpO2 96%   BMI 29.94 kg/m²      Physical Exam  Constitutional:       Appearance: He is well-developed.   HENT:      Head: Normocephalic and atraumatic.     Eyes:      Pupils: Pupils are equal, round, and reactive to light.       Cardiovascular:      Rate and Rhythm: Normal rate and regular rhythm.      Heart sounds: No murmur heard.  Pulmonary:      Effort: Pulmonary effort is normal. No respiratory distress.      Breath sounds: Normal breath sounds. No  wheezing or rales.   Abdominal:      Palpations: Abdomen is soft.     Musculoskeletal:      Cervical back: Normal range of motion and neck supple.     Skin:     General: Skin is warm and dry.     Neurological:      Mental Status: He is alert and oriented to person, place, and time.         Administrative Statements   I have spent a total time of 30 minutes in caring for this patient on the day of the visit/encounter including Diagnostic results, Prognosis, Documenting in the medical record, Reviewing/placing orders in the medical record (including tests, medications, and/or procedures), and Obtaining or reviewing history  .

## 2025-07-08 DIAGNOSIS — R09.81 NASAL CONGESTION: ICD-10-CM

## 2025-07-10 RX ORDER — FLUTICASONE PROPIONATE 50 MCG
1 SPRAY, SUSPENSION (ML) NASAL DAILY
Qty: 16 G | Refills: 0 | Status: SHIPPED | OUTPATIENT
Start: 2025-07-10

## 2025-07-31 LAB
ALBUMIN SERPL-MCNC: 4.4 G/DL (ref 3.8–4.9)
ALP SERPL-CCNC: 63 IU/L (ref 44–121)
ALT SERPL-CCNC: 18 IU/L (ref 0–44)
AST SERPL-CCNC: 20 IU/L (ref 0–40)
BILIRUB SERPL-MCNC: 0.4 MG/DL (ref 0–1.2)
BUN SERPL-MCNC: 18 MG/DL (ref 6–24)
BUN/CREAT SERPL: 16 (ref 9–20)
CALCIUM SERPL-MCNC: 9.6 MG/DL (ref 8.7–10.2)
CHLORIDE SERPL-SCNC: 104 MMOL/L (ref 96–106)
CHOLEST SERPL-MCNC: 121 MG/DL (ref 100–199)
CHOLEST/HDLC SERPL: 3 RATIO (ref 0–5)
CO2 SERPL-SCNC: 22 MMOL/L (ref 20–29)
CREAT SERPL-MCNC: 1.12 MG/DL (ref 0.76–1.27)
EGFR: 77 ML/MIN/1.73
GLOBULIN SER-MCNC: 2.1 G/DL (ref 1.5–4.5)
GLUCOSE SERPL-MCNC: 103 MG/DL (ref 70–99)
HDLC SERPL-MCNC: 40 MG/DL
LDLC SERPL CALC-MCNC: 59 MG/DL (ref 0–99)
MICRODELETION SYND BLD/T FISH: NORMAL
POTASSIUM SERPL-SCNC: 4.8 MMOL/L (ref 3.5–5.2)
PROT SERPL-MCNC: 6.5 G/DL (ref 6–8.5)
SL AMB VLDL CHOLESTEROL CALC: 22 MG/DL (ref 5–40)
SODIUM SERPL-SCNC: 141 MMOL/L (ref 134–144)
TRIGL SERPL-MCNC: 125 MG/DL (ref 0–149)

## (undated) DEVICE — SILVER-COATED ANTIBACTERIAL BARRIER DRESSING: Brand: ACTICOAT SURGIC 10X25CM 5PK US

## (undated) DEVICE — 1200CC GUARDIAN II: Brand: GUARDIAN

## (undated) DEVICE — ANTIBACTERIAL UNDYED BRAIDED (POLYGLACTIN 910), SYNTHETIC ABSORBABLE SUTURE: Brand: COATED VICRYL

## (undated) DEVICE — ADHESIVE SKN CLSR HISTOACRYL FLEX 0.5ML LF

## (undated) DEVICE — GLOVE EXAM NON-STRL NTRL PLUS LRG PF

## (undated) DEVICE — GAUZE SPONGES,16 PLY: Brand: CURITY

## (undated) DEVICE — 32 FR STRAIGHT – SOFT PVC CATHETER: Brand: PVC THORACIC CATHETERS

## (undated) DEVICE — SORIN VEIN DISTENTION KIT

## (undated) DEVICE — OASIS DRAIN, DUAL, IN-LINE, ATS COMPATIBLE: Brand: OASIS

## (undated) DEVICE — SUCTION CATH 18 FR

## (undated) DEVICE — SUT SILK 3-0 SH CR/8 18 IN C013D

## (undated) DEVICE — BONE WAX WHITE: Brand: BONE WAX WHITE

## (undated) DEVICE — CHLORAPREP HI-LITE 10.5ML ORANGE

## (undated) DEVICE — SINGLE-USE POLYPECTOMY SNARE: Brand: SENSATION SHORT THROW

## (undated) DEVICE — SUT SILK 2 60 IN SA8H

## (undated) DEVICE — BLANKET HYPOTHERMIA ADULT GAYMAR

## (undated) DEVICE — AORTIC PUNCH 5.2 MM DISP

## (undated) DEVICE — INTENDED FOR TISSUE SEPARATION, AND OTHER PROCEDURES THAT REQUIRE A SHARP SURGICAL BLADE TO PUNCTURE OR CUT.: Brand: BARD-PARKER ® CARBON RIB-BACK BLADES

## (undated) DEVICE — GLOVE INDICATOR PI UNDERGLOVE SZ 8 BLUE

## (undated) DEVICE — SUT PDS PLUS 1 CTB 36 IN PDPB359T

## (undated) DEVICE — SUT PROLENE 6-0 C-1/C-1 30 IN 8307H

## (undated) DEVICE — "MH-443 SUCTION VALVE F/EVIS140 EVIS160": Brand: SUCTION VALVE

## (undated) DEVICE — 3000CC GUARDIAN II: Brand: GUARDIAN

## (undated) DEVICE — VASOVIEW HEMOPRO 2: Brand: VASOVIEW HEMOPRO 2

## (undated) DEVICE — SUT ETHIBOND 2-0 SH-1/SH-1 30 IN X763H

## (undated) DEVICE — SUT SILK 0 CT-1 30 IN 424H

## (undated) DEVICE — 1/2 FORCE SURGICAL SPRING CLIP: Brand: STEALTH® SPRING CLIP

## (undated) DEVICE — STERNAL WIRE

## (undated) DEVICE — EVERGRIP INSERT SET 61MM: Brand: FOGARTY EVERGRIP

## (undated) DEVICE — INTENDED FOR TISSUE SEPARATION, AND OTHER PROCEDURES THAT REQUIRE A SHARP SURGICAL BLADE TO PUNCTURE OR CUT.: Brand: BARD-PARKER SAFETY BLADES SIZE 15, STERILE

## (undated) DEVICE — 32 FR RIGHT ANGLE – SOFT PVC CATHETER: Brand: PVC THORACIC CATHETERS

## (undated) DEVICE — BLADE BEAVER MINI SZ 69

## (undated) DEVICE — 3M™ TEGADERM™ CHG DRESSING 25/CARTON 4 CARTONS/CASE 1659: Brand: TEGADERM™

## (undated) DEVICE — TUBING AUX CHANNEL

## (undated) DEVICE — LIGACLIP MCA MULTIPLE CLIP APPLIERS, 20 SMALL CLIPS: Brand: LIGACLIP

## (undated) DEVICE — CATH CVP 5FR SINGLE LUMEN SAFETY TRAY

## (undated) DEVICE — AIRLIFE™  ADULT CUSHION NASAL CANNULA WITH 7 FOOT (2.1 M) CRUSH-RESISTANT OXYGEN TUBING, AND U/CONNECT-IT ADAPTER: Brand: AIRLIFE™

## (undated) DEVICE — UMBILICAL TAPE: Brand: DEROYAL

## (undated) DEVICE — RECIP.STERNUM SAW BLADE 34/7.5/0.7MM: Brand: AESCULAP

## (undated) DEVICE — TRAP POLY

## (undated) DEVICE — THERMOFLECT BLANKET, L, 25EA                               TS THERMOFLECT BLANKET, 48" X 84", SILVER, 5/BG, 5 BG/CS NW: Brand: THERMOFLECT

## (undated) DEVICE — "MH-438 A/W VLVE F/140 EVIS-140": Brand: AIR/WATER VALVE

## (undated) DEVICE — TUBING BUBBLE CLEAR 5MM X 100 FT NS

## (undated) DEVICE — CATH STRAIGHT RED RUBBER 20 FR

## (undated) DEVICE — SUT VICRYL 2 TP-1 27 IN J849G

## (undated) DEVICE — TRAY FOLEY 16FR SURESTEP TEMP SENS URIMETER STAT LOK

## (undated) DEVICE — DISPOSABLE BIOPSY VALVE MAJ-1555: Brand: SINGLE USE BIOPSY VALVE (STERILE)

## (undated) DEVICE — TOWEL SET X-RAY

## (undated) DEVICE — BRUSH ENDO CLEANING DBL-HEADER

## (undated) DEVICE — PLUMEPEN PRO 10FT

## (undated) DEVICE — ACE WRAP 6 IN UNSTERILE

## (undated) DEVICE — SOLIDIFIER FLUID WASTE CONTROL 1500ML

## (undated) DEVICE — GLOVE SRG BIOGEL ECLIPSE 7.5

## (undated) DEVICE — HEMOCLIP CARTRIDGE LRG

## (undated) DEVICE — LUBRICANT SURGILUBE TUBE 4 OZ  FLIP TOP

## (undated) DEVICE — MEDI-VAC YANKAUER SUCTION HANDLE: Brand: CARDINAL HEALTH

## (undated) DEVICE — ALCON OPHTHALMIC KNIFE 15 °: Brand: ALCON

## (undated) DEVICE — SILVER-COATED ANTIBACTERIAL BARRIER DRESSING: Brand: ACTICOAT SURGIC 10X12CM 5PK US

## (undated) DEVICE — SUT PROLENE 7-0 BV175-8/BV175-8 24 IN EPM8747

## (undated) DEVICE — "MAJ-901 WATER CONTAINER SET CV-160/140": Brand: WATER CONTAINER

## (undated) DEVICE — PACK CABG PBDS

## (undated) DEVICE — SUT PROLENE 4-0 RB-1/RB-1 36 IN 8557H